# Patient Record
Sex: FEMALE | Race: BLACK OR AFRICAN AMERICAN | NOT HISPANIC OR LATINO | Employment: FULL TIME | URBAN - METROPOLITAN AREA
[De-identification: names, ages, dates, MRNs, and addresses within clinical notes are randomized per-mention and may not be internally consistent; named-entity substitution may affect disease eponyms.]

---

## 2023-12-22 ENCOUNTER — OFFICE VISIT (OUTPATIENT)
Dept: URGENT CARE | Facility: CLINIC | Age: 58
End: 2023-12-22
Payer: COMMERCIAL

## 2023-12-22 VITALS
DIASTOLIC BLOOD PRESSURE: 77 MMHG | SYSTOLIC BLOOD PRESSURE: 123 MMHG | RESPIRATION RATE: 17 BRPM | WEIGHT: 240.5 LBS | OXYGEN SATURATION: 96 % | TEMPERATURE: 99 F | BODY MASS INDEX: 40.07 KG/M2 | HEIGHT: 65 IN | HEART RATE: 96 BPM

## 2023-12-22 DIAGNOSIS — J10.1 INFLUENZA A: ICD-10-CM

## 2023-12-22 DIAGNOSIS — R09.81 NASAL CONGESTION: Primary | ICD-10-CM

## 2023-12-22 DIAGNOSIS — R05.1 ACUTE COUGH: ICD-10-CM

## 2023-12-22 LAB
CTP QC/QA: YES
CTP QC/QA: YES
POC MOLECULAR INFLUENZA A AGN: POSITIVE
POC MOLECULAR INFLUENZA B AGN: NEGATIVE
SARS-COV-2 AG RESP QL IA.RAPID: NEGATIVE

## 2023-12-22 PROCEDURE — 87811 SARS CORONAVIRUS 2 ANTIGEN POCT, MANUAL READ: ICD-10-PCS | Mod: QW,S$GLB,, | Performed by: NURSE PRACTITIONER

## 2023-12-22 PROCEDURE — 99204 PR OFFICE/OUTPT VISIT, NEW, LEVL IV, 45-59 MIN: ICD-10-PCS | Mod: S$GLB,,, | Performed by: NURSE PRACTITIONER

## 2023-12-22 PROCEDURE — 99204 OFFICE O/P NEW MOD 45 MIN: CPT | Mod: S$GLB,,, | Performed by: NURSE PRACTITIONER

## 2023-12-22 PROCEDURE — 87811 SARS-COV-2 COVID19 W/OPTIC: CPT | Mod: QW,S$GLB,, | Performed by: NURSE PRACTITIONER

## 2023-12-22 PROCEDURE — 87502 POCT INFLUENZA A/B MOLECULAR: ICD-10-PCS | Mod: QW,S$GLB,, | Performed by: NURSE PRACTITIONER

## 2023-12-22 PROCEDURE — 87502 INFLUENZA DNA AMP PROBE: CPT | Mod: QW,S$GLB,, | Performed by: NURSE PRACTITIONER

## 2023-12-22 RX ORDER — PROMETHAZINE HYDROCHLORIDE AND DEXTROMETHORPHAN HYDROBROMIDE 6.25; 15 MG/5ML; MG/5ML
5 SYRUP ORAL EVERY 8 HOURS PRN
Qty: 100 ML | Refills: 0 | Status: SHIPPED | OUTPATIENT
Start: 2023-12-22 | End: 2023-12-29

## 2023-12-22 RX ORDER — OSELTAMIVIR PHOSPHATE 75 MG/1
75 CAPSULE ORAL 2 TIMES DAILY
Qty: 10 CAPSULE | Refills: 0 | Status: SHIPPED | OUTPATIENT
Start: 2023-12-22 | End: 2023-12-27

## 2023-12-22 RX ORDER — AZELASTINE 1 MG/ML
1 SPRAY, METERED NASAL 2 TIMES DAILY
Qty: 30 ML | Refills: 0 | Status: SHIPPED | OUTPATIENT
Start: 2023-12-22 | End: 2024-12-21

## 2023-12-22 NOTE — PATIENT INSTRUCTIONS
FLU  Your Rapid FLU test was POSITIVE FOR INFLUENZA A  If your condition worsens or fails to improve we recommend that you receive another evaluation at the ER immediately or contact your PCP to discuss your concerns or return here. You must understand that you've received an urgent care treatment only and that you may be released before all your medical problems are known or treated. You the patient will arrange for follouwp care as instructed.   -  Take full course of Tamilfu as prescribed  -  Flonase (fluticasone) is a nasal spray which is available over the counter and may help with your symptoms.   -  Zyrtec D, Claritin D or Allegra D can also help with symptoms of congestion and drainage.   -  If you just have drainage you can take plain Zyrtec, Claritin or Allegra   -  Rest and fluids are also important.   -  Tylenol or ibuprofen can also be used as directed for pain unless you have an allergy to them or medical condition such as stomach ulcers, kidney or liver disease or blood thinners etc for which you should not be taking these type of medications.   - Do not share any utensils or share drinks   - Wash hands frequently.       You must understand that you've received an Urgent Care treatment only and that you may be released before all your medical problems are known or treated. You, the patient, will arrange for follow up care as instructed.  Follow up with your PCP or specialty clinic as directed in the next 1-2 weeks if not improved or as needed.  You can call [phone] to schedule an appointment with the appropriate provider.  If your condition worsens we recommend that you receive another evaluation at the emergency room immediately or contact your primary medical clinics after hours call service to discuss your concerns.  Please return here or go to the Emergency Department for any concerns or worsening of condition.    If you were prescribed a narcotic or controlled medication, do not drive  or operate heavy equipment or machinery while taking these medications.    Thank you for choosing [facility]!    Our goal in the Urgent Care is to always provide outstanding medical care. You may receive a survey by mail or e-mail in the next week regarding your experience today. We would greatly appreciate you completing and returning the survey. Your feedback provides us with a way to recognize our staff who provide very good care, and it helps us learn how to improve when your experience was below our aspiration of excellence.      We appreciate you trusting us with your medical care. We hope you feel better soon. We will be happy to take care of you for all of your future medical needs.   This note was prepared using voice-recognition software.  Although efforts are made to proofread the note, some errors may persist in the final document.     Sincerely,    Edmund Blanco DNP, FNP-C

## 2023-12-22 NOTE — LETTER
December 22, 2023      [facility] [place]  [address]  [facility] [address]  Phone: [phone]  Fax: [phone]       Patient: Madelaine Barros   YOB: 1965  Date of Visit: 12/22/2023    To Whom It May Concern:    ROBSON Barros  was at [facility] on 12/22/2023. The patient may return to work on 12/26/2023 with no restrictions. If you have any questions or concerns, or if I can be of further assistance, please do not hesitate to contact me.    Sincerely,    Edmund Blanco DNP FNP-C

## 2023-12-22 NOTE — PROGRESS NOTES
"Subjective:      Patient ID: Madelaine Barros is a 58 y.o. female.    Vitals:  height is 5' 5" (1.651 m) and weight is 109.1 kg (240 lb 8.4 oz). Her oral temperature is 98.9 °F (37.2 °C). Her blood pressure is 123/77 and her pulse is 96. Her respiration is 17 and oxygen saturation is 96%.     Chief Complaint: Sinus Problem    58 y.o femlae who presents today with chief complaint of cough, nasal congestion, and sinus pressure x12 days.  Patient has been using saline nasal cleansing and Muccinex and reports no improvement.     Sinus Problem  This is a new problem. The current episode started 1 to 4 weeks ago. There has been no fever. Associated symptoms include congestion, coughing and sinus pressure. Pertinent negatives include no chills, diaphoresis, ear pain, headaches, hoarse voice, neck pain, shortness of breath, sneezing, sore throat or swollen glands. Past treatments include oral decongestants. The treatment provided no relief.     Constitution: Negative for chills, sweating, fatigue, fever, generalized weakness and international travel in last 60 days.   HENT:  Positive for congestion, postnasal drip and sinus pressure. Negative for ear pain, nosebleeds, foreign body in nose, sinus pain and sore throat.    Neck: Negative for neck pain.   Cardiovascular:  Negative for chest pain and palpitations.   Respiratory:  Positive for cough. Negative for chest tightness, sputum production, bloody sputum, COPD, shortness of breath, stridor, wheezing and asthma.    Skin:  Negative for rash.   Allergic/Immunologic: Negative for asthma and sneezing.   Neurological:  Negative for headaches.      Objective:     Physical Exam   Constitutional: She is oriented to person, place, and time. She appears well-developed. She is cooperative.  Non-toxic appearance. She does not appear ill. No distress.   HENT:   Head: Normocephalic and atraumatic.   Ears:   Right Ear: Hearing, tympanic membrane, external ear and ear canal normal. " impacted cerumen  Left Ear: Hearing, tympanic membrane, external ear and ear canal normal. impacted cerumen  Nose: Rhinorrhea and congestion present. No mucosal edema or nasal deformity. No epistaxis. Right sinus exhibits no maxillary sinus tenderness and no frontal sinus tenderness. Left sinus exhibits no maxillary sinus tenderness and no frontal sinus tenderness.   Mouth/Throat: Uvula is midline, oropharynx is clear and moist and mucous membranes are normal. No trismus in the jaw. Normal dentition. No uvula swelling. No oropharyngeal exudate, posterior oropharyngeal edema or posterior oropharyngeal erythema.   Eyes: Conjunctivae and lids are normal. No scleral icterus.   Neck: Trachea normal and phonation normal. Neck supple. No edema present. No erythema present. No neck rigidity present.   Cardiovascular: Normal rate, regular rhythm, normal heart sounds and normal pulses.   Pulmonary/Chest: Effort normal and breath sounds normal. No stridor. No respiratory distress. She has no decreased breath sounds. She has no wheezes. She has no rhonchi. She has no rales. She exhibits no tenderness.   Abdominal: Normal appearance.   Musculoskeletal: Normal range of motion.         General: No deformity. Normal range of motion.   Neurological: She is alert and oriented to person, place, and time. She exhibits normal muscle tone. Coordination normal.   Skin: Skin is warm, dry, intact, not diaphoretic and not pale.   Psychiatric: Her speech is normal and behavior is normal. Judgment and thought content normal.   Nursing note and vitals reviewed.      Assessment:     1. Nasal congestion    2. Influenza A    3. Acute cough      Results for orders placed or performed in visit on 12/22/23   POCT Influenza A/B MOLECULAR   Result Value Ref Range    POC Molecular Influenza A Ag Positive (A) Negative, Not Reported    POC Molecular Influenza B Ag Negative Negative, Not Reported     Acceptable Yes    SARS Coronavirus 2  Antigen, POCT Manual Read   Result Value Ref Range    SARS Coronavirus 2 Antigen Negative Negative     Acceptable Yes         Plan:   Nasal congestion  -     POCT Influenza A/B MOLECULAR  -     SARS Coronavirus 2 Antigen, POCT Manual Read  -     azelastine (ASTELIN) 137 mcg (0.1 %) nasal spray; 1 spray (137 mcg total) by Nasal route 2 (two) times daily.  Dispense: 30 mL; Refill: 0    Influenza A  -     oseltamivir (TAMIFLU) 75 MG capsule; Take 1 capsule (75 mg total) by mouth 2 (two) times daily. for 5 days  Dispense: 10 capsule; Refill: 0    Acute cough  -     promethazine-dextromethorphan (PROMETHAZINE-DM) 6.25-15 mg/5 mL Syrp; Take 5 mLs by mouth every 8 (eight) hours as needed (cough).  Dispense: 100 mL; Refill: 0          FLU  Your Rapid FLU test was POSITIVE FOR INFLUENZA A  If your condition worsens or fails to improve we recommend that you receive another evaluation at the ER immediately or contact your PCP to discuss your concerns or return here. You must understand that you've received an urgent care treatment only and that you may be released before all your medical problems are known or treated. [facility] the [facility].   -  Take full course of Tamilfu as prescribed  -  Flonase (fluticasone) is a nasal spray which is available over the counter and may help with your symptoms.   -  Zyrtec D, Claritin D or Allegra D can also help with symptoms of congestion and drainage.   -  If you just have drainage you can take plain Zyrtec, Claritin or Allegra   -  Rest and fluids are also important.   -  Tylenol or ibuprofen can also be used as directed for pain unless you have an allergy to them or medical condition such as stomach ulcers, kidney or liver disease or blood thinners etc for which you should not be taking these type of medications.   - Do not share any utensils or share drinks   - Wash hands frequently       You must understand that you've received an  Urgent Care treatment only and that you may be released before all your medical problems are known or treated. You, the patient, will arrange for follow up care as instructed.  Follow up with your PCP or specialty clinic as directed in the next 1-2 weeks if not improved or as needed.  You can call [phone] to schedule an appointment with the appropriate provider.  If your condition worsens we recommend that you receive another evaluation at the emergency room immediately or contact your primary medical clinics after hours call service to discuss your concerns.  Please return here or go to the Emergency Department for any concerns or worsening of condition.    If you were prescribed a narcotic or controlled medication, do not drive or operate heavy equipment or machinery while taking these medications.    Thank you for choosing [facility]!    Our goal in the Urgent Care is to always provide outstanding medical care. You may receive a survey by mail or e-mail in the next week regarding your experience today. We would greatly appreciate you completing and returning the survey. Your feedback provides us with a way to recognize our staff who provide very good care, and it helps us learn how to improve when your experience was below our aspiration of excellence.      We appreciate you trusting us with your medical care. We hope you feel better soon. We will be happy to take care of you for all of your future medical needs.   This note was prepared using voice-recognition software.  Although efforts are made to proofread the note, some errors may persist in the final document.     Sincerely,    Edmund Blanco DNP, FNP-C      Additional MDM:     Heart Failure Score:   COPD = No

## 2025-03-02 ENCOUNTER — HOSPITAL ENCOUNTER (INPATIENT)
Facility: HOSPITAL | Age: 60
LOS: 22 days | Discharge: HOME HEALTH | DRG: 377 | End: 2025-03-25
Attending: EMERGENCY MEDICINE | Admitting: HOSPITALIST
Payer: COMMERCIAL

## 2025-03-02 DIAGNOSIS — I10 HYPERTENSION, UNSPECIFIED TYPE: ICD-10-CM

## 2025-03-02 DIAGNOSIS — Z13.6 SCREENING FOR CARDIOVASCULAR CONDITION: ICD-10-CM

## 2025-03-02 DIAGNOSIS — Z93.1 PEG (PERCUTANEOUS ENDOSCOPIC GASTROSTOMY) STATUS: ICD-10-CM

## 2025-03-02 DIAGNOSIS — R00.0 TACHYCARDIA: ICD-10-CM

## 2025-03-02 DIAGNOSIS — R13.10 DYSPHAGIA, UNSPECIFIED TYPE: ICD-10-CM

## 2025-03-02 DIAGNOSIS — R07.9 CHEST PAIN: ICD-10-CM

## 2025-03-02 DIAGNOSIS — K62.5 RECTAL BLEEDING: Primary | ICD-10-CM

## 2025-03-02 PROBLEM — J30.9 ALLERGIC RHINITIS: Status: RESOLVED | Noted: 2019-06-01 | Resolved: 2025-03-02

## 2025-03-02 PROBLEM — E78.5 HYPERLIPIDEMIA: Status: ACTIVE | Noted: 2019-06-01

## 2025-03-02 PROBLEM — N18.6 ESRD (END STAGE RENAL DISEASE): Status: ACTIVE | Noted: 2025-01-05

## 2025-03-02 PROBLEM — D59.39 ATYPICAL HUS (HEMOLYTIC UREMIC SYNDROME) WITH MUTATION IN THROMBOMODULIN GENE: Status: ACTIVE | Noted: 2025-01-05

## 2025-03-02 PROBLEM — K63.5 POLYP OF COLON: Status: RESOLVED | Noted: 2024-03-05 | Resolved: 2025-03-02

## 2025-03-02 PROBLEM — D59.4 MAHA (MICROANGIOPATHIC HEMOLYTIC ANEMIA): Status: ACTIVE | Noted: 2024-11-03

## 2025-03-02 PROBLEM — E43 UNSPECIFIED SEVERE PROTEIN-CALORIE MALNUTRITION: Status: ACTIVE | Noted: 2025-01-02

## 2025-03-02 PROBLEM — M17.11 ARTHRITIS OF RIGHT KNEE: Status: RESOLVED | Noted: 2019-06-01 | Resolved: 2025-03-02

## 2025-03-02 PROBLEM — I82.C11 ACUTE THROMBOSIS OF RIGHT INTERNAL JUGULAR VEIN: Status: ACTIVE | Noted: 2025-03-02

## 2025-03-02 PROBLEM — K21.9 GERD WITHOUT ESOPHAGITIS: Status: ACTIVE | Noted: 2024-08-15

## 2025-03-02 PROBLEM — N18.5 STAGE 5 CHRONIC KIDNEY DISEASE NOT ON CHRONIC DIALYSIS: Status: ACTIVE | Noted: 2024-11-22

## 2025-03-02 PROBLEM — I16.1 HYPERTENSIVE EMERGENCY: Status: RESOLVED | Noted: 2024-10-23 | Resolved: 2025-03-02

## 2025-03-02 PROBLEM — Z86.19 HISTORY OF HELICOBACTER PYLORI INFECTION: Status: ACTIVE | Noted: 2025-03-02

## 2025-03-02 PROBLEM — R74.01 ELEVATED TRANSAMINASE LEVEL: Status: ACTIVE | Noted: 2025-03-02

## 2025-03-02 PROBLEM — K44.9 HIATAL HERNIA: Status: RESOLVED | Noted: 2024-12-11 | Resolved: 2025-03-02

## 2025-03-02 PROBLEM — E51.2 WERNICKE ENCEPHALOPATHY: Status: ACTIVE | Noted: 2025-02-13

## 2025-03-02 PROBLEM — K26.9 DUODENAL ULCER: Status: ACTIVE | Noted: 2024-12-11

## 2025-03-02 PROBLEM — Z86.39 HISTORY OF WERNICKE'S ENCEPHALOPATHY: Status: ACTIVE | Noted: 2025-02-13

## 2025-03-02 PROBLEM — K22.2 SCHATZKI'S RING: Status: ACTIVE | Noted: 2024-12-11

## 2025-03-02 PROBLEM — A04.8 H. PYLORI INFECTION: Status: ACTIVE | Noted: 2025-03-02

## 2025-03-02 PROBLEM — K22.89: Status: ACTIVE | Noted: 2024-12-11

## 2025-03-02 LAB
ABO + RH BLD: NORMAL
ALBUMIN SERPL BCP-MCNC: 2.7 G/DL (ref 3.5–5.2)
ALLENS TEST: ABNORMAL
ALP SERPL-CCNC: 251 U/L (ref 40–150)
ALT SERPL W/O P-5'-P-CCNC: 478 U/L (ref 10–44)
ANION GAP SERPL CALC-SCNC: 18 MMOL/L (ref 8–16)
APTT PPP: 52.8 SEC (ref 21–32)
AST SERPL-CCNC: 733 U/L (ref 10–40)
BASOPHILS # BLD AUTO: 0.01 K/UL (ref 0–0.2)
BASOPHILS NFR BLD: 0.1 % (ref 0–1.9)
BILIRUB SERPL-MCNC: 2.2 MG/DL (ref 0.1–1)
BIPAP: 0
BLD GP AB SCN CELLS X3 SERPL QL: NORMAL
BUN SERPL-MCNC: 35 MG/DL (ref 6–20)
CALCIUM SERPL-MCNC: 8.3 MG/DL (ref 8.7–10.5)
CHLORIDE SERPL-SCNC: 99 MMOL/L (ref 95–110)
CO2 SERPL-SCNC: 18 MMOL/L (ref 23–29)
CREAT SERPL-MCNC: 6.1 MG/DL (ref 0.5–1.4)
D DIMER PPP IA.FEU-MCNC: 0.84 MG/L FEU
DIFFERENTIAL METHOD BLD: ABNORMAL
EOSINOPHIL # BLD AUTO: 0 K/UL (ref 0–0.5)
EOSINOPHIL NFR BLD: 0 % (ref 0–8)
ERYTHROCYTE [DISTWIDTH] IN BLOOD BY AUTOMATED COUNT: 19.9 % (ref 11.5–14.5)
ERYTHROCYTE [DISTWIDTH] IN BLOOD BY AUTOMATED COUNT: 21.2 % (ref 11.5–14.5)
EST. GFR  (NO RACE VARIABLE): 7.4 ML/MIN/1.73 M^2
FIO2: 21 %
GLUCOSE SERPL-MCNC: 81 MG/DL (ref 70–110)
HAV IGM SERPL QL IA: NORMAL
HBV CORE IGM SERPL QL IA: NORMAL
HBV SURFACE AG SERPL QL IA: NORMAL
HCT VFR BLD AUTO: 33.5 % (ref 37–48.5)
HCT VFR BLD AUTO: 45.9 % (ref 37–48.5)
HCV AB SERPL QL IA: NORMAL
HCV AB SERPL QL IA: NORMAL
HGB BLD-MCNC: 10.4 G/DL (ref 12–16)
HGB BLD-MCNC: 14.3 G/DL (ref 12–16)
HIV 1+2 AB+HIV1 P24 AG SERPL QL IA: NORMAL
IMM GRANULOCYTES # BLD AUTO: 0.03 K/UL (ref 0–0.04)
IMM GRANULOCYTES NFR BLD AUTO: 0.4 % (ref 0–0.5)
INFLUENZA A, MOLECULAR: NEGATIVE
INFLUENZA B, MOLECULAR: NEGATIVE
INR PPP: 2.1 (ref 0.8–1.2)
LDH SERPL L TO P-CCNC: 1.9 MMOL/L
LDH SERPL L TO P-CCNC: 3.13 MMOL/L (ref 0.5–2.2)
LYMPHOCYTES # BLD AUTO: 0.9 K/UL (ref 1–4.8)
LYMPHOCYTES NFR BLD: 12.1 % (ref 18–48)
MAGNESIUM SERPL-MCNC: 1.8 MG/DL (ref 1.6–2.6)
MCH RBC QN AUTO: 26.7 PG (ref 27–31)
MCH RBC QN AUTO: 27.1 PG (ref 27–31)
MCHC RBC AUTO-ENTMCNC: 31 G/DL (ref 32–36)
MCHC RBC AUTO-ENTMCNC: 31.2 G/DL (ref 32–36)
MCV RBC AUTO: 86 FL (ref 82–98)
MCV RBC AUTO: 87 FL (ref 82–98)
MONOCYTES # BLD AUTO: 0.5 K/UL (ref 0.3–1)
MONOCYTES NFR BLD: 6.3 % (ref 4–15)
NEUTROPHILS # BLD AUTO: 6 K/UL (ref 1.8–7.7)
NEUTROPHILS NFR BLD: 81.1 % (ref 38–73)
NRBC BLD-RTO: 0 /100 WBC
PLATELET # BLD AUTO: 104 K/UL (ref 150–450)
PLATELET # BLD AUTO: 116 K/UL (ref 150–450)
PMV BLD AUTO: ABNORMAL FL (ref 9.2–12.9)
PMV BLD AUTO: ABNORMAL FL (ref 9.2–12.9)
POC PERFORMED BY: NORMAL
POC TEMPERATURE: 37 C
POTASSIUM SERPL-SCNC: 3.8 MMOL/L (ref 3.5–5.1)
PROT SERPL-MCNC: 5.8 G/DL (ref 6–8.4)
PROTHROMBIN TIME: 21.9 SEC (ref 9–12.5)
RBC # BLD AUTO: 3.89 M/UL (ref 4–5.4)
RBC # BLD AUTO: 5.27 M/UL (ref 4–5.4)
RETICS/RBC NFR AUTO: 1.7 % (ref 0.5–2.5)
SAMPLE: ABNORMAL
SITE: ABNORMAL
SODIUM SERPL-SCNC: 135 MMOL/L (ref 136–145)
SPECIMEN OUTDATE: NORMAL
SPECIMEN SOURCE: NORMAL
SPECIMEN SOURCE: NORMAL
WBC # BLD AUTO: 7.42 K/UL (ref 3.9–12.7)
WBC # BLD AUTO: 9.22 K/UL (ref 3.9–12.7)

## 2025-03-02 PROCEDURE — 80053 COMPREHEN METABOLIC PANEL: CPT | Performed by: EMERGENCY MEDICINE

## 2025-03-02 PROCEDURE — 96360 HYDRATION IV INFUSION INIT: CPT | Mod: 59

## 2025-03-02 PROCEDURE — 96366 THER/PROPH/DIAG IV INF ADDON: CPT

## 2025-03-02 PROCEDURE — 83735 ASSAY OF MAGNESIUM: CPT | Performed by: EMERGENCY MEDICINE

## 2025-03-02 PROCEDURE — 63600175 PHARM REV CODE 636 W HCPCS

## 2025-03-02 PROCEDURE — 93005 ELECTROCARDIOGRAM TRACING: CPT

## 2025-03-02 PROCEDURE — 87389 HIV-1 AG W/HIV-1&-2 AB AG IA: CPT | Performed by: EMERGENCY MEDICINE

## 2025-03-02 PROCEDURE — 85045 AUTOMATED RETICULOCYTE COUNT: CPT | Performed by: HOSPITALIST

## 2025-03-02 PROCEDURE — 86803 HEPATITIS C AB TEST: CPT | Performed by: EMERGENCY MEDICINE

## 2025-03-02 PROCEDURE — 80074 ACUTE HEPATITIS PANEL: CPT

## 2025-03-02 PROCEDURE — G0378 HOSPITAL OBSERVATION PER HR: HCPCS

## 2025-03-02 PROCEDURE — 25000003 PHARM REV CODE 250

## 2025-03-02 PROCEDURE — 96361 HYDRATE IV INFUSION ADD-ON: CPT

## 2025-03-02 PROCEDURE — 96365 THER/PROPH/DIAG IV INF INIT: CPT

## 2025-03-02 PROCEDURE — 93010 ELECTROCARDIOGRAM REPORT: CPT | Mod: ,,, | Performed by: INTERNAL MEDICINE

## 2025-03-02 PROCEDURE — 83605 ASSAY OF LACTIC ACID: CPT

## 2025-03-02 PROCEDURE — 85379 FIBRIN DEGRADATION QUANT: CPT | Performed by: HOSPITALIST

## 2025-03-02 PROCEDURE — 85610 PROTHROMBIN TIME: CPT

## 2025-03-02 PROCEDURE — 86880 COOMBS TEST DIRECT: CPT | Performed by: HOSPITALIST

## 2025-03-02 PROCEDURE — 25000003 PHARM REV CODE 250: Performed by: EMERGENCY MEDICINE

## 2025-03-02 PROCEDURE — 87502 INFLUENZA DNA AMP PROBE: CPT

## 2025-03-02 PROCEDURE — 82803 BLOOD GASES ANY COMBINATION: CPT

## 2025-03-02 PROCEDURE — 96367 TX/PROPH/DG ADDL SEQ IV INF: CPT

## 2025-03-02 PROCEDURE — 85730 THROMBOPLASTIN TIME PARTIAL: CPT

## 2025-03-02 PROCEDURE — 25500020 PHARM REV CODE 255: Performed by: EMERGENCY MEDICINE

## 2025-03-02 PROCEDURE — 99900035 HC TECH TIME PER 15 MIN (STAT)

## 2025-03-02 PROCEDURE — 87040 BLOOD CULTURE FOR BACTERIA: CPT | Mod: 59

## 2025-03-02 PROCEDURE — 83010 ASSAY OF HAPTOGLOBIN QUANT: CPT | Performed by: HOSPITALIST

## 2025-03-02 PROCEDURE — 83615 LACTATE (LD) (LDH) ENZYME: CPT | Performed by: HOSPITALIST

## 2025-03-02 PROCEDURE — 96375 TX/PRO/DX INJ NEW DRUG ADDON: CPT

## 2025-03-02 PROCEDURE — 85027 COMPLETE CBC AUTOMATED: CPT | Performed by: HOSPITALIST

## 2025-03-02 PROCEDURE — 85025 COMPLETE CBC W/AUTO DIFF WBC: CPT

## 2025-03-02 PROCEDURE — 86901 BLOOD TYPING SEROLOGIC RH(D): CPT | Performed by: HOSPITALIST

## 2025-03-02 RX ORDER — CHOLECALCIFEROL (VITAMIN D3) 25 MCG
1 TABLET ORAL DAILY
COMMUNITY
Start: 2025-02-18 | End: 2025-03-20

## 2025-03-02 RX ORDER — ATORVASTATIN CALCIUM 80 MG/1
1 TABLET, FILM COATED ORAL DAILY
Status: ON HOLD | COMMUNITY
Start: 2025-02-17 | End: 2025-03-20

## 2025-03-02 RX ORDER — NAPROXEN SODIUM 220 MG/1
1 TABLET, FILM COATED ORAL DAILY
Status: ON HOLD | COMMUNITY
Start: 2025-02-18 | End: 2025-03-20

## 2025-03-02 RX ORDER — VANCOMYCIN 1.75 GRAM/500 ML IN 0.9 % SODIUM CHLORIDE INTRAVENOUS
25 ONCE
Status: COMPLETED | OUTPATIENT
Start: 2025-03-02 | End: 2025-03-02

## 2025-03-02 RX ORDER — FOLIC ACID 1 MG/1
1000 TABLET ORAL DAILY
COMMUNITY
Start: 2024-11-07

## 2025-03-02 RX ORDER — APIXABAN 5 MG/1
5 TABLET, FILM COATED ORAL 2 TIMES DAILY
COMMUNITY
Start: 2025-02-17

## 2025-03-02 RX ORDER — SCOPOLAMINE 1 MG/3D
1 PATCH, EXTENDED RELEASE TRANSDERMAL
COMMUNITY
Start: 2024-12-19 | End: 2025-03-03

## 2025-03-02 RX ORDER — HYDRALAZINE HYDROCHLORIDE 50 MG/1
50 TABLET, FILM COATED ORAL EVERY 8 HOURS
COMMUNITY
Start: 2024-11-07 | End: 2025-03-03

## 2025-03-02 RX ORDER — PANTOPRAZOLE SODIUM 40 MG/1
40 TABLET, DELAYED RELEASE ORAL DAILY
COMMUNITY
Start: 2025-02-17

## 2025-03-02 RX ORDER — LIDOCAINE HYDROCHLORIDE 20 MG/ML
JELLY TOPICAL
Status: COMPLETED | OUTPATIENT
Start: 2025-03-02 | End: 2025-03-02

## 2025-03-02 RX ORDER — MORPHINE SULFATE 4 MG/ML
4 INJECTION, SOLUTION INTRAMUSCULAR; INTRAVENOUS
Refills: 0 | Status: COMPLETED | OUTPATIENT
Start: 2025-03-02 | End: 2025-03-02

## 2025-03-02 RX ORDER — PANTOPRAZOLE SODIUM 40 MG/10ML
80 INJECTION, POWDER, LYOPHILIZED, FOR SOLUTION INTRAVENOUS
Status: COMPLETED | OUTPATIENT
Start: 2025-03-02 | End: 2025-03-02

## 2025-03-02 RX ORDER — ESCITALOPRAM OXALATE 10 MG/1
1 TABLET ORAL DAILY
COMMUNITY
Start: 2025-02-18 | End: 2025-03-20

## 2025-03-02 RX ORDER — CARVEDILOL 12.5 MG/1
12.5 TABLET ORAL 2 TIMES DAILY
COMMUNITY
Start: 2024-11-18 | End: 2025-03-03

## 2025-03-02 RX ORDER — FUROSEMIDE 20 MG/1
TABLET ORAL
COMMUNITY
End: 2025-03-03 | Stop reason: CLARIF

## 2025-03-02 RX ORDER — POLYETHYLENE GLYCOL 3350 17 G/17G
17 POWDER, FOR SOLUTION ORAL DAILY
COMMUNITY
Start: 2024-12-19 | End: 2025-03-03

## 2025-03-02 RX ORDER — THIAMINE HCL 100 MG
100 TABLET ORAL DAILY
COMMUNITY
Start: 2025-02-18

## 2025-03-02 RX ORDER — FAMOTIDINE 20 MG/1
20 TABLET, FILM COATED ORAL DAILY
COMMUNITY
Start: 2024-11-07 | End: 2025-03-03 | Stop reason: CLARIF

## 2025-03-02 RX ORDER — NIFEDIPINE 60 MG/1
60 TABLET, EXTENDED RELEASE ORAL 2 TIMES DAILY
COMMUNITY
Start: 2024-11-07 | End: 2025-03-03

## 2025-03-02 RX ORDER — MIRTAZAPINE 15 MG/1
15 TABLET, FILM COATED ORAL NIGHTLY
COMMUNITY
Start: 2025-02-17

## 2025-03-02 RX ADMIN — SODIUM CHLORIDE 2163 ML: 9 INJECTION, SOLUTION INTRAVENOUS at 04:03

## 2025-03-02 RX ADMIN — VANCOMYCIN HYDROCHLORIDE 1750 MG: 5 INJECTION, POWDER, LYOPHILIZED, FOR SOLUTION INTRAVENOUS at 06:03

## 2025-03-02 RX ADMIN — LIDOCAINE HYDROCHLORIDE 10 ML: 20 JELLY TOPICAL at 04:03

## 2025-03-02 RX ADMIN — IOHEXOL 75 ML: 350 INJECTION, SOLUTION INTRAVENOUS at 07:03

## 2025-03-02 RX ADMIN — MORPHINE SULFATE 4 MG: 4 INJECTION INTRAVENOUS at 07:03

## 2025-03-02 RX ADMIN — PIPERACILLIN SODIUM AND TAZOBACTAM SODIUM 4.5 G: 4; .5 INJECTION, POWDER, FOR SOLUTION INTRAVENOUS at 05:03

## 2025-03-02 RX ADMIN — PANTOPRAZOLE SODIUM 80 MG: 40 INJECTION, POWDER, LYOPHILIZED, FOR SOLUTION INTRAVENOUS at 06:03

## 2025-03-02 NOTE — ED PROVIDER NOTES
"Encounter Date: 3/2/2025       History     Chief Complaint   Patient presents with    Melena     From home. Reports +melena, bleeding gums, and "sore to rectum" that's burning. Given Fentanyl 65 mcg intranasal by Lisette. -blood thinners. AOx4     Madelaine LINDSAY Jonel this is a 59-year-old female with a history of ESRD on HD MWF, HTN, HLD, atypical hemolytic anemia/HUS presents to the ED with reports of melena and rectal pain at home.  Patient brought in by patient's sister who notes patient has had a very complicated medical history in the last few months.  Patient has had a roughly 100 lb weight loss since September, and has been in and out of the hospitals.  Most recently patient had a stay at [facility] for anemia and malnutrition, found to have concerning hemolysis and started on Eliquis with possible outpatient bone marrow biopsy considered.  In the past few days, patient has had increasing vomiting and p.o. intolerance, with some bleeding about the anus and gums noted by family.  Here in the ED, patient is endorsing significant rectal pain and pain with defecation.  Patient denies fevers, shortness of breath, chest pain, abdominal pain, new rashes.  Patient's most recent dialysis was on Friday        Review of patient's allergies indicates:  No Known Allergies  Past Medical History:   Diagnosis Date    Allergic rhinitis 06/01/2019    Diabetes mellitus     Hiatal hernia 12/11/2024    Hypertension     Polyp of colon 03/05/2024    Renal disorder      Past Surgical History:   Procedure Laterality Date    HYSTERECTOMY      TUBAL LIGATION       No family history on file.  Social History[1]  Review of Systems  10-point Review of Systems was performed and is negative/non-contributory unless otherwise stated in above HPI.    Physical Exam     Initial Vitals   BP Pulse Resp Temp SpO2   03/02/25 1433 03/02/25 1433 03/02/25 1802 03/02/25 1433 03/02/25 1433   (!) 150/109 (!) 120 14 97.7 °F (36.5 °C) 98 %      MAP       --        "         Physical Exam    Constitutional: She is not diaphoretic. She appears cachectic. She appears distressed.   HENT:   Head: Normocephalic and atraumatic. Mouth/Throat: Oropharynx is clear and moist.   Eyes: Conjunctivae and EOM are normal. Pupils are equal, round, and reactive to light. No scleral icterus.   Neck: No tracheal deviation present. No JVD present.   Normal range of motion.  Cardiovascular:  Normal rate, regular rhythm, normal heart sounds and intact distal pulses.     Exam reveals no gallop and no friction rub.       No murmur heard.  Pulmonary/Chest: Breath sounds normal. No stridor. No respiratory distress. She has no wheezes. She has no rhonchi. She has no rales.   Abdominal: Abdomen is soft. She exhibits no distension. There is no abdominal tenderness. There is no rebound.   Genitourinary:       Musculoskeletal:         General: No edema. Normal range of motion.      Cervical back: Normal range of motion.     Neurological: She is alert and oriented to person, place, and time.   Skin: Skin is warm and dry.               ED Course   ED US Guided Misc Procedure    Date/Time: 3/3/2025 5:51 PM    Performed by: Nasra Nava MD  Authorized by: Zoya Cadena MD    Procedure:  Ultrasound-guided peripheral venous cannulation  Indication:  Failed or difficult IV access   Left   Antecubital  Procedure:  Candidate vein examined with linear probe - confirmed collapsibility, lack of pulsatility, and proper anatomic location.  Catheter gauge:  20   Flushes easily and without pain. Patient tolerated procedure well.  Complications:  None    Labs Reviewed   CBC W/ AUTO DIFFERENTIAL - Abnormal       Result Value    WBC 7.42      RBC 5.27      Hemoglobin 14.3      Hematocrit 45.9      MCV 87      MCH 27.1      MCHC 31.2 (*)     RDW 21.2 (*)     Platelets 116 (*)     MPV SEE COMMENT      Immature Granulocytes 0.4      Gran # (ANC) 6.0      Immature Grans (Abs) 0.03      Lymph # 0.9 (*)     Mono # 0.5      Eos #  0.0      Baso # 0.01      nRBC 0      Gran % 81.1 (*)     Lymph % 12.1 (*)     Mono % 6.3      Eosinophil % 0.0      Basophil % 0.1      Differential Method Automated     APTT - Abnormal    aPTT 52.8 (*)    PROTIME-INR - Abnormal    Prothrombin Time 21.9 (*)     INR 2.1 (*)    COMPREHENSIVE METABOLIC PANEL - Abnormal    Sodium 135 (*)     Potassium 3.8      Chloride 99      CO2 18 (*)     Glucose 81      BUN 35 (*)     Creatinine 6.1 (*)     Calcium 8.3 (*)     Total Protein 5.8 (*)     Albumin 2.7 (*)     Total Bilirubin 2.2 (*)     Alkaline Phosphatase 251 (*)      (*)      (*)     eGFR 7.4 (*)     Anion Gap 18 (*)    CBC WITHOUT DIFFERENTIAL - Abnormal    WBC 9.22      RBC 3.89 (*)     Hemoglobin 10.4 (*)     Hematocrit 33.5 (*)     MCV 86      MCH 26.7 (*)     MCHC 31.0 (*)     RDW 19.9 (*)     Platelets 104 (*)     MPV SEE COMMENT     D DIMER, QUANTITATIVE - Abnormal    D-Dimer 0.84 (*)    ISTAT LACTATE - Abnormal    POC Lactate 3.13 (*)     Sample VENOUS      Site Other      Allens Test N/A     INFLUENZA A & B BY MOLECULAR    Influenza A, Molecular Negative      Influenza B, Molecular Negative      Flu A & B Source Nasal swab     CULTURE, BLOOD   CULTURE, BLOOD   CLOSTRIDIUM DIFFICILE   HEPATITIS C ANTIBODY    Hepatitis C Ab Non-reactive      Narrative:     Release to patient->Immediate   HIV 1 / 2 ANTIBODY    HIV 1/2 Ag/Ab Non-reactive      Narrative:     Release to patient->Immediate   MAGNESIUM    Magnesium 1.8     HEPATITIS PANEL, ACUTE    Hepatitis B Surface Ag Non-reactive      Hep B C IgM Non-reactive      Hep A IgM Non-reactive      Hepatitis C Ab Non-reactive     RETICULOCYTES    Retic 1.7     URINALYSIS, REFLEX TO URINE CULTURE   LACTATE DEHYDROGENASE   HAPTOGLOBIN   TYPE & SCREEN   DIRECT ANTIGLOBULIN TEST   ISTAT CHEM8          Imaging Results               CT Abdomen Pelvis With IV Contrast NO Oral Contrast (Final result)  Result time 03/02/25 20:23:37      Final result by Alba  Madhu SUNSHINE MD (03/02/25 20:23:37)                   Impression:      1. Gastroduodenitis involving the gastric pylorus and duodenal bulb.  Minimal small bowel wall thickening in the left hemiabdomen and mild diffuse colonic wall thickening which could be exaggerated by decompressed state.  Suggest correlation for any clinical signs of enteritis or colitis.  2. Small volume pericholecystic fluid is likely reactive in the setting of adjacent gastroduodenitis or liver dysfunction.  No cholelithiasis or other evidence of cholecystitis.  3. Circumferential bladder wall thickening which may be seen in setting of cystitis.  Recommend correlation with urinalysis.  4. Small volume subcapsular fluid along the posterior lower pole left kidney of uncertain etiology.  5. Anasarca.  6. Probable hepatic steatosis and nonspecific subtle heterogeneous enhancement of the liver.  Suggest correlation with laboratory values and risk factors for hepatitis.  7. Additional findings as above.  This report was flagged in Epic as abnormal.    Electronically signed by resident: Jac Hawkins  Date:    03/02/2025  Time:    19:42    Electronically signed by: Madhu Willis MD  Date:    03/02/2025  Time:    20:23               Narrative:    EXAMINATION:  CT ABDOMEN PELVIS WITH IV CONTRAST    CLINICAL HISTORY:  Sepsis;Sepsis with elevated LFTs and coagulopathy, c/f hepatitis;    TECHNIQUE:  Low dose axial images, sagittal and coronal reformations were obtained from the lung bases to the pubic symphysis following the IV administration of 75 mL of Omnipaque 350 .  Oral contrast was not given.    COMPARISON:  Report of CT abdomen pelvis 12/29/2024 and abdominal ultrasound 12/27/2024 (images unavailable)    FINDINGS:  SOFT TISSUES: Diffuse body wall edema.    LUNG BASES/VISUALIZED MEDIASTINUM: Unremarkable.    HEPATOBILIARY: Liver is normal size with diffuse parenchymal hypoattenuation suggestive of steatosis.  Heterogeneous enhancement of the liver  which could be related to hepatitis/inflammation in this patient with elevated LFTs.  No focal hepatic lesions.  No biliary duct dilatation.  Small volume pericholecystic fluid interposed between the gallbladder in the liver.  No calcified gallstones, wall thickening, or pericholecystic stranding.    PANCREAS: No focal masses or ductal dilatation.    SPLEEN: Normal size.    ADRENALS: No adrenal nodules.    KIDNEYS/URETERS: Kidneys enhance symmetrically. Crescentic hypoattenuating collection along the medial inferior pole of left kidney, possibly related to remote subcapsular hemorrhage.  Multifocal left renal cortical scarring.  No obvious enhancing renal lesion though there is possible cortical scarring.  No nephrolithiasis or hydronephrosis. No solid mass lesions. Ureters are unremarkable.    BLADDER/PELVIC ORGANS: Mild circumferential bladder wall thickening.  Hysterectomy.  No adnexal masses.    PERITONEUM / RETROPERITONEUM: No free air or fluid.    LYMPH NODES: No lymphadenopathy.    VESSELS: No aortic aneurysm.  Major aortic branch vessels are patent.  Portal venous system is patent noting narrowing of the main portal vein as it passes under the duodenum.  Decompressed IVC which may be seen the setting of volume depletion.    GI TRACT: Mucosal hyperenhancement, submucosal edema, and wall thickening of the gastric pylorus and duodenal bulb.  No evidence of bowel obstruction.  Minimal small bowel wall thickening in the left hemiabdomen.  Few colonic diverticula.  Appendix is normal.  Mild diffuse colonic wall thickening which could be exaggerated by decompressed state.    BONES: Transitional lumbosacral anatomy.  Advanced degenerative change of the hips.  Degenerative changes spine.  No acute fracture or aggressive appearing osseous lesion.  No acute fractures or suspicious osseous lesions.  Transitional lumbosacral vertebral body at L5 with pseudoarticulation of the left transverse process with the sacrum.                                        X-Ray Chest AP Portable (Final result)  Result time 03/02/25 18:22:49      Final result by Howard Mendez MD (03/02/25 18:22:49)                   Impression:      1. Interstitial findings are accentuated by positioning, superimposed edema is a consideration although correlation is needed.      Electronically signed by: Howard Mendez MD  Date:    03/02/2025  Time:    18:22               Narrative:    EXAMINATION:  XR CHEST AP PORTABLE    CLINICAL HISTORY:  Sepsis;    TECHNIQUE:  Single frontal view of the chest was performed.    COMPARISON:  None    FINDINGS:  Right central venous catheter tip projects over the distal SVC.  The cardiomediastinal silhouette is not enlarged noting patient is rotated..  There is no pleural effusion.  The trachea is midline.  The lungs are symmetrically expanded bilaterally with mildly coarse interstitial attenuation, accentuated by overlying habitus given patient rotation..  No large focal consolidation seen.  There is no pneumothorax.  The osseous structures are remarkable for degenerative change..                                       Medications   piperacillin-tazobactam (ZOSYN) 4.5 g in D5W 100 mL IVPB (MB+) (has no administration in time range)   sodium chloride 0.9% bolus 2,163 mL 2,163 mL (0 mLs Intravenous Stopped 3/2/25 1843)   vancomycin 1750 mg in 0.9% sodium chloride 500 mL IVPB (0 mg Intravenous Stopped 3/2/25 2051)   LIDOcaine HCl 2% urojet (10 mLs Mucous Membrane Given 3/2/25 1604)   pantoprazole injection 80 mg (80 mg Intravenous Given 3/2/25 1844)   morphine injection 4 mg (4 mg Intravenous Given 3/2/25 1942)   iohexoL (OMNIPAQUE 350) injection 75 mL (75 mLs Intravenous Given 3/2/25 1922)     Medical Decision Making  Madelaine Barros is a 59 y.o. female with a past medical history of ESRD on HD MWF, HTN, HLD, atypical hemolytic anemia/HUS presenting to the ED with vomiting, bleeding, and malnutrition. History and physical  exam as above. Initial vital signs concerning for persistent tachycardia. Vital signs addressed with fluid bolus. Initial work-up to include sepsis bundle: Labs (CBC, CMP, PT-INR, Magnesium, Lactate, UA, Blood Cultures, type and screen), Imaging (CXR,), EKG, IV Fluids (NS 30 mL/kg bolus), broad-spectrum antibiotics, pantoprazole    Differential diagnosis for this patient includes, but is not limited to:  Sepsis (persistent tachycardia and tachypnea, open wound around anus with fecal contamination, easy bleeding may represent DIC, however is afebrile), GI bleed (dark stools with positive Hemoccult), liver dysfunction (given bleeding from anus and gums with minimal trauma), malnutrition (bleeding gums and cachexia).      Results of workup include significant transaminase elevation despite normal transaminases charted in January.  This raises suspicion for liver dysfunction, hepatitis versus acute liver injury.  Ordered CT abdomen pelvis with IV contrast to assess, which resulted with significant gastroduodenitis with increased uptake in the liver, concerning for steatosis versus hepatitis.  Ordered acute hepatitis panel    Discussed with Hospital Medicine regarding admission for further workup and management of suspected hepatitis versus acute liver injury.  Patient admitted to Hospital Medicine    This patient does have evidence of infective focus  My overall impression is sepsis.  Source: Skin and Soft Tissue (location anus)  Antibiotics given- Antibiotics (72h ago, onward)    Start     Stop Route Frequency Ordered    03/02/25 1515  piperacillin-tazobactam (ZOSYN) 4.5 g in D5W 100 mL IVPB   (MB+)  (Sepsis Workup)         03/03/25 1514 IV Every 8 hours (non-standard times) 03/02/25 1502    03/02/25 1515  vancomycin 1750 mg in 0.9% sodium chloride 500 mL IVPB    (Sepsis Workup)         -- IV Once 03/02/25 1502      Latest lactate reviewed-  Lab             03/02/25                       1620          POCLAC        3.13*         Organ dysfunction indicated by Acute liver injury and Thrombocytopenia     Fluid challenge Actual Body weight- Patient will receive 30ml/kg actual body weight to calculate fluid bolus for treatment of septic shock.     Post- resuscitation assessment Yes - I attest a sepsis perfusion exam was performed within 6 hours of sepsis, severe sepsis, or septic shock presentation, following fluid resuscitation.      Will Not start Pressors- Levophed for MAP of 65  Source control achieved by:  IV antibiotics     Amount and/or Complexity of Data Reviewed  Labs: ordered. Decision-making details documented in ED Course.  Radiology: ordered.    Risk  Prescription drug management.  Decision regarding hospitalization.              Attending Attestation:         Attending Critical Care:   Critical Care Times:   ==============================================================  Total Critical Care Time - exclusive of procedural time: 40 minutes.  ==============================================================  Critical care was necessary to treat or prevent imminent or life-threatening deterioration of the following conditions: sepsis (gi bleed).   Critical care was time spent personally by me on the following activities: development of treatment plan with patient or relative, ordering and performing treatments and interventions, evaluation of patient's response to treatment, discussion with consultants and re-evaluation of patient's conition.   Critical Care Condition: potentially life-threatening       Attending ED Notes:   Attending Note:  I have seen the patient, have repeated the key portions of the history and physical, reviewed and agree with the medical documentation, and supervised and managed the medical care of the patient. Additionally, I was present for the critical portion of any procedure(s) performed.    59 F hx complicated medical hx here for rectal pain, melena, vomiting with weakness  Labs show new  transaminitis, denies alcohol use  CT concerning for acute gastroduodenitis, treated with protonix  Abx given, lactate improved with IVF  Admit to     ARI Nava MD  Staff ED Physician  03/03/2025 5:50 PM            ED Course as of 03/02/25 2304   Sun Mar 02, 2025   1658 Hemoglobin: 14.3 [GM]   1658 WBC: 7.42 [GM]   1658 INR(!): 2.1 [GM]   1658 POC Lactate(!): 3.13 [GM]   1732 IV antibiotics and fluids were delayed due to difficult IV access.    I presented to bedside and placed a left 18 gauge AC ultrasound-guided IV line. [GM]      ED Course User Index  [GM] Nasra Nava MD          Signed,    Luke Vogel MD  Emergency Medicine, PGY-1  [facility]       Medical Decision Making:   Clinical Tests:   Sepsis Perfusion Assessment: "I attest a sepsis perfusion exam was performed within 6 hours of sepsis, severe sepsis, or septic shock presentation, following fluid resuscitation."             Clinical Impression:  Final diagnoses:  [R00.0] Tachycardia  [Z13.6] Screening for cardiovascular condition          ED Disposition Condition    Observation                   Luke Vogel MD  Resident  03/02/25 2304       Nasra Nava MD  03/03/25 1753         [1]   Social History  Tobacco Use    Smoking status: Never   Substance Use Topics    Alcohol use: Yes     Comment: occasionally    Drug use: No        Nasra Nava MD  03/21/25 7260

## 2025-03-02 NOTE — Clinical Note
Diagnosis: Tachycardia [591049]   Future Attending Provider: WOO GERBER [[id number]]   Reason for IP Medical Treatment  (Clinical interventions that can only be accomplished in the IP setting? ) :: rectal bleeding; frequent CBC monitoring, daily labs, IV PPI BID, Hematology, Nephrology consults, hemodialysis, VS monitoring   Plans for Post-Acute care--if anticipated (pick the single best option):: A. No post acute care anticipated at this time   Special Needs:: No Special Needs [1]

## 2025-03-03 ENCOUNTER — OFFICE VISIT (OUTPATIENT)
Dept: DIALYSIS | Facility: HOSPITAL | Age: 60
End: 2025-03-03
Attending: EMERGENCY MEDICINE
Payer: COMMERCIAL

## 2025-03-03 PROBLEM — R19.7 DIARRHEA: Status: ACTIVE | Noted: 2025-03-03

## 2025-03-03 PROBLEM — D59.39 ATYPICAL HUS (HEMOLYTIC UREMIC SYNDROME) WITH MUTATION IN THROMBOMODULIN GENE: Chronic | Status: ACTIVE | Noted: 2025-01-05

## 2025-03-03 PROBLEM — S37.012S: Status: ACTIVE | Noted: 2025-03-03

## 2025-03-03 PROBLEM — N18.5 ANEMIA OF CHRONIC RENAL FAILURE, STAGE 5: Chronic | Status: ACTIVE | Noted: 2025-03-03

## 2025-03-03 PROBLEM — D63.1 ANEMIA OF CHRONIC RENAL FAILURE, STAGE 5: Chronic | Status: ACTIVE | Noted: 2025-03-03

## 2025-03-03 PROBLEM — E51.2 WERNICKE ENCEPHALOPATHY: Chronic | Status: ACTIVE | Noted: 2025-02-13

## 2025-03-03 PROBLEM — N18.6 ESRD (END STAGE RENAL DISEASE): Chronic | Status: ACTIVE | Noted: 2025-01-05

## 2025-03-03 PROBLEM — N18.5 ANEMIA OF CHRONIC RENAL FAILURE, STAGE 5: Status: ACTIVE | Noted: 2025-03-03

## 2025-03-03 PROBLEM — K21.9 GERD WITHOUT ESOPHAGITIS: Chronic | Status: ACTIVE | Noted: 2024-08-15

## 2025-03-03 PROBLEM — G93.41 METABOLIC ENCEPHALOPATHY: Status: ACTIVE | Noted: 2025-03-03

## 2025-03-03 PROBLEM — R13.10 DYSPHAGIA: Chronic | Status: ACTIVE | Noted: 2024-12-09

## 2025-03-03 PROBLEM — D63.1 ANEMIA OF CHRONIC RENAL FAILURE, STAGE 5: Status: ACTIVE | Noted: 2025-03-03

## 2025-03-03 LAB
ALBUMIN SERPL BCP-MCNC: 2.6 G/DL (ref 3.5–5.2)
ALBUMIN SERPL BCP-MCNC: 2.8 G/DL (ref 3.5–5.2)
ALP SERPL-CCNC: 225 U/L (ref 40–150)
ALP SERPL-CCNC: 269 U/L (ref 40–150)
ALT SERPL W/O P-5'-P-CCNC: 420 U/L (ref 10–44)
ALT SERPL W/O P-5'-P-CCNC: 502 U/L (ref 10–44)
ANION GAP SERPL CALC-SCNC: 17 MMOL/L (ref 8–16)
ANION GAP SERPL CALC-SCNC: 20 MMOL/L (ref 8–16)
APTT PPP: 49.8 SEC (ref 21–32)
AST SERPL-CCNC: 694 U/L (ref 10–40)
AST SERPL-CCNC: 799 U/L (ref 10–40)
BACTERIA #/AREA URNS AUTO: NORMAL /HPF
BASOPHILS # BLD AUTO: 0 K/UL (ref 0–0.2)
BASOPHILS # BLD AUTO: 0.01 K/UL (ref 0–0.2)
BASOPHILS NFR BLD: 0 % (ref 0–1.9)
BASOPHILS NFR BLD: 0.1 % (ref 0–1.9)
BILIRUB SERPL-MCNC: 2.1 MG/DL (ref 0.1–1)
BILIRUB SERPL-MCNC: 2.3 MG/DL (ref 0.1–1)
BILIRUB UR QL STRIP: ABNORMAL
BLD PROD TYP BPU: NORMAL
BLD PROD TYP BPU: NORMAL
BLOOD UNIT EXPIRATION DATE: NORMAL
BLOOD UNIT EXPIRATION DATE: NORMAL
BLOOD UNIT TYPE CODE: 600
BLOOD UNIT TYPE CODE: 6200
BLOOD UNIT TYPE: NORMAL
BLOOD UNIT TYPE: NORMAL
BUN SERPL-MCNC: 41 MG/DL (ref 6–20)
BUN SERPL-MCNC: 43 MG/DL (ref 6–20)
C3 SERPL-MCNC: 57 MG/DL (ref 50–180)
C4 SERPL-MCNC: 27 MG/DL (ref 11–44)
CALCIUM SERPL-MCNC: 8.4 MG/DL (ref 8.7–10.5)
CALCIUM SERPL-MCNC: 8.6 MG/DL (ref 8.7–10.5)
CHLORIDE SERPL-SCNC: 100 MMOL/L (ref 95–110)
CHLORIDE SERPL-SCNC: 99 MMOL/L (ref 95–110)
CLARITY UR REFRACT.AUTO: ABNORMAL
CO2 SERPL-SCNC: 17 MMOL/L (ref 23–29)
CO2 SERPL-SCNC: 17 MMOL/L (ref 23–29)
CODING SYSTEM: NORMAL
CODING SYSTEM: NORMAL
COLOR UR AUTO: ABNORMAL
CREAT SERPL-MCNC: 6.2 MG/DL (ref 0.5–1.4)
CREAT SERPL-MCNC: 6.4 MG/DL (ref 0.5–1.4)
CROSSMATCH INTERPRETATION: NORMAL
CROSSMATCH INTERPRETATION: NORMAL
DAT IGG-SP REAG RBC-IMP: NORMAL
DIFFERENTIAL METHOD BLD: ABNORMAL
DISPENSE STATUS: NORMAL
DISPENSE STATUS: NORMAL
EOSINOPHIL # BLD AUTO: 0 K/UL (ref 0–0.5)
EOSINOPHIL NFR BLD: 0 % (ref 0–8)
EOSINOPHIL NFR BLD: 0.1 % (ref 0–8)
ERYTHROCYTE [DISTWIDTH] IN BLOOD BY AUTOMATED COUNT: 19.8 % (ref 11.5–14.5)
ERYTHROCYTE [DISTWIDTH] IN BLOOD BY AUTOMATED COUNT: 19.8 % (ref 11.5–14.5)
ERYTHROCYTE [DISTWIDTH] IN BLOOD BY AUTOMATED COUNT: 19.9 % (ref 11.5–14.5)
ERYTHROCYTE [DISTWIDTH] IN BLOOD BY AUTOMATED COUNT: 20 % (ref 11.5–14.5)
ERYTHROCYTE [DISTWIDTH] IN BLOOD BY AUTOMATED COUNT: 20.8 % (ref 11.5–14.5)
EST. GFR  (NO RACE VARIABLE): 7 ML/MIN/1.73 M^2
EST. GFR  (NO RACE VARIABLE): 7.3 ML/MIN/1.73 M^2
FIBRINOGEN PPP-MCNC: 207 MG/DL (ref 182–400)
GGT SERPL-CCNC: 447 U/L (ref 8–55)
GLUCOSE SERPL-MCNC: 83 MG/DL (ref 70–110)
GLUCOSE SERPL-MCNC: 94 MG/DL (ref 70–110)
GLUCOSE UR QL STRIP: ABNORMAL
HAPTOGLOB SERPL-MCNC: <10 MG/DL (ref 30–250)
HCT VFR BLD AUTO: 27.7 % (ref 37–48.5)
HCT VFR BLD AUTO: 33.7 % (ref 37–48.5)
HCT VFR BLD AUTO: 34.7 % (ref 37–48.5)
HCT VFR BLD AUTO: 35.2 % (ref 37–48.5)
HCT VFR BLD AUTO: 35.5 % (ref 37–48.5)
HGB BLD-MCNC: 10.9 G/DL (ref 12–16)
HGB BLD-MCNC: 11.1 G/DL (ref 12–16)
HGB BLD-MCNC: 8.8 G/DL (ref 12–16)
HGB UR QL STRIP: ABNORMAL
HYALINE CASTS UR QL AUTO: 1 /LPF
IMM GRANULOCYTES # BLD AUTO: 0.03 K/UL (ref 0–0.04)
IMM GRANULOCYTES # BLD AUTO: 0.03 K/UL (ref 0–0.04)
IMM GRANULOCYTES # BLD AUTO: 0.04 K/UL (ref 0–0.04)
IMM GRANULOCYTES # BLD AUTO: 0.05 K/UL (ref 0–0.04)
IMM GRANULOCYTES # BLD AUTO: 0.06 K/UL (ref 0–0.04)
IMM GRANULOCYTES NFR BLD AUTO: 0.3 % (ref 0–0.5)
IMM GRANULOCYTES NFR BLD AUTO: 0.3 % (ref 0–0.5)
IMM GRANULOCYTES NFR BLD AUTO: 0.4 % (ref 0–0.5)
IMM GRANULOCYTES NFR BLD AUTO: 0.5 % (ref 0–0.5)
IMM GRANULOCYTES NFR BLD AUTO: 0.6 % (ref 0–0.5)
INR PPP: 2.1 (ref 0.8–1.2)
KETONES UR QL STRIP: NEGATIVE
LDH SERPL L TO P-CCNC: 804 U/L (ref 110–260)
LEUKOCYTE ESTERASE UR QL STRIP: ABNORMAL
LYMPHOCYTES # BLD AUTO: 0.9 K/UL (ref 1–4.8)
LYMPHOCYTES # BLD AUTO: 1.1 K/UL (ref 1–4.8)
LYMPHOCYTES # BLD AUTO: 1.5 K/UL (ref 1–4.8)
LYMPHOCYTES NFR BLD: 10 % (ref 18–48)
LYMPHOCYTES NFR BLD: 10.5 % (ref 18–48)
LYMPHOCYTES NFR BLD: 10.7 % (ref 18–48)
LYMPHOCYTES NFR BLD: 14.6 % (ref 18–48)
LYMPHOCYTES NFR BLD: 8.6 % (ref 18–48)
MAGNESIUM SERPL-MCNC: 1.9 MG/DL (ref 1.6–2.6)
MCH RBC QN AUTO: 26.9 PG (ref 27–31)
MCH RBC QN AUTO: 27 PG (ref 27–31)
MCH RBC QN AUTO: 27.2 PG (ref 27–31)
MCH RBC QN AUTO: 27.4 PG (ref 27–31)
MCH RBC QN AUTO: 27.5 PG (ref 27–31)
MCHC RBC AUTO-ENTMCNC: 31.3 G/DL (ref 32–36)
MCHC RBC AUTO-ENTMCNC: 31.4 G/DL (ref 32–36)
MCHC RBC AUTO-ENTMCNC: 31.5 G/DL (ref 32–36)
MCHC RBC AUTO-ENTMCNC: 31.8 G/DL (ref 32–36)
MCHC RBC AUTO-ENTMCNC: 32.9 G/DL (ref 32–36)
MCV RBC AUTO: 83 FL (ref 82–98)
MCV RBC AUTO: 86 FL (ref 82–98)
MICROSCOPIC COMMENT: NORMAL
MONOCYTES # BLD AUTO: 1 K/UL (ref 0.3–1)
MONOCYTES # BLD AUTO: 1 K/UL (ref 0.3–1)
MONOCYTES # BLD AUTO: 1.1 K/UL (ref 0.3–1)
MONOCYTES # BLD AUTO: 1.2 K/UL (ref 0.3–1)
MONOCYTES # BLD AUTO: 1.5 K/UL (ref 0.3–1)
MONOCYTES NFR BLD: 11.1 % (ref 4–15)
MONOCYTES NFR BLD: 11.2 % (ref 4–15)
MONOCYTES NFR BLD: 14.4 % (ref 4–15)
MONOCYTES NFR BLD: 9.8 % (ref 4–15)
MONOCYTES NFR BLD: 9.9 % (ref 4–15)
NEUTROPHILS # BLD AUTO: 7.1 K/UL (ref 1.8–7.7)
NEUTROPHILS # BLD AUTO: 8.1 K/UL (ref 1.8–7.7)
NEUTROPHILS # BLD AUTO: 8.1 K/UL (ref 1.8–7.7)
NEUTROPHILS # BLD AUTO: 8.2 K/UL (ref 1.8–7.7)
NEUTROPHILS # BLD AUTO: 8.4 K/UL (ref 1.8–7.7)
NEUTROPHILS NFR BLD: 70.5 % (ref 38–73)
NEUTROPHILS NFR BLD: 78.3 % (ref 38–73)
NEUTROPHILS NFR BLD: 78.7 % (ref 38–73)
NEUTROPHILS NFR BLD: 79.4 % (ref 38–73)
NEUTROPHILS NFR BLD: 79.7 % (ref 38–73)
NITRITE UR QL STRIP: NEGATIVE
NRBC BLD-RTO: 0 /100 WBC
NUM UNITS TRANS FFP: NORMAL
NUM UNITS TRANS FFP: NORMAL
OHS QRS DURATION: 76 MS
OHS QRS DURATION: 82 MS
OHS QTC CALCULATION: 486 MS
OHS QTC CALCULATION: 492 MS
PH UR STRIP: 6 [PH] (ref 5–8)
PHOSPHATE SERPL-MCNC: 4.2 MG/DL (ref 2.7–4.5)
PLATELET # BLD AUTO: 107 K/UL (ref 150–450)
PLATELET # BLD AUTO: 111 K/UL (ref 150–450)
PLATELET # BLD AUTO: 111 K/UL (ref 150–450)
PLATELET # BLD AUTO: 122 K/UL (ref 150–450)
PLATELET # BLD AUTO: 88 K/UL (ref 150–450)
PMV BLD AUTO: ABNORMAL FL (ref 9.2–12.9)
POCT GLUCOSE: 111 MG/DL (ref 70–110)
POCT GLUCOSE: 93 MG/DL (ref 70–110)
POCT GLUCOSE: 95 MG/DL (ref 70–110)
POCT GLUCOSE: 98 MG/DL (ref 70–110)
POCT GLUCOSE: 98 MG/DL (ref 70–110)
POTASSIUM SERPL-SCNC: 4 MMOL/L (ref 3.5–5.1)
POTASSIUM SERPL-SCNC: 4.2 MMOL/L (ref 3.5–5.1)
PROT SERPL-MCNC: 6 G/DL (ref 6–8.4)
PROT SERPL-MCNC: 6.1 G/DL (ref 6–8.4)
PROT UR QL STRIP: ABNORMAL
PROTHROMBIN TIME: 21.9 SEC (ref 9–12.5)
RBC # BLD AUTO: 3.21 M/UL (ref 4–5.4)
RBC # BLD AUTO: 4.04 M/UL (ref 4–5.4)
RBC # BLD AUTO: 4.04 M/UL (ref 4–5.4)
RBC # BLD AUTO: 4.08 M/UL (ref 4–5.4)
RBC # BLD AUTO: 4.12 M/UL (ref 4–5.4)
RBC #/AREA URNS AUTO: 3 /HPF (ref 0–4)
SODIUM SERPL-SCNC: 133 MMOL/L (ref 136–145)
SODIUM SERPL-SCNC: 137 MMOL/L (ref 136–145)
SP GR UR STRIP: 1.03 (ref 1–1.03)
SQUAMOUS #/AREA URNS AUTO: 0 /HPF
TROPONIN I SERPL DL<=0.01 NG/ML-MCNC: 1102 NG/L (ref 0–14)
TROPONIN I SERPL DL<=0.01 NG/ML-MCNC: 740 NG/L (ref 0–14)
URN SPEC COLLECT METH UR: ABNORMAL
WBC # BLD AUTO: 10.06 K/UL (ref 3.9–12.7)
WBC # BLD AUTO: 10.13 K/UL (ref 3.9–12.7)
WBC # BLD AUTO: 10.19 K/UL (ref 3.9–12.7)
WBC # BLD AUTO: 10.4 K/UL (ref 3.9–12.7)
WBC # BLD AUTO: 10.7 K/UL (ref 3.9–12.7)
WBC #/AREA STL HPF: NORMAL /[HPF]
WBC #/AREA URNS AUTO: 2 /HPF (ref 0–5)

## 2025-03-03 PROCEDURE — 83735 ASSAY OF MAGNESIUM: CPT | Performed by: HOSPITALIST

## 2025-03-03 PROCEDURE — 80053 COMPREHEN METABOLIC PANEL: CPT | Mod: 91 | Performed by: HOSPITALIST

## 2025-03-03 PROCEDURE — 5A1D70Z PERFORMANCE OF URINARY FILTRATION, INTERMITTENT, LESS THAN 6 HOURS PER DAY: ICD-10-PCS | Performed by: HOSPITALIST

## 2025-03-03 PROCEDURE — 86160 COMPLEMENT ANTIGEN: CPT | Performed by: HOSPITALIST

## 2025-03-03 PROCEDURE — G0257 UNSCHED DIALYSIS ESRD PT HOS: HCPCS

## 2025-03-03 PROCEDURE — 96366 THER/PROPH/DIAG IV INF ADDON: CPT

## 2025-03-03 PROCEDURE — 87046 STOOL CULTR AEROBIC BACT EA: CPT | Performed by: HOSPITALIST

## 2025-03-03 PROCEDURE — 81001 URINALYSIS AUTO W/SCOPE: CPT | Performed by: HOSPITALIST

## 2025-03-03 PROCEDURE — 85025 COMPLETE CBC W/AUTO DIFF WBC: CPT | Performed by: HOSPITALIST

## 2025-03-03 PROCEDURE — 51798 US URINE CAPACITY MEASURE: CPT

## 2025-03-03 PROCEDURE — 85025 COMPLETE CBC W/AUTO DIFF WBC: CPT | Mod: 91 | Performed by: HOSPITALIST

## 2025-03-03 PROCEDURE — 89055 LEUKOCYTE ASSESSMENT FECAL: CPT | Performed by: HOSPITALIST

## 2025-03-03 PROCEDURE — 94761 N-INVAS EAR/PLS OXIMETRY MLT: CPT

## 2025-03-03 PROCEDURE — 93010 ELECTROCARDIOGRAM REPORT: CPT | Mod: ,,, | Performed by: INTERNAL MEDICINE

## 2025-03-03 PROCEDURE — 84484 ASSAY OF TROPONIN QUANT: CPT | Performed by: HOSPITALIST

## 2025-03-03 PROCEDURE — 85730 THROMBOPLASTIN TIME PARTIAL: CPT | Performed by: HOSPITALIST

## 2025-03-03 PROCEDURE — 87045 FECES CULTURE AEROBIC BACT: CPT | Performed by: HOSPITALIST

## 2025-03-03 PROCEDURE — 63600175 PHARM REV CODE 636 W HCPCS: Performed by: HOSPITALIST

## 2025-03-03 PROCEDURE — 82977 ASSAY OF GGT: CPT | Performed by: HOSPITALIST

## 2025-03-03 PROCEDURE — 85384 FIBRINOGEN ACTIVITY: CPT | Performed by: HOSPITALIST

## 2025-03-03 PROCEDURE — 36430 TRANSFUSION BLD/BLD COMPNT: CPT

## 2025-03-03 PROCEDURE — 99233 SBSQ HOSP IP/OBS HIGH 50: CPT | Mod: ,,, | Performed by: INTERNAL MEDICINE

## 2025-03-03 PROCEDURE — 87427 SHIGA-LIKE TOXIN AG IA: CPT | Performed by: HOSPITALIST

## 2025-03-03 PROCEDURE — 85610 PROTHROMBIN TIME: CPT | Performed by: HOSPITALIST

## 2025-03-03 PROCEDURE — 20600001 HC STEP DOWN PRIVATE ROOM

## 2025-03-03 PROCEDURE — 96367 TX/PROPH/DG ADDL SEQ IV INF: CPT

## 2025-03-03 PROCEDURE — 84100 ASSAY OF PHOSPHORUS: CPT | Performed by: HOSPITALIST

## 2025-03-03 PROCEDURE — 27000207 HC ISOLATION

## 2025-03-03 PROCEDURE — P9017 PLASMA 1 DONOR FRZ W/IN 8 HR: HCPCS | Performed by: HOSPITALIST

## 2025-03-03 PROCEDURE — 36415 COLL VENOUS BLD VENIPUNCTURE: CPT | Performed by: HOSPITALIST

## 2025-03-03 PROCEDURE — 87449 NOS EACH ORGANISM AG IA: CPT | Performed by: HOSPITALIST

## 2025-03-03 PROCEDURE — 86160 COMPLEMENT ANTIGEN: CPT | Mod: 59 | Performed by: HOSPITALIST

## 2025-03-03 PROCEDURE — P9612 CATHETERIZE FOR URINE SPEC: HCPCS

## 2025-03-03 PROCEDURE — 25000003 PHARM REV CODE 250: Performed by: HOSPITALIST

## 2025-03-03 PROCEDURE — 93005 ELECTROCARDIOGRAM TRACING: CPT

## 2025-03-03 PROCEDURE — 63600175 PHARM REV CODE 636 W HCPCS: Performed by: STUDENT IN AN ORGANIZED HEALTH CARE EDUCATION/TRAINING PROGRAM

## 2025-03-03 PROCEDURE — 30233M1 TRANSFUSION OF NONAUTOLOGOUS PLASMA CRYOPRECIPITATE INTO PERIPHERAL VEIN, PERCUTANEOUS APPROACH: ICD-10-PCS | Performed by: HOSPITALIST

## 2025-03-03 PROCEDURE — 99285 EMERGENCY DEPT VISIT HI MDM: CPT | Mod: 25

## 2025-03-03 RX ORDER — GLUCAGON 1 MG
1 KIT INJECTION
Status: DISCONTINUED | OUTPATIENT
Start: 2025-03-03 | End: 2025-03-25 | Stop reason: HOSPADM

## 2025-03-03 RX ORDER — ALUMINUM HYDROXIDE, MAGNESIUM HYDROXIDE, AND SIMETHICONE 1200; 120; 1200 MG/30ML; MG/30ML; MG/30ML
30 SUSPENSION ORAL 4 TIMES DAILY PRN
Status: DISCONTINUED | OUTPATIENT
Start: 2025-03-03 | End: 2025-03-25 | Stop reason: HOSPADM

## 2025-03-03 RX ORDER — THIAMINE HCL 100 MG
100 TABLET ORAL DAILY
Status: DISCONTINUED | OUTPATIENT
Start: 2025-03-03 | End: 2025-03-25 | Stop reason: HOSPADM

## 2025-03-03 RX ORDER — HEPARIN SODIUM,PORCINE/D5W 25000/250
0-40 INTRAVENOUS SOLUTION INTRAVENOUS CONTINUOUS
Status: DISCONTINUED | OUTPATIENT
Start: 2025-03-03 | End: 2025-03-03

## 2025-03-03 RX ORDER — NALOXONE HCL 0.4 MG/ML
0.02 VIAL (ML) INJECTION
Status: DISCONTINUED | OUTPATIENT
Start: 2025-03-03 | End: 2025-03-25 | Stop reason: HOSPADM

## 2025-03-03 RX ORDER — MUPIROCIN 20 MG/G
OINTMENT TOPICAL 2 TIMES DAILY
Status: CANCELLED | OUTPATIENT
Start: 2025-03-03 | End: 2025-03-08

## 2025-03-03 RX ORDER — AMOXICILLIN 250 MG
1 CAPSULE ORAL 2 TIMES DAILY PRN
Status: DISCONTINUED | OUTPATIENT
Start: 2025-03-03 | End: 2025-03-25 | Stop reason: HOSPADM

## 2025-03-03 RX ORDER — FOLIC ACID 1 MG/1
1000 TABLET ORAL DAILY
Status: DISCONTINUED | OUTPATIENT
Start: 2025-03-03 | End: 2025-03-25 | Stop reason: HOSPADM

## 2025-03-03 RX ORDER — IBUPROFEN 200 MG
24 TABLET ORAL
Status: DISCONTINUED | OUTPATIENT
Start: 2025-03-03 | End: 2025-03-25 | Stop reason: HOSPADM

## 2025-03-03 RX ORDER — SODIUM CHLORIDE 9 MG/ML
INJECTION, SOLUTION INTRAVENOUS
Status: CANCELLED | OUTPATIENT
Start: 2025-03-03

## 2025-03-03 RX ORDER — HEPARIN SODIUM 1000 [USP'U]/ML
1000 INJECTION, SOLUTION INTRAVENOUS; SUBCUTANEOUS
Status: DISCONTINUED | OUTPATIENT
Start: 2025-03-03 | End: 2025-03-25 | Stop reason: HOSPADM

## 2025-03-03 RX ORDER — SODIUM CHLORIDE 9 MG/ML
INJECTION, SOLUTION INTRAVENOUS ONCE
Status: CANCELLED | OUTPATIENT
Start: 2025-03-03 | End: 2025-03-03

## 2025-03-03 RX ORDER — CHOLECALCIFEROL (VITAMIN D3) 25 MCG
1000 TABLET ORAL DAILY
Status: DISCONTINUED | OUTPATIENT
Start: 2025-03-03 | End: 2025-03-25 | Stop reason: HOSPADM

## 2025-03-03 RX ORDER — ESCITALOPRAM OXALATE 10 MG/1
10 TABLET ORAL DAILY
Status: DISCONTINUED | OUTPATIENT
Start: 2025-03-03 | End: 2025-03-25 | Stop reason: HOSPADM

## 2025-03-03 RX ORDER — POLYETHYLENE GLYCOL 3350 17 G/17G
17 POWDER, FOR SOLUTION ORAL DAILY PRN
Status: DISCONTINUED | OUTPATIENT
Start: 2025-03-03 | End: 2025-03-25 | Stop reason: HOSPADM

## 2025-03-03 RX ORDER — ONDANSETRON HYDROCHLORIDE 2 MG/ML
4 INJECTION, SOLUTION INTRAVENOUS EVERY 8 HOURS PRN
Status: DISCONTINUED | OUTPATIENT
Start: 2025-03-03 | End: 2025-03-25 | Stop reason: HOSPADM

## 2025-03-03 RX ORDER — PROCHLORPERAZINE EDISYLATE 5 MG/ML
5 INJECTION INTRAMUSCULAR; INTRAVENOUS EVERY 6 HOURS PRN
Status: DISCONTINUED | OUTPATIENT
Start: 2025-03-03 | End: 2025-03-25 | Stop reason: HOSPADM

## 2025-03-03 RX ORDER — TALC
6 POWDER (GRAM) TOPICAL NIGHTLY PRN
Status: DISCONTINUED | OUTPATIENT
Start: 2025-03-03 | End: 2025-03-25 | Stop reason: HOSPADM

## 2025-03-03 RX ORDER — IBUPROFEN 200 MG
16 TABLET ORAL
Status: DISCONTINUED | OUTPATIENT
Start: 2025-03-03 | End: 2025-03-25 | Stop reason: HOSPADM

## 2025-03-03 RX ORDER — MIRTAZAPINE 15 MG/1
15 TABLET, FILM COATED ORAL NIGHTLY
Status: DISCONTINUED | OUTPATIENT
Start: 2025-03-03 | End: 2025-03-11

## 2025-03-03 RX ORDER — HEPARIN SODIUM 1000 [USP'U]/ML
1000 INJECTION, SOLUTION INTRAVENOUS; SUBCUTANEOUS
Status: DISCONTINUED | OUTPATIENT
Start: 2025-03-03 | End: 2025-03-03

## 2025-03-03 RX ORDER — ACETAMINOPHEN 325 MG/1
650 TABLET ORAL EVERY 4 HOURS PRN
Status: DISCONTINUED | OUTPATIENT
Start: 2025-03-03 | End: 2025-03-03

## 2025-03-03 RX ORDER — PANTOPRAZOLE SODIUM 40 MG/10ML
40 INJECTION, POWDER, LYOPHILIZED, FOR SOLUTION INTRAVENOUS 2 TIMES DAILY
Status: DISCONTINUED | OUTPATIENT
Start: 2025-03-03 | End: 2025-03-05

## 2025-03-03 RX ORDER — HYDROCODONE BITARTRATE AND ACETAMINOPHEN 500; 5 MG/1; MG/1
TABLET ORAL
Status: DISCONTINUED | OUTPATIENT
Start: 2025-03-03 | End: 2025-03-05

## 2025-03-03 RX ORDER — SODIUM CHLORIDE 0.9 % (FLUSH) 0.9 %
10 SYRINGE (ML) INJECTION EVERY 6 HOURS PRN
Status: DISCONTINUED | OUTPATIENT
Start: 2025-03-03 | End: 2025-03-25 | Stop reason: HOSPADM

## 2025-03-03 RX ADMIN — PANTOPRAZOLE SODIUM 40 MG: 40 INJECTION, POWDER, LYOPHILIZED, FOR SOLUTION INTRAVENOUS at 08:03

## 2025-03-03 RX ADMIN — PANTOPRAZOLE SODIUM 40 MG: 40 INJECTION, POWDER, LYOPHILIZED, FOR SOLUTION INTRAVENOUS at 09:03

## 2025-03-03 RX ADMIN — FOLIC ACID 1000 MCG: 1 TABLET ORAL at 08:03

## 2025-03-03 RX ADMIN — Medication 1000 UNITS: at 08:03

## 2025-03-03 RX ADMIN — PIPERACILLIN SODIUM AND TAZOBACTAM SODIUM 4.5 G: 4; .5 INJECTION, POWDER, FOR SOLUTION INTRAVENOUS at 01:03

## 2025-03-03 RX ADMIN — HEPARIN SODIUM 1000 UNITS: 1000 INJECTION, SOLUTION INTRAVENOUS; SUBCUTANEOUS at 03:03

## 2025-03-03 RX ADMIN — PHYTONADIONE 5 MG: 10 INJECTION, EMULSION INTRAMUSCULAR; INTRAVENOUS; SUBCUTANEOUS at 05:03

## 2025-03-03 RX ADMIN — Medication 100 MG: at 08:03

## 2025-03-03 RX ADMIN — ESCITALOPRAM OXALATE 10 MG: 5 TABLET, FILM COATED ORAL at 08:03

## 2025-03-03 RX ADMIN — NEPHROCAP 1 CAPSULE: 1 CAP ORAL at 09:03

## 2025-03-03 RX ADMIN — MIRTAZAPINE 15 MG: 15 TABLET, FILM COATED ORAL at 09:03

## 2025-03-03 NOTE — PROGRESS NOTES
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     She receives all care in [facility], history provided by daughter Sue Barros at bedside.  She was diagnosed with HUS and developed severe KERRY now ESRD in November 2024, she is followed by hematology @ [facility] and has been on immunomodulator infusions every 8 weeks (Soliris, now Ultomiris last dose 2/24/25).    She has severe debility, poor appetite dysphagia to solids, mostly consumes liquid, but daughter is concerned her nutritional intake is inadequate, inquires about feeding tube placement for chronic management.  She had watery stool today,  pts sister noted at home earlier that gingival bleeding was present.  Pt has had c/o of rectal pain, on ED rectal exam, reported anal lesion w/ tenderness and slow bleeding noted, (pictures in media tab)   Patient has not required frequent transfusions, hx of a duodenal ulcer but no reported past history of lower GI bleeding.  Daughter today notes all home anti-hypertensive have been discontinued since leaving IP rehab.     At bedside patient appears ill lethargic, cannot fully participate in interview, wakes up periodically w/ acute pain complaints.     In ED tonight started on treatment for sepsis - given 30cc/kg Bolus 2.1Liters, initial lactic acid 1.9, blood cultuers obtained and vancomycin/zosyn given.  Recent [facility] hgb values in 8.0-9.0 range.   Initial hgb here was 14 and repeat values  are pending.   She has receiving Morphine 4mg IV for pain,  Protonix 80mg IV x 1, topical lidocaine for rectal pain.     Recent admission history   1/31-2/18 - [facility] IP rehab    1/5-1/31/25 - [facility] Admit  - Encephalopathy diagnosed with Wernicke Encephalopathy s/p high dose IV thiamine on maintenance tharpy now.  She had intractable nausea vomiting.  Was on empiric high dose steroids for possible CNS vasculitis s/p cerebral angiogram on 1/13 w/o signs of vasculitis.  REquired ICU level of care during admit for shock, suspected secondary to anti-hypertensive use. Patient tapered to one medication during admit, She was diagnosed with Right IJ DVT and started on oral apixaban for anticoagulation.      12/25/24-01/05/25 - [facility] Admit - HTN emergency & intractable N/V, started on Ultomiris inpt, Diagnosis & treatment for Wernicke Encephalopathy, w/u for Scleroderma, transferred to [facility] for rheumatology consult.     12/09-12/19/24 - [facility] admit - Nausea/vomiting weakness, seen by GI s/p EGD w/ duodenal ulcer, hiatal hernia, Schatzki ring    11/15-11/18/24 - [facility] admit - Admit w/ N/V/ HA for hypertensive emergency causing NSTEMI, no acute changes on ECHO, PRBC transfusion given. High sensitive troponin peak at 677.      10/23-11/06/24 - [facility] admit - HTN emergency w/ KERRY diagnosed with MAHA/HUS, s/p renal biopsy & genetic testing.   Diagnosed & treated for latent syphilis w/ PCN per ID.  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 LFTs up, may be acttively hemalyzing.  hgb 14.3-> 10.4 -> 11. and lethargy/debility .  cr 6.4, INR 2.1.  CRS, hematology, Nephrology and SW/ CM consulted.  LDH and GGT is high.     Called Sue Barros.   Pt lacks capacity. Sue  did sign a blood consent last night.   Wants pt to go to [facility]. Wants regular MD's for ongoing care and treatment for HUS. Pt has been declining mental status since October 2024 since illness began. N&V causes malnourishment and delirium. Some recovery of MS and declined again. Now still with memory loss, loss of orientation, does not remember illness. Not eating properly. Pt's mother had dementia. Will continue ongoing care as needed, including HD, transfusion and what is needed and work to arrange transfer.     Interval History: see above    Review of Systems   Constitutional:  Positive for activity change. Negative for appetite change, chills and fever.   HENT:  Negative for trouble swallowing.    Respiratory:  Negative for cough and shortness of breath.    Cardiovascular:  Negative for chest pain and leg swelling.   Gastrointestinal:  Positive for rectal pain. Negative for abdominal pain, anal bleeding, constipation, diarrhea and nausea.   Genitourinary:  Negative for difficulty urinating.   Musculoskeletal:  Negative for arthralgias, back pain and gait problem.   Neurological:  Negative for light-headedness, numbness and headaches.   Psychiatric/Behavioral:  Positive for confusion. Negative for behavioral problems.      Objective:     Vital Signs (Most Recent):  Temp: 97.8 °F (36.6 °C) (03/03/25 0624)  Pulse: (!) 111 (03/03/25 0624)  Resp: 12 (03/03/25 0624)  BP: 113/81 (03/03/25 0624)  SpO2: 97 % (03/03/25 0624) Vital Signs (24h Range):  Temp:  [97.6 °F (36.4 °C)-98 °F (36.7 °C)] 97.8 °F (36.6 °C)  Pulse:  [] 111  Resp:  [11-20] 12  SpO2:  [95 %-98 %] 97 %  BP: (113-184)/() 113/81     Weight: 72.1 kg (158 lb 15.2 oz)  Body mass index is 26.45 kg/m².  No intake or output data in the 24 hours ending 03/03/25 0747      Physical Exam  Constitutional:       General: She is not in acute distress.     Appearance: Normal appearance. She is ill-appearing.   HENT:      Head: Normocephalic  and atraumatic.      Nose: Nose normal.      Mouth/Throat:      Mouth: Mucous membranes are moist.   Eyes:      General: No scleral icterus.     Extraocular Movements: Extraocular movements intact.      Pupils: Pupils are equal, round, and reactive to light.   Cardiovascular:      Rate and Rhythm: Normal rate and regular rhythm.      Pulses: Normal pulses.      Heart sounds: Normal heart sounds.   Pulmonary:      Effort: Pulmonary effort is normal.      Breath sounds: Normal breath sounds. No wheezing or rhonchi.   Chest:      Chest wall: No tenderness.   Abdominal:      General: Abdomen is flat. Bowel sounds are normal. There is no distension.      Palpations: Abdomen is soft.      Tenderness: There is no abdominal tenderness. There is no right CVA tenderness, left CVA tenderness, guarding or rebound.   Musculoskeletal:         General: No swelling, tenderness or deformity. Normal range of motion.      Cervical back: Normal range of motion and neck supple. No rigidity or tenderness.   Skin:     General: Skin is warm and dry.      Coloration: Skin is not jaundiced or pale.      Findings: No erythema or rash.   Neurological:      General: No focal deficit present.      Mental Status: She is alert. Mental status is at baseline.      Cranial Nerves: No cranial nerve deficit.      Motor: No weakness.   Psychiatric:         Attention and Perception: She is inattentive.         Mood and Affect: Affect is blunt.         Speech: Speech is delayed.         Behavior: Behavior is slowed and withdrawn.         Cognition and Memory: Cognition is impaired. Memory is impaired.             Significant Labs: All pertinent labs within the past 24 hours have been reviewed.  CBC:   Recent Labs   Lab 03/02/25  1609 03/02/25  2218 03/03/25  0510   WBC 7.42 9.22 10.06   HGB 14.3 10.4* 11.1*   HCT 45.9 33.5* 35.2*   * 104* 111*     CMP:   Recent Labs   Lab 03/02/25  1732 03/03/25  0510   * 133*   K 3.8 4.0   CL 99 99   CO2 18*  17*   GLU 81 83   BUN 35* 41*   CREATININE 6.1* 6.4*   CALCIUM 8.3* 8.4*   PROT 5.8* 6.0   ALBUMIN 2.7* 2.6*   BILITOT 2.2* 2.3*   ALKPHOS 251* 269*   * 799*   * 502*   ANIONGAP 18* 17*       Significant Imaging: I have reviewed all pertinent imaging results/findings within the past 24 hours.    Assessment and Plan     * Rectal bleeding  Patient with undifferentiated rectal pain, ?lesion and rectal bleeding.  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 Rising PT/INR and PTT compared to last [facility] 1/31 values.       Metabolic encephalopathy  Pt with waxing and waning changes in MS since October, 2024. Related to HUS.   3/3- she lacks capacity, unable to communicate about her illness.     I spoke to her DTR, Sue Barros, who wants pt to go to [facility]. Wants regular MD's for ongoing care and treatment for HUS.        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   -last Ultomiris dose 2/24/25 - outpt hematologist is Roma Cantu MD  @ [facility]  -Consult Hematology given concern for now active hemolysis again based on lab studies and report of both rectal bleeding and gingival bleeding.   -type & screen and blood consent obtained  -check fibrinogen and complement levels, prior notes indicate complement mediated HUS.     -Discuss with hematology in AM re: need for bridging anticoagulation in setting of active  hemolysis and potential GI bleeding with recent diagnosis of Right IJ Clot    -Would discuss with transfer center regarding transfer to [facility]- daughter requests [facility] if transfer occurs for continuity of care.     ESRD (end stage renal disease)  Consult nephrology     Pt given sepsis bolus 2.1L in ED earlier tonight - CXR with possible edema, no acute effusions on CT abdomen      Elevated transaminase level  Hepatic steatosis on CT tonight   -she has severe malnutrition - her elevated bilirubin I suspect is from hemolysis, but not sure hemolysis explains acutely elevated transaminase levels.   -Worsening hepatic function may contribute to her worsening coagulopathy elevated PT/PTT      Acute thrombosis of right internal jugular vein  Diagnosed during [facility] January hospital admission on apixaban as outpatient.     With concern for active bleeding - holding anticoagulation - would f/u with hematology regarding bridging anticoagulation with IV heparin infusion      Renal hematoma, left, sequela  Bud imaging with concern for left renal lesion that is believed to be secondary to hematoma from her renal biopsy in late 2024.  Current imaging shows resolving appearance of remote hematoma.     Diarrhea  ED team has ordered C.diff studies.  Daughter notes patient having about 3 watery BM per day.     If having watery stool will leave orders for C.diff studies. Add additional pending stool studies.   If patient on primarily a liquid diet and low oral intake this may be cause but CT shows some small bowel wall thickening and colonic thickening  so  an infectious enterocolitis remains on differential.     Started on sepsis protocol in ED -  I will hold further empiric IV antibiotics at this time.       Wernicke encephalopathy  Continue on home thiamine supplementation.       Dysphagia  NPO for now pending CRS evaluation     Recent [facility] notes indicate pt possibly afraid to eat, she has had severe weight loss in recent months with  acute decline in health with HUS syndrome.   Continue IV PPI -     Unspecified severe protein-calorie malnutrition  Severe malnutrition patient with recent hx of worsening dysphagia, has had w/u for scleroderma, has hiatal hernia and ?esophageal dysmotility  - dysphagia to solids.   Developed wernicke encephalopathy from nutritional deficiency     Daughter reports concern of inadequate intake has had severe weight loss in recent months with multiple complex health conditions.   Nutrition consult for tube feed recs.     If patient remains admit here and GI bleed w/u completed/resolved - she inquires about tube feed placement to management severe malnutrition.     GERD without esophagitis  On protonix as outaptient, hx of duodenal ulcer diagnosed in recent months     Continue IV PPI BId for now       History of Helicobacter pylori infection  Diagnosed via EGD and reported treated in [facility] system.         VTE Risk Mitigation (From admission, onward)           Ordered     IP VTE HIGH RISK PATIENT  Once         03/03/25 0416     Place sequential compression device  Until discontinued         03/03/25 0416     Reason for No Pharmacological VTE Prophylaxis  Once        Question:  Reasons:  Answer:  Active Bleeding    03/03/25 0416                    Discharge Planning   PATRICIA:      Code Status: Full Code   Medical Readiness for Discharge Date:                Zoya Cadena MD  Department of Hospital Medicine   [facility] - Emergency Dept

## 2025-03-03 NOTE — ASSESSMENT & PLAN NOTE
Diagnosed via EGD and reported treated in [facility] system.

## 2025-03-03 NOTE — ASSESSMENT & PLAN NOTE
Consult nephrology     Pt given sepsis bolus 2.1L in ED earlier tonight - CXR with possible edema, no acute effusions on CT abdomen

## 2025-03-03 NOTE — ASSESSMENT & PLAN NOTE
Severe malnutrition patient with recent hx of worsening dysphagia, has had w/u for scleroderma, has hiatal hernia and ?esophageal dysmotility  - dysphagia to solids.   Developed wernicke encephalopathy from nutritional deficiency     Daughter reports concern of inadequate intake has had severe weight loss in recent months with multiple complex health conditions.   Nutrition consult for tube feed recs.     If patient remains admit here and GI bleed w/u completed/resolved - she inquires about tube feed placement to management severe malnutrition.

## 2025-03-03 NOTE — HPI
Madelaine Barros is a 59 year old Black woman with hypertension, 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 She lives in [place] [facility] [place]. She works for [employer]. Her primary care physician is Dr. Delilah Kelley. Her hematologist is Dr. Roma Cantu.              She was hospitalized at [facility] from 10/23/2024 to 11/6/2024.              She was hospitalized at [facility] from 11/15/2024 to 1/18/2024.              She was hospitalized at [facility] from 12/9/2024 to 12/19/2024.              She was hospitalized at [facility] from 12/25/2024 to 1/5/2025.              She was hospitalized at [facility] from 1/5/2025 to 1/31/2025. She was discharged to [facility] inpatient rehab until 2/18/2025.               She presented to [facility] Emergency Department on 3/2/2025 with rectal pain and bleeding. She receives her care in the [facility] system, not at [facility]. History was provided by her daughter Sue Barros. She is followed by Hematology at [facility] and has been on immunomodulator infusions every 8 weeks (Soliris, now Ultomiris, last dose 2/24/2025). She has severe debility, poor appetite, dysphagia to solids, mostly consumes liquid, but her daughter was concerned her nutritional intake is inadequate and inquired about feeding tube placement for chronic nutrition. She had watery stool on the day  of presentation. Her sister noted that she had gingival bleeding. She reported an anal lesion with tenderness and slow bleeding. She has not required frequent transfusions. Her daughter noted that all home antihypertensive medications were discontinued since she was discharged from inpatient rehab.              In the emergency department, she appeared ill, lethargic, unable to fully participate in interview, periodically awakening with pain. Labs showed hemoglobin 14.3 g/dL (from 13.2 on 2/24/2025). Repeat hemoglobin was 10.4. Alkaline phosphatase was 251 U/L, total bilirubin was 2.2 mg/dL, AST was 733 U/L, ALT was 478 U/L. She was given 2.1 liters of IV fluids, vancomycin, piperacillin-tazobactam, 4 mg of IV morphine, 80 mg of IV pantoprazole, topical lidocaine for rectal pain. She was admitted to Hospital Medicine Team S.

## 2025-03-03 NOTE — HOSPITAL COURSE
GGT was elevated at 447 U/L. LDH was elevated. Colorectal Surgery, Hematology, Nephrology, and Case Management were consulted. Colorectal Surgery noted a posterior midline anal fissure. She expressed desire to go to [facility] since she gets her care at [facility]. Her mental status has been declining since October 2024. She has memory loss, disorientation, and does not remember her illness. Her mother had dementia. She had chest pain on 3/3/2025 while getting dialysis. EKG showed sinus tachycardia. Troponin was 1102 ng/L. Repeat was 704. Hematology recommended giving 2 units of fresh frozen plasma (FFP) and starting low rate heparin infusion then transitioning to apixaban 2.5 mg twice daily if she had no further bleeding. She was given another 2 units of FFP. Her daughter requested gastrostomy tube placement for malnutrition. On 3/4/2025, heparin was held due to recurrent bleeding. She had intermittent somnolence. She was kept NPO. Speech Language Pathology was consulted. She was hypoglycemic so was put on 10% dextrose drip. Speech Language Pathology recommended full liquid diet. LFTs trended down. Heparin infusion was restarted on 3/5/2025. On 3/6/2025, her daughter decided that she did not want transfer to a [facility] hospital, instead she wanted to transfer her medical care to the [facility] system. Hemoglobin and hematocrit remained stable with no evidence of recurrent bleeding on 3/7/2025. Heparin drip was continued. She had epistaxis on 3/7/2025. Hemoglobin decreased to 6.7 g/dL on 3/8/2025. She developed right arm swelling. Repeat hemoglobin was 5.7. Heparin drip was held. She was transfused 1 unit of packed red blood cells (PRBCs). Hemoglobin improved to 8.2. Right upper extremity venous ultrasound showed known right internal jugular thrombus as well as subclavian thrombus, although the subclavian vein was not visualized when the internal jugular thrombus was diagnosed, so it was unknown if it was new.  Hemoglobin was stable on 3/10/2025. Heparin drip was resumed. Sodium was found to be 118 mmol/L. She had been on dextrose drip for days to treat hypoglycemia due to poor oral intake. She was given normal saline instead. Gastroenterology placed a gastrostomy tube on 3/11/2025. Around midnight that night, she vomited twice. Nausea resolved. Abdomen X-ray showed nothing concerning. She tolerated tube feeds and heparin was transitioned to apixaban. Hyponatremia progressively improved. She had hypophosphatemia that was treated. Case Management worked on skilled nursing facility placement. On 3/20/2025, family opted to take her home with home health.  On 3/21/2025, she had recurrent rectal bleeding with demond red blood, with rectal pain when passing gas and defecating. Apixaban was held. Ultrasound showed persistent occlusive thrombus in the right internal jugular vein. Bleeding subsided. Her blood pressures were too well controlled on the new carvedilol, nifedipine, and hydralazine, so they were stopped. Apixaban was resumed on 3/23/2025. She was monitored for 48 hours on apixaban and had no recurrence of rectal bleeding or rectal pain. She was discharged home with home health on 3/25/2025.

## 2025-03-03 NOTE — HPI
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 She has severe debility, poor appetite dysphagia to solids, mostly consumes liquid. Pt has had c/o of rectal pain, on ED rectal exam, reported anal lesion w/ tenderness and slow bleeding noted. GI saw the patient and noted a non-bleeding fissure in the posterior midline. Nephrology was consulted to help manage her hemodialysis. Patient states that she receives HD MWF and has not missed any of her HD sessions. She is uncertain what her dry hernandez is, denies any dyspnea or chest pain.

## 2025-03-03 NOTE — ASSESSMENT & PLAN NOTE
ED team has ordered C.diff studies.  Daughter notes patient having about 3 watery BM per day.     If having watery stool will leave orders for C.diff studies. Add additional pending stool studies.   If patient on primarily a liquid diet and low oral intake this may be cause but CT shows some small bowel wall thickening and colonic thickening  so  an infectious enterocolitis remains on differential.     Started on sepsis protocol in ED -  I will hold further empiric IV antibiotics at this time.

## 2025-03-03 NOTE — CONSULTS
Hematology Oncology Consult Note    Inpatient consult to Hematology  Consult performed by: Elan Recinos DO  Consult ordered by: Atif Melo MD        SUBJECTIVE:     History of Present Illness:  59F w/ Atypical Hemolytic Uremic syndrome (diagnosed by renal biopsy and genetic testing October 2025) now ESRD on HD m/w/f, Wernicke encephalopathy, Severe Malnutrition, GERD/Duodenal ulcer & schatzki ring, Dysphagia, latent syphilis (treated) who presented to the ED for concerns of rectal pain and bleeding.      She receives all care in [facility] system. She was diagnosed with HUS and developed severe KERRY now ESRD in November 2024, she is followed by hematology at [facility] and has been on immunomodulator infusions every 8 weeks (eculizumab initially, now ravulizumab last 2/24/25). Gingival bleeding was present earlier per sister. On ED rectal exam, reported anal lesion w/ tenderness and slow bleeding noted. Patient has not required frequent transfusions, hx of a duodenal ulcer but no reported past history of lower GI bleeding.       In ED tonight started on treatment for sepsis - 2L IVF, initial lactic acid 1.9, blood cultuers obtained and vancomycin/zosyn given. Recent [facility] hgb values in 8.0-10.0 range.  Initial hgb here was 14, now 11.1  Complements and fibrinogen normal. PTT 49.8, PT 21.9. JOSE negative, retic 1.7, hapto <10, , D-dimer 0.84. , Tbili 2.3, , , . UA with proteinuria Stool studies in process. CT A/P showing gastroduodenitis involving the gastric pylorus and duodenal bulb.    Since October has been admitted multiple times for NSTEMI, Wernicke encephalopathy, and right IJ DVT stated on apixaban (January 2025).     Hematology consulted regarding need for anticoagulation in setting of active hemolysis, bleeding, and recent right IJ thrombosis.       Review of patient's allergies indicates:  No Known Allergies  Past Medical History:   Diagnosis Date    Allergic rhinitis  06/01/2019    Diabetes mellitus     Duodenal ulcer 12/11/2024    Bulb; 4mm; NO SRH on exam of 11 Dec 24      GERD without esophagitis 08/15/2024    Hiatal hernia 12/11/2024    History of Helicobacter pylori infection 03/02/2025    Hypertension     Hypertensive emergency 10/2024    Latent syphilis 11/2024    [place] [facility] [place]    MAHA (microangiopathic hemolytic anemia) 11/03/2024    Polyp of colon 03/05/2024    Schatzki's ring 12/11/2024    TIA (transient ischemic attack) 10/2024    Type 2 MI (myocardial infarction) 11/2024    secondary to hypertensive emergency, normal ECHO - [facility] [place]    Wernicke encephalopathy 02/13/2025     Past Surgical History:   Procedure Laterality Date    HYSTERECTOMY      TUBAL LIGATION       Family History   Problem Relation Name Age of Onset    Scleroderma Other       Social History[1]  ROS  10 point ROS negative except as noted above.     OBJECTIVE:     Vital Signs:  Temp:  [97.6 °F (36.4 °C)-97.9 °F (36.6 °C)]   Pulse:  [102-111]   Resp:  [12-14]   BP: (113-149)/()   SpO2:  [95 %-97 %]     Physical Exam  Constitutional:       General: She is not in acute distress.     Appearance: She is ill-appearing.   HENT:      Head: Normocephalic and atraumatic.      Mouth/Throat:      Comments: Small amount of blood visualized  Eyes:      Extraocular Movements: Extraocular movements intact.      Pupils: Pupils are equal, round, and reactive to light.   Cardiovascular:      Rate and Rhythm: Regular rhythm. Tachycardia present.   Pulmonary:      Effort: Pulmonary effort is normal.      Breath sounds: Normal breath sounds.   Abdominal:      General: Abdomen is flat.      Palpations: Abdomen is soft.   Musculoskeletal:         General: No swelling. Normal range of motion.   Skin:     General: Skin is warm and dry.   Neurological:      Motor: Weakness present.      Comments: Says she sees her sister, no one in the room       Laboratory:  CBC:   Recent Labs   Lab 03/03/25  0510   WBC  10.06   RBC 4.08   HGB 11.1*   HCT 35.2*   *   MCV 86   MCH 27.2   MCHC 31.5*     CMP:   Recent Labs   Lab 03/03/25  0510   GLU 83   CALCIUM 8.4*   ALBUMIN 2.6*   PROT 6.0   *   K 4.0   CO2 17*   CL 99   BUN 41*   CREATININE 6.4*   ALKPHOS 269*   *   *   BILITOT 2.3*         Diagnostic Results:  X-Ray: Reviewed  CT: Reviewed    Peripheral smear reviewed, occasional schistocytes and acanthocytes.     ASSESSMENT/PLAN:     #aHUS  #Coagulopathy in setting of acute liver injury  #Rectal bleeding  #Mucosal bleeding  This patient with recent diagnosis of aHUS c/b ESRD on HD on ravulizumab infusions is admitted with rectal and mucosal bleeding. Has coagulopathy in setting of acute liver injury. Decreased haptoglobin and occasional schistocytes on smear suggest low level hemolysis, however hemoglobin and platelet count have overall improved. Fibrinogen normal, making DIC unlikely. Holding anticoagulation. Hematology consulted regarding need for anticoagulation in setting of active hemolysis, bleeding, and recent right IJ thrombosis. CRS saw patient and will not perform anoscopy or flex sig as anal fissure found. Trialing more aggressive bowel regimen.       Recommendations:   - Recommend 2u FFP due to coagulopathy, recheck coags after  - Hold anticoagulation during active bleeding  - Can give additional FFP for continued bleeding  - Once no signs of bleeding, can start low rate infusional heparin and uptitrate to therapeutic dose if no evidence of bleeding  - Transition from heparin to apixaban 2.5 mg BID (HD dosing) if no bleeding      Discussed with Dr. Narayanan.  We will continue to follow. Let us know if any questions.      Elan Recinos DO  Hematology/Oncology Fellow, PGY-IV   [facility]         [1]   Social History  Tobacco Use    Smoking status: Never   Substance Use Topics    Alcohol use: Not Currently     Comment: occasionally    Drug use: No

## 2025-03-03 NOTE — CARE UPDATE
15 minutes into her HD session, patient complained of chest pain and her session was stopped at that time. 12 lead ECG showed sinus tachycardia and a troponin was ordered.

## 2025-03-03 NOTE — ASSESSMENT & PLAN NOTE
-hemolysis studies sent tonight and have returned with haptoglobin <10, has elevated direct bili, elevated LDH and D-dimer.   -last Ultomiris dose 2/24/25 - outpt hematologist is Roma Cantu MD  @ [facility]  -Consult Hematology given concern for now active hemolysis again based on lab studies and report of both rectal bleeding and gingival bleeding.   -type & screen and blood consent obtained  -check fibrinogen and complement levels, prior notes indicate complement mediated HUS.     -Discuss with hematology in AM re: need for bridging anticoagulation in setting of active hemolysis and potential GI bleeding with recent diagnosis of Right IJ Clot    -Would discuss with transfer center regarding transfer to [facility]- daughter requests [facility] if transfer occurs for continuity of care.

## 2025-03-03 NOTE — SUBJECTIVE & OBJECTIVE
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     Current Outpatient Medications on File Prior to Encounter   Medication Sig    aspirin 81 MG Chew Take 1 tablet by mouth once daily.    atorvastatin (LIPITOR) 80 MG tablet Take 1 tablet by mouth once daily.    carvediloL (COREG) 12.5 MG tablet Take 12.5 mg by mouth 2 (two) times daily.    ELIQUIS 5 mg Tab Take 5 mg by mouth 2 (two) times daily.    EScitalopram oxalate (LEXAPRO) 10 MG tablet Take 1 tablet by mouth once daily.    hydrALAZINE (APRESOLINE) 50 MG tablet Take 50 mg by mouth every 8 (eight) hours.    mirtazapine (REMERON) 15 MG tablet Take 15 mg by mouth every evening.    NIFEdipine (PROCARDIA-XL) 60 MG (OSM) 24 hr tablet Take 60 mg by mouth 2 (two) times daily.    vitamin D (VITAMIN D3) 1000 units Tab Take 1 tablet by mouth once daily.    vitamin renal formula, B-complex-vitamin c-folic acid, (NEPHROCAP) 1 mg Cap Take 1 capsule by mouth once daily.    azelastine (ASTELIN) 137 mcg (0.1 %) nasal spray 1 spray (137 mcg total) by Nasal route 2 (two) times daily.    epoetin  pily-epbx (RETACRIT) 10,000 unit/mL imjection Inject 10,000 Units into the skin.    famotidine (PEPCID) 20 MG tablet Take 20 mg by mouth once daily.    folic acid (FOLVITE) 1 MG tablet Take 1,000 mcg by mouth once daily.    furosemide (LASIX) 20 MG tablet 90    ibuprofen (ADVIL,MOTRIN) 600 MG tablet Take 1 tablet (600 mg total) by mouth every 6 (six) hours as needed for Pain.    pantoprazole (PROTONIX) 40 MG tablet Take 40 mg by mouth once daily.    polyethylene glycol (GLYCOLAX) 17 gram/dose powder Take 17 g by mouth once daily.    scopolamine (TRANSDERM-SCOP) 1.3-1.5 mg (1 mg over 3 days) 1 patch Every 3 (three) days.    VITAMIN B-1 100 MG tablet Take 100 mg by mouth once daily.     Family History    None       Tobacco Use    Smoking status: Never    Smokeless tobacco: Not on file   Substance and Sexual Activity    Alcohol use: Yes     Comment: occasionally    Drug use: No    Sexual activity: Not Currently     Review of Systems   Unable to perform ROS: Acuity of condition   Genitourinary:  Positive for decreased urine volume (Very limited Urine output, urinates few times per week since ESRD diagnosis).     Objective:     Vital Signs (Most Recent):  Temp: 97.6 °F (36.4 °C) (03/03/25 0124)  Pulse: 108 (03/03/25 0232)  Resp: 12 (03/03/25 0232)  BP: (!) 130/92 (03/03/25 0232)  SpO2: 97 % (03/03/25 0232) Vital Signs (24h Range):  Temp:  [97.6 °F (36.4 °C)-98 °F (36.7 °C)] 97.6 °F (36.4 °C)  Pulse:  [] 108  Resp:  [11-20] 12  SpO2:  [95 %-98 %] 97 %  BP: (130-184)/() 130/92     Weight: 72.1 kg (158 lb 15.2 oz)  Body mass index is 26.45 kg/m².     Physical Exam  Constitutional:       Appearance: She is cachectic. She is ill-appearing.   HENT:      Mouth/Throat:      Mouth: Mucous membranes are dry.   Eyes:      Pupils: Pupils are equal, round, and reactive to light.   Cardiovascular:      Rate and Rhythm: Regular rhythm. Tachycardia present.      Comments: Right chest tunneled hd catheter  Pulmonary:       "Effort: No respiratory distress.      Comments: Limited anterior exam  Abdominal:      General: There is no distension.      Palpations: Abdomen is soft.      Tenderness: There is no abdominal tenderness. There is no guarding.      Comments: See ED note re: rectal exam & media tab pictures   Musculoskeletal:      Right lower leg: No edema.      Left lower leg: No edema.      Comments: Diffuse muscle wasting in extremities   Skin:     General: Skin is warm and dry.      Findings: No bruising.   Neurological:      Mental Status: She is lethargic.      GCS: GCS eye subscore is 3. GCS verbal subscore is 4. GCS motor subscore is 5.      Motor: Weakness present.              CRANIAL NERVES     CN III, IV, VI   Pupils are equal, round, and reactive to light.       Significant Labs: All pertinent labs within the past 24 hours have been reviewed.  ABGs: No results for input(s): "PH", "PCO2", "HCO3", "POCSATURATED", "BE", "TOTALHB", "COHB", "METHB", "O2HB", "POCFIO2", "PO2" in the last 48 hours.  CBC:   Recent Labs   Lab 03/02/25  1609 03/02/25  2218   WBC 7.42 9.22   HGB 14.3 10.4*   HCT 45.9 33.5*   * 104*     CMP:   Recent Labs   Lab 03/02/25  1732   *   K 3.8   CL 99   CO2 18*   GLU 81   BUN 35*   CREATININE 6.1*   CALCIUM 8.3*   PROT 5.8*   ALBUMIN 2.7*   BILITOT 2.2*   ALKPHOS 251*   *   *   ANIONGAP 18*     Cardiac Markers: No results for input(s): "CKMB", "MYOGLOBIN", "BNP", "TROPISTAT" in the last 48 hours.  Coagulation:   Recent Labs   Lab 03/02/25  1609   INR 2.1*   APTT 52.8*     Lactic Acid: No results for input(s): "LACTATE" in the last 48 hours.  Magnesium:   Recent Labs   Lab 03/02/25  1732   MG 1.8     Troponin: No results for input(s): "TROPONINI", "TROPONINIHS" in the last 48 hours.  Urine Studies: No results for input(s): "COLORU", "APPEARANCEUA", "PHUR", "SPECGRAV", "PROTEINUA", "GLUCUA", "KETONESU", "BILIRUBINUA", "OCCULTUA", "NITRITE", "UROBILINOGEN", "LEUKOCYTESUR", "RBCUA", " ""WBCUA", "BACTERIA", "SQUAMEPITHEL", "HYALINECASTS" in the last 48 hours.    Invalid input(s): "WRIGHTSUR"    Significant Imaging: I have reviewed all pertinent imaging results/findings within the past 24 hours.    XR CHEST AP PORTABLE     CLINICAL HISTORY:  Sepsis;     TECHNIQUE:  Single frontal view of the chest was performed.     COMPARISON:  None     FINDINGS:  Right central venous catheter tip projects over the distal SVC.  The cardiomediastinal silhouette is not enlarged noting patient is rotated..  There is no pleural effusion.  The trachea is midline.  The lungs are symmetrically expanded bilaterally with mildly coarse interstitial attenuation, accentuated by overlying habitus given patient rotation..  No large focal consolidation seen.  There is no pneumothorax.  The osseous structures are remarkable for degenerative change..     Impression:     1. Interstitial findings are accentuated by positioning, superimposed edema is a consideration although correlation is needed.        Electronically signed by: Howard Mendez MD  Date:                                            03/02/2025  Time:                                           18:22      CT ABDOMEN PELVIS WITH IV CONTRAST     CLINICAL HISTORY:  Sepsis;Sepsis with elevated LFTs and coagulopathy, c/f hepatitis;     TECHNIQUE:  Low dose axial images, sagittal and coronal reformations were obtained from the lung bases to the pubic symphysis following the IV administration of 75 mL of Omnipaque 350 .  Oral contrast was not given.     COMPARISON:  Report of CT abdomen pelvis 12/29/2024 and abdominal ultrasound 12/27/2024 (images unavailable)     FINDINGS:  SOFT TISSUES: Diffuse body wall edema.     LUNG BASES/VISUALIZED MEDIASTINUM: Unremarkable.     HEPATOBILIARY: Liver is normal size with diffuse parenchymal hypoattenuation suggestive of steatosis.  Heterogeneous enhancement of the liver which could be related to hepatitis/inflammation in this patient with " elevated LFTs.  No focal hepatic lesions.  No biliary duct dilatation.  Small volume pericholecystic fluid interposed between the gallbladder in the liver.  No calcified gallstones, wall thickening, or pericholecystic stranding.     PANCREAS: No focal masses or ductal dilatation.     SPLEEN: Normal size.     ADRENALS: No adrenal nodules.     KIDNEYS/URETERS: Kidneys enhance symmetrically. Crescentic hypoattenuating collection along the medial inferior pole of left kidney, possibly related to remote subcapsular hemorrhage.  Multifocal left renal cortical scarring.  No obvious enhancing renal lesion though there is possible cortical scarring.  No nephrolithiasis or hydronephrosis. No solid mass lesions. Ureters are unremarkable.     BLADDER/PELVIC ORGANS: Mild circumferential bladder wall thickening.  Hysterectomy.  No adnexal masses.     PERITONEUM / RETROPERITONEUM: No free air or fluid.     LYMPH NODES: No lymphadenopathy.     VESSELS: No aortic aneurysm.  Major aortic branch vessels are patent.  Portal venous system is patent noting narrowing of the main portal vein as it passes under the duodenum.  Decompressed IVC which may be seen the setting of volume depletion.     GI TRACT: Mucosal hyperenhancement, submucosal edema, and wall thickening of the gastric pylorus and duodenal bulb.  No evidence of bowel obstruction.  Minimal small bowel wall thickening in the left hemiabdomen.  Few colonic diverticula.  Appendix is normal.  Mild diffuse colonic wall thickening which could be exaggerated by decompressed state.     BONES: Transitional lumbosacral anatomy.  Advanced degenerative change of the hips.  Degenerative changes spine.  No acute fracture or aggressive appearing osseous lesion.  No acute fractures or suspicious osseous lesions.  Transitional lumbosacral vertebral body at L5 with pseudoarticulation of the left transverse process with the sacrum.     Impression:     1. Gastroduodenitis involving the gastric  pylorus and duodenal bulb.  Minimal small bowel wall thickening in the left hemiabdomen and mild diffuse colonic wall thickening which could be exaggerated by decompressed state.  Suggest correlation for any clinical signs of enteritis or colitis.  2. Small volume pericholecystic fluid is likely reactive in the setting of adjacent gastroduodenitis or liver dysfunction.  No cholelithiasis or other evidence of cholecystitis.  3. Circumferential bladder wall thickening which may be seen in setting of cystitis.  Recommend correlation with urinalysis.  4. Small volume subcapsular fluid along the posterior lower pole left kidney of uncertain etiology.  5. Anasarca.  6. Probable hepatic steatosis and nonspecific subtle heterogeneous enhancement of the liver.  Suggest correlation with laboratory values and risk factors for hepatitis.  7. Additional findings as above.  This report was flagged in Epic as abnormal.     Electronically signed by resident: Jac Hawkins  Date:                                            03/02/2025  Time:                                           19:42     Electronically signed by: Madhu Willis MD  Date:                                            03/02/2025  Time:                                           20:23

## 2025-03-03 NOTE — ASSESSMENT & PLAN NOTE
Diagnosed during [facility] January hospital admission on apixaban as outpatient.     With concern for active bleeding - holding anticoagulation - would f/u with hematology regarding bridging anticoagulation with IV heparin infusion

## 2025-03-03 NOTE — ASSESSMENT & PLAN NOTE
-hemolysis studies sent tonight and have returned with haptoglobin <10, has elevated direct bili, elevated LDH and D-dimer.   -last Ultomiris dose 2/24/25 - outpt hematologist is Roma Cantu MD  @ [facility]  -Consult Hematology given concern for now active hemolysis again based on lab studies and report of both rectal bleeding and gingival bleeding.   -type & screen and blood consent obtained  -check fibrinogen and complement levels, prior notes indicate complement mediated HUS.     -Discuss with hematology in AM re: need for bridging anticoagulation in setting of active hemolysis and potential GI bleeding with recent diagnosis of Right IJ Clot    -Would discuss with transfer center regarding transfer to [facility]- daughter requests [facility] if transfer occurs for continuity of care.

## 2025-03-03 NOTE — ASSESSMENT & PLAN NOTE
Patient with undifferentiated rectal pain, ?lesion and rectal bleeding.  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 Rising PT/INR and PTT compared to last [facility] 1/31 values.

## 2025-03-03 NOTE — ASSESSMENT & PLAN NOTE
Diagnosed during [facility] January hospital admission on apixaban as outpatient.     With concern for active bleeding - holding anticoagulation - would f/u with hematology regarding bridging anticoagulation with IV heparin infusion

## 2025-03-03 NOTE — ASSESSMENT & PLAN NOTE
On protonix as outaptient, hx of duodenal ulcer diagnosed in recent months     Continue IV PPI BId for now

## 2025-03-03 NOTE — HPI
59 year old female with complex PMH including multiple recent hospitalizations for encephalopathy, malnutrition with significant weight loss and HUS amongst many others. Most recently she was admitted at outside hospital for encephalopathy and vasculitis. She was found to have a RIJ DVT where she was started on anticoagulation and eventually discharge to a rehab facility in late January. She spent a couple of weeks in inpatient rehab and has been at home with caregivers since that time. She presented to the ED overnight for complaints of acute onset perirectal pain and bleeding. Upon evaluation in the ED she was found to be hemodynamically stable. Labwork revealed appropriate H/H but she has evidence of coagulopathy (skewed due to her recent anticoagulation), and moderate transaminitis concerning for hepatitis. CT abd/pelvis reveals gastroduodenitis and findings suggestive of hepatitis/steatosis. Perirectal exam revealed an indistinct bleeding area around the anus. She was admitted to the hospital medicine service overnight and Colorectal Surgery was consulted for evaluation.

## 2025-03-03 NOTE — ASSESSMENT & PLAN NOTE
Hepatic steatosis on CT tonight   -she has severe malnutrition - her elevated bilirubin I suspect is from hemolysis, but not sure hemolysis explains acutely elevated transaminase levels.   -Worsening hepatic function may contribute to her worsening coagulopathy elevated PT/PTT

## 2025-03-03 NOTE — ASSESSMENT & PLAN NOTE
Outpatient HD Information:  -Dialysis modality: Hemodialysis  -HD TX days: Monday/Wednesday/Friday  -Last HD TX prior to hospital admission: ?  -Dialysis access: dialysis catheter  -Residual urine: Anuric   -EDW: ?    Recommendations  -HD today for clearance of metabolic toxins.   -1.5L fluid restriction  -Renal diet when not NPO

## 2025-03-03 NOTE — NURSING
Patient  is in contact  isolation .Patient arrived  in stretcher . Ao*2.Both lumens of HD catheter flushes good . Dialysis treatment started .

## 2025-03-03 NOTE — ASSESSMENT & PLAN NOTE
NPO for now pending CRS evaluation     Recent [facility] notes indicate pt possibly afraid to eat, she has had severe weight loss in recent months with acute decline in health with HUS syndrome.   Continue IV PPI -

## 2025-03-03 NOTE — SUBJECTIVE & OBJECTIVE
(Not in a hospital admission)      Review of patient's allergies indicates:  No Known Allergies    Past Medical History:   Diagnosis Date    Allergic rhinitis 06/01/2019    Diabetes mellitus     Duodenal ulcer 12/11/2024    Bulb; 4mm; NO SRH on exam of 11 Dec 24      GERD without esophagitis 08/15/2024    Hiatal hernia 12/11/2024    History of Helicobacter pylori infection 03/02/2025    Hypertension     Hypertensive emergency 10/2024    Latent syphilis 11/2024    [place] with [place] [facility] [place]    MAHA (microangiopathic hemolytic anemia) 11/03/2024    Polyp of colon 03/05/2024    Schatzki's ring 12/11/2024    TIA (transient ischemic attack) 10/2024    Type 2 MI (myocardial infarction) 11/2024    secondary to hypertensive emergency, normal ECHO - [facility] [place]    Wernicke encephalopathy 02/13/2025     Past Surgical History:   Procedure Laterality Date    HYSTERECTOMY      TUBAL LIGATION       Family History       Problem Relation (Age of Onset)    Scleroderma Other          Tobacco Use    Smoking status: Never    Smokeless tobacco: Not on file   Substance and Sexual Activity    Alcohol use: Not Currently     Comment: occasionally    Drug use: No    Sexual activity: Not Currently     Review of Systems   Unable to perform ROS: Mental status change     Objective:     Vital Signs (Most Recent):  Temp: 97.8 °F (36.6 °C) (03/03/25 0624)  Pulse: (!) 111 (03/03/25 0624)  Resp: 12 (03/03/25 0624)  BP: 113/81 (03/03/25 0624)  SpO2: 97 % (03/03/25 0624) Vital Signs (24h Range):  Temp:  [97.6 °F (36.4 °C)-98 °F (36.7 °C)] 97.8 °F (36.6 °C)  Pulse:  [] 111  Resp:  [11-20] 12  SpO2:  [95 %-98 %] 97 %  BP: (113-184)/() 113/81     Weight: 72.1 kg (158 lb 15.2 oz)  Body mass index is 26.45 kg/m².     Physical Exam  Vitals and nursing note reviewed.   Constitutional:       Appearance: She is ill-appearing.      Comments: Unable to give thorough history or answer direct questions   HENT:      Head: Normocephalic.   Eyes:       General: No scleral icterus.     Extraocular Movements: Extraocular movements intact.      Conjunctiva/sclera: Conjunctivae normal.   Cardiovascular:      Rate and Rhythm: Tachycardia present.      Pulses: Normal pulses.   Pulmonary:      Effort: Pulmonary effort is normal. No respiratory distress.      Breath sounds: No wheezing.   Abdominal:      General: Abdomen is flat. Bowel sounds are normal. There is no distension.      Palpations: Abdomen is soft.   Genitourinary:     Comments: Mild perianal skin excoriation. Old hemorrhoid in the right anterior column with no evidence of thrombosis. Non-bleeding anal fissue in the posterior midline.   Musculoskeletal:         General: No swelling or tenderness. Normal range of motion.   Skin:     General: Skin is warm and dry.      Coloration: Skin is not jaundiced or pale.   Neurological:      Mental Status: She is disoriented.            Anorectal Exam:  Anal Skin: External hemorrhoids in the right anterior column. Non-thrombosed. Mild circumferential skin excoriation. Non-bleeding fissure in the posterior midline.     Digital Rectal Exam:  Resting Tone normal  Squeeze normal  Relaxation with bear down present  Mass none  Rectocele  absent  Tenderness  present    Anoscopy: Deferred    Significant Labs:  All pertinent lab results within the last 24 hours have been reviewed.     Significant Diagnostics:  I have reviewed all pertinent imaging results/findings within the past 24 hours.

## 2025-03-03 NOTE — ED NOTES
Pt identifiers Madelaine Barros were checked and are correct  LOC: The patient is awake, alert, aware of environment with an appropriate affect. Oriented x4, speaking appropriately  APPEARANCE: Pt resting comfortably, in no acute distress, pt is clean and well groomed, clothing properly fastened  SKIN: Skin warm, dry and intact, normal skin turgor, moist mucus membranes  RESPIRATORY: Airway is open and patent, respirations are spontaneous, even and unlabored, normal effort and rate  CARDIAC: Normal rate and rhythm, no peripheral edema noted, capillary refill < 3 seconds, bilateral radial pulses 2+  pt is on a cardiac monitor and pulse oximetry  Dialysis access site noted to right upper chest   ABDOMEN: Soft, nontender, nondistended. Bowel sounds present   NEUROLOGIC: PERRL, facial expression is symmetrical, patient moving all extremities spontaneously, normal sensation in all extremities when touched with a finger.  Follows all commands appropriately  MUSCULOSKELETAL: No obvious deformities.

## 2025-03-03 NOTE — CARE UPDATE
RAPID RESPONSE NURSE NOTE        Admit Date: 3/2/2025  LOS: 0  Code Status: Full Code   Date of Consult: 2025  : 1965  Age: 59 y.o.  Weight:   Wt Readings from Last 1 Encounters:   25 72.1 kg (158 lb 15.2 oz)     Sex: female  Race: Black or    Bed: ED :   MRN: [id number]  Time Rapid Response Team page Received: 1452  Time Rapid Response Team at Bedside: 1453  Time Rapid Response Team left Bedside: 1310  Was the patient discharged from an ICU this admission? No   Was the patient discharged from a PACU within last 24 hours? No   Did the patient receive conscious sedation/general anesthesia in last 24 hours? No  Was the patient in the ED within the past 24 hours? No  Was the patient on NIPPV within the past 24 hours? No   Did this progress into an ARC or CPA: No  Attending Physician: Zoya Cadena MD  Primary Service: [facility]       SITUATION    Notified by Pager.  Reason for alert: Chest Pain  Called to evaluate the patient for Circulatory.    BACKGROUND     Why is the patient in the hospital?: Rectal bleeding    Patient has a past medical history of Allergic rhinitis, Diabetes mellitus, Duodenal ulcer, GERD without esophagitis, Hiatal hernia, History of Helicobacter pylori infection, Hypertension, Hypertensive emergency, Latent syphilis, MAHA (microangiopathic hemolytic anemia), Polyp of colon, Schatzki's ring, TIA (transient ischemic attack), Type 2 MI (myocardial infarction), and Wernicke encephalopathy.    Last Vitals:  Temp: 98.1 °F (36.7 °C) ( 1402)  Pulse: 113 ( 1500)  Resp: 16 ( 1455)  BP: 108/83 ( 1500)  SpO2: 100 % ( 1500)    24 Hours Vitals Range:  Temp:  [97.5 °F (36.4 °C)-98.1 °F (36.7 °C)]   Pulse:  []   Resp:  [11-20]   BP: (101-184)/()   SpO2:  [95 %-100 %]     Labs:  Recent Labs     25  2218 25  0510 25  1217   WBC 9.22 10.06 10.70   HGB 10.4* 11.1* 11.1*   HCT 33.5* 35.2* 35.5*   * 111* 111*        Recent Labs     03/02/25  1732 03/03/25  0510   * 133*   K 3.8 4.0   CL 99 99   CO2 18* 17*   BUN 35* 41*   CREATININE 6.1* 6.4*   GLU 81 83   PHOS  --  4.2   MG 1.8 1.9      ASSESSMENT     Physical Exam  Constitutional:       General: She is not in acute distress.  Eyes:      Pupils: Pupils are equal, round, and reactive to light.   Cardiovascular:      Rate and Rhythm: Regular rhythm. Tachycardia present.   Pulmonary:      Effort: No respiratory distress.      Breath sounds: No wheezing or rhonchi.   Abdominal:      Tenderness: There is no abdominal tenderness. There is no guarding.   Neurological:      Mental Status: She is alert. Mental status is at baseline. She is disoriented.     Called to see pt who was began HD and 15 minutes into HD began c/o Chest Pain.  Pt was rinsed back and symptoms resolved.  Pt confused but at baseline.  EKG obtained and Troponin sent.   Nephrology at bedside.  Pt remained hemodynamically stable while protecting her airway throughout the event.  No immediate critical care needs were identified.    INTERVENTIONS    The patient was seen for Cardiac problem. Staff concerns included chest pain. The following interventions were performed: Troponin, EKG, continuous cardiac monitoring continued, and continuous pulse ox monitoring .    RECOMMENDATIONS    We recommend: Continuous telemetry monitoring, Continuous SpO2 monitoring, Maintain IV access, Monitor for signs of hemodynamic instability, Titrate oxygen to maintain SpO2 >90, and Notify Rapid Response of decline in patient status    PROVIDER ESCALATION    Orders received and case discussed with Dr. Davis .    Primary team arrival time: N/A    Disposition: Tx to ER bed 06.    FOLLOW UP    Charge RNJanes  updated on plan of care. Instructed to call the Rapid Response Nurse, Yasmany Rodgers RN at [phone] for additional questions or concerns.

## 2025-03-03 NOTE — SUBJECTIVE & OBJECTIVE
Interval History: see above    Review of Systems   Constitutional:  Positive for activity change. Negative for appetite change, chills and fever.   HENT:  Negative for trouble swallowing.    Respiratory:  Negative for cough and shortness of breath.    Cardiovascular:  Negative for chest pain and leg swelling.   Gastrointestinal:  Positive for rectal pain. Negative for abdominal pain, anal bleeding, constipation, diarrhea and nausea.   Genitourinary:  Negative for difficulty urinating.   Musculoskeletal:  Negative for arthralgias, back pain and gait problem.   Neurological:  Negative for light-headedness, numbness and headaches.   Psychiatric/Behavioral:  Positive for confusion. Negative for behavioral problems.      Objective:     Vital Signs (Most Recent):  Temp: 97.8 °F (36.6 °C) (03/03/25 0624)  Pulse: (!) 111 (03/03/25 0624)  Resp: 12 (03/03/25 0624)  BP: 113/81 (03/03/25 0624)  SpO2: 97 % (03/03/25 0624) Vital Signs (24h Range):  Temp:  [97.6 °F (36.4 °C)-98 °F (36.7 °C)] 97.8 °F (36.6 °C)  Pulse:  [] 111  Resp:  [11-20] 12  SpO2:  [95 %-98 %] 97 %  BP: (113-184)/() 113/81     Weight: 72.1 kg (158 lb 15.2 oz)  Body mass index is 26.45 kg/m².  No intake or output data in the 24 hours ending 03/03/25 0747      Physical Exam  Constitutional:       General: She is not in acute distress.     Appearance: Normal appearance. She is ill-appearing.   HENT:      Head: Normocephalic and atraumatic.      Nose: Nose normal.      Mouth/Throat:      Mouth: Mucous membranes are moist.   Eyes:      General: No scleral icterus.     Extraocular Movements: Extraocular movements intact.      Pupils: Pupils are equal, round, and reactive to light.   Cardiovascular:      Rate and Rhythm: Normal rate and regular rhythm.      Pulses: Normal pulses.      Heart sounds: Normal heart sounds.   Pulmonary:      Effort: Pulmonary effort is normal.      Breath sounds: Normal breath sounds. No wheezing or rhonchi.   Chest:      Chest  wall: No tenderness.   Abdominal:      General: Abdomen is flat. Bowel sounds are normal. There is no distension.      Palpations: Abdomen is soft.      Tenderness: There is no abdominal tenderness. There is no right CVA tenderness, left CVA tenderness, guarding or rebound.   Musculoskeletal:         General: No swelling, tenderness or deformity. Normal range of motion.      Cervical back: Normal range of motion and neck supple. No rigidity or tenderness.   Skin:     General: Skin is warm and dry.      Coloration: Skin is not jaundiced or pale.      Findings: No erythema or rash.   Neurological:      General: No focal deficit present.      Mental Status: She is alert. Mental status is at baseline.      Cranial Nerves: No cranial nerve deficit.      Motor: No weakness.   Psychiatric:         Attention and Perception: She is inattentive.         Mood and Affect: Affect is blunt.         Speech: Speech is delayed.         Behavior: Behavior is slowed and withdrawn.         Cognition and Memory: Cognition is impaired. Memory is impaired.             Significant Labs: All pertinent labs within the past 24 hours have been reviewed.  CBC:   Recent Labs   Lab 03/02/25  1609 03/02/25  2218 03/03/25  0510   WBC 7.42 9.22 10.06   HGB 14.3 10.4* 11.1*   HCT 45.9 33.5* 35.2*   * 104* 111*     CMP:   Recent Labs   Lab 03/02/25  1732 03/03/25  0510   * 133*   K 3.8 4.0   CL 99 99   CO2 18* 17*   GLU 81 83   BUN 35* 41*   CREATININE 6.1* 6.4*   CALCIUM 8.3* 8.4*   PROT 5.8* 6.0   ALBUMIN 2.7* 2.6*   BILITOT 2.2* 2.3*   ALKPHOS 251* 269*   * 799*   * 502*   ANIONGAP 18* 17*       Significant Imaging: I have reviewed all pertinent imaging results/findings within the past 24 hours.

## 2025-03-03 NOTE — H&P
"  [facility] - Emergency Dept  Hospital Medicine  History & Physical    Patient Name: Madelaine Barros  MRN: [id number]  Patient Class: OP- Observation  Admission Date: 3/2/2025  Attending Physician: Zoya Cadena MD [facility] HOSP MED S  Admitting Physician: Atif Melo MD  Primary Care Provider: Delilah Kelley MD    Patient information was obtained from relative(s), past medical records, and ER records.     Subjective:     Principal Problem:Rectal bleeding    Chief Complaint:   Chief Complaint   Patient presents with    Melena     From home. Reports +melena, bleeding gums, and "sore to rectum" that's burning. Given Fentanyl 65 mcg intranasal by [facility]. -blood thinners. 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     She receives all care in [facility] [place], history provided by daughter Sue Barros at bedside.  She was diagnosed with HUS and developed severe KERRY now ESRD in November 2024, she is followed by hematology @ [facility] and has been on immunomodulator infusions every 8 weeks (Soliris, now Ultomiris last dose 2/24/25).    She has severe debility, poor appetite dysphagia to solids, mostly consumes liquid, but daughter is concerned her nutritional intake is inadequate, inquires about feeding tube placement for chronic management.  She had watery stool today,  pts sister noted at home earlier that gingival bleeding was present.  Pt has had c/o of rectal pain, on ED rectal exam, reported anal lesion w/ tenderness and slow bleeding noted, (pictures in media tab)   Patient has not required frequent transfusions, hx of a duodenal ulcer but no reported past history of lower GI bleeding.  Daughter today notes all home anti-hypertensive have been discontinued since leaving IP rehab.     At bedside patient " appears ill lethargic, cannot fully participate in interview, wakes up periodically w/ acute pain complaints.     In ED tonight started on treatment for sepsis - given 30cc/kg Bolus 2.1Liters, initial lactic acid 1.9, blood cultuers obtained and vancomycin/zosyn given.  [place] [facility] [place] values in 8.0-9.0 range.   Initial hgb here was 14 and repeat values are pending.   She has receiving Morphine 4mg IV for pain,  Protonix 80mg IV x 1, topical lidocaine for rectal pain.     Recent admission history   [place] [facility] [place]    1/5-1/31/25 - [facility] Admit  - Encephalopathy diagnosed with Wernicke Encephalopathy s/p high dose IV thiamine on maintenance tharpy now.  She had intractable nausea vomiting.  Was on empiric high dose steroids for possible CNS vasculitis s/p cerebral angiogram on 1/13 w/o signs of vasculitis.  REquired ICU level of care during admit for shock, suspected secondary to anti-hypertensive use. Patient tapered to one medication during admit, She was diagnosed with Right IJ DVT and started on oral apixaban for anticoagulation.      12/25/24-01/05/25 - [facility] Admit - HTN emergency & intractable N/V, started on Ultomiris inpt, Diagnosis & treatment for Wernicke Encephalopathy, w/u for Scleroderma, transferred to [facility] for rheumatology consult.     12/09-12/19/24 - [facility] admit - Nausea/vomiting weakness, seen by GI s/p EGD w/ duodenal ulcer, hiatal hernia, Schatzki ring    11/15-11/18/24 - [facility] admit - Admit w/ N/V/ HA for hypertensive emergency causing NSTEMI, no acute changes on ECHO, PRBC transfusion given. High sensitive troponin peak at 677.      10/23-11/06/24 - [facility] admit - HTN emergency w/ KERRY diagnosed with MAHA/HUS, s/p renal biopsy & genetic testing.   Diagnosed & treated for latent syphilis w/ PCN per ID.  TIA concern - started on ASA & statin    Past Medical History:   Diagnosis Date    Allergic rhinitis 06/01/2019    Diabetes mellitus     Duodenal ulcer 12/11/2024     Bulb; 4mm; NO SRH on exam of 11 Dec 24      GERD without esophagitis 08/15/2024    Hiatal hernia 12/11/2024    History of Helicobacter pylori infection 03/02/2025    Hypertension     Hypertensive emergency 10/2024    Latent syphilis 11/2024    [place] with [place] [facility] [place]    MAHA (microangiopathic hemolytic anemia) 11/03/2024    Polyp of colon 03/05/2024    Schatzki's ring 12/11/2024    TIA (transient ischemic attack) 10/2024    Type 2 MI (myocardial infarction) 11/2024    secondary to hypertensive emergency, normal ECHO [place] [facility] [place]    Wernicke encephalopathy 02/13/2025       Past Surgical History:   Procedure Laterality Date    HYSTERECTOMY      TUBAL LIGATION         Review of patient's allergies indicates:  No Known Allergies    No current facility-administered medications on file prior to encounter.     Current Outpatient Medications on File Prior to Encounter   Medication Sig    aspirin 81 MG Chew Take 1 tablet by mouth once daily.    atorvastatin (LIPITOR) 80 MG tablet Take 1 tablet by mouth once daily.    carvediloL (COREG) 12.5 MG tablet Take 12.5 mg by mouth 2 (two) times daily.    ELIQUIS 5 mg Tab Take 5 mg by mouth 2 (two) times daily.    EScitalopram oxalate (LEXAPRO) 10 MG tablet Take 1 tablet by mouth once daily.    hydrALAZINE (APRESOLINE) 50 MG tablet Take 50 mg by mouth every 8 (eight) hours.    mirtazapine (REMERON) 15 MG tablet Take 15 mg by mouth every evening.    NIFEdipine (PROCARDIA-XL) 60 MG (OSM) 24 hr tablet Take 60 mg by mouth 2 (two) times daily.    vitamin D (VITAMIN D3) 1000 units Tab Take 1 tablet by mouth once daily.    vitamin renal formula, B-complex-vitamin c-folic acid, (NEPHROCAP) 1 mg Cap Take 1 capsule by mouth once daily.    azelastine (ASTELIN) 137 mcg (0.1 %) nasal spray 1 spray (137 mcg total) by Nasal route 2 (two) times daily.    epoetin pily-epbx (RETACRIT) 10,000 unit/mL imjection Inject 10,000 Units into the skin.    famotidine (PEPCID) 20 MG tablet Take 20 mg by  mouth once daily.    folic acid (FOLVITE) 1 MG tablet Take 1,000 mcg by mouth once daily.    furosemide (LASIX) 20 MG tablet 90    ibuprofen (ADVIL,MOTRIN) 600 MG tablet Take 1 tablet (600 mg total) by mouth every 6 (six) hours as needed for Pain.    pantoprazole (PROTONIX) 40 MG tablet Take 40 mg by mouth once daily.    polyethylene glycol (GLYCOLAX) 17 gram/dose powder Take 17 g by mouth once daily.    scopolamine (TRANSDERM-SCOP) 1.3-1.5 mg (1 mg over 3 days) 1 patch Every 3 (three) days.    VITAMIN B-1 100 MG tablet Take 100 mg by mouth once daily.     Family History    None       Tobacco Use    Smoking status: Never    Smokeless tobacco: Not on file   Substance and Sexual Activity    Alcohol use: Yes     Comment: occasionally    Drug use: No    Sexual activity: Not Currently     Review of Systems   Unable to perform ROS: Acuity of condition   Genitourinary:  Positive for decreased urine volume (Very limited Urine output, urinates few times per week since ESRD diagnosis).     Objective:     Vital Signs (Most Recent):  Temp: 97.6 °F (36.4 °C) (03/03/25 0124)  Pulse: 108 (03/03/25 0232)  Resp: 12 (03/03/25 0232)  BP: (!) 130/92 (03/03/25 0232)  SpO2: 97 % (03/03/25 0232) Vital Signs (24h Range):  Temp:  [97.6 °F (36.4 °C)-98 °F (36.7 °C)] 97.6 °F (36.4 °C)  Pulse:  [] 108  Resp:  [11-20] 12  SpO2:  [95 %-98 %] 97 %  BP: (130-184)/() 130/92     Weight: 72.1 kg (158 lb 15.2 oz)  Body mass index is 26.45 kg/m².     Physical Exam  Constitutional:       Appearance: She is cachectic. She is ill-appearing.   HENT:      Mouth/Throat:      Mouth: Mucous membranes are dry.   Eyes:      Pupils: Pupils are equal, round, and reactive to light.   Cardiovascular:      Rate and Rhythm: Regular rhythm. Tachycardia present.      Comments: Right chest tunneled hd catheter  Pulmonary:      Effort: No respiratory distress.      Comments: Limited anterior exam  Abdominal:      General: There is no distension.       "Palpations: Abdomen is soft.      Tenderness: There is no abdominal tenderness. There is no guarding.      Comments: See ED note re: rectal exam & media tab pictures   Musculoskeletal:      Right lower leg: No edema.      Left lower leg: No edema.      Comments: Diffuse muscle wasting in extremities   Skin:     General: Skin is warm and dry.      Findings: No bruising.   Neurological:      Mental Status: She is lethargic.      GCS: GCS eye subscore is 3. GCS verbal subscore is 4. GCS motor subscore is 5.      Motor: Weakness present.              CRANIAL NERVES     CN III, IV, VI   Pupils are equal, round, and reactive to light.       Significant Labs: All pertinent labs within the past 24 hours have been reviewed.  ABGs: No results for input(s): "PH", "PCO2", "HCO3", "POCSATURATED", "BE", "TOTALHB", "COHB", "METHB", "O2HB", "POCFIO2", "PO2" in the last 48 hours.  CBC:   Recent Labs   Lab 03/02/25  1609 03/02/25  2218   WBC 7.42 9.22   HGB 14.3 10.4*   HCT 45.9 33.5*   * 104*     CMP:   Recent Labs   Lab 03/02/25  1732   *   K 3.8   CL 99   CO2 18*   GLU 81   BUN 35*   CREATININE 6.1*   CALCIUM 8.3*   PROT 5.8*   ALBUMIN 2.7*   BILITOT 2.2*   ALKPHOS 251*   *   *   ANIONGAP 18*     Cardiac Markers: No results for input(s): "CKMB", "MYOGLOBIN", "BNP", "TROPISTAT" in the last 48 hours.  Coagulation:   Recent Labs   Lab 03/02/25  1609   INR 2.1*   APTT 52.8*     Lactic Acid: No results for input(s): "LACTATE" in the last 48 hours.  Magnesium:   Recent Labs   Lab 03/02/25  1732   MG 1.8     Troponin: No results for input(s): "TROPONINI", "TROPONINIHS" in the last 48 hours.  Urine Studies: No results for input(s): "COLORU", "APPEARANCEUA", "PHUR", "SPECGRAV", "PROTEINUA", "GLUCUA", "KETONESU", "BILIRUBINUA", "OCCULTUA", "NITRITE", "UROBILINOGEN", "LEUKOCYTESUR", "RBCUA", "WBCUA", "BACTERIA", "SQUAMEPITHEL", "HYALINECASTS" in the last 48 hours.    Invalid input(s): "WRIGHTSUR"    Significant " Imaging: I have reviewed all pertinent imaging results/findings within the past 24 hours.    XR CHEST AP PORTABLE     CLINICAL HISTORY:  Sepsis;     TECHNIQUE:  Single frontal view of the chest was performed.     COMPARISON:  None     FINDINGS:  Right central venous catheter tip projects over the distal SVC.  The cardiomediastinal silhouette is not enlarged noting patient is rotated..  There is no pleural effusion.  The trachea is midline.  The lungs are symmetrically expanded bilaterally with mildly coarse interstitial attenuation, accentuated by overlying habitus given patient rotation..  No large focal consolidation seen.  There is no pneumothorax.  The osseous structures are remarkable for degenerative change..     Impression:     1. Interstitial findings are accentuated by positioning, superimposed edema is a consideration although correlation is needed.        Electronically signed by: Howard Mendez MD  Date:                                            03/02/2025  Time:                                           18:22      CT ABDOMEN PELVIS WITH IV CONTRAST     CLINICAL HISTORY:  Sepsis;Sepsis with elevated LFTs and coagulopathy, c/f hepatitis;     TECHNIQUE:  Low dose axial images, sagittal and coronal reformations were obtained from the lung bases to the pubic symphysis following the IV administration of 75 mL of Omnipaque 350 .  Oral contrast was not given.     COMPARISON:  Report of CT abdomen pelvis 12/29/2024 and abdominal ultrasound 12/27/2024 (images unavailable)     FINDINGS:  SOFT TISSUES: Diffuse body wall edema.     LUNG BASES/VISUALIZED MEDIASTINUM: Unremarkable.     HEPATOBILIARY: Liver is normal size with diffuse parenchymal hypoattenuation suggestive of steatosis.  Heterogeneous enhancement of the liver which could be related to hepatitis/inflammation in this patient with elevated LFTs.  No focal hepatic lesions.  No biliary duct dilatation.  Small volume pericholecystic fluid interposed  between the gallbladder in the liver.  No calcified gallstones, wall thickening, or pericholecystic stranding.     PANCREAS: No focal masses or ductal dilatation.     SPLEEN: Normal size.     ADRENALS: No adrenal nodules.     KIDNEYS/URETERS: Kidneys enhance symmetrically. Crescentic hypoattenuating collection along the medial inferior pole of left kidney, possibly related to remote subcapsular hemorrhage.  Multifocal left renal cortical scarring.  No obvious enhancing renal lesion though there is possible cortical scarring.  No nephrolithiasis or hydronephrosis. No solid mass lesions. Ureters are unremarkable.     BLADDER/PELVIC ORGANS: Mild circumferential bladder wall thickening.  Hysterectomy.  No adnexal masses.     PERITONEUM / RETROPERITONEUM: No free air or fluid.     LYMPH NODES: No lymphadenopathy.     VESSELS: No aortic aneurysm.  Major aortic branch vessels are patent.  Portal venous system is patent noting narrowing of the main portal vein as it passes under the duodenum.  Decompressed IVC which may be seen the setting of volume depletion.     GI TRACT: Mucosal hyperenhancement, submucosal edema, and wall thickening of the gastric pylorus and duodenal bulb.  No evidence of bowel obstruction.  Minimal small bowel wall thickening in the left hemiabdomen.  Few colonic diverticula.  Appendix is normal.  Mild diffuse colonic wall thickening which could be exaggerated by decompressed state.     BONES: Transitional lumbosacral anatomy.  Advanced degenerative change of the hips.  Degenerative changes spine.  No acute fracture or aggressive appearing osseous lesion.  No acute fractures or suspicious osseous lesions.  Transitional lumbosacral vertebral body at L5 with pseudoarticulation of the left transverse process with the sacrum.     Impression:     1. Gastroduodenitis involving the gastric pylorus and duodenal bulb.  Minimal small bowel wall thickening in the left hemiabdomen and mild diffuse colonic wall  thickening which could be exaggerated by decompressed state.  Suggest correlation for any clinical signs of enteritis or colitis.  2. Small volume pericholecystic fluid is likely reactive in the setting of adjacent gastroduodenitis or liver dysfunction.  No cholelithiasis or other evidence of cholecystitis.  3. Circumferential bladder wall thickening which may be seen in setting of cystitis.  Recommend correlation with urinalysis.  4. Small volume subcapsular fluid along the posterior lower pole left kidney of uncertain etiology.  5. Anasarca.  6. Probable hepatic steatosis and nonspecific subtle heterogeneous enhancement of the liver.  Suggest correlation with laboratory values and risk factors for hepatitis.  7. Additional findings as above.  This report was flagged in Epic as abnormal.     Electronically signed by resident: Jac Hawkins  Date:                                            03/02/2025  Time:                                           19:42     Electronically signed by: Madhu Willis MD  Date:                                            03/02/2025  Time:                                           20:23  Assessment/Plan:     * Rectal bleeding  Patient with undifferentiated rectal pain, ?lesion and rectal bleeding.  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 Rising PT/INR and PTT compared to last [facility] 1/31 values.       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   -last Ultomiris dose 2/24/25 - outpt hematologist is Roma Cantu MD  @ [facility]  -Consult Hematology given concern for now active hemolysis again based on lab studies and report of both rectal bleeding and gingival bleeding.   -type & screen and blood consent obtained  -check fibrinogen and complement levels, prior notes indicate complement mediated HUS.     -Discuss with hematology in AM re: need for bridging anticoagulation in setting of active hemolysis and potential GI bleeding with recent diagnosis of Right IJ Clot    -Would discuss with transfer center regarding transfer to [facility]- daughter requests [facility] if transfer occurs for continuity of care.     Elevated transaminase level  Hepatic steatosis on CT tonight   -she has severe malnutrition - her elevated bilirubin I suspect is from hemolysis, but not sure hemolysis explains acutely elevated transaminase levels.   _Worsening hepatic function may contribute to her worsening coagulopathy elevated PT/PTT      Dysphagia  NPO for now pending CRS evaluation     Recent [facility] notes indicate pt possibly afraid to eat, she has had severe weight loss in recent months with acute decline in health with HUS syndrome.   Continue IV PPI -     ESRD (end stage renal disease)  Consult nephrology     Pt given sepsis bolus 2.1L in ED earlier tonight - CXR with possible edema, no acute effusions on CT abdomen      Acute thrombosis of right internal jugular vein  Diagnosed during [facility] [place] hospital admission on apixaban as outpatient.     With concern for active bleeding - holding anticoagulation - would f/u with hematology regarding bridging anticoagulation with IV heparin infusion      Wernicke encephalopathy  Continue on home thiamine supplementation.       Unspecified severe protein-calorie malnutrition  Severe malnutrition patient with recent hx of worsening dysphagia, has had w/u for scleroderma, has hiatal hernia and ?esophageal  dysmotility  - dysphagia to solids.   Developed wernicke encephalopathy from nutritional deficiency     Daughter reports concern of inadequate intake has had severe weight loss in recent months with multiple complex health conditions.   Nutrition consult for tube feed recs.     If patient remains admit here and GI bleed w/u completed/resolved - she inquires about tube feed placement to management severe malnutrition.     GERD without esophagitis  On protonix as outaptient, hx of duodenal ulcer diagnosed in recent months     Continue IV PPI BId for now       Diarrhea  ED team has ordered C.diff studies.  Daughter notes patient having about 3 watery BM per day.     If having watery stool will leave orders for C.diff studies. Add additional pending stool studies.   If patient on primarily a liquid diet and low oral intake this may be cause but CT shows some small bowel wall thickening and colonic thickening  so  an infectious enterocolitis remains on differential.     Started on sepsis protocol in ED -  I will hold further empiric IV antibiotics at this time.       Renal hematoma, left, sequela  Bud imaging with concern for left renal lesion that is believed to be secondary to hematoma from her renal biopsy in late 2024.  Current imaging shows resolving appearance of remote hematoma.     History of Helicobacter pylori infection  Diagnosed via EGD and reported treated in [facility].         VTE Risk Mitigation (From admission, onward)           Ordered     IP VTE HIGH RISK PATIENT  Once         03/03/25 0416     Place sequential compression device  Until discontinued         03/03/25 0416     Reason for No Pharmacological VTE Prophylaxis  Once        Question:  Reasons:  Answer:  Active Bleeding    03/03/25 0416                       On 03/03/2025, patient should be placed in hospital observation services under my care.             Atif Melo MD  Department of Hospital Medicine  [facility] - Emergency  Dept

## 2025-03-03 NOTE — PROVIDER PROGRESS NOTES - EMERGENCY DEPT.
Encounter Date: 3/2/2025    ED Physician Progress Notes        Physician Note:   Given patient's newly elevated transaminase level and coagulopathy on labs, concern for potential liver involvement/injury and ordered CT abdomen and pelvis with IV contrast.  Given patient's decreased kidney function, discussed risks and benefits of contrast administration with patient's daughter.  Specifically the risk of further came to any damage, which was deemed largely negligible given patient's end-stage renal disease on dialysis.  Patient's daughter agrees that the benefits of potentially diagnosing a hepatitis quickly outweigh the risks of potential further kidney injury, and states that she would agree with IV contrast administration at this time.       Signed,    Luke Vogel MD  Emergency Medicine, PGY-1  [facility]

## 2025-03-03 NOTE — CONSULTS
[facility] Emergency Dept  Nephrology  Consult Note    Patient Name: Madelaine Barros  MRN: [id number]  Admission Date: 3/2/2025  Hospital Length of Stay: 0 days  Attending Provider: Zoya Cadena MD   Primary Care Physician: Delilah Kelley MD  Principal Problem:Rectal bleeding    Inpatient consult to Nephrology  Consult performed by: Milo Davis MD  Consult ordered by: Atif Melo MD  Reason for consult: ESRD        Subjective:     HPI: 60 y/o AAF w/ Atypical Hemolytic Uremic syndrome now ESRD on HD m/w/f, Severe Malnutrition, GERD/Duodenal ulcer & schatzki ring, Dysphagia, latent syphilis (treated) presents to the ED for concerns of rectal pain and bleeding. She has severe debility, poor appetite dysphagia to solids, mostly consumes liquid. Pt has had c/o of rectal pain, on ED rectal exam, reported anal lesion w/ tenderness and slow bleeding noted. GI saw the patient and noted a non-bleeding fissure in the posterior midline. Nephrology was consulted to help manage her hemodialysis. Patient states that she receives HD MWF and has not missed any of her HD sessions. She is uncertain what her dry hernandez is, denies any dyspnea or chest pain.     Past Medical History:   Diagnosis Date    Allergic rhinitis 06/01/2019    Diabetes mellitus     Duodenal ulcer 12/11/2024    Bulb; 4mm; NO SRH on exam of 11 Dec 24      GERD without esophagitis 08/15/2024    Hiatal hernia 12/11/2024    History of Helicobacter pylori infection 03/02/2025    Hypertension     Hypertensive emergency 10/2024    Latent syphilis 11/2024    [place] [facility] [place]    MAHA (microangiopathic hemolytic anemia) 11/03/2024    Polyp of colon 03/05/2024    Schatzki's ring 12/11/2024    TIA (transient ischemic attack) 10/2024    Type 2 MI (myocardial infarction) 11/2024    secondary to hypertensive emergency, normal [place]CHO [place] [facility] [place]    Wernicke encephalopathy 02/13/2025       Past Surgical History:   Procedure Laterality Date     HYSTERECTOMY      TUBAL LIGATION         Review of patient's allergies indicates:  No Known Allergies  Current Facility-Administered Medications   Medication Frequency    aluminum-magnesium hydroxide-simethicone 200-200-20 mg/5 mL suspension 30 mL QID PRN    dextrose 50% injection 12.5 g PRN    dextrose 50% injection 25 g PRN    EScitalopram oxalate tablet 10 mg Daily    folic acid tablet 1,000 mcg Daily    glucagon (human recombinant) injection 1 mg PRN    glucose chewable tablet 16 g PRN    glucose chewable tablet 24 g PRN    melatonin tablet 6 mg Nightly PRN    mirtazapine tablet 15 mg QHS    naloxone 0.4 mg/mL injection 0.02 mg PRN    ondansetron injection 4 mg Q8H PRN    pantoprazole injection 40 mg BID    polyethylene glycol packet 17 g Daily PRN    prochlorperazine injection Soln 5 mg Q6H PRN    senna-docusate 8.6-50 mg per tablet 1 tablet BID PRN    sodium chloride 0.9% flush 10 mL Q6H PRN    thiamine tablet 100 mg Daily    vitamin D 1000 units tablet 1,000 Units Daily    vitamin renal formula (B-complex-vitamin c-folic acid) 1 mg per capsule 1 capsule Daily     Current Outpatient Medications   Medication    aspirin 81 MG Chew    atorvastatin (LIPITOR) 80 MG tablet    ELIQUIS 5 mg Tab    epoetin pily-epbx (RETACRIT) 10,000 unit/mL imjection    EScitalopram oxalate (LEXAPRO) 10 MG tablet    folic acid (FOLVITE) 1 MG tablet    mirtazapine (REMERON) 15 MG tablet    pantoprazole (PROTONIX) 40 MG tablet    VITAMIN B-1 100 MG tablet    vitamin D (VITAMIN D3) 1000 units Tab    vitamin renal formula, B-complex-vitamin c-folic acid, (NEPHROCAP) 1 mg Cap    azelastine (ASTELIN) 137 mcg (0.1 %) nasal spray     Family History       Problem Relation (Age of Onset)    Scleroderma Other          Tobacco Use    Smoking status: Never    Smokeless tobacco: Not on file   Substance and Sexual Activity    Alcohol use: Not Currently     Comment: occasionally    Drug use: No    Sexual activity: Not Currently     Review of  Systems   Constitutional:  Negative for chills, diaphoresis and fever.   Respiratory:  Negative for chest tightness, shortness of breath and wheezing.    Cardiovascular:  Negative for chest pain.     Objective:     Vital Signs (Most Recent):  Temp: 97.5 °F (36.4 °C) (03/03/25 0930)  Pulse: (!) 116 (03/03/25 0930)  Resp: 13 (03/03/25 0930)  BP: 119/83 (03/03/25 0930)  SpO2: 100 % (03/03/25 0930) Vital Signs (24h Range):  Temp:  [97.5 °F (36.4 °C)-98 °F (36.7 °C)] 97.5 °F (36.4 °C)  Pulse:  [] 116  Resp:  [11-20] 13  SpO2:  [95 %-100 %] 100 %  BP: (113-184)/() 119/83     Weight: 72.1 kg (158 lb 15.2 oz) (03/02/25 1433)  Body mass index is 26.45 kg/m².  Body surface area is 1.82 meters squared.    No intake/output data recorded.     Physical Exam  Vitals reviewed.   Constitutional:       General: She is not in acute distress.     Appearance: She is not ill-appearing or toxic-appearing.   HENT:      Head: Normocephalic and atraumatic.      Right Ear: External ear normal.      Left Ear: External ear normal.      Nose: Nose normal.      Mouth/Throat:      Mouth: Mucous membranes are moist.   Eyes:      Extraocular Movements: Extraocular movements intact.   Cardiovascular:      Rate and Rhythm: Normal rate and regular rhythm.      Pulses: Normal pulses.      Heart sounds: Normal heart sounds. No murmur heard.  Pulmonary:      Effort: Pulmonary effort is normal.      Breath sounds: Normal breath sounds.   Abdominal:      General: Abdomen is flat.      Palpations: Abdomen is soft.   Musculoskeletal:         General: Normal range of motion.      Right lower leg: No edema.      Left lower leg: No edema.   Skin:     General: Skin is warm.      Capillary Refill: Capillary refill takes less than 2 seconds.   Neurological:      Mental Status: She is alert.          Significant Labs:  CBC:   Recent Labs   Lab 03/03/25  0510   WBC 10.06   RBC 4.08   HGB 11.1*   HCT 35.2*   *   MCV 86   MCH 27.2   MCHC 31.5*      CMP:   Recent Labs   Lab 03/03/25  0510   GLU 83   CALCIUM 8.4*   ALBUMIN 2.6*   PROT 6.0   *   K 4.0   CO2 17*   CL 99   BUN 41*   CREATININE 6.4*   ALKPHOS 269*   *   *   BILITOT 2.3*     All labs within the past 24 hours have been reviewed.  Assessment/Plan:     Renal/  ESRD (end stage renal disease)  Outpatient HD Information:  -Dialysis modality: Hemodialysis  -HD TX days: Monday/Wednesday/Friday  -Last HD TX prior to hospital admission: ?  -Dialysis access: dialysis catheter  -Residual urine: Anuric   -EDW: ?    Recommendations  -HD today for clearance of metabolic toxins.   -1.5L fluid restriction  -Renal diet when not NPO      Oncology  Anemia of chronic renal failure, stage 5  At goal Hgb of 10-11         Thank you for your consult. I will follow-up with patient. Please contact us if you have any additional questions.    Milo Davis MD  Nephrology  [facility] - [facility] Dept

## 2025-03-03 NOTE — NURSING
Patient is in contact  Isolation .Dialysis treatment ran for  15 minutes, Bp  dropped  and patient  complains of  chest  pain .  Patient was restless  to answer  so dialysis treatment stopped  and rapid  called .  Troponin and  CBC sent . EKG done .   Attempt  made to call to give  report  to Primary Nurse .  Report given Charge Nurse.

## 2025-03-03 NOTE — ASSESSMENT & PLAN NOTE
59 year old female with complex PMH including multiple recent hospitalizations for encephalopathy, malnutrition with significant weight loss and HUS amongst many others. Most recently she was admitted at outside hospital for encephalopathy and vasculitis. Started on anticoagulation for RIJ DVT. Presented to [facility] [place] with perianal pain and bleeding. Found to be diffusely coagulopathic with evidence of acute liver dysfunction and gastroduodenitis.    - Perianal exam revealed a non-bleeding fissure in the posterior midline. Patient admits to some constipation/straining with bowel movements but is not able to provide a thorough history.  - Digital exam performed with no evidence of mass. Normal tone. No blood on extraction. Anoscopy deferred given her symptoms.   - recommend correction of coagulopathy  - Needs PO bowel regimen consisting of stool softener +/- osmotic agent.   - Will need outpatient follow up with CRS  - Can continue to follow while inpatient

## 2025-03-03 NOTE — ASSESSMENT & PLAN NOTE
NPO for now pending CRS evaluation     Recent [facility] [place] [place] notes indicate pt possibly afraid to eat, she has had severe weight loss in recent months with acute decline in health with HUS syndrome.   Continue IV PPI -

## 2025-03-03 NOTE — CONSULTS
[facility] - Emergency Dept  Colorectal Surgery  Consult Note    Patient Name: Madelaine Barros  MRN: [id number]  Admission Date: 3/2/2025  Hospital Length of Stay: 0 days  Attending Physician: Zoya Cadena MD  Primary Care Provider: Delilah Kelley MD    Inpatient consult to Colorectal Surgery  Consult performed by: Sridhar Mathis MD  Consult ordered by: Atif Melo MD  Reason for consult: Perianal pain        Subjective:     Chief Complaint/Reason for Admission: Perianal pain    History of Present Illness:  59 year old female with complex PMH including multiple recent hospitalizations for encephalopathy, malnutrition with significant weight loss and HUS amongst many others. Most recently she was admitted at outside hospital for encephalopathy and vasculitis. She was found to have a RIJ DVT where she was started on anticoagulation and eventually discharge to a rehab facility in late January. She spent a couple of weeks in inpatient rehab and has been at home with caregivers since that time. She presented to the ED overnight for complaints of acute onset perirectal pain and bleeding. Upon evaluation in the ED she was found to be hemodynamically stable. Labwork revealed appropriate H/H but she has evidence of coagulopathy (skewed due to her recent anticoagulation), and moderate transaminitis concerning for hepatitis. CT abd/pelvis reveals gastroduodenitis and findings suggestive of hepatitis/steatosis. Perirectal exam revealed an indistinct bleeding area around the anus. She was admitted to the hospital medicine service overnight and Colorectal Surgery was consulted for evaluation.     (Not in a hospital admission)      Review of patient's allergies indicates:  No Known Allergies    Past Medical History:   Diagnosis Date    Allergic rhinitis 06/01/2019    Diabetes mellitus     Duodenal ulcer 12/11/2024    Bulb; 4mm; NO SRH on exam of 11 Dec 24      GERD without esophagitis 08/15/2024    Hiatal  hernia 12/11/2024    History of Helicobacter pylori infection 03/02/2025    Hypertension     Hypertensive emergency 10/2024    Latent syphilis 11/2024    [place] [facility] [place]    MAHA (microangiopathic hemolytic anemia) 11/03/2024    Polyp of colon 03/05/2024    Schatzki's ring 12/11/2024    TIA (transient ischemic attack) 10/2024    Type 2 MI (myocardial infarction) 11/2024    secondary to hypertensive emergency, [place] [facility] [place]    Wernicke encephalopathy 02/13/2025     Past Surgical History:   Procedure Laterality Date    HYSTERECTOMY      TUBAL LIGATION       Family History       Problem Relation (Age of Onset)    Scleroderma Other          Tobacco Use    Smoking status: Never    Smokeless tobacco: Not on file   Substance and Sexual Activity    Alcohol use: Not Currently     Comment: occasionally    Drug use: No    Sexual activity: Not Currently     Review of Systems   Unable to perform ROS: Mental status change     Objective:     Vital Signs (Most Recent):  Temp: 97.8 °F (36.6 °C) (03/03/25 0624)  Pulse: (!) 111 (03/03/25 0624)  Resp: 12 (03/03/25 0624)  BP: 113/81 (03/03/25 0624)  SpO2: 97 % (03/03/25 0624) Vital Signs (24h Range):  Temp:  [97.6 °F (36.4 °C)-98 °F (36.7 °C)] 97.8 °F (36.6 °C)  Pulse:  [] 111  Resp:  [11-20] 12  SpO2:  [95 %-98 %] 97 %  BP: (113-184)/() 113/81     Weight: 72.1 kg (158 lb 15.2 oz)  Body mass index is 26.45 kg/m².     Physical Exam  Vitals and nursing note reviewed.   Constitutional:       Appearance: She is ill-appearing.      Comments: Unable to give thorough history or answer direct questions   HENT:      Head: Normocephalic.   Eyes:      General: No scleral icterus.     Extraocular Movements: Extraocular movements intact.      Conjunctiva/sclera: Conjunctivae normal.   Cardiovascular:      Rate and Rhythm: Tachycardia present.      Pulses: Normal pulses.   Pulmonary:      Effort: Pulmonary effort is normal. No respiratory distress.      Breath  sounds: No wheezing.   Abdominal:      General: Abdomen is flat. Bowel sounds are normal. There is no distension.      Palpations: Abdomen is soft.   Genitourinary:     Comments: Mild perianal skin excoriation. Old hemorrhoid in the right anterior column with no evidence of thrombosis. Non-bleeding anal fissue in the posterior midline.   Musculoskeletal:         General: No swelling or tenderness. Normal range of motion.   Skin:     General: Skin is warm and dry.      Coloration: Skin is not jaundiced or pale.   Neurological:      Mental Status: She is disoriented.            Anorectal Exam:  Anal Skin: External hemorrhoids in the right anterior column. Non-thrombosed. Mild circumferential skin excoriation. Non-bleeding fissure in the posterior midline.     Digital Rectal Exam:  Resting Tone normal  Squeeze normal  Relaxation with bear down present  Mass none  Rectocele  absent  Tenderness  present    Anoscopy: Deferred    Significant Labs:  All pertinent lab results within the last 24 hours have been reviewed.     Significant Diagnostics:  I have reviewed all pertinent imaging results/findings within the past 24 hours.  Assessment/Plan:     GI  * Rectal bleeding  59 year old female with complex PMH including multiple recent hospitalizations for encephalopathy, malnutrition with significant weight loss and HUS amongst many others. Most recently she was admitted at outside hospital for encephalopathy and vasculitis. Started on anticoagulation for RIJ DVT. Presented to [facility] overnight with perianal pain and bleeding. Found to be diffusely coagulopathic with evidence of acute liver dysfunction and gastroduodenitis.    - Perianal exam revealed a non-bleeding fissure in the posterior midline. Patient admits to some constipation/straining with bowel movements but is not able to provide a thorough history.  - Digital exam performed with no evidence of mass. Normal tone. No blood on extraction. Anoscopy deferred given her  symptoms.   - recommend correction of coagulopathy  - Needs PO bowel regimen consisting of stool softener +/- osmotic agent.   - Will need outpatient follow up with CRS  - Can continue to follow while inpatient        Thank you for your consult. I will follow-up with patient. Please contact us if you have any additional questions.    Sridhar Mathis MD  Colorectal Surgery  [facility]

## 2025-03-03 NOTE — ED TRIAGE NOTES
"    Triage note:  Chief Complaint   Patient presents with    Melena     From home. Reports +melena, bleeding gums, and "sore to rectum" that's burning. Given Fentanyl 65 mcg intranasal by [facility]. -blood thinners. AOx4     Review of patient's allergies indicates:  No Known Allergies  History reviewed. No pertinent past medical history.    "

## 2025-03-03 NOTE — ASSESSMENT & PLAN NOTE
Pt with waxing and waning changes in MS since October, 2024. Related to HUS.   3/3- she lacks capacity, unable to communicate about her illness.     I spoke to her DTR, Sue Barros, who wants pt to [facility] to [facility]. Wants regular MD's for ongoing care and treatment for HUS.

## 2025-03-03 NOTE — ASSESSMENT & PLAN NOTE
Bud imaging with concern for left renal lesion that is believed to be secondary to hematoma from her renal biopsy in late 2024.  Current imaging shows resolving appearance of remote hematoma.

## 2025-03-03 NOTE — ED NOTES
Per Veronika RN, pt. Began having chest pain while being dialyzed at 1500. 12 lead EKG was obtained by EKG technician ID .  Khurram with rapid response confirmed that the rapid team did go to dialysis and evaluate the pt. The EKG was reviewed by a nephrologist at bedside per dialysis and rapid response nurse.

## 2025-03-03 NOTE — RESPIRATORY THERAPY
"RAPID RESPONSE RESPIRATORY THERAPY NOTE             Code Status: Full Code   : 1965  Bed: ED :   MRN: [id number]  Time page Received: 1455  Time Rapid Response RT at Bedside: 1458  Time Rapid Response RT left Bedside: 1514    SITUATION    Evaluated patient for: Chest pain    BACKGROUND    Why is the patient in the hospital?: Rectal bleeding    Patient has a past medical history of Allergic rhinitis, Diabetes mellitus, Duodenal ulcer, GERD without esophagitis, Hiatal hernia, History of Helicobacter pylori infection, Hypertension, Hypertensive emergency, Latent syphilis, MAHA (microangiopathic hemolytic anemia), Polyp of colon, Schatzki's ring, TIA (transient ischemic attack), Type 2 MI (myocardial infarction), and Wernicke encephalopathy.    24 Hours Vitals Range:  Temp:  [97.5 °F (36.4 °C)-98.1 °F (36.7 °C)]   Pulse:  []   Resp:  [11-20]   BP: (101-184)/()   SpO2:  [95 %-100 %]     Labs:    Recent Labs     25  1732 25  0510   * 133*   K 3.8 4.0   CL 99 99   CO2 18* 17*   BUN 35* 41*   CREATININE 6.1* 6.4*   GLU 81 83   PHOS  --  4.2   MG 1.8 1.9        No results for input(s): "PH", "PCO2", "PO2", "HCO3", "POCSATURATED", "BE" in the last 72 hours.    ASSESSMENT/INTERVENTIONS  Patient complains of chest pain, but showing no signs or symptoms of respiratory distress. She is wearing a nasal cannula and has an SPO2 of 100% on 3L. An EKG was obtained. No further respiratory interventions are needed at this time.     Last Vitals: Temp: 98.1 °F (36.7 °C) ( 1402)  Pulse: 113 ( 1500)  Resp: 16 ( 1455)  BP: 108/83 ( 1500)  SpO2: 100 % ( 1500)  Level of Consciousness: Level of Consciousness (AVPU): alert  Respiratory Effort:    Expansion/Accessory Muscle Usage:    All Lung Field Breath Sounds:    O2 Device/Concentration: 3L NC  NIPPV: No Surgical airway: No Vent: No  ETCO2 monitored:    Ambu at bedside: Yes    Active Orders   Respiratory Care    Oxygen PRN     " Frequency: PRN     Number of Occurrences: Until Specified     Order Questions:      Device type: Low flow      Device: Nasal Cannula (1- 5 Liters)      LPM: 2      Titrate O2 per Oxygen Titration Protocol: Yes      To maintain SpO2 goal of: >= 92%      Notify MD of: Inability to achieve desired SpO2; Sudden change in patient status and requires 20% increase in FiO2; Patient requires >60% FiO2    Pulse Oximetry Continuous     Frequency: Continuous     Number of Occurrences: Until Specified       RECOMMENDATIONS  ?  We recommend: RRT Recs: Continue POC per primary team.      FOLLOW-UP    Disposition: Tx to ER bed 06.    Please call back the Rapid Response RT, Ever Carmona, RRT at x [phone] for any questions or concerns.

## 2025-03-03 NOTE — ASSESSMENT & PLAN NOTE
Patient with undifferentiated rectal pain, ?lesion and rectal bleeding.  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 Rising PT/INR and PTT compared to last [facility] 1/31 values.

## 2025-03-03 NOTE — ASSESSMENT & PLAN NOTE
Hepatic steatosis on CT tonight   -she has severe malnutrition - her elevated bilirubin I suspect is from hemolysis, but not sure hemolysis explains acutely elevated transaminase levels.   _Worsening hepatic function may contribute to her worsening coagulopathy elevated PT/PTT

## 2025-03-04 LAB
ALBUMIN SERPL BCP-MCNC: 2.8 G/DL (ref 3.5–5.2)
ALP SERPL-CCNC: 238 U/L (ref 40–150)
ALT SERPL W/O P-5'-P-CCNC: 399 U/L (ref 10–44)
ANION GAP SERPL CALC-SCNC: 17 MMOL/L (ref 8–16)
APTT PPP: 40 SEC (ref 21–32)
AST SERPL-CCNC: 676 U/L (ref 10–40)
BASOPHILS # BLD AUTO: 0 K/UL (ref 0–0.2)
BASOPHILS # BLD AUTO: 0.01 K/UL (ref 0–0.2)
BASOPHILS NFR BLD: 0 % (ref 0–1.9)
BASOPHILS NFR BLD: 0.1 % (ref 0–1.9)
BILIRUB SERPL-MCNC: 2.3 MG/DL (ref 0.1–1)
BLD PROD TYP BPU: NORMAL
BLD PROD TYP BPU: NORMAL
BLOOD UNIT EXPIRATION DATE: NORMAL
BLOOD UNIT EXPIRATION DATE: NORMAL
BLOOD UNIT TYPE CODE: 6200
BLOOD UNIT TYPE CODE: 6200
BLOOD UNIT TYPE: NORMAL
BLOOD UNIT TYPE: NORMAL
BUN SERPL-MCNC: 49 MG/DL (ref 6–20)
CALCIUM SERPL-MCNC: 8.8 MG/DL (ref 8.7–10.5)
CHLORIDE SERPL-SCNC: 97 MMOL/L (ref 95–110)
CO2 SERPL-SCNC: 21 MMOL/L (ref 23–29)
CODING SYSTEM: NORMAL
CODING SYSTEM: NORMAL
CREAT SERPL-MCNC: 6.8 MG/DL (ref 0.5–1.4)
CROSSMATCH INTERPRETATION: NORMAL
CROSSMATCH INTERPRETATION: NORMAL
DIFFERENTIAL METHOD BLD: ABNORMAL
DIFFERENTIAL METHOD BLD: ABNORMAL
DISPENSE STATUS: NORMAL
DISPENSE STATUS: NORMAL
EOSINOPHIL # BLD AUTO: 0 K/UL (ref 0–0.5)
EOSINOPHIL # BLD AUTO: 0.1 K/UL (ref 0–0.5)
EOSINOPHIL NFR BLD: 0 % (ref 0–8)
EOSINOPHIL NFR BLD: 0.6 % (ref 0–8)
ERYTHROCYTE [DISTWIDTH] IN BLOOD BY AUTOMATED COUNT: 20.4 % (ref 11.5–14.5)
ERYTHROCYTE [DISTWIDTH] IN BLOOD BY AUTOMATED COUNT: 20.6 % (ref 11.5–14.5)
EST. GFR  (NO RACE VARIABLE): 6.5 ML/MIN/1.73 M^2
GLUCOSE SERPL-MCNC: 129 MG/DL (ref 70–110)
HCT VFR BLD AUTO: 26.6 % (ref 37–48.5)
HCT VFR BLD AUTO: 39.3 % (ref 37–48.5)
HGB BLD-MCNC: 12.2 G/DL (ref 12–16)
HGB BLD-MCNC: 8.6 G/DL (ref 12–16)
IMM GRANULOCYTES # BLD AUTO: 0.02 K/UL (ref 0–0.04)
IMM GRANULOCYTES # BLD AUTO: 0.03 K/UL (ref 0–0.04)
IMM GRANULOCYTES NFR BLD AUTO: 0.3 % (ref 0–0.5)
IMM GRANULOCYTES NFR BLD AUTO: 0.3 % (ref 0–0.5)
INR PPP: 1.3 (ref 0.8–1.2)
LYMPHOCYTES # BLD AUTO: 1.2 K/UL (ref 1–4.8)
LYMPHOCYTES # BLD AUTO: 1.3 K/UL (ref 1–4.8)
LYMPHOCYTES NFR BLD: 10.7 % (ref 18–48)
LYMPHOCYTES NFR BLD: 21.3 % (ref 18–48)
MAGNESIUM SERPL-MCNC: 1.8 MG/DL (ref 1.6–2.6)
MCH RBC QN AUTO: 26.9 PG (ref 27–31)
MCH RBC QN AUTO: 27.7 PG (ref 27–31)
MCHC RBC AUTO-ENTMCNC: 31 G/DL (ref 32–36)
MCHC RBC AUTO-ENTMCNC: 32.3 G/DL (ref 32–36)
MCV RBC AUTO: 86 FL (ref 82–98)
MCV RBC AUTO: 87 FL (ref 82–98)
MONOCYTES # BLD AUTO: 0.5 K/UL (ref 0.3–1)
MONOCYTES # BLD AUTO: 1.5 K/UL (ref 0.3–1)
MONOCYTES NFR BLD: 12.8 % (ref 4–15)
MONOCYTES NFR BLD: 8.7 % (ref 4–15)
NEUTROPHILS # BLD AUTO: 4.1 K/UL (ref 1.8–7.7)
NEUTROPHILS # BLD AUTO: 8.6 K/UL (ref 1.8–7.7)
NEUTROPHILS NFR BLD: 69.7 % (ref 38–73)
NEUTROPHILS NFR BLD: 75.5 % (ref 38–73)
NRBC BLD-RTO: 0 /100 WBC
NRBC BLD-RTO: 0 /100 WBC
NUM UNITS TRANS FFP: NORMAL
NUM UNITS TRANS FFP: NORMAL
OHS QRS DURATION: 70 MS
OHS QRS DURATION: 76 MS
OHS QTC CALCULATION: 478 MS
OHS QTC CALCULATION: 487 MS
PHOSPHATE SERPL-MCNC: 4.2 MG/DL (ref 2.7–4.5)
PLATELET # BLD AUTO: 87 K/UL (ref 150–450)
PLATELET # BLD AUTO: 97 K/UL (ref 150–450)
PMV BLD AUTO: ABNORMAL FL (ref 9.2–12.9)
PMV BLD AUTO: ABNORMAL FL (ref 9.2–12.9)
POCT GLUCOSE: 114 MG/DL (ref 70–110)
POCT GLUCOSE: 131 MG/DL (ref 70–110)
POCT GLUCOSE: 153 MG/DL (ref 70–110)
POCT GLUCOSE: 23 MG/DL (ref 70–110)
POCT GLUCOSE: 23 MG/DL (ref 70–110)
POCT GLUCOSE: 32 MG/DL (ref 70–110)
POCT GLUCOSE: 44 MG/DL (ref 70–110)
POCT GLUCOSE: 47 MG/DL (ref 70–110)
POCT GLUCOSE: 57 MG/DL (ref 70–110)
POCT GLUCOSE: 65 MG/DL (ref 70–110)
POCT GLUCOSE: 82 MG/DL (ref 70–110)
POCT GLUCOSE: 96 MG/DL (ref 70–110)
POTASSIUM SERPL-SCNC: 3.5 MMOL/L (ref 3.5–5.1)
PROT SERPL-MCNC: 6.2 G/DL (ref 6–8.4)
PROTHROMBIN TIME: 14 SEC (ref 9–12.5)
RBC # BLD AUTO: 3.1 M/UL (ref 4–5.4)
RBC # BLD AUTO: 4.54 M/UL (ref 4–5.4)
SODIUM SERPL-SCNC: 135 MMOL/L (ref 136–145)
TROPONIN I SERPL DL<=0.01 NG/ML-MCNC: 818 NG/L (ref 0–14)
WBC # BLD AUTO: 11.37 K/UL (ref 3.9–12.7)
WBC # BLD AUTO: 5.95 K/UL (ref 3.9–12.7)

## 2025-03-04 PROCEDURE — 99232 SBSQ HOSP IP/OBS MODERATE 35: CPT | Mod: ,,, | Performed by: NURSE PRACTITIONER

## 2025-03-04 PROCEDURE — 92610 EVALUATE SWALLOWING FUNCTION: CPT

## 2025-03-04 PROCEDURE — P9017 PLASMA 1 DONOR FRZ W/IN 8 HR: HCPCS | Performed by: HOSPITALIST

## 2025-03-04 PROCEDURE — 27000221 HC OXYGEN, UP TO 24 HOURS

## 2025-03-04 PROCEDURE — 84100 ASSAY OF PHOSPHORUS: CPT | Performed by: HOSPITALIST

## 2025-03-04 PROCEDURE — 63600175 PHARM REV CODE 636 W HCPCS: Performed by: HOSPITALIST

## 2025-03-04 PROCEDURE — 63600175 PHARM REV CODE 636 W HCPCS: Performed by: STUDENT IN AN ORGANIZED HEALTH CARE EDUCATION/TRAINING PROGRAM

## 2025-03-04 PROCEDURE — 97535 SELF CARE MNGMENT TRAINING: CPT

## 2025-03-04 PROCEDURE — 84484 ASSAY OF TROPONIN QUANT: CPT | Performed by: HOSPITALIST

## 2025-03-04 PROCEDURE — 85610 PROTHROMBIN TIME: CPT | Performed by: HOSPITALIST

## 2025-03-04 PROCEDURE — 25000003 PHARM REV CODE 250: Performed by: HOSPITALIST

## 2025-03-04 PROCEDURE — 36600 WITHDRAWAL OF ARTERIAL BLOOD: CPT

## 2025-03-04 PROCEDURE — 82803 BLOOD GASES ANY COMBINATION: CPT

## 2025-03-04 PROCEDURE — 93005 ELECTROCARDIOGRAM TRACING: CPT

## 2025-03-04 PROCEDURE — 36415 COLL VENOUS BLD VENIPUNCTURE: CPT | Performed by: HOSPITALIST

## 2025-03-04 PROCEDURE — 80053 COMPREHEN METABOLIC PANEL: CPT | Performed by: HOSPITALIST

## 2025-03-04 PROCEDURE — 90935 HEMODIALYSIS ONE EVALUATION: CPT

## 2025-03-04 PROCEDURE — 27000207 HC ISOLATION

## 2025-03-04 PROCEDURE — 85730 THROMBOPLASTIN TIME PARTIAL: CPT | Performed by: HOSPITALIST

## 2025-03-04 PROCEDURE — 83735 ASSAY OF MAGNESIUM: CPT | Performed by: HOSPITALIST

## 2025-03-04 PROCEDURE — 20600001 HC STEP DOWN PRIVATE ROOM

## 2025-03-04 PROCEDURE — 85025 COMPLETE CBC W/AUTO DIFF WBC: CPT | Performed by: HOSPITALIST

## 2025-03-04 PROCEDURE — 93010 ELECTROCARDIOGRAM REPORT: CPT | Mod: ,,, | Performed by: INTERNAL MEDICINE

## 2025-03-04 PROCEDURE — 99900035 HC TECH TIME PER 15 MIN (STAT)

## 2025-03-04 RX ORDER — DEXTROSE MONOHYDRATE 100 MG/ML
INJECTION, SOLUTION INTRAVENOUS CONTINUOUS
Status: DISCONTINUED | OUTPATIENT
Start: 2025-03-04 | End: 2025-03-07

## 2025-03-04 RX ORDER — SODIUM CHLORIDE 9 MG/ML
INJECTION, SOLUTION INTRAVENOUS ONCE
Status: CANCELLED | OUTPATIENT
Start: 2025-03-04 | End: 2025-03-04

## 2025-03-04 RX ADMIN — MIRTAZAPINE 15 MG: 15 TABLET, FILM COATED ORAL at 09:03

## 2025-03-04 RX ADMIN — DEXTROSE MONOHYDRATE 25 G: 25 INJECTION, SOLUTION INTRAVENOUS at 06:03

## 2025-03-04 RX ADMIN — DEXTROSE MONOHYDRATE: 100 INJECTION, SOLUTION INTRAVENOUS at 09:03

## 2025-03-04 RX ADMIN — PANTOPRAZOLE SODIUM 40 MG: 40 INJECTION, POWDER, LYOPHILIZED, FOR SOLUTION INTRAVENOUS at 09:03

## 2025-03-04 RX ADMIN — HEPARIN SODIUM 1000 UNITS: 1000 INJECTION, SOLUTION INTRAVENOUS; SUBCUTANEOUS at 05:03

## 2025-03-04 RX ADMIN — DEXTROSE MONOHYDRATE 25 G: 25 INJECTION, SOLUTION INTRAVENOUS at 08:03

## 2025-03-04 NOTE — ASSESSMENT & PLAN NOTE
-hemolysis studies sent tonight and have returned with haptoglobin <10, has elevated direct bili, elevated LDH and D-dimer.   -last Ultomiris dose 2/24/25 - outpt hematologist is Roma Cantu MD  @ [facility]  -Consult Hematology given concern for now active hemolysis again based on lab studies and report of both rectal bleeding and gingival bleeding.   -type & screen and blood consent obtained  -check fibrinogen and complement levels, prior notes indicate complement mediated HUS.     -Discuss with hematology in AM re: need for bridging anticoagulation in setting of active hemolysis and potential GI bleeding with recent diagnosis of Right IJ Clot    -Would discuss with transfer center regarding [place] [facility] [place]- daughter requests [facility] if transfer occurs for continuity of care.  Needs insurance approval.

## 2025-03-04 NOTE — SUBJECTIVE & OBJECTIVE
Interval History: see above    Review of Systems   Constitutional:  Positive for activity change. Negative for appetite change, chills and fever.   HENT:  Negative for trouble swallowing.    Respiratory:  Negative for cough and shortness of breath.    Cardiovascular:  Negative for chest pain and leg swelling.   Gastrointestinal:  Positive for rectal pain. Negative for abdominal pain, anal bleeding, constipation, diarrhea and nausea.   Genitourinary:  Negative for difficulty urinating.   Musculoskeletal:  Negative for arthralgias, back pain and gait problem.   Neurological:  Negative for light-headedness, numbness and headaches.   Psychiatric/Behavioral:  Positive for confusion. Negative for behavioral problems.      Objective:     Vital Signs (Most Recent):  Temp: 97.9 °F (36.6 °C) (03/04/25 0255)  Pulse: 94 (03/04/25 0707)  Resp: 11 (03/04/25 0255)  BP: (P) 137/89 (03/04/25 0345)  SpO2: 100 % (03/04/25 0707) Vital Signs (24h Range):  Temp:  [97.5 °F (36.4 °C)-98.3 °F (36.8 °C)] 97.9 °F (36.6 °C)  Pulse:  [] 94  Resp:  [10-23] 11  SpO2:  [92 %-100 %] 100 %  BP: ()/(66-99) (P) 137/89     Weight: 72.1 kg (158 lb 15.2 oz)  Body mass index is 26.45 kg/m².    Intake/Output Summary (Last 24 hours) at 3/4/2025 0809  Last data filed at 3/4/2025 0345  Gross per 24 hour   Intake 1501.37 ml   Output 85 ml   Net 1416.37 ml         Physical Exam  Constitutional:       General: She is not in acute distress.     Appearance: Normal appearance. She is ill-appearing.   HENT:      Head: Normocephalic and atraumatic.      Nose: Nose normal.      Mouth/Throat:      Mouth: Mucous membranes are moist.   Eyes:      General: No scleral icterus.     Extraocular Movements: Extraocular movements intact.      Pupils: Pupils are equal, round, and reactive to light.   Cardiovascular:      Rate and Rhythm: Normal rate and regular rhythm.      Pulses: Normal pulses.      Heart sounds: Normal heart sounds.   Pulmonary:      Effort:  Pulmonary effort is normal.      Breath sounds: Normal breath sounds. No wheezing or rhonchi.   Chest:      Chest wall: No tenderness.   Abdominal:      General: Abdomen is flat. Bowel sounds are normal. There is no distension.      Palpations: Abdomen is soft.      Tenderness: There is no abdominal tenderness. There is no right CVA tenderness, left CVA tenderness, guarding or rebound.   Musculoskeletal:         General: No swelling, tenderness or deformity. Normal range of motion.      Cervical back: Normal range of motion and neck supple. No rigidity or tenderness.   Skin:     General: Skin is warm and dry.      Coloration: Skin is not jaundiced or pale.      Findings: No erythema or rash.   Neurological:      General: No focal deficit present.      Mental Status: She is alert. Mental status is at baseline.      Cranial Nerves: No cranial nerve deficit.      Motor: No weakness.   Psychiatric:         Attention and Perception: She is inattentive.         Mood and Affect: Affect is blunt.         Speech: Speech is delayed.         Behavior: Behavior is slowed and withdrawn.         Cognition and Memory: Cognition is impaired. Memory is impaired.             Significant Labs: All pertinent labs within the past 24 hours have been reviewed.  CBC:   Recent Labs   Lab 03/03/25  1814 03/03/25 2024 03/03/25  2331   WBC 10.13 10.19 5.95   HGB 10.9* 8.8* 12.2   HCT 34.7* 27.7* 39.3   * 88* 97*     CMP:   Recent Labs   Lab 03/02/25  1732 03/03/25  0510 03/03/25 2024   * 133* 137   K 3.8 4.0 4.2   CL 99 99 100   CO2 18* 17* 17*   GLU 81 83 94   BUN 35* 41* 43*   CREATININE 6.1* 6.4* 6.2*   CALCIUM 8.3* 8.4* 8.6*   PROT 5.8* 6.0 6.1   ALBUMIN 2.7* 2.6* 2.8*   BILITOT 2.2* 2.3* 2.1*   ALKPHOS 251* 269* 225*   * 799* 694*   * 502* 420*   ANIONGAP 18* 17* 20*       Significant Imaging: I have reviewed all pertinent imaging results/findings within the past 24 hours.

## 2025-03-04 NOTE — CONSULTS
[facility] - Telemetry Stepdown  Adult Nutrition  Consult Note    SUMMARY     Recommendations  1. Upon G tube placement, initiate TF of Isosource 1.5 @10ml/hr, advancing as tolerated to a goal rate of 50ml/hr to provide 1800 kcals, 81.5g of protein, and 912 ml of fluid. Free water flushes of 155ml Q4H to provide an additional 930ml. Total free water=1842ml   --Avoid holding TF for residuals less than 500mL unless associated with other s/s of intolerance such as N/V/D, abd pain/distention.     2. Monitor weight/labs/residuals    Goals: Pt will meet 85% of EEN/EPN by RD follow up    Nutrition Goal Status: new  Communication of RD Recs:  (POC)    Nutrition Discharge Planning   Nutrition Discharge Planning: Enteral nutrition (comments)  Enteral nutrition (comments): Isosource 1.5    Assessment and Plan  Nutrition Problem  Inadequate oral intake     Related to (etiology):   Decreased ability to consume sufficient needs    Signs and Symptoms (as evidenced by):   No diet order in place   Severe debility, poor appetite, dysphagia to solids, mostly consuming liquids   TF recs in place     Interventions:  Collaboration by nutrition professional with other providers     Nutrition Diagnosis Status:   New    Malnutrition Assessment  Pending NFPE     Reason for Assessment  Reason For Assessment: consult (TF recs)  Diagnosis:  (Rectal bleeding)  General Information Comments: Pt currently has no diet order in place. May require a G tube placement. PMH- duodenal ulcer, GERD, wernicke encephalopathy. ESRD on HD. Refusing some meals at previous facility. Anil-12/medial perineum. No meal intake noted. Melena, bleeding gums, and sores to rectum. ESRD on HD. Severe malnutrition noted in MD note. Severe debility, poor appetite, dysphagia to solids, mostly consumes liquids. Lethargic. No recent weight history.    Nutrition/Diet History  Nutrition Intake History: NELSON  Food Preferences: NELSON  Factors Affecting Nutritional Intake: decreased  "appetite, chewing difficulties/inability to chew food, difficulty/impaired swallowing    Food Insecurity: Patient Unable To Answer (3/4/2025)    Hunger Vital Sign     Worried About Running Out of Food in the Last Year: Patient unable to answer     Ran Out of Food in the Last Year: Patient unable to answer   Recent Concern: Food Insecurity - Food Insecurity Present (2024)    Received from [facility]    Hunger Vital Sign     Worried About Running Out of Food in the Last Year: Often true     Ran Out of Food in the Last Year: Often true     Anthropometrics  Height: 5' 5" (165.1 cm)  Height (inches): 65 in  Weight: 72.1 kg (158 lb 15.2 oz)  Weight (lb): 158.95 lb  Ideal Body Weight (IBW), Female: 125 lb  % Ideal Body Weight, Female (lb): 127.16 %  BMI (Calculated): 26.5  BMI Grade: 25 - 29.9 - overweight  Usual Body Weight (UBW), k.1 kg (1/3/25)  % Usual Body Weight: 100.21  % Weight Change From Usual Weight: 0 %     Lab/Procedures/Meds  Pertinent Labs Reviewed: reviewed  Pertinent Labs Comments: CO2 17, BUN 43, Creatinine 6.2, Ca 8.6, GFR 10, Cholesterol 237  Pertinent Medications Reviewed: reviewed  Pertinent Medications Comments: folic acid, pantoprazole, thiamine, vit D 1000 units, renal vitamin    Estimated/Assessed Needs  Weight Used For Calorie Calculations: 72.1 kg (158 lb 15.2 oz)  Energy Calorie Requirements (kcal): 8371-8273 kcals (25-30 kcals/kg)  Energy Need Method: Kcal/kg  Protein Requirements: 72-86g (1-1.2g/kg)  Weight Used For Protein Calculations: 72.1 kg (158 lb 15.2 oz)     Estimated Fluid Requirement Method: RDA Method  RDA Method (mL): 1802  CHO Requirement: 225g    Nutrition Prescription Ordered  Current Diet Order: None    Evaluation of Received Nutrient/Fluid Intake  I/O: 1501/85  Energy Calories Required: not meeting needs  Protein Required: not meeting needs  Fluid Required: not meeting needs  Comments: LBM-3/3/25  Tolerance: not tolerating  % Intake of Estimated Energy Needs: " Other: No diet order  % Meal Intake: Other: No diet order    Nutrition Risk  Level of Risk/Frequency of Follow-up: high (2x/week)     Monitor and Evaluation  Monitor and Evaluation: Enteral and parenteral nutrition administration, Weight     Nutrition Follow-Up  RD Follow-up?: Yes

## 2025-03-04 NOTE — ED NOTES
Telemetry Verification   Patient placed on Telemetry Box  Verified with War Room  Box # [id number]   Monitor Tech    Rate    Rhythm

## 2025-03-04 NOTE — ASSESSMENT & PLAN NOTE
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  . Most recent hemoglobin and hematocrit are listed below.  Recent Labs     03/03/25  1814 03/03/25 2024 03/03/25  2331   HGB 10.9* 8.8* 12.2   HCT 34.7* 27.7* 39.3     Plan  - Monitor serial CBC: Every 12 hours  - Transfuse PRBC if patient becomes hemodynamically unstable, symptomatic or H/H drops below 7/21.  - Patient has not received any PRBC transfusions to date  - Patient's anemia is currently improving  - correcting coagulopathy

## 2025-03-04 NOTE — ED NOTES
MD Cadena made aware of drop in pt's Hemoglobin 8.8  and Hematocrit of 27.7. MD Cadena also made aware of pt's bloody BM. Pt cleaned and changed into new gown and sheets. Will continue to monitor.

## 2025-03-04 NOTE — ASSESSMENT & PLAN NOTE
Diagnosed during [facility] January hospital admission on apixaban as outpatient.     With concern for active bleeding - holding anticoagulation - would f/u with hematology regarding bridging anticoagulation with IV heparin infusion    3/4- if lab stable this am, consider trial of low intensity heparin.

## 2025-03-04 NOTE — PROGRESS NOTES
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     She receives all care in [facility] system, history provided by daughter Sue Barros at bedside.  She was diagnosed with HUS and developed severe KERRY now ESRD in November 2024, she is followed by hematology @ [facility] and has been on immunomodulator infusions every 8 weeks (Soliris, now Ultomiris last dose 2/24/25).    She has severe debility, poor appetite dysphagia to solids, mostly consumes liquid, but daughter is concerned her nutritional intake is inadequate, inquires about feeding tube placement for chronic management.  She had watery stool today,  pts sister noted at home earlier that gingival bleeding was present.  Pt has had c/o of rectal pain, on ED rectal exam, reported anal lesion w/ tenderness and slow bleeding noted, (pictures in media tab)   Patient has not required frequent transfusions, hx of a duodenal ulcer but no reported past history of lower GI bleeding.  Daughter today notes all home anti-hypertensive have been discontinued since leaving IP rehab.     At bedside patient appears ill lethargic, cannot fully participate in interview, wakes up periodically w/ acute pain complaints.     In ED tonight started on treatment for sepsis - given 30cc/kg Bolus 2.1Liters, initial lactic acid 1.9, blood cultuers obtained and vancomycin/zosyn given.  Recent [facility] hgb values in 8.0-9.0 range.   Initial hgb here was 14 and repeat  values are pending.   She has receiving Morphine 4mg IV for pain,  Protonix 80mg IV x 1, topical lidocaine for rectal pain.     Recent admission history   1/31-2/18 - [facility] IP rehab    1/5-1/31/25 - [facility] Admit  - Encephalopathy diagnosed with Wernicke Encephalopathy s/p high dose IV thiamine on maintenance tharpy now.  She had intractable nausea vomiting.  Was on empiric high dose steroids for possible CNS vasculitis s/p cerebral angiogram on 1/13 w/o signs of vasculitis.  REquired ICU level of care during admit for shock, suspected secondary to anti-hypertensive use. Patient tapered to one medication during admit, She was diagnosed with Right IJ DVT and started on oral apixaban for anticoagulation.      12/25/24-01/05/25 - [facility] Admit - HTN emergency & intractable N/V, started on Ultomiris inpt, Diagnosis & treatment for Wernicke Encephalopathy, w/u for Scleroderma, transferred to [facility] for rheumatology consult.     12/09-12/19/24 - [facility] admit - Nausea/vomiting weakness, seen by GI s/p EGD w/ duodenal ulcer, hiatal hernia, Schatzki ring    11/15-11/18/24 - [facility] admit - Admit w/ N/V/ HA for hypertensive emergency causing NSTEMI, no acute changes on ECHO, PRBC transfusion given. High sensitive troponin peak at 677.      10/23-11/06/24 - [facility] admit - HTN emergency w/ KERRY diagnosed with MAHA/HUS, s/p renal biopsy & genetic testing.   Diagnosed & treated for latent syphilis w/ PCN per ID.  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 LFTs up, may be acttively hemalyzing.  hgb 14.3-> 10.4 -> 11. and lethargy/debility .  cr 6.4, INR 2.1.  CRS, hematology, Nephrology and SW/ CM consulted.  LDH and GGT is high.     Called Sue Barros.   Pt lacks capacity.  Sue did sign a blood consent last night.   Wants pt to go to [facility]. Wants regular MD's for ongoing care and treatment for HUS. Pt has been declining mental status since October 2024 since illness began. N&V causes malnourishment and delirium. Some recovery of MS and declined again. Now still with memory loss, loss of orientation, does not remember illness. Not eating properly. Pt's mother had dementia. Will continue ongoing care as needed, including HD, transfusion and what is needed and work to arrange transfer.   Per CRS: CT abd/pelvis reveals gastroduodenitis and findings suggestive of hepatitis/steatosis. Perirectal exam revealed an indistinct bleeding area around the anus. Perrectal fissure.   Per Nephrology HD scheduled for today.     In HD: At 15 minutes into HD she complained of chest pain and had to be rinsed back.  RR called.  ECG shows sinus tachycardia, Troponin was  1102 -> 704    Appreciate Hematology recs:   - Received 2 units FFP.  no signs of bleeding all day but Hb droped to 8.8  Continue monitoring for bleeding. Will start low rate infusional heparin in am. (and uptitrate to therapeutic dose) if no evidence of bleeding. Coags in am.   - Transition from heparin to apixaban 2.5 mg BID (HD dosing) if no bleeding  - Hold anticoagulation during active bleeding  - had a bloody BM 9:30 pm additional 2 units  FFP ordered  for continued bleeding    3/4- received FFP x 4 for active rectal bleeding last night.  Hb 12.2. Did not tolerate HD due to chest pain, which resolved. EKG- ST.  Odrered a repeat EKG this am- It is NSR.  Trop felt high due to renal disease,  CR 6.2. heparin held due to active bleeding. Coagulopathy being corrected, and NG placement on hold.  Encephalopathy- Pt more somnolent this am, but alert with DTR in room. Interacting.   Plan to retry HD. Did not receive blood. Anuric. NPO, needs  speech eval.  Repeat lab pending: cmp, cbc  and trop.  Hard stick and could  not draw this am. Ok to get from HD catheter.  BS 23=> 89. added D10 @ 50 cc/ h.  FULL liquid diet per SLP.         Interval History: see above    Review of Systems   Constitutional:  Positive for activity change. Negative for appetite change, chills and fever.   HENT:  Negative for trouble swallowing.    Respiratory:  Negative for cough and shortness of breath.    Cardiovascular:  Negative for chest pain and leg swelling.   Gastrointestinal:  Positive for rectal pain. Negative for abdominal pain, anal bleeding, constipation, diarrhea and nausea.   Genitourinary:  Negative for difficulty urinating.   Musculoskeletal:  Negative for arthralgias, back pain and gait problem.   Neurological:  Negative for light-headedness, numbness and headaches.   Psychiatric/Behavioral:  Positive for confusion. Negative for behavioral problems.      Objective:     Vital Signs (Most Recent):  Temp: 97.9 °F (36.6 °C) (03/04/25 0255)  Pulse: 94 (03/04/25 0707)  Resp: 11 (03/04/25 0255)  BP: (P) 137/89 (03/04/25 0345)  SpO2: 100 % (03/04/25 0707) Vital Signs (24h Range):  Temp:  [97.5 °F (36.4 °C)-98.3 °F (36.8 °C)] 97.9 °F (36.6 °C)  Pulse:  [] 94  Resp:  [10-23] 11  SpO2:  [92 %-100 %] 100 %  BP: ()/(66-99) (P) 137/89     Weight: 72.1 kg (158 lb 15.2 oz)  Body mass index is 26.45 kg/m².    Intake/Output Summary (Last 24 hours) at 3/4/2025 0809  Last data filed at 3/4/2025 0345  Gross per 24 hour   Intake 1501.37 ml   Output 85 ml   Net 1416.37 ml         Physical Exam  Constitutional:       General: She is not in acute distress.     Appearance: Normal appearance. She is ill-appearing.   HENT:      Head: Normocephalic and atraumatic.      Nose: Nose normal.      Mouth/Throat:      Mouth: Mucous membranes are moist.   Eyes:      General: No scleral icterus.     Extraocular Movements: Extraocular movements intact.      Pupils: Pupils are equal, round, and reactive to light.   Cardiovascular:      Rate and Rhythm: Normal rate and  regular rhythm.      Pulses: Normal pulses.      Heart sounds: Normal heart sounds.   Pulmonary:      Effort: Pulmonary effort is normal.      Breath sounds: Normal breath sounds. No wheezing or rhonchi.   Chest:      Chest wall: No tenderness.   Abdominal:      General: Abdomen is flat. Bowel sounds are normal. There is no distension.      Palpations: Abdomen is soft.      Tenderness: There is no abdominal tenderness. There is no right CVA tenderness, left CVA tenderness, guarding or rebound.   Musculoskeletal:         General: No swelling, tenderness or deformity. Normal range of motion.      Cervical back: Normal range of motion and neck supple. No rigidity or tenderness.   Skin:     General: Skin is warm and dry.      Coloration: Skin is not jaundiced or pale.      Findings: No erythema or rash.   Neurological:      General: No focal deficit present.      Mental Status: She is alert. Mental status is at baseline.      Cranial Nerves: No cranial nerve deficit.      Motor: No weakness.   Psychiatric:         Attention and Perception: She is inattentive.         Mood and Affect: Affect is blunt.         Speech: Speech is delayed.         Behavior: Behavior is slowed and withdrawn.         Cognition and Memory: Cognition is impaired. Memory is impaired.             Significant Labs: All pertinent labs within the past 24 hours have been reviewed.  CBC:   Recent Labs   Lab 03/03/25  1814 03/03/25 2024 03/03/25  2331   WBC 10.13 10.19 5.95   HGB 10.9* 8.8* 12.2   HCT 34.7* 27.7* 39.3   * 88* 97*     CMP:   Recent Labs   Lab 03/02/25  1732 03/03/25  0510 03/03/25 2024   * 133* 137   K 3.8 4.0 4.2   CL 99 99 100   CO2 18* 17* 17*   GLU 81 83 94   BUN 35* 41* 43*   CREATININE 6.1* 6.4* 6.2*   CALCIUM 8.3* 8.4* 8.6*   PROT 5.8* 6.0 6.1   ALBUMIN 2.7* 2.6* 2.8*   BILITOT 2.2* 2.3* 2.1*   ALKPHOS 251* 269* 225*   * 799* 694*   * 502* 420*   ANIONGAP 18* 17* 20*       Significant Imaging: I have  reviewed all pertinent imaging results/findings within the past 24 hours.    Assessment and Plan     * Rectal bleeding  Patient with undifferentiated rectal pain, ?lesion and rectal bleeding.  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 Rising PT/INR and PTT compared to last [facility] 1/31 values.     3/4-has non-bleeding fissure in the posterior midline.constipation/straining with bowel movements but is not able to provide a thorough history.  correction of coagulopathy with FFP.  PO bowel regimen started.   Received 4 units FFP.  Am lab pending      Metabolic encephalopathy  Pt with waxing and waning changes in MS since October, 2024. Related to HUS.   3/3- she lacks capacity, unable to communicate about her illness.     I spoke to her DTR, Sue Barros, who wants pt to go to [facility]. Wants regular MD's for ongoing care and treatment for HUS.    3/4- somnolence. 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   -last Ultomiris dose 2/24/25 - outpt hematologist is Roma Cantu MD  @ [facility]  -Consult Hematology given concern for now active hemolysis again based on lab studies and report of both rectal bleeding and gingival bleeding.   -type & screen and blood consent obtained  -check fibrinogen and complement levels, prior notes indicate complement mediated HUS.     -Discuss with hematology  in AM re: need for bridging anticoagulation in setting of active hemolysis and potential GI bleeding with recent diagnosis of Right IJ Clot    -Would discuss with transfer center regarding transfer to [facility]- daughter requests [facility] if transfer occurs for continuity of care.  Needs insurance approval.     ESRD (end stage renal disease)  Consult nephrology     Pt given sepsis bolus 2.1L in ED earlier tonight - CXR with possible edema, no acute effusions on CT abdomen      Elevated transaminase level  Hepatic steatosis on CT tonight   -she has severe malnutrition - her elevated bilirubin I suspect is from hemolysis, but not sure hemolysis explains acutely elevated transaminase levels.   -Worsening hepatic function may contribute to her worsening coagulopathy elevated PT/PTT      Acute thrombosis of right internal jugular vein  Diagnosed during [facility] January hospital admission on apixaban as outpatient.     With concern for active bleeding - holding anticoagulation - would f/u with hematology regarding bridging anticoagulation with IV heparin infusion    3/4- if lab stable this am, consider trial of low intensity heparin.       Renal hematoma, left, sequela  Bud imaging with concern for left renal lesion that is believed to be secondary to hematoma from her renal biopsy in late 2024.  Current imaging shows resolving appearance of remote hematoma.     Diarrhea  ED team has ordered C.diff studies.  Daughter notes patient having about 3 watery BM per day.     If having watery stool will leave orders for C.diff studies. Add additional pending stool studies.   If patient on primarily a liquid diet and low oral intake this may be cause but CT shows some small bowel wall thickening and colonic thickening  so  an infectious enterocolitis remains on differential.     Started on sepsis protocol in ED -  I will hold further empiric IV antibiotics at this time.       Wernicke encephalopathy  Continue on home thiamine  supplementation.       Dysphagia  NPO for now pending CRS evaluation     Recent [facility] notes indicate pt possibly afraid to eat, she has had severe weight loss in recent months with acute decline in health with HUS syndrome.   Continue IV PPI -     3/4- SLP joce, nurses holding meds    Unspecified severe protein-calorie malnutrition  Severe malnutrition patient with recent hx of worsening dysphagia, has had w/u for scleroderma, has hiatal hernia and ?esophageal dysmotility  - dysphagia to solids.   Developed wernicke encephalopathy from nutritional deficiency     Daughter reports concern of inadequate intake has had severe weight loss in recent months with multiple complex health conditions.   Nutrition consult for tube feed recs.     If patient remains admit here and GI bleed w/u completed/resolved - she inquires about tube feed placement to management severe malnutrition.     3/4- Holding TF as has coagulopathy and high risk of trauma and further bleeding. GI bleed not resolved. Lab pending    GERD without esophagitis  On protonix as outaptient, hx of duodenal ulcer diagnosed in recent months     Continue IV PPI BId for now       History of Helicobacter pylori infection  Diagnosed via EGD and reported treated in [facility] system.       Anemia of chronic renal failure, stage 5  Anemia is likely due to acute blood loss which was from GI bleed and active hemolysis due to HUS.  . Most recent hemoglobin and hematocrit are listed below.  Recent Labs     03/03/25  1814 03/03/25  2024 03/03/25  2331   HGB 10.9* 8.8* 12.2   HCT 34.7* 27.7* 39.3     Plan  - Monitor serial CBC: Every 12 hours  - Transfuse PRBC if patient becomes hemodynamically unstable, symptomatic or H/H drops below 7/21.  - Patient has not received any PRBC transfusions to date  - Patient's anemia is currently improving  - correcting coagulopathy      VTE Risk Mitigation (From admission, onward)           Ordered     heparin (porcine) injection 1,000 Units  As  needed (PRN)         03/03/25 1524     IP VTE HIGH RISK PATIENT  Once         03/03/25 0416     Place sequential compression device  Until discontinued         03/03/25 0416     Reason for No Pharmacological VTE Prophylaxis  Once        Question:  Reasons:  Answer:  Active Bleeding    03/03/25 0416                    Discharge Planning   PATRICIA:      Code Status: Full Code   Medical Readiness for Discharge Date:            Zoya Cadena MD  Department of Hospital Medicine   [facility] - Telemetry Stepdown

## 2025-03-04 NOTE — ASSESSMENT & PLAN NOTE
Pt with waxing and waning changes in MS since October, 2024. Related to HUS.   3/3- she lacks capacity, unable to communicate about her illness.     I spoke to her DTR, Sue Barros, who wants pt to go to [facility]. Wants regular MD's for ongoing care and treatment for HUS.    3/4- somnolence. Attempt to re-try dialysis

## 2025-03-04 NOTE — PLAN OF CARE
Recommendations  1. Upon G tube placement, initiate TF of Isosource 1.5 @10ml/hr, advancing as tolerated to a goal rate of 50ml/hr to provide 1800 kcals, 81.5g of protein, and 912 ml of fluid. Free water flushes of 155ml Q4H to provide an additional 930ml. Total free water=1842ml   --Avoid holding TF for residuals less than 500mL unless associated with other s/s of intolerance such as N/V/D, abd pain/distention.     2. Monitor weight/labs/residuals    Goals: Pt will meet 85% of EEN/EPN by RD follow up

## 2025-03-04 NOTE — PROGRESS NOTES
03/04/25 1745   Post-Hemodialysis Assessment   Blood Volume Processed (Liters) 53 L   Dialyzer Clearance   (yellow)   Duration of Treatment 180 minutes   Net Fluid Removal 500     HD tx completed without issue, blood returned and PC heparin locked. Pt in NAD/VSS, back to unit via stretcher. Report called to primary RN.

## 2025-03-04 NOTE — ASSESSMENT & PLAN NOTE
NPO for now pending CRS evaluation     Recent [facility] notes indicate pt possibly afraid to eat, she has had severe weight loss in recent months with acute decline in health with HUS syndrome.   Continue IV PPI -     3/4- SLP joce, nurses holding meds

## 2025-03-04 NOTE — CARE UPDATE
RAPID RESPONSE NURSE PROACTIVE ROUNDING NOTE       Time of Visit: 45    Patient Information:  Admit Date: 3/2/2025  LOS: 1  Code Status: Full Code   Date of Visit: 2025  : 1965  Age: 59 y.o.  Sex: female  Race: Black or   Bed: 8087/8087 A:   MRN: [id number]    Recent Clinical History:  ICU discharge this admission? No   PACU discharge (last 24 hours)? No   Received conscious sedation/general anesthesia (last 24 hours)? No  ED Visit (Last 24 hours)? Yes  On NIPPV (Last 24 hours)? No     Primary Team:  Attending Physician: Zoya Cadena MD  Primary Service: [facility] MED S     SITUATION    Notification source: Bedside RN via phone call.  Reason for alert: increased oxygen demand, abnormal lab values  Alert category: Circulatory     BACKGROUND     Primary Reason for Admission: Rectal bleeding    Patient has a past medical history of Allergic rhinitis, Diabetes mellitus, Duodenal ulcer, GERD without esophagitis, Hiatal hernia, History of Helicobacter pylori infection, Hypertension, Hypertensive emergency, Latent syphilis, MAHA (microangiopathic hemolytic anemia), Polyp of colon, Schatzki's ring, TIA (transient ischemic attack), Type 2 MI (myocardial infarction), and Wernicke encephalopathy.    Last Vitals:  Temp: 97.9 °F (36.6 °C) (34)  Pulse: 100 (34)  Resp: 11 (34)  BP: 139/94 (43)  SpO2: 100 % (34)    24 Hours Vitals Range:  Temp:  [97.5 °F (36.4 °C)-98.3 °F (36.8 °C)]   Pulse:  []   Resp:  [10-18]   BP: ()/(66-99)   SpO2:  [92 %-100 %]     Labs:  Recent Labs     25  1814 25  2331   WBC 10.13 10.19 5.95   HGB 10.9* 8.8* 12.2   HCT 34.7* 27.7* 39.3   * 88* 97*       Recent Labs     25  1732 25  0510 03/03/25  2024   * 133* 137   K 3.8 4.0 4.2   CL 99 99 100   CO2 18* 17* 17*   BUN 35* 41* 43*   CREATININE 6.1* 6.4* 6.2*   GLU 81 83 94   PHOS  --  4.2  --    MG 1.8 1.9  --         No  "results for input(s): "PH", "PCO2", "PO2", "HCO3", "POCSATURATED", "BE" in the last 72 hours.     ASSESSMENT      Physical Exam  Constitutional:       Appearance: She is ill-appearing.   Cardiovascular:      Rate and Rhythm: Normal rate.   Pulmonary:      Breath sounds: Decreased breath sounds present.   Abdominal:      General: There is distension.   Skin:     General: Skin is warm and dry.   Neurological:      Mental Status: She is alert. Mental status is at baseline.       /94 (99)  RR 14, SpO2 100% on 4L NC    Patient awake and alert, sitting up in bed. FFP currently infusing as per order. No additional bloody bowel movements since ~2130 while still in the ER  Patient denies any current chest pain or shortness of breath.    INTERVENTIONS    Patient evaluated for Respiratory problem. Staff concerns included increased oxygen requirements. The following interventions were performed: portable chest x-ray, continuous pulse ox monitoring continued, continuous cardiac monitoring continued, and CBC, troponin.    Repeat trop ordered earlier down to 740 from 1102. Will order repeat with AM labs    Repeat CBC drawn by phlebotomy at 2331, CXR completed at 0007 with no detrimental change    Time spent at the bedside: < 15 min    RECOMMENDATIONS    We Recommend: Continuous telemetry monitoring, Continuous SpO2 monitoring, Maintain IV access, Strict I/O, Monitor for signs of respiratory distress, hemodynamic instability, and bleeding, Titrate oxygen to maintain SpO2 >92, and Notify Rapid Response of decline in patient status    PROVIDER ESCALATION    Escalation Required:  No    Orders received and case discussed with NA.    Patient Disposition: Remain in room 8011.    FOLLOW-UP    Bedside Miladys GUERRIER  updated on plan of care. Instructed to contact Rapid Response NurseCHESTER RN at [phone] for further questions or concerns.            "

## 2025-03-04 NOTE — PROGRESS NOTES
[facility] - Telemetry Stepdown  Nephrology  Progress Note    Patient Name: Madelaine Barros  MRN: [id number]  Admission Date: 3/2/2025  Hospital Length of Stay: 1 days  Attending Provider: Zoya Cadena MD   Primary Care Physician: Delilah Kelley MD  Principal Problem:Rectal bleeding    Subjective:       Interval History: Pt rinsed back after ~ 15 min of HD yesterday due to chest pain. EKG - NSR today. Troponin trending down. Somnolent earlier this morning. BG 23. D10 gtt started. Received FFP x 4 yesterday due to rectal bleeding. Repeat hgb 12.2 this morning. Labs pending. Difficult stick. HDS. SBP 130s-140s. Alert on exam, following commands.        Review of patient's allergies indicates:  No Known Allergies  Current Facility-Administered Medications   Medication Frequency    0.9%  NaCl infusion (for blood administration) Q24H PRN    0.9%  NaCl infusion (for blood administration) Q24H PRN    aluminum-magnesium hydroxide-simethicone 200-200-20 mg/5 mL suspension 30 mL QID PRN    dextrose 10 % infusion Continuous    dextrose 50% injection 12.5 g PRN    dextrose 50% injection 25 g PRN    EScitalopram oxalate tablet 10 mg Daily    folic acid tablet 1,000 mcg Daily    glucagon (human recombinant) injection 1 mg PRN    glucose chewable tablet 16 g PRN    glucose chewable tablet 24 g PRN    heparin (porcine) injection 1,000 Units PRN    melatonin tablet 6 mg Nightly PRN    mirtazapine tablet 15 mg QHS    naloxone 0.4 mg/mL injection 0.02 mg PRN    ondansetron injection 4 mg Q8H PRN    pantoprazole injection 40 mg BID    polyethylene glycol packet 17 g Daily PRN    prochlorperazine injection Soln 5 mg Q6H PRN    senna-docusate 8.6-50 mg per tablet 1 tablet BID PRN    sodium chloride 0.9% flush 10 mL Q6H PRN    thiamine tablet 100 mg Daily    vitamin D 1000 units tablet 1,000 Units Daily    vitamin renal formula (B-complex-vitamin c-folic acid) 1 mg per capsule 1 capsule Daily       Objective:     Vital Signs  (Most Recent):  Temp: 99.2 °F (37.3 °C) (03/04/25 1131)  Pulse: 93 (03/04/25 1131)  Resp: 14 (03/04/25 1131)  BP: (!) 138/90 (03/04/25 1131)  SpO2: 100 % (03/04/25 1131) Vital Signs (24h Range):  Temp:  [97.3 °F (36.3 °C)-99.2 °F (37.3 °C)] 99.2 °F (37.3 °C)  Pulse:  [] 93  Resp:  [10-23] 14  SpO2:  [92 %-100 %] 100 %  BP: ()/(66-99) 138/90     Weight: 72.1 kg (158 lb 15.2 oz) (03/04/25 0958)  Body mass index is 26.45 kg/m².  Body surface area is 1.82 meters squared.    I/O last 3 completed shifts:  In: 1501.4 [Blood:1155.8; Other:300; IV Piggyback:45.6]  Out: 85 [Other:85]     Physical Exam     Significant Labs:  CBC:   Recent Labs   Lab 03/03/25  2331   WBC 5.95   RBC 4.54   HGB 12.2   HCT 39.3   PLT 97*   MCV 87   MCH 26.9*   MCHC 31.0*     CMP:   Recent Labs   Lab 03/03/25 2024   GLU 94   CALCIUM 8.6*   ALBUMIN 2.8*   PROT 6.1      K 4.2   CO2 17*      BUN 43*   CREATININE 6.2*   ALKPHOS 225*   *   *   BILITOT 2.1*     All labs within the past 24 hours have been reviewed.       Assessment/Plan:     Renal/  ESRD (end stage renal disease)  Outpatient HD Information:  -Dialysis modality: Hemodialysis  -HD TX days: Monday/Wednesday/Friday  -Last HD TX prior to hospital admission: ?  -Dialysis access: dialysis catheter  -Residual urine: Anuric   -EDW: ?    Recommendations    -HD today for clearance of metabolic toxins.   -1.5L fluid restriction  -Renal diet when not NPO      GI  * Rectal bleeding  - per primary         Thank you for your consult. I will follow-up with patient. Please contact us if you have any additional questions.    Gladys Feliciano, ARUN  Nephrology  [facility] - Telemetry Stepdown

## 2025-03-04 NOTE — PLAN OF CARE
Problem: SLP  Goal: SLP Goal  Description: Speech Language Pathology Goals  Goals expected to be met by 3/18:   1) Pt will tolerate least restrictive diet in order to maintain adequate nutrition/hydration.     Recommending full liquid diet. SLP to follow.       Outcome: Progressing

## 2025-03-04 NOTE — PLAN OF CARE
Problem: Adult Inpatient Plan of Care  Goal: Plan of Care Review  Outcome: Progressing  Goal: Patient-Specific Goal (Individualized)  Outcome: Progressing  Goal: Absence of Hospital-Acquired Illness or Injury  Outcome: Progressing  Goal: Optimal Comfort and Wellbeing  Outcome: Progressing  Goal: Readiness for Transition of Care  Outcome: Progressing     Problem: Infection  Goal: Absence of Infection Signs and Symptoms  Outcome: Progressing     Problem: Hemodialysis  Goal: Safe, Effective Therapy Delivery  Outcome: Progressing  Goal: Effective Tissue Perfusion  Outcome: Progressing  Goal: Absence of Infection Signs and Symptoms  Outcome: Progressing     Problem: Wound  Goal: Optimal Coping  Outcome: Progressing  Goal: Optimal Functional Ability  Outcome: Progressing  Goal: Absence of Infection Signs and Symptoms  Outcome: Progressing  Goal: Improved Oral Intake  Outcome: Progressing  Goal: Optimal Pain Control and Function  Outcome: Progressing  Goal: Skin Health and Integrity  Outcome: Progressing  Goal: Optimal Wound Healing  Outcome: Progressing     Problem: Skin Injury Risk Increased  Goal: Skin Health and Integrity  Outcome: Progressing     Problem: Fall Injury Risk  Goal: Absence of Fall and Fall-Related Injury  Outcome: Progressing

## 2025-03-04 NOTE — ASSESSMENT & PLAN NOTE
Patient with undifferentiated rectal pain, ?lesion and rectal bleeding.  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 Rising PT/INR and PTT compared to last [facility] 1/31 values.     3/4-has non-bleeding fissure in the posterior midline.constipation/straining with bowel movements but is not able to provide a thorough history.  correction of coagulopathy with FFP.  PO bowel regimen started.   Received 4 units FFP.  Am lab pending

## 2025-03-04 NOTE — SUBJECTIVE & OBJECTIVE
Interval History: Pt rinsed back after ~ 15 min of HD yesterday due to chest pain. EKG - NSR today. Troponin trending down. Somnolent earlier this morning. BG 23. D10 gtt started. Received FFP x 4 yesterday due to rectal bleeding. Repeat hgb 12.2 this morning. Labs pending. Difficult stick. HDS. SBP 130s-140s. Alert on exam, following commands.        Review of patient's allergies indicates:  No Known Allergies  Current Facility-Administered Medications   Medication Frequency    0.9%  NaCl infusion (for blood administration) Q24H PRN    0.9%  NaCl infusion (for blood administration) Q24H PRN    aluminum-magnesium hydroxide-simethicone 200-200-20 mg/5 mL suspension 30 mL QID PRN    dextrose 10 % infusion Continuous    dextrose 50% injection 12.5 g PRN    dextrose 50% injection 25 g PRN    EScitalopram oxalate tablet 10 mg Daily    folic acid tablet 1,000 mcg Daily    glucagon (human recombinant) injection 1 mg PRN    glucose chewable tablet 16 g PRN    glucose chewable tablet 24 g PRN    heparin (porcine) injection 1,000 Units PRN    melatonin tablet 6 mg Nightly PRN    mirtazapine tablet 15 mg QHS    naloxone 0.4 mg/mL injection 0.02 mg PRN    ondansetron injection 4 mg Q8H PRN    pantoprazole injection 40 mg BID    polyethylene glycol packet 17 g Daily PRN    prochlorperazine injection Soln 5 mg Q6H PRN    senna-docusate 8.6-50 mg per tablet 1 tablet BID PRN    sodium chloride 0.9% flush 10 mL Q6H PRN    thiamine tablet 100 mg Daily    vitamin D 1000 units tablet 1,000 Units Daily    vitamin renal formula (B-complex-vitamin c-folic acid) 1 mg per capsule 1 capsule Daily       Objective:     Vital Signs (Most Recent):  Temp: 99.2 °F (37.3 °C) (03/04/25 1131)  Pulse: 93 (03/04/25 1131)  Resp: 14 (03/04/25 1131)  BP: (!) 138/90 (03/04/25 1131)  SpO2: 100 % (03/04/25 1131) Vital Signs (24h Range):  Temp:  [97.3 °F (36.3 °C)-99.2 °F (37.3 °C)] 99.2 °F (37.3 °C)  Pulse:  [] 93  Resp:  [10-23] 14  SpO2:  [92 %-100  %] 100 %  BP: ()/(66-99) 138/90     Weight: 72.1 kg (158 lb 15.2 oz) (03/04/25 0958)  Body mass index is 26.45 kg/m².  Body surface area is 1.82 meters squared.    I/O last 3 completed shifts:  In: 1501.4 [Blood:1155.8; Other:300; IV Piggyback:45.6]  Out: 85 [Other:85]     Physical Exam     Significant Labs:  CBC:   Recent Labs   Lab 03/03/25  2331   WBC 5.95   RBC 4.54   HGB 12.2   HCT 39.3   PLT 97*   MCV 87   MCH 26.9*   MCHC 31.0*     CMP:   Recent Labs   Lab 03/03/25 2024   GLU 94   CALCIUM 8.6*   ALBUMIN 2.8*   PROT 6.1      K 4.2   CO2 17*      BUN 43*   CREATININE 6.2*   ALKPHOS 225*   *   *   BILITOT 2.1*     All labs within the past 24 hours have been reviewed.

## 2025-03-04 NOTE — ASSESSMENT & PLAN NOTE
Severe malnutrition patient with recent hx of worsening dysphagia, has had w/u for scleroderma, has hiatal hernia and ?esophageal dysmotility  - dysphagia to solids.   Developed wernicke encephalopathy from nutritional deficiency     Daughter reports concern of inadequate intake has had severe weight loss in recent months with multiple complex health conditions.   Nutrition consult for tube feed recs.     If patient remains admit here and GI bleed w/u completed/resolved - she inquires about tube feed placement to management severe malnutrition.     3/4- Holding TF as has coagulopathy and high risk of trauma and further bleeding. GI bleed not resolved. Lab pending

## 2025-03-04 NOTE — ED NOTES
Assumed care of the patient. Report received from kody GUERRIER. Pt on continuous cardiac monitoring, continuous pulse oximetry, and automatic BP cuff cycling Q15min. Pt in hospital gown, side rails up X2, bed low and locked, and call light is placed within reach. No  family/visitors at bedside at this time. Pt denies any complaints or needs.

## 2025-03-04 NOTE — PT/OT/SLP EVAL
"Speech Language Pathology Evaluation  Bedside Swallow    Patient Name:  Madelaine Barros   MRN:  [id number]  Admitting Diagnosis: Rectal bleeding    Recommendations:                 General Recommendations:   ongoing swallow  Diet recommendations:   , Full liquids ; Pt would benefit from oral nutritional supplement given minimal PO intake  Aspiration Precautions: 1 bite/sip at a time, Feed only when awake/alert, Frequent oral care, HOB to 90 degrees, Meds crushed in puree, Small bites/sips, and Standard aspiration precautions   General Precautions: Standard, aspiration  Communication strategies:  go to room if call light pushed    Assessment:     Madelaine Barros is a 59 y.o. female with an SLP diagnosis of Dysphagia.  She presents with decreased PO acceptance.    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    Polyp of colon 03/05/2024    Schatzki's ring 12/11/2024    TIA (transient ischemic attack) 10/2024    Type 2 MI (myocardial infarction) 11/2024    secondary to hypertensive emergency, normal ECHO - [facility] ADMIT    Wernicke encephalopathy 02/13/2025       Past Surgical History:   Procedure Laterality Date    HYSTERECTOMY      TUBAL LIGATION     HPI: "60 y/o AAF w/ Atypical Hemolytic Uremic syndrome now ESRD on HD m/w/f, Severe Malnutrition, GERD/Duodenal ulcer & schatzki ring, Dysphagia, latent syphilis (treated) presents to the ED for concerns of rectal pain and bleeding.   She receives all care in [facility] system, history provided by daughter Sue Barros at bedside.  She was diagnosed " "with HUS and developed severe KERRY now ESRD in November 2024, she is followed by hematology @ [facility] and has been on immunomodulator infusions every 8 weeks (Soliris, now Ultomiris last dose 2/24/25).    She has severe debility, poor appetite dysphagia to solids, mostly consumes liquid, but daughter is concerned her nutritional intake is inadequate, inquires about feeding tube placement for chronic management.  She had watery stool today,  pts sister noted at home earlier that gingival bleeding was present.  Pt has had c/o of rectal pain, on ED rectal exam, reported anal lesion w/ tenderness and slow bleeding noted, (pictures in media tab)   Patient has not required frequent transfusions, hx of a duodenal ulcer but no reported past history of lower GI bleeding.  Daughter today notes all home anti-hypertensive have been discontinued since leaving IP rehab.   At bedside patient appears ill lethargic, cannot fully participate in interview, wakes up periodically w/ acute pain complaints.     In ED tonight started on treatment for sepsis - given 30cc/kg Bolus 2.1Liters, initial lactic acid 1.9, blood cultuers obtained and vancomycin/zosyn given.  Recent [facility] hgb values in 8.0-9.0 range.   Initial hgb here was 14 and repeat values are pending.   She has receiving Morphine 4mg IV for pain,  Protonix 80mg IV x 1, topical lidocaine for rectal pain. "    Prior Intubation HX:  none this admit    Modified Barium Swallow: none on file    Chest X-Rays: 3/4: "FINDINGS:  Cardiac wires overlie the chest.  Cardiomediastinal silhouette is not enlarged.  Coarse interstitial lung markings in the left hemithorax, potentially exaggerated by mild rotation.  No confluent area of consolidation.  No large pleural effusion.  No distinct pneumothorax.  Similar positioning of right-sided central venous catheter.  Impression:  No detrimental change when compared with 03/02/2025."    Prior diet: per sister, pt refusing solids; diet mostly consists " of purees.    Occupation/hobbies/homemaking: [employer].    Subjective     Pt resting upon arrival with sister present.     Pain/Comfort:  Pain Rating 1: 0/10    Respiratory Status: Nasal cannula, flow 2 L/min    Objective:     Oral Musculature Evaluation  Oral Musculature: WFL  Dentition: present and adequate  Secretion Management: adequate  Mucosal Quality: adequate  Mandibular Strength and Mobility: WFL  Oral Labial Strength and Mobility: WFL  Lingual Strength and Mobility: WFL  Velar Elevation: WFL  Buccal Strength and Mobility: WFL  Volitional Cough: strong  Volitional Swallow: elicited  Voice Prior to PO Intake: dry and clear; low intensity    Bedside Swallow Eval:   Consistencies Assessed:  Thin liquids via cup edge and straw sips  Puree   Minced and moist  Pt declined advanced solids    Oral Phase:   Minced and moist: increased mastication time and mild Oral residue  Thins and puree: WFL    Pharyngeal Phase:   no overt clinical signs/symptoms of aspiration    Compensatory Strategies  None    Treatment: RN reported fluctuating mental status and levels of alertness. Pt easily roused given min verbal stimulation and was able to maintained adequate alertness throughout session. Pt's sister reported pt with minimal PO intake prior to admit. PT not amenable to advanced solids past minced and moist to which she demonstrated prolonged mastication with mild oral residue.  SLP educated pt and sister re: SLP services, diet recs, aspiration precautions, oral nutritional supplement, and poc to which they both verbalized understanding. Recommend full liquid diet. MD in room for half of session and in agreement with SLP recs. SLP to follow.     Goals:   Multidisciplinary Problems       SLP Goals          Problem: SLP    Goal Priority Disciplines Outcome   SLP Goal     SLP Progressing   Description: Speech Language Pathology Goals  Goals expected to be met by 3/18:   1) Pt will tolerate least restrictive diet in order to maintain  adequate nutrition/hydration.     Recommending full liquid diet. SLP to follow.                            Plan:     Patient to be seen:  4 x/week   Plan of Care expires:  04/04/25  Plan of Care reviewed with:  patient, sibling   SLP Follow-Up:  Yes       Discharge recommendations:      Barriers to Discharge:  Level of Skilled Assistance Needed      Time Tracking:     SLP Treatment Date:      Speech Start Time:  1054  Speech Stop Time:  1115     Speech Total Time (min):  21 min    Billable Minutes: Eval Swallow and Oral Function `13 and Self Care/Home Management Training 8    03/04/2025

## 2025-03-04 NOTE — CARE UPDATE
RAPID RESPONSE NURSE CHART REVIEW        Chart Reviewed: 03/03/2025, 2005    MRN: [id number]  Bed: ED 06    Dx: Rectal bleeding    Madelaine Barros has a past medical history of Allergic rhinitis, Diabetes mellitus, Duodenal ulcer, GERD without esophagitis, Hiatal hernia, History of Helicobacter pylori infection, Hypertension, Hypertensive emergency, Latent syphilis, MAHA (microangiopathic hemolytic anemia), Polyp of colon, Schatzki's ring, TIA (transient ischemic attack), Type 2 MI (myocardial infarction), and Wernicke encephalopathy.    Last VS: BP (!) 139/94   Pulse 101   Temp 97.9 °F (36.6 °C)   Resp 14   Wt 72.1 kg (158 lb 15.2 oz)   SpO2 100%   Breastfeeding No   BMI 26.45 kg/m²     24H Vital Sign Range:  Temp:  [97.5 °F (36.4 °C)-98.3 °F (36.8 °C)]   Pulse:  []   Resp:  [10-18]   BP: ()/(66-99)   SpO2:  [92 %-100 %]     Level of Consciousness (AVPU): responds to voice    Recent Labs     03/03/25  1814 03/03/25 2024 03/03/25  2331   WBC 10.13 10.19 5.95   HGB 10.9* 8.8* 12.2   HCT 34.7* 27.7* 39.3   * 88* 97*       Recent Labs     03/02/25  1732 03/03/25  0510 03/03/25 2024   * 133* 137   K 3.8 4.0 4.2   CL 99 99 100   CO2 18* 17* 17*   BUN 35* 41* 43*   CREATININE 6.1* 6.4* 6.2*   GLU 81 83 94   PHOS  --  4.2  --    MG 1.8 1.9  --           OXYGEN:  Flow (L/min) (Oxygen Therapy): 4          MEWS score: 0    ED Charge Marshall GUERRIER notified of patient acuity. Bed request changed to step-down from med/surg. Bed coordinator Juan made aware, will assign patient bed on MTS[facility].  No additional concerns verbalized at this time. Patient ok to go to step-down floor once bed is ready. Instructed to call [phone] for further concerns or assistance.    CHESTER Monroy RN

## 2025-03-05 LAB
ALBUMIN SERPL BCP-MCNC: 2.5 G/DL (ref 3.5–5.2)
ALP SERPL-CCNC: 267 U/L (ref 40–150)
ALT SERPL W/O P-5'-P-CCNC: 430 U/L (ref 10–44)
ANION GAP SERPL CALC-SCNC: 13 MMOL/L (ref 8–16)
APTT PPP: 39.6 SEC (ref 21–32)
APTT PPP: 51.1 SEC (ref 21–32)
APTT PPP: >150 SEC (ref 21–32)
APTT PPP: >150 SEC (ref 21–32)
AST SERPL-CCNC: 697 U/L (ref 10–40)
BACTERIA STL CULT: NORMAL
BASOPHILS # BLD AUTO: 0.01 K/UL (ref 0–0.2)
BASOPHILS # BLD AUTO: ABNORMAL K/UL (ref 0–0.2)
BASOPHILS NFR BLD: 0 % (ref 0–1.9)
BASOPHILS NFR BLD: 0.1 % (ref 0–1.9)
BILIRUB SERPL-MCNC: 2 MG/DL (ref 0.1–1)
BUN SERPL-MCNC: 23 MG/DL (ref 6–20)
BURR CELLS BLD QL SMEAR: ABNORMAL
CALCIUM SERPL-MCNC: 8.6 MG/DL (ref 8.7–10.5)
CHLORIDE SERPL-SCNC: 96 MMOL/L (ref 95–110)
CO2 SERPL-SCNC: 23 MMOL/L (ref 23–29)
CREAT SERPL-MCNC: 4 MG/DL (ref 0.5–1.4)
DIFFERENTIAL METHOD BLD: ABNORMAL
DIFFERENTIAL METHOD BLD: ABNORMAL
E COLI SXT1 STL QL IA: NEGATIVE
E COLI SXT2 STL QL IA: NEGATIVE
EOSINOPHIL # BLD AUTO: 0.1 K/UL (ref 0–0.5)
EOSINOPHIL # BLD AUTO: ABNORMAL K/UL (ref 0–0.5)
EOSINOPHIL NFR BLD: 1.3 % (ref 0–8)
EOSINOPHIL NFR BLD: 1.6 % (ref 0–8)
ERYTHROCYTE [DISTWIDTH] IN BLOOD BY AUTOMATED COUNT: 19.9 % (ref 11.5–14.5)
ERYTHROCYTE [DISTWIDTH] IN BLOOD BY AUTOMATED COUNT: 20.3 % (ref 11.5–14.5)
EST. GFR  (NO RACE VARIABLE): 12.3 ML/MIN/1.73 M^2
GLUCOSE SERPL-MCNC: 109 MG/DL (ref 70–110)
HCT VFR BLD AUTO: 24.7 % (ref 37–48.5)
HCT VFR BLD AUTO: 25.9 % (ref 37–48.5)
HGB BLD-MCNC: 8.1 G/DL (ref 12–16)
HGB BLD-MCNC: 8.3 G/DL (ref 12–16)
IMM GRANULOCYTES # BLD AUTO: 0.04 K/UL (ref 0–0.04)
IMM GRANULOCYTES # BLD AUTO: ABNORMAL K/UL (ref 0–0.04)
IMM GRANULOCYTES NFR BLD AUTO: 0.5 % (ref 0–0.5)
IMM GRANULOCYTES NFR BLD AUTO: ABNORMAL %
INR PPP: 1.1 (ref 0.8–1.2)
INR PPP: 1.2 (ref 0.8–1.2)
LYMPHOCYTES # BLD AUTO: 1.5 K/UL (ref 1–4.8)
LYMPHOCYTES # BLD AUTO: ABNORMAL K/UL (ref 1–4.8)
LYMPHOCYTES NFR BLD: 10.6 % (ref 18–48)
LYMPHOCYTES NFR BLD: 17.1 % (ref 18–48)
MAGNESIUM SERPL-MCNC: 1.6 MG/DL (ref 1.6–2.6)
MCH RBC QN AUTO: 27 PG (ref 27–31)
MCH RBC QN AUTO: 27.2 PG (ref 27–31)
MCHC RBC AUTO-ENTMCNC: 32 G/DL (ref 32–36)
MCHC RBC AUTO-ENTMCNC: 32.8 G/DL (ref 32–36)
MCV RBC AUTO: 82 FL (ref 82–98)
MCV RBC AUTO: 85 FL (ref 82–98)
MONOCYTES # BLD AUTO: 1.2 K/UL (ref 0.3–1)
MONOCYTES # BLD AUTO: ABNORMAL K/UL (ref 0.3–1)
MONOCYTES NFR BLD: 13.7 % (ref 4–15)
MONOCYTES NFR BLD: 25.2 % (ref 4–15)
NEUTROPHILS # BLD AUTO: 5.8 K/UL (ref 1.8–7.7)
NEUTROPHILS # BLD AUTO: ABNORMAL K/UL
NEUTROPHILS NFR BLD: 61.8 % (ref 38–73)
NEUTROPHILS NFR BLD: 67.3 % (ref 38–73)
NRBC BLD-RTO: 0 /100 WBC
NRBC BLD-RTO: 0 /100 WBC
PHOSPHATE SERPL-MCNC: 1.9 MG/DL (ref 2.7–4.5)
PLATELET # BLD AUTO: 54 K/UL (ref 150–450)
PLATELET # BLD AUTO: 70 K/UL (ref 150–450)
PLATELET BLD QL SMEAR: ABNORMAL
PMV BLD AUTO: ABNORMAL FL (ref 9.2–12.9)
PMV BLD AUTO: ABNORMAL FL (ref 9.2–12.9)
POCT GLUCOSE: 104 MG/DL (ref 70–110)
POCT GLUCOSE: 106 MG/DL (ref 70–110)
POCT GLUCOSE: 108 MG/DL (ref 70–110)
POCT GLUCOSE: 109 MG/DL (ref 70–110)
POCT GLUCOSE: 121 MG/DL (ref 70–110)
POCT GLUCOSE: 42 MG/DL (ref 70–110)
POCT GLUCOSE: 46 MG/DL (ref 70–110)
POCT GLUCOSE: 97 MG/DL (ref 70–110)
POIKILOCYTOSIS BLD QL SMEAR: ABNORMAL
POTASSIUM SERPL-SCNC: 3.5 MMOL/L (ref 3.5–5.1)
PROT SERPL-MCNC: 5.9 G/DL (ref 6–8.4)
PROTHROMBIN TIME: 12.4 SEC (ref 9–12.5)
PROTHROMBIN TIME: 13 SEC (ref 9–12.5)
RBC # BLD AUTO: 3 M/UL (ref 4–5.4)
RBC # BLD AUTO: 3.05 M/UL (ref 4–5.4)
SCHISTOCYTES BLD QL SMEAR: ABNORMAL
SCHISTOCYTES BLD QL SMEAR: PRESENT
SODIUM SERPL-SCNC: 132 MMOL/L (ref 136–145)
WBC # BLD AUTO: 11.74 K/UL (ref 3.9–12.7)
WBC # BLD AUTO: 8.67 K/UL (ref 3.9–12.7)
WBC NRBC COR # BLD: ABNORMAL K/UL

## 2025-03-05 PROCEDURE — 85610 PROTHROMBIN TIME: CPT | Performed by: HOSPITALIST

## 2025-03-05 PROCEDURE — 85025 COMPLETE CBC W/AUTO DIFF WBC: CPT | Performed by: HOSPITALIST

## 2025-03-05 PROCEDURE — 99900035 HC TECH TIME PER 15 MIN (STAT)

## 2025-03-05 PROCEDURE — 80053 COMPREHEN METABOLIC PANEL: CPT | Performed by: HOSPITALIST

## 2025-03-05 PROCEDURE — 36415 COLL VENOUS BLD VENIPUNCTURE: CPT | Mod: XB | Performed by: HOSPITALIST

## 2025-03-05 PROCEDURE — 25000003 PHARM REV CODE 250: Performed by: HOSPITALIST

## 2025-03-05 PROCEDURE — 85730 THROMBOPLASTIN TIME PARTIAL: CPT | Mod: 91 | Performed by: HOSPITALIST

## 2025-03-05 PROCEDURE — 63600175 PHARM REV CODE 636 W HCPCS: Performed by: HOSPITALIST

## 2025-03-05 PROCEDURE — 94761 N-INVAS EAR/PLS OXIMETRY MLT: CPT

## 2025-03-05 PROCEDURE — 27000221 HC OXYGEN, UP TO 24 HOURS

## 2025-03-05 PROCEDURE — 84100 ASSAY OF PHOSPHORUS: CPT | Performed by: HOSPITALIST

## 2025-03-05 PROCEDURE — 83735 ASSAY OF MAGNESIUM: CPT | Performed by: HOSPITALIST

## 2025-03-05 PROCEDURE — 20600001 HC STEP DOWN PRIVATE ROOM

## 2025-03-05 PROCEDURE — 85610 PROTHROMBIN TIME: CPT | Mod: 91 | Performed by: HOSPITALIST

## 2025-03-05 PROCEDURE — 27000207 HC ISOLATION

## 2025-03-05 PROCEDURE — 36415 COLL VENOUS BLD VENIPUNCTURE: CPT | Performed by: HOSPITALIST

## 2025-03-05 PROCEDURE — 92526 ORAL FUNCTION THERAPY: CPT

## 2025-03-05 RX ORDER — HEPARIN SODIUM,PORCINE/D5W 25000/250
0-40 INTRAVENOUS SOLUTION INTRAVENOUS CONTINUOUS
Status: DISCONTINUED | OUTPATIENT
Start: 2025-03-05 | End: 2025-03-07

## 2025-03-05 RX ORDER — PANTOPRAZOLE SODIUM 40 MG/1
40 TABLET, DELAYED RELEASE ORAL
Status: DISCONTINUED | OUTPATIENT
Start: 2025-03-05 | End: 2025-03-25 | Stop reason: HOSPADM

## 2025-03-05 RX ADMIN — PANTOPRAZOLE SODIUM 40 MG: 40 TABLET, DELAYED RELEASE ORAL at 04:03

## 2025-03-05 RX ADMIN — HEPARIN SODIUM AND DEXTROSE 12 UNITS/KG/HR: 10000; 5 INJECTION INTRAVENOUS at 11:03

## 2025-03-05 RX ADMIN — Medication 1000 UNITS: at 09:03

## 2025-03-05 RX ADMIN — DEXTROSE MONOHYDRATE 25 G: 25 INJECTION, SOLUTION INTRAVENOUS at 12:03

## 2025-03-05 RX ADMIN — NEPHROCAP 1 CAPSULE: 1 CAP ORAL at 09:03

## 2025-03-05 RX ADMIN — DEXTROSE MONOHYDRATE: 100 INJECTION, SOLUTION INTRAVENOUS at 04:03

## 2025-03-05 RX ADMIN — Medication 100 MG: at 09:03

## 2025-03-05 RX ADMIN — PANTOPRAZOLE SODIUM 40 MG: 40 INJECTION, POWDER, LYOPHILIZED, FOR SOLUTION INTRAVENOUS at 09:03

## 2025-03-05 RX ADMIN — ESCITALOPRAM OXALATE 10 MG: 5 TABLET, FILM COATED ORAL at 09:03

## 2025-03-05 RX ADMIN — FOLIC ACID 1000 MCG: 1 TABLET ORAL at 09:03

## 2025-03-05 NOTE — CONSULTS
Received consult for PIV placement.  Attemted x 1 without success.  No other suitable veins located.  NIAS recommends alternate access placed by a physician.

## 2025-03-05 NOTE — PT/OT/SLP PROGRESS
Speech Language Pathology Treatment    Patient Name:  Madelaine Barros   MRN:  [id number]  Admitting Diagnosis: Rectal bleeding    Recommendations:                 General Recommendations:   Ongoing swallow   Diet recommendations: Liquid Diet Level: Full liquids; Pt would benefit from an oral nutritional supplement 2/2 poor PO intake  Aspiration Precautions: 1 bite/sip at a time, Frequent oral care, Meds crushed in puree, and Standard aspiration precautions   General Precautions: Standard, aspiration  Communication strategies:  go to room if call light pushed    Assessment:     Madelaine Barros is a 59 y.o. female with an SLP diagnosis of Dysphagia.  She presents with ongoing poor oral intake. SLP to follow for ongoing assessment.    Subjective     Pt resting unaccompanied upon SLP arrival. Sister arrived at the end of session. RN agreeable to SLP session.      Pain/Comfort:  Pain Rating 1: 0/10    Respiratory Status: Nasal cannula, flow 2 L/min    Objective:     Has the patient been evaluated by SLP for swallowing?   Yes  Keep patient NPO? No     Spoke with RN prior to session who endorsed pt declined breakfast this morning. Pt resting in bed upon SLP arrival. Pt able to rouse easily to min verbal stimulation. Pt able to maintain adequate alertness for PO intake though remained with eyes closed for majority of session. Pt required moderate encouragement to accept minimal PO of puree x3 and thins via straw sips x3. No overt signs of aspiration appreciated. Pt declined advanced PO. SLP provided education to pt and sister re: diet recs, ongoing assessment, aspiration precautions, small meals throughout the day vs 3 big meals, encouraging PO, and poc to which pt will benefit from ongoing reinforcement. Recommend continued full liquid diet with additional oral nutritional supplement given minimal intake.    Goals:   Multidisciplinary Problems       SLP Goals          Problem: SLP    Goal Priority Disciplines  Outcome   SLP Goal     SLP Progressing   Description: Speech Language Pathology Goals  Goals expected to be met by 3/18:   1) Pt will tolerate least restrictive diet in order to maintain adequate nutrition/hydration.     Recommending full liquid diet. SLP to follow.                            Plan:     Patient to be seen:  4 x/week   Plan of Care expires:  04/04/25  Plan of Care reviewed with:  patient, sibling   SLP Follow-Up:  Yes       Discharge recommendations:      Barriers to Discharge:  None    Time Tracking:     SLP Treatment Date:   03/05/25  Speech Start Time:  1104  Speech Stop Time:  1117     Speech Total Time (min):  13 min    Billable Minutes: Treatment Swallowing Dysfunction 13    03/05/2025

## 2025-03-05 NOTE — ASSESSMENT & PLAN NOTE
Pt with waxing and waning changes in MS since October, 2024. Related to HUS.   3/3- she lacks capacity, unable to communicate about her illness.     I spoke to her DTR, Sue Barros, who wants pt to go to [facility]. Wants regular MD's for ongoing care and treatment for HUS.    3/4- somnolence, improved when pt's DTR arrived.  Attempt to re-try dialysis

## 2025-03-05 NOTE — PLAN OF CARE
[facility] - Telemetry Stepdown  Initial Discharge Assessment       Primary Care Provider: Delilah Kelley MD    Admission Diagnosis: Rectal bleeding [K62.5]  Screening for cardiovascular condition [Z13.6]  Tachycardia [R00.0]  Chest pain [R07.9]    Admission Date: 3/2/2025  Expected Discharge Date: 3/7/2025    Transition of Care Barriers: None    Payor: BLUE CROSS BLUE [facility] / Plan: [place] OF [place] PPO / Product Type: PPO /     Extended Emergency Contact Information  Primary Emergency Contact: Sue Barros  Address: [address] [place] [address] [facility]  Home Phone: [phone]  Relation: Daughter  Secondary Emergency Contact: Gladys Burris   [facility]  Home Phone: [phone]  Relation: Sister    Discharge Plan A: Home with family, Home Health  Discharge Plan B: Home with family      [id number] [address] AT [facility] [address]  [address]  [facility] [address]  Phone: [phone] Fax: [phone]                CM met with patient in room for Discharge Planning Assessment.  Patient able to answer some questions. CM also called tesha Rogers [phone] Per patient, she lives with daughter Sue and a son in a 2-story home with 1 step(s) to enter.   Per Sue, patient was dependent with ADLS and used wheelchair for ambulation. Per Sue, patient is  on dialysis at [facility] on F, and does not take Coumadin. PCP and pharmacy verified. Patient does not have home health, and has not been hospitalized in past 30 days.  Patient will have help from Sue Barros (daughter) [phone] upon discharge.   Discharge Planning discussed.  All questions addressed.  CM will follow for needs.    Discharge Plan A and Plan B have been determined by review of patient's clinical status, future medical and therapeutic needs, and coverage/benefits for post-acute care in coordination with  multidisciplinary team members.      Initial Assessment (most recent)       Adult Discharge Assessment - 03/05/25 1601          Discharge Assessment    Assessment Type Discharge Planning Assessment     Confirmed/corrected address, phone number and insurance Yes     Confirmed Demographics Correct on Facesheet     Source of Information patient     When was your last doctors appointment? 02/24/25     Does patient/caregiver understand observation status Yes     Reason For Admission rectal bleeding     People in Home child(reji), adult     Facility Arrived From: Home     Do you expect to return to your current living situation? Yes     Do you have help at home or someone to help you manage your care at home? Yes     Who are your caregiver(s) and their phone number(s)? Sue Barros (daughter) [phone], Gladys Burris ( sister) [phone]     Prior to hospitilization cognitive status: Alert/Oriented     Current cognitive status: Alert/Oriented;Not Oriented to Time     Walking or Climbing Stairs Difficulty yes     Walking or Climbing Stairs ambulation difficulty, requires equipment     Mobility Management wheelchair, (bed bound at this time)     Dressing/Bathing Difficulty yes     Dressing/Bathing bathing difficulty, dependent;dressing difficulty, dependent     Dressing/Bathing Management daughter, sister assists in these tasks     Home Layout Bedroom on 2nd floor     Equipment Currently Used at Home wheelchair;raised toilet     Readmission within 30 days? No     Patient currently being followed by outpatient case management? No     Do you currently have service(s) that help you manage your care at home? No     Do you take prescription medications? Yes     Do you have prescription coverage? Yes     Coverage BLUE CROSS BLUE [employer] - BCBS OF [facility]     Do you have any problems affording any of your prescribed medications? TBD     Is the patient taking medications as prescribed? yes     Who is going to help you get  home at discharge? Sue Barros (daughter) [phone], Gladys Burris ( sister) [phone]     How do you get to doctors appointments? family or friend will provide     Are you on dialysis? Yes     Dialysis Name and Scheduled days [facility] MWF     Do you take coumadin? No     Discharge Plan A Home with family;Home Health     Discharge Plan B Home with family     DME Needed Upon Discharge  other (see comments)   TBD    Discharge Plan discussed with: Patient;Adult children     Transition of Care Barriers None        Physical Activity    On average, how many days per week do you engage in moderate to strenuous exercise (like a brisk walk)? 0 days     On average, how many minutes do you engage in exercise at this level? 0 min        Financial Resource Strain    How hard is it for you to pay for the very basics like food, housing, medical care, and heating? Somewhat hard        Housing Stability    In the last 12 months, was there a time when you were not able to pay the mortgage or rent on time? No     At any time in the past 12 months, were you homeless or living in a shelter (including now)? No        Transportation Needs    Has the lack of transportation kept you from medical appointments, meetings, work or from getting things needed for daily living? No        Food Insecurity    Within the past 12 months, you worried that your food would run out before you got the money to buy more. Sometimes true     Within the past 12 months, the food you bought just didn't last and you didn't have money to get more. Sometimes true        Stress    Do you feel stress - tense, restless, nervous, or anxious, or unable to sleep at night because your mind is troubled all the time - these days? Patient declined        Social Isolation    How often do you feel lonely or isolated from those around you?  Patient declined        Alcohol Use    Q1: How often do you have a drink containing alcohol? Never     Q2: How many drinks  containing alcohol do you have on a typical day when you are drinking? Patient does not drink     Q3: How often do you have six or more drinks on one occasion? Never        Utilities    In the past 12 months has the electric, gas, oil, or water company threatened to shut off services in your home? No        Health Literacy    How often do you need to have someone help you when you read instructions, pamphlets, or other written material from your doctor or pharmacy? Always        OTHER    Name(s) of People in Home Sue Barros (daughter) [phone],                      Tete Goetz RN     [phone]

## 2025-03-05 NOTE — PROGRESS NOTES
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     She receives all care in [facility] system, history provided by daughter Sue Barros at bedside.  She was diagnosed with HUS and developed severe KERRY now ESRD in November 2024, she is followed by hematology @ [facility] and has been on immunomodulator infusions every 8 weeks (Soliris, now Ultomiris last dose 2/24/25).    She has severe debility, poor appetite dysphagia to solids, mostly consumes liquid, but daughter is concerned her nutritional intake is inadequate, inquires about feeding tube placement for chronic management.  She had watery stool today,  pts sister noted at home earlier that gingival bleeding was present.  Pt has had c/o of rectal pain, on ED rectal exam, reported anal lesion w/ tenderness and slow bleeding noted, (pictures in media tab)   Patient has not required frequent transfusions, hx of a duodenal ulcer but no reported past history of lower GI bleeding.  Daughter today notes all home anti-hypertensive have been discontinued since leaving IP rehab.     At bedside patient appears ill lethargic, cannot fully participate in interview, wakes up periodically w/ acute pain complaints.     In ED tonight started on treatment for sepsis - given 30cc/kg Bolus 2.1Liters, initial lactic acid 1.9, blood cultuers obtained and vancomycin/zosyn given.  Recent [facility] hgb values in 8.0-9.0 range.   Initial hgb here was 14 and repeat values are  pending.   She has receiving Morphine 4mg IV for pain,  Protonix 80mg IV x 1, topical lidocaine for rectal pain.     Recent admission history   [place] [facility] [place]    1/5-1/31/25 - [facility] Admit  - Encephalopathy diagnosed with Wernicke Encephalopathy s/p high dose IV thiamine on maintenance tharpy now.  She had intractable nausea vomiting.  Was on empiric high dose steroids for possible CNS vasculitis s/p cerebral angiogram on 1/13 w/o signs of vasculitis.  REquired ICU level of care during admit for shock, suspected secondary to anti-hypertensive use. Patient tapered to one medication during admit, She was diagnosed with Right IJ DVT and started on oral apixaban for anticoagulation.      12/25/24-01/05/25 - [facility] Admit - HTN emergency & intractable N/V, started on Ultomiris inpt, Diagnosis & treatment for Wernicke Encephalopathy, w/u for Scleroderma, transferred to [facility] for rheumatology consult.     12/09-12/19/24 - [facility] admit - Nausea/vomiting weakness, seen by GI s/p EGD w/ duodenal ulcer, hiatal hernia, Schatzki ring    11/15-11/18/24 - [facility] admit - Admit w/ N/V/ HA for hypertensive emergency causing NSTEMI, no acute changes on ECHO, PRBC transfusion given. High sensitive troponin peak at 677.      10/23-11/06/24 - [facility] admit - HTN emergency w/ KERRY diagnosed with MAHA/HUS, s/p renal biopsy & genetic testing.   Diagnosed & treated for latent syphilis w/ PCN per ID.  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 LFTs up, may be acttively hemalyzing.  hgb 14.3-> 10.4 -> 11. and lethargy/debility .  cr 6.4, INR 2.1.  CRS, hematology, Nephrology and SW/ CM consulted.  LDH and GGT is high.     Called Sue Barros.   Pt lacks capacity. Sue did  sign a blood consent last night.   Wants pt to go to [facility]. Wants regular MD's for ongoing care and treatment for HUS. Pt has been declining mental status since October 2024 since illness began. N&V causes malnourishment and delirium. Some recovery of MS and declined again. Now still with memory loss, loss of orientation, does not remember illness. Not eating properly. Pt's mother had dementia. Will continue ongoing care as needed, including HD, transfusion and what is needed and work to arrange transfer.   Per CRS: CT abd/pelvis reveals gastroduodenitis and findings suggestive of hepatitis/steatosis. Perirectal exam revealed an indistinct bleeding area around the anus. Perrectal fissure.   Per Nephrology HD scheduled for today.     In HD: At 15 minutes into HD she complained of chest pain and had to be rinsed back.  RR called.  ECG shows sinus tachycardia, Troponin was  1102 -> 704    Appreciate Hematology recs:   - Received 2 units FFP.  no signs of bleeding all day but Hb droped to 8.8  Continue monitoring for bleeding. Will start low rate infusional heparin in am. (and uptitrate to therapeutic dose) if no evidence of bleeding. Coags in am.   - Transition from heparin to apixaban 2.5 mg BID (HD dosing) if no bleeding  - Hold anticoagulation during active bleeding  - had a bloody BM 9:30 pm additional 2 units  FFP ordered  for continued bleeding    3/4- received FFP x 4 for active rectal bleeding last night.  Hb 12.2. Did not tolerate HD due to chest pain, which resolved. EKG- ST.  Odrered a repeat EKG this am- It is NSR.  Trop felt high due to renal disease,  CR 6.2. heparin held due to active bleeding. Coagulopathy being corrected, and NG placement on hold.  Encephalopathy- Pt more somnolent this am, but alert with DTR in room. Interacting.   Plan to retry HD. Did not receive blood. Anuric. NPO, needs  speech eval.  Repeat lab pending: cmp, cbc  and trop.  Hard stick and could not draw this  am. Ok to get from HD catheter.  BS 23-> 89. added D10 @ 50 cc/ h.  FULL liquid diet per SLP.   - INR 1.3, PTT 40. Hb 8.8 -> 12-> 8.6.  Has Right IJ thrombosis. Bleeding seems stable so far today. Considering IV heparin.     3/5- had HD last night, 500 cc removed. No further rectal bleeding. Coagulopathy improved but not completely  resolved.  on D10 at 50 cc / h. Rate increased 75 today. She is not eating despite encouragement. Pt has novasource and full liquid diet.  INR 1.1, PTT 38, cbc- h/h stable.  will start low intensity heparin with plan to convert to eliquis if no bleeding in 1-2 days. Consider GI consult if rebleeds. Cr 4, phos 1.9. encourage oral intake. LFT trending down (since admit).  Would benefit from ongoing PC discussions.       Interval History: see above    Review of Systems   Constitutional:  Positive for activity change. Negative for appetite change, chills and fever.   HENT:  Negative for trouble swallowing.    Respiratory:  Negative for cough and shortness of breath.    Cardiovascular:  Negative for chest pain and leg swelling.   Gastrointestinal:  Positive for rectal pain. Negative for abdominal pain, anal bleeding, constipation, diarrhea and nausea.   Genitourinary:  Negative for difficulty urinating.   Musculoskeletal:  Negative for arthralgias, back pain and gait problem.   Neurological:  Negative for light-headedness, numbness and headaches.   Psychiatric/Behavioral:  Positive for confusion. Negative for behavioral problems.      Objective:     Vital Signs (Most Recent):  Temp: 97.3 °F (36.3 °C) (03/05/25 0452)  Pulse: 103 (03/05/25 0452)  Resp: 16 (03/05/25 0452)  BP: (!) 128/92 (03/05/25 0452)  SpO2: 96 % (03/04/25 2245) Vital Signs (24h Range):  Temp:  [97.3 °F (36.3 °C)-99.2 °F (37.3 °C)] 97.3 °F (36.3 °C)  Pulse:  [] 103  Resp:  [12-16] 16  SpO2:  [96 %-100 %] 96 %  BP: (102-144)/(75-94) 128/92     Weight: 72.1 kg (158 lb 15.2 oz)  Body mass index is 26.45  kg/m².    Intake/Output Summary (Last 24 hours) at 3/5/2025 0658  Last data filed at 3/4/2025 1745  Gross per 24 hour   Intake --   Output 1000 ml   Net -1000 ml         Physical Exam  Constitutional:       General: She is not in acute distress.     Appearance: Normal appearance. She is ill-appearing.   HENT:      Head: Normocephalic and atraumatic.      Nose: Nose normal.      Mouth/Throat:      Mouth: Mucous membranes are moist.   Eyes:      General: No scleral icterus.     Extraocular Movements: Extraocular movements intact.      Pupils: Pupils are equal, round, and reactive to light.   Cardiovascular:      Rate and Rhythm: Normal rate and regular rhythm.      Pulses: Normal pulses.      Heart sounds: Normal heart sounds.   Pulmonary:      Effort: Pulmonary effort is normal.      Breath sounds: Normal breath sounds. No wheezing or rhonchi.   Chest:      Chest wall: No tenderness.   Abdominal:      General: Abdomen is flat. Bowel sounds are normal. There is no distension.      Palpations: Abdomen is soft.      Tenderness: There is no abdominal tenderness. There is no right CVA tenderness, left CVA tenderness, guarding or rebound.   Musculoskeletal:         General: No swelling, tenderness or deformity. Normal range of motion.      Cervical back: Normal range of motion and neck supple. No rigidity or tenderness.   Skin:     General: Skin is warm and dry.      Coloration: Skin is not jaundiced or pale.      Findings: No erythema or rash.   Neurological:      General: No focal deficit present.      Mental Status: She is alert. Mental status is at baseline.      Cranial Nerves: No cranial nerve deficit.      Motor: No weakness.   Psychiatric:         Attention and Perception: She is inattentive.         Mood and Affect: Affect is blunt.         Speech: Speech is delayed.         Behavior: Behavior is slowed and withdrawn.         Cognition and Memory: Cognition is impaired. Memory is impaired.             Significant  Labs: All pertinent labs within the past 24 hours have been reviewed.  CBC:   Recent Labs   Lab 03/03/25 2024 03/03/25  2331 03/04/25  1245   WBC 10.19 5.95 11.37   HGB 8.8* 12.2 8.6*   HCT 27.7* 39.3 26.6*   PLT 88* 97* 87*     CMP:   Recent Labs   Lab 03/03/25 2024 03/04/25  1245    135*   K 4.2 3.5    97   CO2 17* 21*   GLU 94 129*   BUN 43* 49*   CREATININE 6.2* 6.8*   CALCIUM 8.6* 8.8   PROT 6.1 6.2   ALBUMIN 2.8* 2.8*   BILITOT 2.1* 2.3*   ALKPHOS 225* 238*   * 676*   * 399*   ANIONGAP 20* 17*       Significant Imaging: I have reviewed all pertinent imaging results/findings within the past 24 hours.    Assessment and Plan     * Rectal bleeding  Patient with undifferentiated rectal pain, ?lesion and rectal bleeding.  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 Rising PT/INR and PTT compared to last [facility] 1/31 values.     3/4-has non-bleeding fissure in the posterior midline.constipation/straining with bowel movements but is not able to provide a thorough history.  correction of coagulopathy with FFP.  PO bowel regimen started.   Received 4 units FFP.  Am lab pending    3/5- seems to have stopped. Consider GI eval if bleeds again.    Metabolic encephalopathy  Pt with waxing and waning changes in MS since October, 2024. Related to HUS.   3/3- she lacks capacity, unable to communicate about her illness.     I spoke to her DTR, Sue Barros, who wants pt to go to [facility]. Wants regular MD's for ongoing care and treatment for HUS.    3/4-  somnolence, improved when pt's DTR arrived.  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   -last Ultomiris dose 2/24/25 - out[place] hematologist is [place] Cantu [place]  [place]  -Consult Hematology given concern for now active hemolysis again based on lab studies and report of both rectal bleeding and gingival bleeding.   -type & screen and blood consent obtained  -check fibrinogen and complement levels, prior notes indicate complement mediated HUS.     -Discuss with hematology in AM re: need for bridging anticoagulation in setting of active hemolysis and potential GI bleeding with recent diagnosis of Right IJ Clot    -Would discuss with transfer center regarding transfer to [facility] [place] [facility]- daughter requests [facility] if transfer occurs for continuity of care.  Needs insurance approval.     3/5- nephrology and hematology following. Mild low grade hemolysis. Hb 8.1. coags better.     ESRD (end stage renal disease)  Consult nephrology     Pt given sepsis bolus 2.1L in ED earlier tonight - CXR with possible edema, no acute effusions on CT abdomen    HD on 3/4 removed 500 cc.       Elevated transaminase level  Hepatic steatosis on CT tonight   -she has severe malnutrition - her elevated bilirubin I suspect is from hemolysis, but not sure hemolysis explains acutely elevated transaminase levels.   -Worsening hepatic function may contribute to her worsening coagulopathy elevated PT/PTT      Acute thrombosis of right internal jugular vein  Diagnosed during [facility] [place] [facility] January hospital admission on apixaban as outpatient.     With concern for active bleeding - holding anticoagulation - would f/u with hematology regarding bridging anticoagulation with IV heparin infusion    3/5- lab stable this am, Coags improved. Will do trial of low intensity heparin.  Monitor for Recurrent GI bleeding.       Renal hematoma, left, sequela  Bud imaging with concern for left renal lesion that is believed to be secondary to hematoma from her renal biopsy in late 2024.  Current imaging shows resolving appearance of remote hematoma.     Diarrhea  ED team has ordered C.diff studies.  Daughter notes patient having about 3 watery BM per day.     If having watery stool will leave orders for C.diff studies. Add additional pending stool studies.   If patient on primarily a liquid diet and low oral intake this may be cause but CT shows some small bowel wall thickening and colonic thickening  so  an infectious enterocolitis remains on differential.     Started on sepsis protocol in ED -  I will hold further empiric IV antibiotics at this time.       Wernicke encephalopathy  Continue on home thiamine supplementation.       Dysphagia  NPO for now pending CRS evaluation     Recent [facility] notes indicate pt possibly afraid to eat, she has had severe weight loss in recent months with acute decline in health with HUS syndrome.   Continue IV PPI -     3/4- SLP eval- Full liquid diet, add Novasource. nurses holding meds    Unspecified severe protein-calorie malnutrition  Severe malnutrition patient with recent hx of worsening dysphagia, has had w/u for scleroderma, has hiatal hernia and ?esophageal dysmotility  - dysphagia to solids.   Developed wernicke encephalopathy from nutritional deficiency     Daughter reports concern of inadequate intake has had severe weight loss in recent months with multiple complex health conditions.   Nutrition consult for tube feed recs.     If patient remains admit here and GI bleed w/u completed/resolved - she inquires about tube feed placement to management severe malnutrition.     3/5- Holding TF as has coagulopathy and high risk of trauma and further bleeding. GI bleed not resolved. Lab pending    GERD without esophagitis  On protonix as outaptient, hx of duodenal  ulcer diagnosed in recent months   Continue IV PPI BId for now       History of Helicobacter pylori infection  Diagnosed via EGD and reported treated in [facility] system.       Anemia of chronic renal failure, stage 5  Anemia is likely due to acute blood loss which was from GI bleed and active hemolysis due to HUS.  . Most recent hemoglobin and hematocrit are listed below.  Recent Labs     03/03/25 2024 03/03/25  2331 03/04/25  1245   HGB 8.8* 12.2 8.6*   HCT 27.7* 39.3 26.6*       Plan  - Monitor serial CBC: Every 12 hours  - Transfuse PRBC if patient becomes hemodynamically unstable, symptomatic or H/H drops below 7/21.  - Patient has not received any PRBC transfusions to date  - Patient's anemia is currently improving  - correcting coagulopathy      VTE Risk Mitigation (From admission, onward)           Ordered     heparin (porcine) injection 1,000 Units  As needed (PRN)         03/03/25 1524     IP VTE HIGH RISK PATIENT  Once         03/03/25 0416     Place sequential compression device  Until discontinued         03/03/25 0416     Reason for No Pharmacological VTE Prophylaxis  Once        Question:  Reasons:  Answer:  Active Bleeding    03/03/25 0416                    Discharge Planning   PATRICIA: 3/7/2025     Code Status: Full Code   Medical Readiness for Discharge Date:              Zoya Cadena MD  Department of Hospital Medicine   [facility] - Telemetry Stepdown

## 2025-03-05 NOTE — ASSESSMENT & PLAN NOTE
-hemolysis studies sent tonight and have returned with haptoglobin <10, has elevated direct bili, elevated LDH and D-dimer.   -last Ultomiris dose 2/24/25 - outpt hematologist is Roma Cantu MD  @ [facility]  -Consult Hematology given concern for now active hemolysis again based on lab studies and report of both rectal bleeding and gingival bleeding.   -type & screen and blood consent obtained  -check fibrinogen and complement levels, prior notes indicate complement mediated HUS.     -Discuss with hematology in AM re: need for bridging anticoagulation in setting of active hemolysis and potential GI bleeding with recent diagnosis of Right IJ Clot    -Would discuss with transfer center regarding transfer to [facility] [place]- daughter requests [facility] if transfer occurs for continuity of care.  Needs insurance approval.     3/5- nephrology and hematology following. Mild low grade hemolysis. Hb 8.1. coags better.

## 2025-03-05 NOTE — ASSESSMENT & PLAN NOTE
Severe malnutrition patient with recent hx of worsening dysphagia, has had w/u for scleroderma, has hiatal hernia and ?esophageal dysmotility  - dysphagia to solids.   Developed wernicke encephalopathy from nutritional deficiency     Daughter reports concern of inadequate intake has had severe weight loss in recent months with multiple complex health conditions.   Nutrition consult for tube feed recs.     If patient remains admit here and GI bleed w/u completed/resolved - she inquires about tube feed placement to management severe malnutrition.     3/5- Holding TF as has coagulopathy and high risk of trauma and further bleeding. GI bleed not resolved. Lab pending

## 2025-03-05 NOTE — ASSESSMENT & PLAN NOTE
Diagnosed during [facility] January hospital admission on apixaban as outpatient.     With concern for active bleeding - holding anticoagulation - would f/u with hematology regarding bridging anticoagulation with IV heparin infusion    3/5- lab stable this am, Coags improved. Will do trial of low intensity heparin. Monitor for Recurrent GI bleeding.

## 2025-03-05 NOTE — AI DETERIORATION ALERT
"RAPID RESPONSE NURSE AI ALERT       AI alert received.    Chart Reviewed: 03/04/2025, 8:49 PM    MRN: [id number]  Bed: 8087/8087 A    Dx: Rectal bleeding    Madelaine Barros has a past medical history of Allergic rhinitis, Diabetes mellitus, Duodenal ulcer, GERD without esophagitis, Hiatal hernia, History of Helicobacter pylori infection, Hypertension, Hypertensive emergency, Latent syphilis, MAHA (microangiopathic hemolytic anemia), Polyp of colon, Schatzki's ring, TIA (transient ischemic attack), Type 2 MI (myocardial infarction), and Wernicke encephalopathy.    Last VS: /83 (BP Location: Right arm, Patient Position: Lying)   Pulse 97   Temp 99.2 °F (37.3 °C) (Oral)   Resp 14   Ht 5' 5" (1.651 m)   Wt 72.1 kg (158 lb 15.2 oz)   SpO2 100%   Breastfeeding No   BMI 26.45 kg/m²     24H Vital Sign Range:  Temp:  [97.3 °F (36.3 °C)-99.2 °F (37.3 °C)]   Pulse:  []   Resp:  [10-23]   BP: (102-144)/(76-99)   SpO2:  [92 %-100 %]     Level of Consciousness (AVPU): responds to voice    Recent Labs     03/03/25 2024 03/03/25  2331 03/04/25  1245   WBC 10.19 5.95 11.37   HGB 8.8* 12.2 8.6*   HCT 27.7* 39.3 26.6*   PLT 88* 97* 87*       Recent Labs     03/02/25  1732 03/03/25  0510 03/03/25 2024 03/04/25  1245   * 133* 137 135*   K 3.8 4.0 4.2 3.5   CL 99 99 100 97   CO2 18* 17* 17* 21*   BUN 35* 41* 43* 49*   CREATININE 6.1* 6.4* 6.2* 6.8*   GLU 81 83 94 129*   PHOS  --  4.2  --  4.2   MG 1.8 1.9  --  1.8        OXYGEN:  Flow (L/min) (Oxygen Therapy): 2      MEWS score: 1    Charge RNBridget contacted for AI alert reports monitoring wires replaced,  via telemetry. Pulse oximetry with intermittently labile waveform despite probe replacement s/t cool extremities, STAT ABG ordered to confirm oxygenation status. No additional concerns verbalized at this time. Instructed to call [phone] for further concerns or assistance.    Loren Goodwin RN        "

## 2025-03-05 NOTE — ASSESSMENT & PLAN NOTE
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  . Most recent hemoglobin and hematocrit are listed below.  Recent Labs     03/03/25 2024 03/03/25  2331 03/04/25  1245   HGB 8.8* 12.2 8.6*   HCT 27.7* 39.3 26.6*       Plan  - Monitor serial CBC: Every 12 hours  - Transfuse PRBC if patient becomes hemodynamically unstable, symptomatic or H/H drops below 7/21.  - Patient has not received any PRBC transfusions to date  - Patient's anemia is currently improving  - correcting coagulopathy

## 2025-03-05 NOTE — PLAN OF CARE
Nephrology Chart Review      Intake/Output Summary (Last 24 hours) at 3/5/2025 0905  Last data filed at 3/4/2025 1745  Gross per 24 hour   Intake --   Output 1000 ml   Net -1000 ml       Vitals:    03/04/25 2245 03/04/25 2319 03/05/25 0452 03/05/25 0830   BP: 112/75 122/85 (!) 128/92 113/82   BP Location:    Left arm   Patient Position:    Lying   Pulse: 100  103 97   Resp: 16  16 18   Temp: 99 °F (37.2 °C) 97.8 °F (36.6 °C) 97.3 °F (36.3 °C) 97.5 °F (36.4 °C)   TempSrc:  Axillary  Oral   SpO2: 96%   100%   Weight:       Height:           Recent Labs   Lab 03/03/25  0510 03/03/25 2024 03/04/25  1245 03/05/25  0730   * 137 135* 132*   K 4.0 4.2 3.5 3.5   CL 99 100 97 96   CO2 17* 17* 21* 23   BUN 41* 43* 49* 23*   CREATININE 6.4* 6.2* 6.8* 4.0*   CALCIUM 8.4* 8.6* 8.8 8.6*   PHOS 4.2  --  4.2 1.9*         Labs stable and completed HD yesterday evening. No new recommendations at this time, will plan for HD tomorrow.     No other related issues identified. Please call Nephrology as needed; We will continue to follow.

## 2025-03-05 NOTE — ASSESSMENT & PLAN NOTE
Patient with undifferentiated rectal pain, ?lesion and rectal bleeding.  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 Rising PT/INR and PTT compared to last [facility].     3/4-has non-bleeding fissure in the posterior midline.constipation/straining with bowel movements but is not able to provide a thorough history.  correction of coagulopathy with FFP.  PO bowel regimen started.   Received 4 units FFP.  Am lab pending    3/5- seems to have stopped. Consider GI eval if bleeds again.

## 2025-03-05 NOTE — ASSESSMENT & PLAN NOTE
Consult nephrology     Pt given sepsis bolus 2.1L in ED earlier tonight - CXR with possible edema, no acute effusions on CT abdomen    HD on 3/4 removed 500 cc.

## 2025-03-05 NOTE — SUBJECTIVE & OBJECTIVE
Interval History: see above    Review of Systems   Constitutional:  Positive for activity change. Negative for appetite change, chills and fever.   HENT:  Negative for trouble swallowing.    Respiratory:  Negative for cough and shortness of breath.    Cardiovascular:  Negative for chest pain and leg swelling.   Gastrointestinal:  Positive for rectal pain. Negative for abdominal pain, anal bleeding, constipation, diarrhea and nausea.   Genitourinary:  Negative for difficulty urinating.   Musculoskeletal:  Negative for arthralgias, back pain and gait problem.   Neurological:  Negative for light-headedness, numbness and headaches.   Psychiatric/Behavioral:  Positive for confusion. Negative for behavioral problems.      Objective:     Vital Signs (Most Recent):  Temp: 97.3 °F (36.3 °C) (03/05/25 0452)  Pulse: 103 (03/05/25 0452)  Resp: 16 (03/05/25 0452)  BP: (!) 128/92 (03/05/25 0452)  SpO2: 96 % (03/04/25 2245) Vital Signs (24h Range):  Temp:  [97.3 °F (36.3 °C)-99.2 °F (37.3 °C)] 97.3 °F (36.3 °C)  Pulse:  [] 103  Resp:  [12-16] 16  SpO2:  [96 %-100 %] 96 %  BP: (102-144)/(75-94) 128/92     Weight: 72.1 kg (158 lb 15.2 oz)  Body mass index is 26.45 kg/m².    Intake/Output Summary (Last 24 hours) at 3/5/2025 0658  Last data filed at 3/4/2025 1745  Gross per 24 hour   Intake --   Output 1000 ml   Net -1000 ml         Physical Exam  Constitutional:       General: She is not in acute distress.     Appearance: Normal appearance. She is ill-appearing.   HENT:      Head: Normocephalic and atraumatic.      Nose: Nose normal.      Mouth/Throat:      Mouth: Mucous membranes are moist.   Eyes:      General: No scleral icterus.     Extraocular Movements: Extraocular movements intact.      Pupils: Pupils are equal, round, and reactive to light.   Cardiovascular:      Rate and Rhythm: Normal rate and regular rhythm.      Pulses: Normal pulses.      Heart sounds: Normal heart sounds.   Pulmonary:      Effort: Pulmonary effort  is normal.      Breath sounds: Normal breath sounds. No wheezing or rhonchi.   Chest:      Chest wall: No tenderness.   Abdominal:      General: Abdomen is flat. Bowel sounds are normal. There is no distension.      Palpations: Abdomen is soft.      Tenderness: There is no abdominal tenderness. There is no right CVA tenderness, left CVA tenderness, guarding or rebound.   Musculoskeletal:         General: No swelling, tenderness or deformity. Normal range of motion.      Cervical back: Normal range of motion and neck supple. No rigidity or tenderness.   Skin:     General: Skin is warm and dry.      Coloration: Skin is not jaundiced or pale.      Findings: No erythema or rash.   Neurological:      General: No focal deficit present.      Mental Status: She is alert. Mental status is at baseline.      Cranial Nerves: No cranial nerve deficit.      Motor: No weakness.   Psychiatric:         Attention and Perception: She is inattentive.         Mood and Affect: Affect is blunt.         Speech: Speech is delayed.         Behavior: Behavior is slowed and withdrawn.         Cognition and Memory: Cognition is impaired. Memory is impaired.             Significant Labs: All pertinent labs within the past 24 hours have been reviewed.  CBC:   Recent Labs   Lab 03/03/25 2024 03/03/25  2331 03/04/25  1245   WBC 10.19 5.95 11.37   HGB 8.8* 12.2 8.6*   HCT 27.7* 39.3 26.6*   PLT 88* 97* 87*     CMP:   Recent Labs   Lab 03/03/25 2024 03/04/25  1245    135*   K 4.2 3.5    97   CO2 17* 21*   GLU 94 129*   BUN 43* 49*   CREATININE 6.2* 6.8*   CALCIUM 8.6* 8.8   PROT 6.1 6.2   ALBUMIN 2.8* 2.8*   BILITOT 2.1* 2.3*   ALKPHOS 225* 238*   * 676*   * 399*   ANIONGAP 20* 17*       Significant Imaging: I have reviewed all pertinent imaging results/findings within the past 24 hours.   normal

## 2025-03-05 NOTE — ASSESSMENT & PLAN NOTE
NPO for now pending CRS evaluation     [place] [facility] possibly afraid to eat, she has had severe weight loss in recent months with acute decline in health with HUS syndrome.   Continue IV PPI -     3/4- SLP eval- Full liquid diet, add Novasource. nurses holding meds

## 2025-03-06 LAB
APTT PPP: 36.3 SEC (ref 21–32)
APTT PPP: 69.4 SEC (ref 21–32)
APTT PPP: 76.3 SEC (ref 21–32)
BASOPHILS # BLD AUTO: 0.01 K/UL (ref 0–0.2)
BASOPHILS NFR BLD: 0.1 % (ref 0–1.9)
DIFFERENTIAL METHOD BLD: ABNORMAL
EOSINOPHIL # BLD AUTO: 0.1 K/UL (ref 0–0.5)
EOSINOPHIL NFR BLD: 1.3 % (ref 0–8)
ERYTHROCYTE [DISTWIDTH] IN BLOOD BY AUTOMATED COUNT: 19.9 % (ref 11.5–14.5)
HCT VFR BLD AUTO: 28.2 % (ref 37–48.5)
HGB BLD-MCNC: 8.9 G/DL (ref 12–16)
IMM GRANULOCYTES # BLD AUTO: 0.02 K/UL (ref 0–0.04)
IMM GRANULOCYTES NFR BLD AUTO: 0.2 % (ref 0–0.5)
LYMPHOCYTES # BLD AUTO: 1.8 K/UL (ref 1–4.8)
LYMPHOCYTES NFR BLD: 20.3 % (ref 18–48)
MCH RBC QN AUTO: 27 PG (ref 27–31)
MCHC RBC AUTO-ENTMCNC: 31.6 G/DL (ref 32–36)
MCV RBC AUTO: 86 FL (ref 82–98)
MONOCYTES # BLD AUTO: 1.4 K/UL (ref 0.3–1)
MONOCYTES NFR BLD: 16.3 % (ref 4–15)
NEUTROPHILS # BLD AUTO: 5.3 K/UL (ref 1.8–7.7)
NEUTROPHILS NFR BLD: 61.8 % (ref 38–73)
NRBC BLD-RTO: 0 /100 WBC
PLATELET # BLD AUTO: 67 K/UL (ref 150–450)
PMV BLD AUTO: ABNORMAL FL (ref 9.2–12.9)
POCT GLUCOSE: 111 MG/DL (ref 70–110)
POCT GLUCOSE: 124 MG/DL (ref 70–110)
POCT GLUCOSE: 126 MG/DL (ref 70–110)
POCT GLUCOSE: 161 MG/DL (ref 70–110)
RBC # BLD AUTO: 3.3 M/UL (ref 4–5.4)
WBC # BLD AUTO: 8.63 K/UL (ref 3.9–12.7)

## 2025-03-06 PROCEDURE — 63600175 PHARM REV CODE 636 W HCPCS: Performed by: HOSPITALIST

## 2025-03-06 PROCEDURE — 80100014 HC HEMODIALYSIS 1:1

## 2025-03-06 PROCEDURE — 25000003 PHARM REV CODE 250: Performed by: HOSPITALIST

## 2025-03-06 PROCEDURE — 36415 COLL VENOUS BLD VENIPUNCTURE: CPT | Performed by: HOSPITALIST

## 2025-03-06 PROCEDURE — 92526 ORAL FUNCTION THERAPY: CPT

## 2025-03-06 PROCEDURE — 85025 COMPLETE CBC W/AUTO DIFF WBC: CPT | Performed by: HOSPITALIST

## 2025-03-06 PROCEDURE — 85730 THROMBOPLASTIN TIME PARTIAL: CPT | Mod: 91 | Performed by: HOSPITALIST

## 2025-03-06 PROCEDURE — 85730 THROMBOPLASTIN TIME PARTIAL: CPT | Performed by: HOSPITALIST

## 2025-03-06 PROCEDURE — 63600175 PHARM REV CODE 636 W HCPCS: Performed by: STUDENT IN AN ORGANIZED HEALTH CARE EDUCATION/TRAINING PROGRAM

## 2025-03-06 PROCEDURE — 20600001 HC STEP DOWN PRIVATE ROOM

## 2025-03-06 RX ORDER — SODIUM CHLORIDE 9 MG/ML
INJECTION, SOLUTION INTRAVENOUS
Status: CANCELLED | OUTPATIENT
Start: 2025-03-06

## 2025-03-06 RX ORDER — MUPIROCIN 20 MG/G
OINTMENT TOPICAL 2 TIMES DAILY
Status: CANCELLED | OUTPATIENT
Start: 2025-03-06 | End: 2025-03-11

## 2025-03-06 RX ORDER — SODIUM CHLORIDE 9 MG/ML
INJECTION, SOLUTION INTRAVENOUS ONCE
Status: CANCELLED | OUTPATIENT
Start: 2025-03-06 | End: 2025-03-06

## 2025-03-06 RX ADMIN — DEXTROSE MONOHYDRATE: 100 INJECTION, SOLUTION INTRAVENOUS at 07:03

## 2025-03-06 RX ADMIN — DEXTROSE MONOHYDRATE: 100 INJECTION, SOLUTION INTRAVENOUS at 12:03

## 2025-03-06 RX ADMIN — HEPARIN SODIUM AND DEXTROSE 30 UNITS/KG/HR: 10000; 5 INJECTION INTRAVENOUS at 06:03

## 2025-03-06 RX ADMIN — NEPHROCAP 1 CAPSULE: 1 CAP ORAL at 09:03

## 2025-03-06 RX ADMIN — MIRTAZAPINE 15 MG: 15 TABLET, FILM COATED ORAL at 09:03

## 2025-03-06 RX ADMIN — Medication 1000 UNITS: at 09:03

## 2025-03-06 RX ADMIN — FOLIC ACID 1000 MCG: 1 TABLET ORAL at 09:03

## 2025-03-06 RX ADMIN — ESCITALOPRAM OXALATE 10 MG: 5 TABLET, FILM COATED ORAL at 09:03

## 2025-03-06 RX ADMIN — Medication 100 MG: at 09:03

## 2025-03-06 RX ADMIN — HEPARIN SODIUM AND DEXTROSE 12 UNITS/KG/HR: 10000; 5 INJECTION INTRAVENOUS at 07:03

## 2025-03-06 RX ADMIN — HEPARIN SODIUM 1000 UNITS: 1000 INJECTION, SOLUTION INTRAVENOUS; SUBCUTANEOUS at 06:03

## 2025-03-06 RX ADMIN — PANTOPRAZOLE SODIUM 40 MG: 40 TABLET, DELAYED RELEASE ORAL at 09:03

## 2025-03-06 NOTE — SUBJECTIVE & OBJECTIVE
Interval History: Patient seen this morning, no acute distress. Will plan for HD this afternoon.        Review of patient's allergies indicates:  No Known Allergies  Current Facility-Administered Medications   Medication Frequency    aluminum-magnesium hydroxide-simethicone 200-200-20 mg/5 mL suspension 30 mL QID PRN    dextrose 10 % infusion Continuous    dextrose 50% injection 12.5 g PRN    dextrose 50% injection 25 g PRN    EScitalopram oxalate tablet 10 mg Daily    folic acid tablet 1,000 mcg Daily    glucagon (human recombinant) injection 1 mg PRN    glucose chewable tablet 16 g PRN    glucose chewable tablet 24 g PRN    heparin (porcine) injection 1,000 Units PRN    heparin 25,000 units in dextrose 5% (100 units/ml) IV bolus from bag LOW INTENSITY nomogram - [facility] PRN    heparin 25,000 units in dextrose 5% (100 units/ml) IV bolus from bag LOW INTENSITY nomogram - [facility] PRN    heparin 25,000 units in dextrose 5% 250 mL (100 units/mL) infusion LOW INTENSITY nomogram - [facility] Continuous    melatonin tablet 6 mg Nightly PRN    mirtazapine tablet 15 mg QHS    naloxone 0.4 mg/mL injection 0.02 mg PRN    ondansetron injection 4 mg Q8H PRN    pantoprazole EC tablet 40 mg BID AC    polyethylene glycol packet 17 g Daily PRN    prochlorperazine injection Soln 5 mg Q6H PRN    senna-docusate 8.6-50 mg per tablet 1 tablet BID PRN    sodium chloride 0.9% flush 10 mL Q6H PRN    thiamine tablet 100 mg Daily    vitamin D 1000 units tablet 1,000 Units Daily    vitamin renal formula (B-complex-vitamin c-folic acid) 1 mg per capsule 1 capsule Daily       Objective:     Vital Signs (Most Recent):  Temp: 98.2 °F (36.8 °C) (03/06/25 0515)  Pulse: 99 (03/06/25 0515)  Resp: 15 (03/06/25 0515)  BP: 101/71 (03/06/25 0615)  SpO2: 100 % (03/06/25 0515) Vital Signs (24h Range):  Temp:  [97.5 °F (36.4 °C)-98.5 °F (36.9 °C)] 98.2 °F (36.8 °C)  Pulse:  [] 99  Resp:  [15-20] 15  SpO2:  [98 %-100 %] 100 %  BP: ()/(60-84) 101/71     Weight:  72.1 kg (158 lb 15.2 oz) (03/04/25 09)  Body mass index is 26.45 kg/m².  Body surface area is 1.82 meters squared.    No intake/output data recorded.     Physical Exam  Constitutional:       Appearance: Normal appearance.   HENT:      Head: Normocephalic and atraumatic.      Right Ear: External ear normal.      Left Ear: External ear normal.      Nose: Nose normal.      Mouth/Throat:      Mouth: Mucous membranes are moist.   Eyes:      Extraocular Movements: Extraocular movements intact.   Cardiovascular:      Rate and Rhythm: Normal rate and regular rhythm.      Pulses: Normal pulses.      Heart sounds: Normal heart sounds.   Pulmonary:      Effort: Pulmonary effort is normal.      Breath sounds: Normal breath sounds.   Abdominal:      General: Abdomen is flat.      Palpations: Abdomen is soft.   Musculoskeletal:         General: Normal range of motion.      Cervical back: Normal range of motion and neck supple.      Right lower leg: No edema.      Left lower leg: No edema.   Skin:     General: Skin is warm.      Capillary Refill: Capillary refill takes less than 2 seconds.   Neurological:      General: No focal deficit present.      Mental Status: She is alert and oriented to person, place, and time.          Significant Labs:  CBC:   Recent Labs   Lab 03/06/25  0407   WBC 8.63   RBC 3.30*   HGB 8.9*   HCT 28.2*   PLT 67*   MCV 86   MCH 27.0   MCHC 31.6*     CMP:   Recent Labs   Lab 03/05/25  0730      CALCIUM 8.6*   ALBUMIN 2.5*   PROT 5.9*   *   K 3.5   CO2 23   CL 96   BUN 23*   CREATININE 4.0*   ALKPHOS 267*   *   *   BILITOT 2.0*     All labs within the past 24 hours have been reviewed.

## 2025-03-06 NOTE — PROGRESS NOTES
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 She lives in [facility]. She works for "[employer]. Her primary care physician is Dr. Delilah Kelley.               She was hospitalized at [facility] from 10/23/2024 to 11/6/2024.              She was hospitalized at [facility] from 11/15/2024 to 1/18/2024.              She was hospitalized at [facility] from 12/9/2024 to 12/19/2024.              She was hospitalized at [facility] from 12/25/2024 to 1/5/2025.              She was hospitalized at [facility] from 1/5/2025 to 1/31/2025. She was discharged to [facility] inpatient rehab until 2/18/2025.               She presented to [facility] Emergency Department on 3/2/2025 with rectal pain and bleeding. She receives her care in the [facility] system, not at [facility]. History was provided by her daughter Sue Barros. She is followed by Hematology at [facility] and has been on immunomodulator  infusions every 8 weeks (Soliris, now Ultomiris, last dose 2/24/2025). She has severe debility, poor appetite, dysphagia to solids, mostly consumes liquid, but her daughter was concerned her nutritional intake is inadequate and inquired about feeding tube placement for chronic nutrition. She had watery stool on the day of presentation. Her sister noted that she had gingival bleeding. She reported an anal lesion with tenderness and slow bleeding. She has not required frequent transfusions. Her daughter noted that all home antihypertensive medications were discontinued since she was discharged from inpatient rehab.              In the emergency department, she appeared ill, lethargic, unable to fully participate in interview, periodically awakening with pain. Labs showed hemoglobin 14.3 g/dL (from 13.2 on 2/24/2025). Repeat hemoglobin was 10.4. Alkaline phosphatase was 251 U/L, total bilirubin was 2.2 mg/dL, AST was 733 U/L, ALT was 478 U/L. She was given 2.1 liters of IV fluids, vancomycin, piperacillin-tazobactam, 4 mg of IV morphine, 80 mg of IV pantoprazole, topical lidocaine for rectal pain. She was admitted to Hospital Medicine Team S.     Overview/Hospital Course:  GGT was elevated at 447 U/L. LDH was elevated. Colorectal Surgery, Hematology, Nephrology, and Case Management were consulted. She expressed desire to go to [facility] since she gets her care at [facility]. Her mental status has been declining since October 2024. She has memory loss, disorientation, and does not remember her illness. Her mother had dementia. She had chest pain on 3/3/2025 while getting dialysis. EKG showed sinus tachycardia. Troponin was 1102 ng/L. Repeat was 704. Hematology recommended giving 2 units of fresh frozen plasma (FFP) and starting low rate heparin infusion then transitioning to apixaban 2.5 mg twice daily if she had no further bleeding. She was given another 2 units of FFP. On 3/4/2025, heparin was held due to  recurrent bleeding. She had intermittent somnolence. She was kept NPO. Speech Language Pathology was consulted. She was hypoglycemic so was put on 10% dextrose drip. Speech Language Pathology recommended full liquid diet. LFTs trended down. Heparin infusion was restarted on 3/5/2025.     Interval History: On heparin drip and D10 drip.    Review of Systems   Unable to perform ROS: Mental status change     Objective:     Vital Signs (Most Recent):  Temp: 98.2 °F (36.8 °C) (03/06/25 0758)  Pulse: 104 (03/06/25 0758)  Resp: 14 (03/06/25 0758)  BP: 95/65 (03/06/25 0758)  SpO2: 100 % (03/06/25 0758) Vital Signs (24h Range):  Temp:  [97.7 °F (36.5 °C)-98.5 °F (36.9 °C)] 98.2 °F (36.8 °C)  Pulse:  [] 104  Resp:  [14-20] 14  SpO2:  [98 %-100 %] 100 %  BP: ()/(60-84) 95/65     Weight: 72.1 kg (158 lb 15.2 oz)  Body mass index is 26.45 kg/m².  No intake or output data in the 24 hours ending 03/06/25 0907      Physical Exam  Vitals and nursing note reviewed.   Constitutional:       General: She is not in acute distress.     Appearance: She is well-developed. She is ill-appearing. She is not diaphoretic.      Interventions: She is not intubated.  Pulmonary:      Effort: Pulmonary effort is normal. No accessory muscle usage or respiratory distress. She is not intubated.   Skin:     General: Skin is warm and dry.      Coloration: Skin is not jaundiced or pale.   Neurological:      Mental Status: She is confused.      Motor: No seizure activity.   Psychiatric:         Attention and Perception: She is inattentive.         Mood and Affect: Affect is blunt.         Behavior: Behavior is not aggressive or hyperactive.         Cognition and Memory: Cognition is impaired. Memory is impaired.               Significant Labs: All pertinent labs within the past 24 hours have been reviewed.  CBC:   Recent Labs   Lab 03/05/25  1245 03/05/25  1428 03/06/25  0407   WBC 11.74 8.67 8.63   HGB 8.1* 8.3* 8.9*   HCT 24.7* 25.9* 28.2*    PLT 54* 70* 67*       Significant Imaging: I have reviewed all pertinent imaging results/findings within the past 24 hours.  X-Ray Chest AP Portable 3/2/25: FINDINGS:   Right central venous catheter tip projects over the distal SVC.  The cardiomediastinal silhouette is not enlarged noting patient is rotated..  There is no pleural effusion.  The trachea is midline.  The lungs are symmetrically expanded bilaterally with mildly coarse interstitial attenuation, accentuated by overlying habitus given patient rotation..  No large focal consolidation seen.  There is no pneumothorax.  The osseous structures are remarkable for degenerative change..   Impression:  1. Interstitial findings are accentuated by positioning, superimposed edema is a consideration although correlation is needed.   CT Abdomen Pelvis With IV Contrast NO Oral Contrast 3/2/25: FINDINGS:   SOFT TISSUES: Diffuse body wall edema.   LUNG BASES/VISUALIZED  MEDIASTINUM: Unremarkable.   HEPATOBILIARY: Liver is normal size with diffuse parenchymal hypoattenuation suggestive of steatosis.  Heterogeneous enhancement of the liver which could be related to hepatitis/inflammation in this patient with elevated LFTs.  No focal hepatic lesions.  No biliary duct dilatation.  Small volume pericholecystic fluid interposed between the gallbladder in the liver.  No calcified gallstones, wall thickening, or pericholecystic stranding.   PANCREAS: No focal masses or ductal dilatation.   SPLEEN: Normal size.   ADRENALS: No adrenal nodules.   KIDNEYS/URETERS: Kidneys enhance symmetrically. Crescentic hypoattenuating collection along the medial inferior pole of left kidney, possibly related to remote subcapsular hemorrhage.  Multifocal left renal cortical scarring.  No obvious enhancing renal lesion though there is possible cortical scarring.  No nephrolithiasis or hydronephrosis. No solid mass lesions. Ureters are unremarkable.   BLADDER/PELVIC ORGANS: Mild circumferential  bladder wall thickening.  Hysterectomy.  No adnexal masses.   PERITONEUM / RETROPERITONEUM: No free air or fluid.   LYMPH NODES: No lymphadenopathy.   VESSELS: No aortic aneurysm.  Major aortic branch vessels are patent.  Portal venous system is patent noting narrowing of the main portal vein as it passes under the duodenum.  Decompressed IVC which may be seen the setting of volume depletion.   GI TRACT: Mucosal hyperenhancement, submucosal edema, and wall thickening of the gastric pylorus and duodenal bulb.  No evidence of bowel obstruction.  Minimal small bowel wall thickening in the left hemiabdomen.  Few colonic diverticula.  Appendix is normal.  Mild diffuse colonic wall thickening which could be exaggerated by decompressed state.   BONES: Transitional lumbosacral anatomy.  Advanced degenerative change of the hips.  Degenerative changes spine.  No acute fracture or aggressive appearing osseous lesion.  No acute fractures or suspicious osseous lesions.  Transitional lumbosacral vertebral body at L5 with pseudoarticulation of the left transverse process with the sacrum.   Impression:  1. Gastroduodenitis involving the gastric pylorus and duodenal bulb.  Minimal small bowel wall thickening in the left hemiabdomen and mild diffuse colonic wall thickening which could be exaggerated by decompressed state.  Suggest correlation for any clinical signs of enteritis or colitis.   2. Small volume pericholecystic fluid is likely reactive in the setting of adjacent gastroduodenitis or liver dysfunction.  No cholelithiasis or other evidence of cholecystitis.   3. Circumferential bladder wall thickening which may be seen in setting of cystitis.  Recommend correlation with urinalysis.   4. Small volume subcapsular fluid along the posterior lower pole left kidney of uncertain etiology.   5. Anasarca.   6. Probable hepatic steatosis and nonspecific subtle heterogeneous enhancement of the liver.  Suggest correlation with  laboratory values and risk factors for hepatitis.   7. Additional findings as above.     Assessment and Plan     Assessment & Plan  Rectal bleeding  Holding home aspirin and apixaban. Consulted Colorectal Surgery. Monitor for recurrence  Unspecified severe protein-calorie malnutrition  Severe malnutrition patient with recent hx of worsening dysphagia, has had w/u for scleroderma, has hiatal hernia and ?esophageal dysmotility  - dysphagia to solids.   Developed wernicke encephalopathy from nutritional deficiency     Daughter reports concern of inadequate intake has had severe weight loss in recent months with multiple complex health conditions.   Nutrition consult for tube feed recs.     If patient remains admit here and GI bleed w/u completed/resolved - she inquires about tube feed placement to management severe malnutrition.     3/5- Holding TF as has coagulopathy and high risk of trauma and further bleeding. GI bleed not resolved. Lab pending  Dysphagia  NPO for now pending CRS evaluation     Recent [facility] notes indicate pt possibly afraid to eat, she has had severe weight loss in recent months with acute decline in health with HUS syndrome.   Continue IV PPI -     3/4- SLP eval- Full liquid diet, add Novasource. nurses holding meds  Wernicke encephalopathy  Continue on home thiamine supplementation.     GERD without esophagitis  Continue home pantoprazole.    ESRD (end stage renal disease)  Nephrology managing dialysis.    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   -last Ultomiris dose 2/24/25 - outpt hematologist is Roma Cantu MD  @ [facility]  -Consult Hematology given concern for now active hemolysis again based on lab studies and report of both rectal bleeding and gingival bleeding.   -type & screen and blood consent obtained  -check fibrinogen and  complement levels, prior notes indicate complement mediated HUS.     -Discuss with hematology in AM re: need for bridging anticoagulation in setting of active hemolysis and potential GI bleeding with recent diagnosis of Right IJ Clot    -Would discuss with transfer center regarding [place] [facility] daughter requests [facility] if transfer occurs for continuity of care.  Needs insurance approval.     3/5- nephrology and hematology following. Mild low grade hemolysis. Hb 8.1. coags better.   Elevated transaminase level  Hepatic steatosis on CT tonight   -she has severe malnutrition - her elevated bilirubin I suspect is from hemolysis, but not sure hemolysis explains acutely elevated transaminase levels.   -Worsening hepatic function may contribute to her worsening coagulopathy elevated PT/PTT    Acute thrombosis of right internal jugular vein  Diagnosed 1/20/2025, on apixaban at home. Held due to GI bleed. Started heparin drip 3/5/2025. Transition to apixaban if no evidence of recurrent bleeding after 24 to 48 hours.  History of Helicobacter pylori infection  Diagnosed via EGD and reported [facility] in [facility].     Renal hematoma, left, sequela  In January, due to biopsy in 2024. Most recent imaging showed resolving appearance of remote hematoma.   Diarrhea  ED team has ordered C.diff studies.  Daughter notes patient having about 3 watery BM per day.     If having watery stool will leave orders for C.diff studies. Add additional pending stool studies.   If patient on primarily a liquid diet and low oral intake this may be cause but CT shows some small bowel wall thickening and colonic thickening  so  an infectious enterocolitis remains on differential.     Started on sepsis protocol in ED -  I will hold further empiric IV antibiotics at this time.     Anemia of chronic renal failure, stage 5  Anemia is likely due to acute blood loss which was from GI bleed and active hemolysis due to HUS.  . Most recent hemoglobin and  hematocrit are listed below.  Recent Labs     03/05/25  1245 03/05/25  1428 03/06/25  0407   HGB 8.1* 8.3* 8.9*   HCT 24.7* 25.9* 28.2*     Plan  - Monitor serial CBC: Every 12 hours  - Transfuse PRBC if patient becomes hemodynamically unstable, symptomatic or H/H drops below 7/21.  - Patient has not received any PRBC transfusions to date  - Patient's anemia is currently improving  - correcting coagulopathy  Metabolic encephalopathy  Waxing and waning since October 2024 due to HUS. Lacks capacity, unable to communicate about her illness. Daughter Sue Barros wants her to go to [facility] for continuity of care.  VTE Risk Mitigation (From admission, onward)           Ordered     heparin 25,000 units in dextrose 5% (100 units/ml) IV bolus from bag LOW INTENSITY nomogram - [facility]  As needed (PRN)        Question:  Heparin Infusion Adjustment (DO NOT MODIFY ANSWER)  Answer:  \\[email or web address]\epic\Images\Pharmacy\HeparinInfusions\heparin LOW INTENSITY nomogram for [facility] [id number]    03/05/25 1409     heparin 25,000 units in dextrose 5% (100 units/ml) IV bolus from bag LOW INTENSITY nomogram - [facility]  As needed (PRN)        Question:  Heparin Infusion Adjustment (DO NOT MODIFY ANSWER)  Answer:  \\[email or web address]\epic\Images\Pharmacy\HeparinInfusions\heparin LOW INTENSITY nomogram for [facility] [id number]    03/05/25 1409     heparin 25,000 units in dextrose 5% 250 mL (100 units/mL) infusion LOW INTENSITY nomogram - [facility]  Continuous        Question:  Begin at (units/kg/hr)  Answer:  12    03/05/25 1409     heparin (porcine) injection 1,000 Units  As needed (PRN)         03/03/25 1524     IP VTE HIGH RISK PATIENT  Once         03/03/25 0416     Place sequential compression device  Until discontinued         03/03/25 0416     Reason for No Pharmacological VTE Prophylaxis  Once        Question:  Reasons:  Answer:  Active Bleeding    03/03/25 0416                    Discharge Planning   PATRICIA: 3/7/2025     Code Status: Full Code    Medical Readiness for Discharge Date:   Discharge Plan A: Home with family, Home Health                        Edi Staton MD  Department of Hospital Medicine   [facility] - Telemetry Stepdown

## 2025-03-06 NOTE — ASSESSMENT & PLAN NOTE
Diagnosed 1/20/2025, on apixaban at home. Held due to GI bleed. Started heparin drip 3/5/2025. Transition to apixaban if no evidence of recurrent bleeding after 24 to 48 hours.

## 2025-03-06 NOTE — SUBJECTIVE & OBJECTIVE
Interval History: On heparin drip and D10 drip.    Review of Systems   Unable to perform ROS: Mental status change     Objective:     Vital Signs (Most Recent):  Temp: 98.2 °F (36.8 °C) (03/06/25 0758)  Pulse: 104 (03/06/25 0758)  Resp: 14 (03/06/25 0758)  BP: 95/65 (03/06/25 0758)  SpO2: 100 % (03/06/25 0758) Vital Signs (24h Range):  Temp:  [97.7 °F (36.5 °C)-98.5 °F (36.9 °C)] 98.2 °F (36.8 °C)  Pulse:  [] 104  Resp:  [14-20] 14  SpO2:  [98 %-100 %] 100 %  BP: ()/(60-84) 95/65     Weight: 72.1 kg (158 lb 15.2 oz)  Body mass index is 26.45 kg/m².  No intake or output data in the 24 hours ending 03/06/25 0907      Physical Exam  Vitals and nursing note reviewed.   Constitutional:       General: She is not in acute distress.     Appearance: She is well-developed. She is ill-appearing. She is not diaphoretic.      Interventions: She is not intubated.  Pulmonary:      Effort: Pulmonary effort is normal. No accessory muscle usage or respiratory distress. She is not intubated.   Skin:     General: Skin is warm and dry.      Coloration: Skin is not jaundiced or pale.   Neurological:      Mental Status: She is confused.      Motor: No seizure activity.   Psychiatric:         Attention and Perception: She is inattentive.         Mood and Affect: Affect is blunt.         Behavior: Behavior is not aggressive or hyperactive.         Cognition and Memory: Cognition is impaired. Memory is impaired.               Significant Labs: All pertinent labs within the past 24 hours have been reviewed.  CBC:   Recent Labs   Lab 03/05/25  1245 03/05/25  1428 03/06/25  0407   WBC 11.74 8.67 8.63   HGB 8.1* 8.3* 8.9*   HCT 24.7* 25.9* 28.2*   PLT 54* 70* 67*       Significant Imaging: I have reviewed all pertinent imaging results/findings within the past 24 hours.  X-Ray Chest AP Portable 3/2/25: FINDINGS:   Right central venous catheter tip projects over the distal SVC.  The cardiomediastinal silhouette is not enlarged noting  patient is rotated..  There is no pleural effusion.  The trachea is midline.  The lungs are symmetrically expanded bilaterally with mildly coarse interstitial attenuation, accentuated by overlying habitus given patient rotation..  No large focal consolidation seen.  There is no pneumothorax.  The osseous structures are remarkable for degenerative change..   Impression:  1. Interstitial findings are accentuated by positioning, superimposed edema is a consideration although correlation is needed.   CT Abdomen Pelvis With IV Contrast NO Oral Contrast 3/2/25: FINDINGS:   SOFT TISSUES: Diffuse body wall edema.   LUNG BASES/VISUALIZED  MEDIASTINUM: Unremarkable.   HEPATOBILIARY: Liver is normal size with diffuse parenchymal hypoattenuation suggestive of steatosis.  Heterogeneous enhancement of the liver which could be related to hepatitis/inflammation in this patient with elevated LFTs.  No focal hepatic lesions.  No biliary duct dilatation.  Small volume pericholecystic fluid interposed between the gallbladder in the liver.  No calcified gallstones, wall thickening, or pericholecystic stranding.   PANCREAS: No focal masses or ductal dilatation.   SPLEEN: Normal size.   ADRENALS: No adrenal nodules.   KIDNEYS/URETERS: Kidneys enhance symmetrically. Crescentic hypoattenuating collection along the medial inferior pole of left kidney, possibly related to remote subcapsular hemorrhage.  Multifocal left renal cortical scarring.  No obvious enhancing renal lesion though there is possible cortical scarring.  No nephrolithiasis or hydronephrosis. No solid mass lesions. Ureters are unremarkable.   BLADDER/PELVIC ORGANS: Mild circumferential bladder wall thickening.  Hysterectomy.  No adnexal masses.   PERITONEUM / RETROPERITONEUM: No free air or fluid.   LYMPH NODES: No lymphadenopathy.   VESSELS: No aortic aneurysm.  Major aortic branch vessels are patent.  Portal venous system is patent noting narrowing of the main portal vein  as it passes under the duodenum.  Decompressed IVC which may be seen the setting of volume depletion.   GI TRACT: Mucosal hyperenhancement, submucosal edema, and wall thickening of the gastric pylorus and duodenal bulb.  No evidence of bowel obstruction.  Minimal small bowel wall thickening in the left hemiabdomen.  Few colonic diverticula.  Appendix is normal.  Mild diffuse colonic wall thickening which could be exaggerated by decompressed state.   BONES: Transitional lumbosacral anatomy.  Advanced degenerative change of the hips.  Degenerative changes spine.  No acute fracture or aggressive appearing osseous lesion.  No acute fractures or suspicious osseous lesions.  Transitional lumbosacral vertebral body at L5 with pseudoarticulation of the left transverse process with the sacrum.   Impression:  1. Gastroduodenitis involving the gastric pylorus and duodenal bulb.  Minimal small bowel wall thickening in the left hemiabdomen and mild diffuse colonic wall thickening which could be exaggerated by decompressed state.  Suggest correlation for any clinical signs of enteritis or colitis.   2. Small volume pericholecystic fluid is likely reactive in the setting of adjacent gastroduodenitis or liver dysfunction.  No cholelithiasis or other evidence of cholecystitis.   3. Circumferential bladder wall thickening which may be seen in setting of cystitis.  Recommend correlation with urinalysis.   4. Small volume subcapsular fluid along the posterior lower pole left kidney of uncertain etiology.   5. Anasarca.   6. Probable hepatic steatosis and nonspecific subtle heterogeneous enhancement of the liver.  Suggest correlation with laboratory values and risk factors for hepatitis.   7. Additional findings as above.

## 2025-03-06 NOTE — CONSULTS
[facility] - Telemetry Stepdown  Wound Care    Patient Name:  Madealine Barros   MRN:  [id number]  Date: 3/6/2025  Diagnosis: Rectal bleeding    History:     Past Medical History:   Diagnosis Date    Allergic rhinitis 06/01/2019    Diabetes mellitus     Duodenal ulcer 12/11/2024    Bulb; 4mm; NO SRH on exam of 11 Dec 24      GERD without esophagitis 08/15/2024    Hiatal hernia 12/11/2024    History of Helicobacter pylori infection 03/02/2025    Hypertension     Hypertensive emergency 10/2024    Latent syphilis 11/2024    treated with PCN  - [facility] Admit    MAHA (microangiopathic hemolytic anemia) 11/03/2024    Polyp of colon 03/05/2024    Schatzki's ring 12/11/2024    TIA (transient ischemic attack) 10/2024    Type 2 MI (myocardial infarction) 11/2024    secondary to hypertensive emergency, normal ECHO - [facility] ADMIT    Wernicke encephalopathy 02/13/2025       Social History[1]    Precautions:     Allergies as of 03/02/2025    (No Known Allergies)       WO Assessment Details/Treatment     Wound care consult received for perineum.  Chart reviewed for this encounter.  See flowsheets for findings.    Pt found lying in bed, agreeable to care at this time. Pt turned in bed independently for assessment. Pt w/ scattered, red and moist partial thickness skin loss to perirectal region w/ extension to perineum, likely r/t moisture associated dermatitis as pt is incontinent. Triad applied for moisture barrier protection.    RECOMMENDATIONS:  BID/PRN - perineum - cleanse gently w/ no rinse bath wipes, pat dry and apply Triad to affected area.      03/06/25 1008   WOCN Assessment   WOCN Total Time (mins) 15   Visit Date 03/06/25   Visit Time 1008   Consult Type New   WOCN Speciality Wound   Intervention assessed;changed;applied;chart review;coordination of care;orders   Teaching on-going        Wound 03/02/25 1500 Moisture associated dermatitis medial Perineum   Date First Assessed/Time First Assessed: 03/02/25 1500   Present on  Original Admission: Yes  Primary Wound Type: (c) Moisture associated dermatitis  Orientation: medial  Location: Perineum   Wound Image    Dressing Appearance Open to air   Drainage Amount None   Drainage Characteristics/Odor No odor   Appearance Pink;Red;Moist   Tissue loss description Partial thickness   Periwound Area Moist   Wound Edges Irregular;Open   Care Cleansed with:;Soap and water;Applied:;Skin Barrier          [1]   Social History  Socioeconomic History    Marital status: Single   Tobacco Use    Smoking status: Never   Substance and Sexual Activity    Alcohol use: Not Currently     Comment: occasionally    Drug use: No    Sexual activity: Not Currently     Social Drivers of Health     Financial Resource Strain: Medium Risk (3/5/2025)    Overall Financial Resource Strain (CARDIA)     Difficulty of Paying Living Expenses: Somewhat hard   Food Insecurity: Food Insecurity Present (3/5/2025)    Hunger Vital Sign     Worried About Running Out of Food in the Last Year: Sometimes true     Ran Out of Food in the Last Year: Sometimes true   Transportation Needs: No Transportation Needs (1/31/2025)    Received from [facility]    PRAPARE - Transportation     Lack of Transportation (Medical): No     Lack of Transportation (Non-Medical): No   Physical Activity: Inactive (3/5/2025)    Exercise Vital Sign     Days of Exercise per Week: 0 days     Minutes of Exercise per Session: 0 min   Stress: Patient Declined (3/5/2025)    [employer] [facility] [employer] - Occupational Stress Questionnaire     Feeling of Stress : Patient declined   Recent Concern: Stress - Stress Concern Present (1/7/2025)    Received from [facility] - Occupational Stress Questionnaire     Feeling of Stress : To some extent   Housing Stability: Low Risk  (3/5/2025)    Housing Stability Vital Sign     Unable to Pay for Housing in the Last Year: No     Homeless in the Last Year: No   Recent Concern:  Housing Stability - High Risk (1/31/2025)    Received from [facility]    Housing Stability Vital Sign     Unable to Pay for Housing in the Last Year: Yes     Number of Times Moved in the Last Year: 1     Homeless in the Last Year: No

## 2025-03-06 NOTE — ASSESSMENT & PLAN NOTE
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 . Most recent hemoglobin and hematocrit are listed below.  Recent Labs     03/05/25  1245 03/05/25  1428 03/06/25  0407   HGB 8.1* 8.3* 8.9*   HCT 24.7* 25.9* 28.2*     Plan  - Monitor serial CBC: Every 12 hours  - Transfuse PRBC if patient becomes hemodynamically unstable, symptomatic or H/H drops below 7/21.  - Patient has not received any PRBC transfusions to date  - Patient's anemia is currently improving  - correcting coagulopathy

## 2025-03-06 NOTE — ASSESSMENT & PLAN NOTE
NPO for now pending CRS evaluation     Recent [facility] notes indicate pt possibly afraid to eat, she has had severe weight loss in recent months with acute decline in health with HUS syndrome.   Continue IV PPI -     3/4- SLP eval- Full liquid diet, add Novasource. nurses holding meds

## 2025-03-06 NOTE — PT/OT/SLP PROGRESS
"Speech Language Pathology Treatment    Patient Name:  Madelaine Barros   MRN:  [id number]  Admitting Diagnosis: Rectal bleeding    Recommendations:                 General Recommendations:   Ongoing swallow   Diet recommendations: Solid Diet Level: Minced & Moist - IDDSI Level 5, Liquid Diet Level: Thin liquids - IDDSI Level 0; Pt would benefit from an oral nutritional supplement 2/2 poor PO intake  Aspiration Precautions: 1 bite/sip at a time, Frequent oral care, Meds crushed in puree, and Standard aspiration precautions   General Precautions: Standard, aspiration  Communication strategies:  go to room if call light pushed    Assessment:     Madelaine Barros is a 59 y.o. female with an SLP diagnosis of Dysphagia.  She presents with ongoing poor oral intake, though was amenable to solid trials this date. Recommending diet upgrade to minced and moist consistency with thin liquids. SLP to follow for ongoing assessment.    Subjective     Pt resting upon arrival with her sister present. Pt pleasant and agreeable to session.    "I'm not hungry"    Pain/Comfort:  Pain Rating 1: 0/10    Respiratory Status: Nasal cannula, flow 2 L/min    Objective:     Has the patient been evaluated by SLP for swallowing?   Yes  Keep patient NPO? No      Pt resting in bed upon SLP arrival, endorsing right hip discomfort s/p completion of wound care. HOB raised prior to PO. Pt able to maintain adequate alertness for PO intake though required encouragement from SLP and her sister to accept. Pt accepted 1/2 sheet cracker and thins via straw sips x3. Pt demonstrated moderately prolonged mastication time of solid trials though no overt signs of aspiration appreciated. Pt endorsed fatigue following mastication of dry cracker. SLP provided education to pt and sister re: diet recs to upgrade, ongoing assessment, aspiration precautions, small meals throughout the day vs 3 big meals, encouraging PO, and poc to which pt will benefit from ongoing " reinforcement. Recommend minced and moist consistency diet with additional oral nutritional supplement given minimal intake. SLP to follow for ongoing assessment.     Goals:   Multidisciplinary Problems       SLP Goals          Problem: SLP    Goal Priority Disciplines Outcome   SLP Goal     SLP Progressing   Description: Speech Language Pathology Goals  Goals expected to be met by 3/18:   1) Pt will tolerate least restrictive diet in order to maintain adequate nutrition/hydration.     Recommending full liquid diet. SLP to follow.                            Plan:     Patient to be seen:  4 x/week   Plan of Care expires:  04/04/25  Plan of Care reviewed with:  patient, sibling   SLP Follow-Up:  Yes       Discharge recommendations:      Barriers to Discharge:  None    Time Tracking:     SLP Treatment Date:   03/06/25  Speech Start Time:  1012  Speech Stop Time:  1023     Speech Total Time (min):  11 min    Billable Minutes: Treatment Swallowing Dysfunction 11    03/06/2025

## 2025-03-06 NOTE — ASSESSMENT & PLAN NOTE
In January, due to biopsy in 2024. Most recent imaging showed resolving appearance of remote hematoma.

## 2025-03-06 NOTE — PLAN OF CARE
Recommendations  1. Upon G tube placement, initiate TF of Isosource 1.5 @10ml/hr, advancing as tolerated to a goal rate of 50ml/hr to provide 1800 kcals, 81.5g of protein, and 912 ml of fluid. Free water flushes of 155ml Q4H to provide an additional 930ml. Total free water=1842ml     --Avoid holding TF for residuals less than 500mL unless associated with other s/s of intolerance such as N/V/D, abd pain/distention.     2. Monitor weight/labs/residuals   3. Continue full liquid diet as clinically indicated   4. Continue Novasource Renal TID as tolerated    Goals: Pt will meet 85% of EEN/EPN by RD follow up

## 2025-03-06 NOTE — PLAN OF CARE
CM approached by bedside nurse regarding patient transferring to [facility]. CM was unaware of the transfer. CM reached out to daughter Sue. She advised that she does not want patient transferred to [facility]. She has been trying to get patient into the [facility] system and wants her to receive her care here  at [facility]. CM spoke with transfer center and cancelled transfer to [facility]. Medical team and bedside nurse notified.      Tete Goetz RN     [phone]

## 2025-03-06 NOTE — ASSESSMENT & PLAN NOTE
-hemolysis studies sent tonight and have returned with haptoglobin <10, has elevated direct bili, elevated LDH and D-dimer.   -last Ultomiris dose 2/24/25 - outpt hematologist is Roma Cantu MD  @ [facility]  -Consult Hematology given concern for now active hemolysis again based on lab studies and report of both rectal bleeding and gingival bleeding.   -type & screen and blood consent obtained  -check fibrinogen and complement levels, prior notes indicate complement mediated HUS.     -Discuss with hematology in AM re: need for bridging anticoagulation in setting of active hemolysis and potential GI bleeding with recent diagnosis of Right IJ Clot    -Would discuss with transfer center regarding transfer [place] [facility]- daughter requests [facility] if transfer occurs for continuity of care.  Needs insurance approval.     3/5- nephrology and hematology following. Mild low grade hemolysis. Hb 8.1. coags better.

## 2025-03-06 NOTE — PROGRESS NOTES
[facility] - Telemetry Stepdown  Nephrology  Progress Note    Patient Name: Madelaine Barros  MRN: [id number]  Admission Date: 3/2/2025  Hospital Length of Stay: 3 days  Attending Provider: Edi Staton MD   Primary Care Physician: Delilah Kelley MD  Principal Problem:Rectal bleeding    Subjective:     HPI: 58 y/o AAF w/ Atypical Hemolytic Uremic syndrome now ESRD on HD m/w/f, Severe Malnutrition, GERD/Duodenal ulcer & schatzki ring, Dysphagia, latent syphilis (treated) presents to the ED for concerns of rectal pain and bleeding. She has severe debility, poor appetite dysphagia to solids, mostly consumes liquid. Pt has had c/o of rectal pain, on ED rectal exam, reported anal lesion w/ tenderness and slow bleeding noted. GI saw the patient and noted a non-bleeding fissure in the posterior midline. Nephrology was consulted to help manage her hemodialysis. Patient states that she receives HD MWF and has not missed any of her HD sessions. She is uncertain what her dry hernandez is, denies any dyspnea or chest pain.     Interval History: Patient seen this morning, no acute distress. Will plan for HD this afternoon.        Review of patient's allergies indicates:  No Known Allergies  Current Facility-Administered Medications   Medication Frequency    aluminum-magnesium hydroxide-simethicone 200-200-20 mg/5 mL suspension 30 mL QID PRN    dextrose 10 % infusion Continuous    dextrose 50% injection 12.5 g PRN    dextrose 50% injection 25 g PRN    EScitalopram oxalate tablet 10 mg Daily    folic acid tablet 1,000 mcg Daily    glucagon (human recombinant) injection 1 mg PRN    glucose chewable tablet 16 g PRN    glucose chewable tablet 24 g PRN    heparin (porcine) injection 1,000 Units PRN    heparin 25,000 units in dextrose 5% (100 units/ml) IV bolus from bag LOW INTENSITY nomogram - [facility] PRN    heparin 25,000 units in dextrose 5% (100 units/ml) IV bolus from bag LOW INTENSITY nomogram - [facility] PRN    heparin 25,000 units  in dextrose 5% 250 mL (100 units/mL) infusion LOW INTENSITY nomogram - [facility] Continuous    melatonin tablet 6 mg Nightly PRN    mirtazapine tablet 15 mg QHS    naloxone 0.4 mg/mL injection 0.02 mg PRN    ondansetron injection 4 mg Q8H PRN    pantoprazole EC tablet 40 mg BID AC    polyethylene glycol packet 17 g Daily PRN    prochlorperazine injection Soln 5 mg Q6H PRN    senna-docusate 8.6-50 mg per tablet 1 tablet BID PRN    sodium chloride 0.9% flush 10 mL Q6H PRN    thiamine tablet 100 mg Daily    vitamin D 1000 units tablet 1,000 Units Daily    vitamin renal formula (B-complex-vitamin c-folic acid) 1 mg per capsule 1 capsule Daily       Objective:     Vital Signs (Most Recent):  Temp: 98.2 °F (36.8 °C) (03/06/25 0515)  Pulse: 99 (03/06/25 0515)  Resp: 15 (03/06/25 0515)  BP: 101/71 (03/06/25 0615)  SpO2: 100 % (03/06/25 0515) Vital Signs (24h Range):  Temp:  [97.5 °F (36.4 °C)-98.5 °F (36.9 °C)] 98.2 °F (36.8 °C)  Pulse:  [] 99  Resp:  [15-20] 15  SpO2:  [98 %-100 %] 100 %  BP: ()/(60-84) 101/71     Weight: 72.1 kg (158 lb 15.2 oz) (03/04/25 0958)  Body mass index is 26.45 kg/m².  Body surface area is 1.82 meters squared.    No intake/output data recorded.     Physical Exam  Constitutional:       Appearance: Normal appearance.   HENT:      Head: Normocephalic and atraumatic.      Right Ear: External ear normal.      Left Ear: External ear normal.      Nose: Nose normal.      Mouth/Throat:      Mouth: Mucous membranes are moist.   Eyes:      Extraocular Movements: Extraocular movements intact.   Cardiovascular:      Rate and Rhythm: Normal rate and regular rhythm.      Pulses: Normal pulses.      Heart sounds: Normal heart sounds.   Pulmonary:      Effort: Pulmonary effort is normal.      Breath sounds: Normal breath sounds.   Abdominal:      General: Abdomen is flat.      Palpations: Abdomen is soft.   Musculoskeletal:         General: Normal range of motion.      Cervical back: Normal range of motion  and neck supple.      Right lower leg: No edema.      Left lower leg: No edema.   Skin:     General: Skin is warm.      Capillary Refill: Capillary refill takes less than 2 seconds.   Neurological:      General: No focal deficit present.      Mental Status: She is alert and oriented to person, place, and time.          Significant Labs:  CBC:   Recent Labs   Lab 03/06/25  0407   WBC 8.63   RBC 3.30*   HGB 8.9*   HCT 28.2*   PLT 67*   MCV 86   MCH 27.0   MCHC 31.6*     CMP:   Recent Labs   Lab 03/05/25  0730      CALCIUM 8.6*   ALBUMIN 2.5*   PROT 5.9*   *   K 3.5   CO2 23   CL 96   BUN 23*   CREATININE 4.0*   ALKPHOS 267*   *   *   BILITOT 2.0*     All labs within the past 24 hours have been reviewed.       Assessment/Plan:     Renal/  ESRD (end stage renal disease)  Outpatient HD Information:  -Dialysis modality: Hemodialysis  -HD TX days: Monday/Wednesday/Friday  -Last HD TX prior to hospital admission: ?  -Dialysis access: dialysis catheter  -Residual urine: Anuric   -EDW: ?    Recommendations    -HD today for clearance of metabolic toxins.   -1.5L fluid restriction  -Renal diet when not NPO      GI  * Rectal bleeding  - per primary         Thank you for your consult. I will follow-up with patient. Please contact us if you have any additional questions.    Milo Davis MD  Nephrology  [facility] - Telemetry Stepdown

## 2025-03-06 NOTE — PROGRESS NOTES
[facility] - Telemetry Stepdown  Adult Nutrition  Progress Note    SUMMARY     Recommendations  1. Upon G tube placement, initiate TF of Isosource 1.5 @10ml/hr, advancing as tolerated to a goal rate of 50ml/hr to provide 1800 kcals, 81.5g of protein, and 912 ml of fluid. Free water flushes of 155ml Q4H to provide an additional 930ml. Total free water=1842ml     --Avoid holding TF for residuals less than 500mL unless associated with other s/s of intolerance such as N/V/D, abd pain/distention.     2. Monitor weight/labs/residuals   3. Continue full liquid diet as clinically indicated   4. Continue Novasource Renal TID as tolerated    Goals: Pt will meet 85% of EEN/EPN by RD follow up    Nutrition Goal Status: goal not met  Communication of RD Recs:  (POC)    Nutrition Discharge Planning  Nutrition Discharge Planning: Therapeutic diet (comments), Oral supplement regimen (comments)  Therapeutic diet (comments): Full Liquid  Oral supplement regimen (comments): Novasource Renal TID  Enteral nutrition (comments): 0    Assessment and Plan  No new Assessment & Plan notes have been filed under this hospital service since the last note was generated.  Service: Nutrition     Malnutrition Assessment  Unable to assess at this time, will attempt at follow up     Reason for Assessment  Reason For Assessment: RD follow-up  Diagnosis:  (Rectal bleeding)  General Information Comments: Pt is currently on a full liquid diet with Novsource TID. PMH- DM, GERD, wernicke encephalopathy. ESRD on HD, Lethargic. Anil-12/medial perineum. Noted meal intake. RD consulted for TF recs 3/4/25. HD scheduled for this afternoon. Mental status declining since October 2024. Weight stable for the last 2 months.    Nutrition/Diet History  Nutrition Intake History: NELSON  Food Preferences: NELSON  Factors Affecting Nutritional Intake: decreased appetite, chewing difficulties/inability to chew food, difficulty/impaired swallowing    Food Insecurity: Food  "Insecurity Present (3/5/2025)    Hunger Vital Sign     Worried About Running Out of Food in the Last Year: Sometimes true     Ran Out of Food in the Last Year: Sometimes true     Anthropometrics  Height: 5' 5" (165.1 cm)  Height (inches): 65 in  Weight: 72.1 kg (158 lb 15.2 oz)  Weight (lb): 158.95 lb  Ideal Body Weight (IBW), Female: 125 lb  % Ideal Body Weight, Female (lb): 127.16 %  BMI (Calculated): 26.5  BMI Grade: 25 - 29.9 - overweight  Usual Body Weight (UBW), k.1 kg (1/3/25)  % Usual Body Weight: 100.21  % Weight Change From Usual Weight: 0 %     Lab/Procedures/Meds  Pertinent Labs Reviewed: reviewed  Pertinent Labs Comments: Na 132, BUN 23, Creatinine 4, Ca 8.6, GFR 12.3, Phos 1.3, Cholesterol 237  Pertinent Medications Reviewed: reviewed  Pertinent Medications Comments: folic acid, pantoprazole, thiamine, vit D 1000 units, renal vitamin    Estimated/Assessed Needs  Weight Used For Calorie Calculations: 72.1 kg (158 lb 15.2 oz)  Energy Calorie Requirements (kcal): 0655-5452 kcals (25-30 kcals/kg)  Energy Need Method: Kcal/kg  Protein Requirements: 72-86g (1-1.2g/kg)  Weight Used For Protein Calculations: 72.1 kg (158 lb 15.2 oz)     Estimated Fluid Requirement Method: RDA Method  RDA Method (mL): 1802  CHO Requirement: 225g    Nutrition Prescription Ordered  Current Diet Order: Full Liquid  Oral Nutrition Supplement: Novasource TID    Evaluation of Received Nutrient/Fluid Intake  I/O:   Energy Calories Required: not meeting needs  Protein Required: not meeting needs  Fluid Required: not meeting needs  Comments: LBM-3/6/25  Tolerance: not tolerating  % Intake of Estimated Energy Needs: 0 - 25 %  % Meal Intake: 0 - 25 %    Nutrition Risk  Level of Risk/Frequency of Follow-up: high (2x/week)     Monitor and Evaluation  Monitor and Evaluation: Energy intake, Food and beverage intake, Protein intake, Diet order, Weight     Nutrition Follow-Up  RD Follow-up?: Yes      "

## 2025-03-07 PROBLEM — R19.7 DIARRHEA: Status: RESOLVED | Noted: 2025-03-03 | Resolved: 2025-03-07

## 2025-03-07 LAB
ALLENS TEST: ABNORMAL
APTT PPP: 79.4 SEC (ref 21–32)
APTT PPP: 85.9 SEC (ref 21–32)
BACTERIA BLD CULT: NORMAL
BACTERIA BLD CULT: NORMAL
BASOPHILS # BLD AUTO: 0.01 K/UL (ref 0–0.2)
BASOPHILS NFR BLD: 0.1 % (ref 0–1.9)
DIFFERENTIAL METHOD BLD: ABNORMAL
EOSINOPHIL # BLD AUTO: 0.2 K/UL (ref 0–0.5)
EOSINOPHIL NFR BLD: 2.1 % (ref 0–8)
ERYTHROCYTE [DISTWIDTH] IN BLOOD BY AUTOMATED COUNT: 20.4 % (ref 11.5–14.5)
HCO3 UR-SCNC: 23.6 MMOL/L (ref 24–28)
HCT VFR BLD AUTO: 28.2 % (ref 37–48.5)
HCT VFR BLD CALC: 31 %PCV (ref 36–54)
HGB BLD-MCNC: 8.7 G/DL (ref 12–16)
IMM GRANULOCYTES # BLD AUTO: 0.02 K/UL (ref 0–0.04)
IMM GRANULOCYTES NFR BLD AUTO: 0.2 % (ref 0–0.5)
LYMPHOCYTES # BLD AUTO: 2.2 K/UL (ref 1–4.8)
LYMPHOCYTES NFR BLD: 23.5 % (ref 18–48)
MCH RBC QN AUTO: 27.1 PG (ref 27–31)
MCHC RBC AUTO-ENTMCNC: 30.9 G/DL (ref 32–36)
MCV RBC AUTO: 88 FL (ref 82–98)
MONOCYTES # BLD AUTO: 1.3 K/UL (ref 0.3–1)
MONOCYTES NFR BLD: 14.1 % (ref 4–15)
NEUTROPHILS # BLD AUTO: 5.5 K/UL (ref 1.8–7.7)
NEUTROPHILS NFR BLD: 60 % (ref 38–73)
NRBC BLD-RTO: 0 /100 WBC
PCO2 BLDA: 32 MMHG (ref 35–45)
PH SMN: 7.47 [PH] (ref 7.35–7.45)
PLATELET # BLD AUTO: 69 K/UL (ref 150–450)
PMV BLD AUTO: ABNORMAL FL (ref 9.2–12.9)
PO2 BLDA: 152 MMHG (ref 80–100)
POC BE: 0 MMOL/L
POC IONIZED CALCIUM: 1.07 MMOL/L (ref 1.06–1.42)
POC SATURATED O2: 99 % (ref 95–100)
POC TCO2: 25 MMOL/L (ref 23–27)
POCT GLUCOSE: 113 MG/DL (ref 70–110)
POCT GLUCOSE: 114 MG/DL (ref 70–110)
POCT GLUCOSE: 128 MG/DL (ref 70–110)
POCT GLUCOSE: 130 MG/DL (ref 70–110)
POCT GLUCOSE: 85 MG/DL (ref 70–110)
POCT GLUCOSE: 88 MG/DL (ref 70–110)
POCT GLUCOSE: 95 MG/DL (ref 70–110)
POTASSIUM BLD-SCNC: 3.5 MMOL/L (ref 3.5–5.1)
RBC # BLD AUTO: 3.21 M/UL (ref 4–5.4)
SAMPLE: ABNORMAL
SITE: ABNORMAL
SODIUM BLD-SCNC: 132 MMOL/L (ref 136–145)
WBC # BLD AUTO: 9.13 K/UL (ref 3.9–12.7)

## 2025-03-07 PROCEDURE — 25000003 PHARM REV CODE 250: Performed by: HOSPITALIST

## 2025-03-07 PROCEDURE — 20600001 HC STEP DOWN PRIVATE ROOM

## 2025-03-07 PROCEDURE — 63600175 PHARM REV CODE 636 W HCPCS: Performed by: HOSPITALIST

## 2025-03-07 PROCEDURE — 36415 COLL VENOUS BLD VENIPUNCTURE: CPT | Performed by: HOSPITALIST

## 2025-03-07 PROCEDURE — 85730 THROMBOPLASTIN TIME PARTIAL: CPT | Performed by: HOSPITALIST

## 2025-03-07 PROCEDURE — 85025 COMPLETE CBC W/AUTO DIFF WBC: CPT | Performed by: HOSPITALIST

## 2025-03-07 PROCEDURE — 99223 1ST HOSP IP/OBS HIGH 75: CPT | Mod: ,,, | Performed by: INTERNAL MEDICINE

## 2025-03-07 RX ORDER — DEXTROSE MONOHYDRATE 100 MG/ML
INJECTION, SOLUTION INTRAVENOUS CONTINUOUS
Status: DISCONTINUED | OUTPATIENT
Start: 2025-03-07 | End: 2025-03-10

## 2025-03-07 RX ORDER — SODIUM CHLORIDE 9 MG/ML
INJECTION, SOLUTION INTRAVENOUS
Status: CANCELLED | OUTPATIENT
Start: 2025-03-07

## 2025-03-07 RX ORDER — HEPARIN SODIUM,PORCINE/D5W 25000/250
0-40 INTRAVENOUS SOLUTION INTRAVENOUS CONTINUOUS
Status: DISCONTINUED | OUTPATIENT
Start: 2025-03-07 | End: 2025-03-08

## 2025-03-07 RX ORDER — OXYMETAZOLINE HCL 0.05 %
2 SPRAY, NON-AEROSOL (ML) NASAL 2 TIMES DAILY
Status: COMPLETED | OUTPATIENT
Start: 2025-03-07 | End: 2025-03-08

## 2025-03-07 RX ORDER — MUPIROCIN 20 MG/G
OINTMENT TOPICAL 2 TIMES DAILY
Status: CANCELLED | OUTPATIENT
Start: 2025-03-07 | End: 2025-03-12

## 2025-03-07 RX ORDER — SODIUM CHLORIDE 9 MG/ML
INJECTION, SOLUTION INTRAVENOUS ONCE
Status: CANCELLED | OUTPATIENT
Start: 2025-03-07 | End: 2025-03-07

## 2025-03-07 RX ADMIN — MIRTAZAPINE 15 MG: 15 TABLET, FILM COATED ORAL at 09:03

## 2025-03-07 RX ADMIN — Medication 1000 UNITS: at 09:03

## 2025-03-07 RX ADMIN — FOLIC ACID 1000 MCG: 1 TABLET ORAL at 09:03

## 2025-03-07 RX ADMIN — Medication 2 SPRAY: at 10:03

## 2025-03-07 RX ADMIN — Medication 100 MG: at 09:03

## 2025-03-07 RX ADMIN — DEXTROSE MONOHYDRATE: 100 INJECTION, SOLUTION INTRAVENOUS at 03:03

## 2025-03-07 RX ADMIN — NEPHROCAP 1 CAPSULE: 1 CAP ORAL at 09:03

## 2025-03-07 RX ADMIN — ESCITALOPRAM OXALATE 10 MG: 5 TABLET, FILM COATED ORAL at 09:03

## 2025-03-07 RX ADMIN — HEPARIN SODIUM AND DEXTROSE 12 UNITS/KG/HR: 10000; 5 INJECTION INTRAVENOUS at 03:03

## 2025-03-07 RX ADMIN — PANTOPRAZOLE SODIUM 40 MG: 40 TABLET, DELAYED RELEASE ORAL at 03:03

## 2025-03-07 RX ADMIN — PANTOPRAZOLE SODIUM 40 MG: 40 TABLET, DELAYED RELEASE ORAL at 06:03

## 2025-03-07 NOTE — H&P (VIEW-ONLY)
[facility] - Telemetry Stepdown  Gastroenterology  Consult Note    Patient Name: Madelaine Barros  MRN: [id number]  Admission Date: 3/2/2025  Hospital Length of Stay: 4 days  Code Status: Full Code   Attending Provider: Edi Staton MD   Consulting Provider: Shayy Garcia MD  Primary Care Physician: Delilah Kelley MD  Principal Problem:Rectal bleeding    Inpatient consult to Gastroenterology  Consult performed by: Shayy Garcia MD  Consult ordered by: Edi Staton MD        Subjective:     HPI:  Madelaine Laws is a 58yo female with a hx of htn, atypical HUS, ESR on dialysis, anemia, GERD, wernicke encephalopathy, dysphagia, neuropathy, hx of latent syphilis (treated), TIA, duodenal ulcer and schatzki ring (12/11/2024), and hx of IJ DVT (on eliquis) admitted for rectal bleeding which was managed by CRS and has since subsided. She was recently admitted to OSH and started on eliquis for RIJ DVT. She is still being managed for malnutrition and dysphagia. GI has been consulted for PEG tube placement. Eliquis has been held since admission in favor for heparin drip. Heparin stopped early this morning. She is currently resting comfortable with complaints.     On 12/22/24 she was taken for a diagnostic E[place] at [facility] which showed a nonbleeding ulcer, hiatal hernia, Schatzki ring, visible concern for possible eosinophilic esophagitis though pathology negative, and subjective hypomotility. Pathology positive for chronic antral gastritis.   Esophagram 12/27 revealed no abnormality      Past Medical History:   Diagnosis Date    Allergic rhinitis 06/01/2019    Diabetes mellitus     Duodenal ulcer 12/11/2024    Bulb; 4mm; NO SRH on exam of 11 Dec 24      GERD without esophagitis 08/15/2024    Hiatal hernia 12/11/2024    History of Helicobacter pylori infection 03/02/2025    Hypertension     Hypertensive emergency 10/2024    Latent syphilis 11/2024    [place] [facility] [place]    MAHA (microangiopathic  hemolytic anemia) 11/03/2024    Polyp of colon 03/05/2024    Schatzki's ring 12/11/2024    TIA (transient ischemic attack) 10/2024    Type 2 MI (myocardial infarction) 11/2024    secondary to hypertensive emergency, normal [place] [facility] [place]    Wernicke encephalopathy 02/13/2025       Past Surgical History:   Procedure Laterality Date    HYSTERECTOMY      TUBAL LIGATION         Review of patient's allergies indicates:  No Known Allergies  Family History       Problem Relation (Age of Onset)    Scleroderma Other          Tobacco Use    Smoking status: Never    Smokeless tobacco: Not on file   Substance and Sexual Activity    Alcohol use: Not Currently     Comment: occasionally    Drug use: No    Sexual activity: Not Currently     Review of Systems   Gastrointestinal:  Negative for abdominal pain and blood in stool.    - limited by ams, but no current complaints.  Objective:     Vital Signs (Most Recent):  Temp: 98.1 °F (36.7 °C) (03/07/25 0810)  Pulse: 98 (03/07/25 0930)  Resp: 17 (03/07/25 0810)  BP: 108/65 (03/07/25 0810)  SpO2: (!) 92 % (03/07/25 0810) Vital Signs (24h Range):  Temp:  [97.8 °F (36.6 °C)-98.2 °F (36.8 °C)] 98.1 °F (36.7 °C)  Pulse:  [] 98  Resp:  [12-20] 17  SpO2:  [92 %-100 %] 92 %  BP: ()/(55-93) 108/65     Weight: 72.1 kg (158 lb 15.2 oz) (03/04/25 0958)  Body mass index is 26.45 kg/m².      Intake/Output Summary (Last 24 hours) at 3/7/2025 1116  Last data filed at 3/6/2025 1821  Gross per 24 hour   Intake 300 ml   Output 1600 ml   Net -1300 ml       Lines/Drains/Airways       Central Venous Catheter Line  Duration                  Hemodialysis Catheter right subclavian -- days              Peripheral Intravenous Line  Duration                  Peripheral IV - Single Lumen 03/02/25 1555 22 G Anterior;Distal;Right Wrist 4 days         Peripheral IV - Single Lumen 03/05/25 1833 22 G Anterior;Distal;Right Forearm 1 day                     Physical Exam  Constitutional:       General: She  is not in acute distress.     Appearance: Normal appearance.   Cardiovascular:      Rate and Rhythm: Normal rate and regular rhythm.   Pulmonary:      Effort: Pulmonary effort is normal. No respiratory distress.   Abdominal:      General: Abdomen is flat. There is no distension.      Palpations: Abdomen is soft.      Tenderness: There is no abdominal tenderness. There is no guarding.   Skin:     General: Skin is warm.   Neurological:      Mental Status: She is alert.      Comments: Confused  Calm, cooperative, alert          Significant Labs:  CBC:   Recent Labs   Lab 03/05/25  1428 03/06/25  0407 03/07/25  0612   WBC 8.67 8.63 9.13   HGB 8.3* 8.9* 8.7*   HCT 25.9* 28.2* 28.2*   PLT 70* 67* 69*       Significant Imaging:  Imaging results within the past 24 hours have been reviewed.  CT AP 3/2/25 -    1. Gastroduodenitis involving the gastric pylorus and duodenal bulb.  Minimal small bowel wall thickening in the left hemiabdomen and mild diffuse colonic wall thickening which could be exaggerated by decompressed state.  Suggest correlation for any clinical signs of enteritis or colitis.  2. Small volume pericholecystic fluid is likely reactive in the setting of adjacent gastroduodenitis or liver dysfunction.  No cholelithiasis or other evidence of cholecystitis.  3. Circumferential bladder wall thickening which may be seen in setting of cystitis.  Recommend correlation with urinalysis.  4. Small volume subcapsular fluid along the posterior lower pole left kidney of uncertain etiology.  5. Anasarca.  6. Probable hepatic steatosis and nonspecific subtle heterogeneous enhancement of the liver.  Suggest correlation with laboratory values and risk factors for hepatitis.  7. Additional findings as above.    Assessment/Plan:     GI  Dysphagia  59yF with progressive ams over multiple months and persistent issues with dysphagia leasing to malnutrition. GI has been asked to evaluate for placement of a PEG tube. While she does  have multiple considerations such as hiatal hernia, prior Schatzki ring dilation, subjective hypomotility and untreated H. Pylori, she does not have any direct contraindications. Rectal bleeding is now resolved and hgb has remained stable for multiple days.    - okay for tolerated diet for now  - NPO Sunday night  - plan for PEG placement Monday  - optimize hydration prior to procedure        Thank you for your consult. I will follow-up with patient. Please contact us if you have any additional questions.    Shayy Garcia MD  Gastroenterology  [facility] - Telemetry Stepdown

## 2025-03-07 NOTE — ASSESSMENT & PLAN NOTE
Severe malnutrition patient with recent hx of worsening dysphagia, has had w/u for scleroderma, has hiatal hernia and ?esophageal dysmotility  - dysphagia to solids.   Developed wernicke encephalopathy from nutritional deficiency     Daughter reports concern of inadequate intake has had severe weight loss in recent months with multiple complex health conditions. She requested PEG placement. Nutrition gave recommendations on tube feeds. Giving minced and moist diet. Add Novasource Renal supplements.

## 2025-03-07 NOTE — SUBJECTIVE & OBJECTIVE
Past Medical History:   Diagnosis Date    Allergic rhinitis 06/01/2019    Diabetes mellitus     Duodenal ulcer 12/11/2024    Bulb; 4mm; NO SRH on exam of 11 Dec 24      GERD without esophagitis 08/15/2024    Hiatal hernia 12/11/2024    History of Helicobacter pylori infection 03/02/2025    Hypertension     Hypertensive emergency 10/2024    Latent syphilis 11/2024    [place] [facility] [place]    MAHA (microangiopathic hemolytic anemia) 11/03/2024    Polyp of colon 03/05/2024    Schatzki's ring 12/11/2024    TIA (transient ischemic attack) 10/2024    Type 2 MI (myocardial infarction) 11/2024    secondary to hypertensive emergency, normal [place] [facility] [place]    Wernicke encephalopathy 02/13/2025       Past Surgical History:   Procedure Laterality Date    HYSTERECTOMY      TUBAL LIGATION         Review of patient's allergies indicates:  No Known Allergies  Family History       Problem Relation (Age of Onset)    Scleroderma Other          Tobacco Use    Smoking status: Never    Smokeless tobacco: Not on file   Substance and Sexual Activity    Alcohol use: Not Currently     Comment: occasionally    Drug use: No    Sexual activity: Not Currently     Review of Systems   Gastrointestinal:  Negative for abdominal pain and blood in stool.    - limited by ams, but no current complaints.  Objective:     Vital Signs (Most Recent):  Temp: 98.1 °F (36.7 °C) (03/07/25 0810)  Pulse: 98 (03/07/25 0930)  Resp: 17 (03/07/25 0810)  BP: 108/65 (03/07/25 0810)  SpO2: (!) 92 % (03/07/25 0810) Vital Signs (24h Range):  Temp:  [97.8 °F (36.6 °C)-98.2 °F (36.8 °C)] 98.1 °F (36.7 °C)  Pulse:  [] 98  Resp:  [12-20] 17  SpO2:  [92 %-100 %] 92 %  BP: ()/(55-93) 108/65     Weight: 72.1 kg (158 lb 15.2 oz) (03/04/25 0958)  Body mass index is 26.45 kg/m².      Intake/Output Summary (Last 24 hours) at 3/7/2025 1116  Last data filed at 3/6/2025 1821  Gross per 24 hour   Intake 300 ml   Output 1600 ml   Net -1300 ml        Lines/Drains/Airways       Central Venous Catheter Line  Duration                  Hemodialysis Catheter right subclavian -- days              Peripheral Intravenous Line  Duration                  Peripheral IV - Single Lumen 03/02/25 1555 22 G Anterior;Distal;Right Wrist 4 days         Peripheral IV - Single Lumen 03/05/25 1833 22 G Anterior;Distal;Right Forearm 1 day                     Physical Exam  Constitutional:       General: She is not in acute distress.     Appearance: Normal appearance.   Cardiovascular:      Rate and Rhythm: Normal rate and regular rhythm.   Pulmonary:      Effort: Pulmonary effort is normal. No respiratory distress.   Abdominal:      General: Abdomen is flat. There is no distension.      Palpations: Abdomen is soft.      Tenderness: There is no abdominal tenderness. There is no guarding.   Skin:     General: Skin is warm.   Neurological:      Mental Status: She is alert.      Comments: Confused  Calm, cooperative, alert          Significant Labs:  CBC:   Recent Labs   Lab 03/05/25  1428 03/06/25  0407 03/07/25  0612   WBC 8.67 8.63 9.13   HGB 8.3* 8.9* 8.7*   HCT 25.9* 28.2* 28.2*   PLT 70* 67* 69*       Significant Imaging:  Imaging results within the past 24 hours have been reviewed.  CT AP 3/2/25 -    1. Gastroduodenitis involving the gastric pylorus and duodenal bulb.  Minimal small bowel wall thickening in the left hemiabdomen and mild diffuse colonic wall thickening which could be exaggerated by decompressed state.  Suggest correlation for any clinical signs of enteritis or colitis.  2. Small volume pericholecystic fluid is likely reactive in the setting of adjacent gastroduodenitis or liver dysfunction.  No cholelithiasis or other evidence of cholecystitis.  3. Circumferential bladder wall thickening which may be seen in setting of cystitis.  Recommend correlation with urinalysis.  4. Small volume subcapsular fluid along the posterior lower pole left kidney of uncertain  etiology.  5. Anasarca.  6. Probable hepatic steatosis and nonspecific subtle heterogeneous enhancement of the liver.  Suggest correlation with laboratory values and risk factors for hepatitis.  7. Additional findings as above.

## 2025-03-07 NOTE — ASSESSMENT & PLAN NOTE
Chronic. Last Ultomiris dose 2/24/2025. Outpatient hematologist is Roma Cantu MD at [facility]. Consulted Hematology here. Appreciate assistance with management.

## 2025-03-07 NOTE — PLAN OF CARE
JORGE spoke with patient's sister Gladys this am. She had questions as to how to go about helping patient sign up for Medicare.  Gladys advised patient's daughter Sue has already filed for patient's disability. JORGE provided guidance and resources as how to file for Medicare.      Tete Goetz RN     [phone]  \

## 2025-03-07 NOTE — PROGRESS NOTES
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 She lives in [facility] [place] [facility]. She works for [employer]. Her primary care physician is Dr. Delilah Kelley.               She was hospitalized at [facility] from 10/23/2024 to 11/6/2024.              She was hospitalized at [facility] from 11/15/2024 to 1/18/2024.              She was hospitalized at [facility] from 12/9/2024 to 12/19/2024.              She was hospitalized at [facility] from 12/25/2024 to 1/5/2025.              She was hospitalized at [facility] from 1/5/2025 to 1/31/2025. She was discharged to [facility] inpatient rehab until 2/18/2025.               She presented to [facility] on 3/2/2025 with rectal pain and bleeding. She receives her care in the [facility] system, not at [facility]. History was provided by her daughter Sue Barros. She is followed by Hematology at [facility] and has been on immunomodulator  infusions every 8 weeks (Soliris, now Ultomiris, last dose 2/24/2025). She has severe debility, poor appetite, dysphagia to solids, mostly consumes liquid, but her daughter was concerned her nutritional intake is inadequate and inquired about feeding tube placement for chronic nutrition. She had watery stool on the day of presentation. Her sister noted that she had gingival bleeding. She reported an anal lesion with tenderness and slow bleeding. She has not required frequent transfusions. Her daughter noted that all home antihypertensive medications were discontinued since she was discharged from inpatient rehab.              In the emergency department, she appeared ill, lethargic, unable to fully participate in interview, periodically awakening with pain. Labs showed hemoglobin 14.3 g/dL (from 13.2 on 2/24/2025). Repeat hemoglobin was 10.4. Alkaline phosphatase was 251 U/L, total bilirubin was 2.2 mg/dL, AST was 733 U/L, ALT was 478 U/L. She was given 2.1 liters of IV fluids, vancomycin, piperacillin-tazobactam, 4 mg of IV morphine, 80 mg of IV pantoprazole, topical lidocaine for rectal pain. She was admitted to Hospital Medicine Team S.     Overview/Hospital Course:  GGT was elevated at 447 U/L. LDH was elevated. Colorectal Surgery, Hematology, Nephrology, and Case Management were consulted. She expressed desire to go to [facility] since she [place] [facility]. Her mental status has been declining since October 2024. She has memory loss, disorientation, and does not remember her illness. Her mother had dementia. She had chest pain on 3/3/2025 while getting dialysis. EKG showed sinus tachycardia. Troponin was 1102 ng/L. Repeat was 704. Hematology recommended giving 2 units of fresh frozen plasma (FFP) and starting low rate heparin infusion then transitioning to apixaban 2.5 mg twice daily if she had no further bleeding. She was given another 2 units of FFP. Her daughter requested gastrostomy tube  placement for malnutrition. On 3/4/2025, heparin was held due to recurrent bleeding. She had intermittent somnolence. She was kept NPO. Speech Language Pathology was consulted. She was hypoglycemic so was put on 10% dextrose drip. Speech Language Pathology recommended full liquid diet. LFTs trended down. Heparin infusion was restarted on 3/5/2025. On 3/6/2025, her daughter decided that she did not want transfer to a [facility] hospital, instead she wanted to transfer her medical care to the [facility] system. Hemoglobin and hematocrit remained stable with no evidence of recurrent bleeding on 3/7/2025.     Interval History: On heparin drip and D10 drip. Can transition heparin drip to apixaban 2.5 mg BID but will hold until PEG placed.    Review of Systems   Unable to perform ROS: Mental status change     Objective:     Vital Signs (Most Recent):  Temp: 98.1 °F (36.7 °C) (03/07/25 0810)  Pulse: 104 (03/07/25 0810)  Resp: 17 (03/07/25 0810)  BP: 108/65 (03/07/25 0810)  SpO2: (!) 92 % (03/07/25 0810) Vital Signs (24h Range):  Temp:  [97.8 °F (36.6 °C)-98.2 °F (36.8 °C)] 98.1 °F (36.7 °C)  Pulse:  [] 104  Resp:  [12-38] 17  SpO2:  [92 %-100 %] 92 %  BP: ()/(55-93) 108/65     Weight: 72.1 kg (158 lb 15.2 oz)  Body mass index is 26.45 kg/m².    Intake/Output Summary (Last 24 hours) at 3/7/2025 0856  Last data filed at 3/6/2025 1821  Gross per 24 hour   Intake 300 ml   Output 1600 ml   Net -1300 ml         Physical Exam  Vitals and nursing note reviewed.   Constitutional:       General: She is not in acute distress.     Appearance: She is well-developed. She is ill-appearing. She is not diaphoretic.      Interventions: She is not intubated.  Pulmonary:      Effort: Pulmonary effort is normal. No accessory muscle usage or respiratory distress. She is not intubated.   Skin:     General: Skin is warm and dry.      Coloration: Skin is not jaundiced or pale.   Neurological:      Mental Status: She is confused.      Motor: No  seizure activity.   Psychiatric:         Attention and Perception: She is inattentive.         Mood and Affect: Affect is blunt.         Behavior: Behavior is not aggressive or hyperactive.         Cognition and Memory: Cognition is impaired. Memory is impaired.               Significant Labs: All pertinent labs within the past 24 hours have been reviewed.  CBC:   Recent Labs   Lab 03/05/25  1428 03/06/25  0407 03/07/25  0612   WBC 8.67 8.63 9.13   HGB 8.3* 8.9* 8.7*   HCT 25.9* 28.2* 28.2*   PLT 70* 67* 69*       Significant Imaging: I have reviewed all pertinent imaging results/findings within the past 24 hours.      Assessment & Plan  Rectal bleeding  Holding home aspirin and apixaban. Has been on heparin drip. Appreciate Colorectal Surgery. Subsided. Monitor for recurrence.  Unspecified severe protein-calorie malnutrition  Severe malnutrition patient with recent hx of worsening dysphagia, has had w/u for scleroderma, has hiatal hernia and ?esophageal dysmotility  - dysphagia to solids.   Developed wernicke encephalopathy from nutritional deficiency     Daughter reports concern of inadequate intake has had severe weight loss in recent months with multiple complex health conditions. She requested PEG placement. Nutrition gave recommendations on tube feeds. Giving minced and moist diet. Add Novasource Renal supplements.  Dysphagia  Appreciate SLP. Advanced diet to minced and moist. Stop dextrose drip and monitor for hypoglycemia. Start Novasource Renal supplements for associated malnutrition.  Wernicke encephalopathy  Continue on home thiamine supplementation.     GERD without esophagitis  Continue home pantoprazole.    ESRD (end stage renal disease)  Nephrology managing dialysis.    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 Last Ultomiris dose 2/24/2025. Outpatient hematologist is Roma Cantu MD at [facility]. Consulted Hematology here. Appreciate assistance with  management.  Elevated transaminase level  Hepatic steatosis on CT. Worsening hepatic function may contribute to her worsening coagulopathy elevated PT/PTT. Stable or slightly improved since admission.  Acute thrombosis of right internal jugular vein  Diagnosed 1/20/2025, on apixaban at home. Held due to GI bleed. Started heparin drip 3/5/2025. Transition to apixaban after PEG placement.  History of Helicobacter pylori infection  Diagnosed via EGD and reported treated in [facility] [place].     Renal hematoma, left, sequela  In January, due to biopsy in 2024. Most recent imaging showed resolving appearance of remote hematoma.   Anemia of chronic renal failure, stage 5  Anemia is likely due to acute blood loss which was from GI bleed and active hemolysis due to HUS.  . Most recent hemoglobin and hematocrit are listed below.  Recent Labs     03/05/25  1428 03/06/25  0407 03/07/25  0612   HGB 8.3* 8.9* 8.7*   HCT 25.9* 28.2* 28.2*     Plan  - Monitor serial CBC: Every 12 hours  - Transfuse PRBC if patient becomes hemodynamically unstable, symptomatic or H/H drops below 7/21.  - Patient has not received any PRBC transfusions to date  - Patient's anemia is currently improving  - correcting coagulopathy  Metabolic encephalopathy  Waxing and waning since October 2024 due to HUS. Lacks capacity, unable to communicate about her illness. Daughter Sue Barros wanted her to go to [facility] for continuity of care but has changed her mind and she will transition her medical care to [facility].  VTE Risk Mitigation (From admission, onward)           Ordered     heparin (porcine) injection 1,000 Units  As needed (PRN)         03/03/25 1524     IP VTE HIGH RISK PATIENT  Once         03/03/25 0416     Place sequential compression device  Until discontinued         03/03/25 0416     Reason for No Pharmacological VTE Prophylaxis  Once        Question:  Reasons:  Answer:  Active Bleeding    03/03/25 0416                     Discharge Planning   PATRICIA: 3/8/2025     Code Status: Full Code   Medical Readiness for Discharge Date:   Discharge Plan A: Home with family, Home Health                        Edi Staton MD  Department of Hospital Medicine   [facility] - Telemetry Stepdown

## 2025-03-07 NOTE — ASSESSMENT & PLAN NOTE
Appreciate SLP. Advanced diet to minced and moist. Stop dextrose drip and monitor for hypoglycemia. Start Novasource Renal supplements for associated malnutrition.

## 2025-03-07 NOTE — PLAN OF CARE
Problem: Adult Inpatient Plan of Care  Goal: Plan of Care Review  Outcome: Progressing  Goal: Patient-Specific Goal (Individualized)  Outcome: Progressing  Goal: Absence of Hospital-Acquired Illness or Injury  Outcome: Progressing  Goal: Optimal Comfort and Wellbeing  Outcome: Progressing  Goal: Readiness for Transition of Care  Outcome: Progressing     Problem: Infection  Goal: Absence of Infection Signs and Symptoms  Outcome: Progressing     Problem: Hemodialysis  Goal: Safe, Effective Therapy Delivery  Outcome: Progressing  Goal: Effective Tissue Perfusion  Outcome: Progressing  Goal: Absence of Infection Signs and Symptoms  Outcome: Progressing     Problem: Wound  Goal: Optimal Coping  Outcome: Progressing  Goal: Optimal Functional Ability  Outcome: Progressing  Goal: Absence of Infection Signs and Symptoms  Outcome: Progressing  Goal: Improved Oral Intake  Outcome: Progressing  Goal: Optimal Pain Control and Function  Outcome: Progressing  Goal: Skin Health and Integrity  Outcome: Progressing  Goal: Optimal Wound Healing  Outcome: Progressing     Problem: Skin Injury Risk Increased  Goal: Skin Health and Integrity  Outcome: Progressing     Problem: Fall Injury Risk  Goal: Absence of Fall and Fall-Related Injury  Outcome: Progressing  Patient in bed. No complaints voiced. NADN at this time. Safety measures in place. Call light in reach.

## 2025-03-07 NOTE — PLAN OF CARE
Stable without further bleeding. No further recs. We will sign off.    Elan Recinos DO  Hematology/Oncology Fellow, PGY-IV  [facility] [place]

## 2025-03-07 NOTE — PLAN OF CARE
Completed HD yesterday evening at 1830, labs are stable will plan for HD tomorrow then resume MWF dialysis schedule.

## 2025-03-07 NOTE — CONSULTS
[facility] - Telemetry Stepdown  Gastroenterology  Consult Note    Patient Name: Madelaine Barros  MRN: [id number]  Admission Date: 3/2/2025  Hospital Length of Stay: 4 days  Code Status: Full Code   Attending Provider: Eid Staton MD   Consulting Provider: Shayy Garcia MD  Primary Care Physician: Delilah Kelley MD  Principal Problem:Rectal bleeding    Inpatient consult to Gastroenterology  Consult performed by: Shayy Garcia MD  Consult ordered by: Edi Staton MD        Subjective:     HPI:  Madelaine Laws is a 60yo female with a hx of htn, atypical HUS, ESR on dialysis, anemia, GERD, wernicke encephalopathy, dysphagia, neuropathy, hx of latent syphilis (treated), TIA, duodenal ulcer and schatzki ring (12/11/2024), and hx of IJ DVT (on eliquis) admitted for rectal bleeding which was managed by CRS and has since subsided. She was recently admitted to OSH and started on eliquis for RIJ DVT. She is still being managed for malnutrition and dysphagia. GI has been consulted for PEG tube placement. Eliquis has been held since admission in favor for heparin drip. Heparin stopped early this morning. She is currently resting comfortable with complaints.     On 12/22/24 she was taken for a diagnostic EGD at [facility] which showed a nonbleeding ulcer, hiatal hernia, Schatzki ring, visible concern for possible eosinophilic esophagitis though pathology negative, and subjective hypomotility. Pathology positive for chronic antral gastritis.   Esophagram 12/27 revealed no abnormality      Past Medical History:   Diagnosis Date    Allergic rhinitis 06/01/2019    Diabetes mellitus     Duodenal ulcer 12/11/2024    Bulb; 4mm; NO SRH on exam of 11 Dec 24      GERD without esophagitis 08/15/2024    Hiatal hernia 12/11/2024    History of Helicobacter pylori infection 03/02/2025    Hypertension     Hypertensive emergency 10/2024    Latent syphilis 11/2024    [place] [facility] [place]    MAHA (microangiopathic  hemolytic anemia) 11/03/2024    Polyp of colon 03/05/2024    Schatzki's ring 12/11/2024    TIA (transient ischemic attack) 10/2024    Type 2 MI (myocardial infarction) 11/2024    secondary to hypertensive emergency, normal ECHO - [facility] ADMIT    Wernicke encephalopathy 02/13/2025       Past Surgical History:   Procedure Laterality Date    HYSTERECTOMY      TUBAL LIGATION         Review of patient's allergies indicates:  No Known Allergies  Family History       Problem Relation (Age of Onset)    Scleroderma Other          Tobacco Use    Smoking status: Never    Smokeless tobacco: Not on file   Substance and Sexual Activity    Alcohol use: Not Currently     Comment: occasionally    Drug use: No    Sexual activity: Not Currently     Review of Systems   Gastrointestinal:  Negative for abdominal pain and blood in stool.    - limited by ams, but no current complaints.  Objective:     Vital Signs (Most Recent):  Temp: 98.1 °F (36.7 °C) (03/07/25 0810)  Pulse: 98 (03/07/25 0930)  Resp: 17 (03/07/25 0810)  BP: 108/65 (03/07/25 0810)  SpO2: (!) 92 % (03/07/25 0810) Vital Signs (24h Range):  Temp:  [97.8 °F (36.6 °C)-98.2 °F (36.8 °C)] 98.1 °F (36.7 °C)  Pulse:  [] 98  Resp:  [12-20] 17  SpO2:  [92 %-100 %] 92 %  BP: ()/(55-93) 108/65     Weight: 72.1 kg (158 lb 15.2 oz) (03/04/25 0958)  Body mass index is 26.45 kg/m².      Intake/Output Summary (Last 24 hours) at 3/7/2025 1116  Last data filed at 3/6/2025 1821  Gross per 24 hour   Intake 300 ml   Output 1600 ml   Net -1300 ml       Lines/Drains/Airways       Central Venous Catheter Line  Duration                  Hemodialysis Catheter right subclavian -- days              Peripheral Intravenous Line  Duration                  Peripheral IV - Single Lumen 03/02/25 1555 22 G Anterior;Distal;Right Wrist 4 days         Peripheral IV - Single Lumen 03/05/25 1833 22 G Anterior;Distal;Right Forearm 1 day                     Physical Exam  Constitutional:       General: She  is not in acute distress.     Appearance: Normal appearance.   Cardiovascular:      Rate and Rhythm: Normal rate and regular rhythm.   Pulmonary:      Effort: Pulmonary effort is normal. No respiratory distress.   Abdominal:      General: Abdomen is flat. There is no distension.      Palpations: Abdomen is soft.      Tenderness: There is no abdominal tenderness. There is no guarding.   Skin:     General: Skin is warm.   Neurological:      Mental Status: She is alert.      Comments: Confused  Calm, cooperative, alert          Significant Labs:  CBC:   Recent Labs   Lab 03/05/25  1428 03/06/25  0407 03/07/25  0612   WBC 8.67 8.63 9.13   HGB 8.3* 8.9* 8.7*   HCT 25.9* 28.2* 28.2*   PLT 70* 67* 69*       Significant Imaging:  Imaging results within the past 24 hours have been reviewed.  CT AP 3/2/25 -    1. Gastroduodenitis involving the gastric pylorus and duodenal bulb.  Minimal small bowel wall thickening in the left hemiabdomen and mild diffuse colonic wall thickening which could be exaggerated by decompressed state.  Suggest correlation for any clinical signs of enteritis or colitis.  2. Small volume pericholecystic fluid is likely reactive in the setting of adjacent gastroduodenitis or liver dysfunction.  No cholelithiasis or other evidence of cholecystitis.  3. Circumferential bladder wall thickening which may be seen in setting of cystitis.  Recommend correlation with urinalysis.  4. Small volume subcapsular fluid along the posterior lower pole left kidney of uncertain etiology.  5. Anasarca.  6. Probable hepatic steatosis and nonspecific subtle heterogeneous enhancement of the liver.  Suggest correlation with laboratory values and risk factors for hepatitis.  7. Additional findings as above.    Assessment/Plan:     GI  Dysphagia  59yF with progressive ams over multiple months and persistent issues with dysphagia leasing to malnutrition. GI has been asked to evaluate for placement of a PEG tube. While she does  have multiple considerations such as hiatal hernia, prior Schatzki ring dilation, subjective hypomotility and untreated H. Pylori, she does not have any direct contraindications. Rectal bleeding is now resolved and hgb has remained stable for multiple days.    - okay for tolerated diet for now  - NPO Sunday night  - plan for PEG placement Monday  - optimize hydration prior to procedure        Thank you for your consult. I will follow-up with patient. Please contact us if you have any additional questions.    Shayy Garcia MD  Gastroenterology  [facility] - Telemetry Stepdown

## 2025-03-07 NOTE — PROGRESS NOTES
I have reviewed the notes, assessments, and/or procedures performed this visit, and I concur with the documentation.    I am not sure how much of the poor intake is due to dysphagia, I suspect it is more due to encephalopathy and lethargy    But a reasonable option is PEG tube placement. She did have some solid food this am, so we were unable to do the procedure today  But can do on monday

## 2025-03-07 NOTE — HPI
Madelaine Laws is a 58yo female with a hx of htn, atypical HUS, ESR on dialysis, anemia, GERD, wernicke encephalopathy, dysphagia, neuropathy, hx of latent syphilis (treated), TIA, duodenal ulcer and schatzki ring (12/11/2024), and hx of IJ DVT (on eliquis) admitted for rectal bleeding which was managed by CRS and has since subsided. She was recently admitted to OSH and started on eliquis for RIJ DVT. She is still being managed for malnutrition and dysphagia. GI has been consulted for PEG tube placement. Eliquis has been held since admission in favor for heparin drip. Heparin stopped early this morning. She is currently resting comfortable with complaints.     On 12/22/24 she was taken [place] [facility] [place] showed a nonbleeding ulcer, hiatal hernia, Schatzki ring, visible concern for possible eosinophilic esophagitis though pathology negative, and subjective hypomotility. Pathology positive for chronic antral gastritis.   Esophagram 12/27 revealed no abnormality

## 2025-03-07 NOTE — ASSESSMENT & PLAN NOTE
59yF with progressive ams over multiple months and persistent issues with dysphagia leasing to malnutrition. GI has been asked to evaluate for placement of a PEG tube. While she does have multiple considerations such as hiatal hernia, prior Schatzki ring dilation, subjective hypomotility and untreated H. Pylori, she does not have any direct contraindications. Rectal bleeding is now resolved and hgb has remained stable for multiple days.    - okay for tolerated diet for now  - NPO Sunday night  - plan for PEG placement Monday  - optimize hydration prior to procedure

## 2025-03-07 NOTE — ASSESSMENT & PLAN NOTE
Diagnosed 1/20/2025, on apixaban at home. Held due to GI bleed. Started heparin drip 3/5/2025. Transition to apixaban after PEG placement.

## 2025-03-07 NOTE — ASSESSMENT & PLAN NOTE
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 . Most recent hemoglobin and hematocrit are listed below.  Recent Labs     03/05/25  1428 03/06/25  0407 03/07/25  0612   HGB 8.3* 8.9* 8.7*   HCT 25.9* 28.2* 28.2*     Plan  - Monitor serial CBC: Every 12 hours  - Transfuse PRBC if patient becomes hemodynamically unstable, symptomatic or H/H drops below 7/21.  - Patient has not received any PRBC transfusions to date  - Patient's anemia is currently improving  - correcting coagulopathy

## 2025-03-07 NOTE — ASSESSMENT & PLAN NOTE
Waxing and waning since October 2024 due to HUS. Lacks capacity, unable to communicate about her illness. Daughter Sue Barros wanted her to go to [facility] for continuity of care but has changed her mind and she will transition her medical care to [facility].

## 2025-03-07 NOTE — PLAN OF CARE
CM sent home health referral to [facility] via [facility], in case patient discharges over weekend.      Tete Goetz RN     [phone]

## 2025-03-07 NOTE — ASSESSMENT & PLAN NOTE
Holding home aspirin and apixaban. Has been on heparin drip. Appreciate Colorectal Surgery. Subsided. Monitor for recurrence.

## 2025-03-07 NOTE — NURSING
3 hours  dialysis  treatment completed . 1.3 L Uf removed .Both lumens of  HD catheter were flushed, Heparin locked  and wrapped with tape and gauze .  Report  given to Primary Nurse.

## 2025-03-07 NOTE — ASSESSMENT & PLAN NOTE
Hepatic steatosis on CT. Worsening hepatic function may contribute to her worsening coagulopathy elevated PT/PTT. Stable or slightly improved since admission.

## 2025-03-07 NOTE — SUBJECTIVE & OBJECTIVE
Interval History: On heparin drip and D10 drip. Can transition heparin drip to apixaban 2.5 mg BID but will hold until PEG placed.    Review of Systems   Unable to perform ROS: Mental status change     Objective:     Vital Signs (Most Recent):  Temp: 98.1 °F (36.7 °C) (03/07/25 0810)  Pulse: 104 (03/07/25 0810)  Resp: 17 (03/07/25 0810)  BP: 108/65 (03/07/25 0810)  SpO2: (!) 92 % (03/07/25 0810) Vital Signs (24h Range):  Temp:  [97.8 °F (36.6 °C)-98.2 °F (36.8 °C)] 98.1 °F (36.7 °C)  Pulse:  [] 104  Resp:  [12-38] 17  SpO2:  [92 %-100 %] 92 %  BP: ()/(55-93) 108/65     Weight: 72.1 kg (158 lb 15.2 oz)  Body mass index is 26.45 kg/m².    Intake/Output Summary (Last 24 hours) at 3/7/2025 0856  Last data filed at 3/6/2025 1821  Gross per 24 hour   Intake 300 ml   Output 1600 ml   Net -1300 ml         Physical Exam  Vitals and nursing note reviewed.   Constitutional:       General: She is not in acute distress.     Appearance: She is well-developed. She is ill-appearing. She is not diaphoretic.      Interventions: She is not intubated.  Pulmonary:      Effort: Pulmonary effort is normal. No accessory muscle usage or respiratory distress. She is not intubated.   Skin:     General: Skin is warm and dry.      Coloration: Skin is not jaundiced or pale.   Neurological:      Mental Status: She is confused.      Motor: No seizure activity.   Psychiatric:         Attention and Perception: She is inattentive.         Mood and Affect: Affect is blunt.         Behavior: Behavior is not aggressive or hyperactive.         Cognition and Memory: Cognition is impaired. Memory is impaired.               Significant Labs: All pertinent labs within the past 24 hours have been reviewed.  CBC:   Recent Labs   Lab 03/05/25  1428 03/06/25  0407 03/07/25  0612   WBC 8.67 8.63 9.13   HGB 8.3* 8.9* 8.7*   HCT 25.9* 28.2* 28.2*   PLT 70* 67* 69*       Significant Imaging: I have reviewed all pertinent imaging results/findings within  the past 24 hours.

## 2025-03-08 LAB
ABO + RH BLD: NORMAL
ABO + RH BLD: NORMAL
ANISOCYTOSIS BLD QL SMEAR: SLIGHT
APTT PPP: 93.1 SEC (ref 21–32)
BASO STIPL BLD QL SMEAR: ABNORMAL
BASOPHILS # BLD AUTO: 0.01 K/UL (ref 0–0.2)
BASOPHILS # BLD AUTO: 0.02 K/UL (ref 0–0.2)
BASOPHILS NFR BLD: 0.1 % (ref 0–1.9)
BASOPHILS NFR BLD: 0.2 % (ref 0–1.9)
BLD GP AB SCN CELLS X3 SERPL QL: NORMAL
BLD GP AB SCN CELLS X3 SERPL QL: NORMAL
BLD PROD TYP BPU: NORMAL
BLOOD UNIT EXPIRATION DATE: NORMAL
BLOOD UNIT TYPE CODE: 6200
BLOOD UNIT TYPE: NORMAL
BURR CELLS BLD QL SMEAR: ABNORMAL
CODING SYSTEM: NORMAL
CROSSMATCH INTERPRETATION: NORMAL
DIFFERENTIAL METHOD BLD: ABNORMAL
DIFFERENTIAL METHOD BLD: ABNORMAL
DISPENSE STATUS: NORMAL
EOSINOPHIL # BLD AUTO: 0.2 K/UL (ref 0–0.5)
EOSINOPHIL # BLD AUTO: 0.2 K/UL (ref 0–0.5)
EOSINOPHIL NFR BLD: 2 % (ref 0–8)
EOSINOPHIL NFR BLD: 2.2 % (ref 0–8)
ERYTHROCYTE [DISTWIDTH] IN BLOOD BY AUTOMATED COUNT: 19.9 % (ref 11.5–14.5)
ERYTHROCYTE [DISTWIDTH] IN BLOOD BY AUTOMATED COUNT: 20.7 % (ref 11.5–14.5)
GIANT PLATELETS BLD QL SMEAR: PRESENT
HCT VFR BLD AUTO: 17.9 % (ref 37–48.5)
HCT VFR BLD AUTO: 21.4 % (ref 37–48.5)
HGB BLD-MCNC: 5.7 G/DL (ref 12–16)
HGB BLD-MCNC: 6.7 G/DL (ref 12–16)
HYPOCHROMIA BLD QL SMEAR: ABNORMAL
IMM GRANULOCYTES # BLD AUTO: 0.03 K/UL (ref 0–0.04)
IMM GRANULOCYTES # BLD AUTO: 0.06 K/UL (ref 0–0.04)
IMM GRANULOCYTES NFR BLD AUTO: 0.3 % (ref 0–0.5)
IMM GRANULOCYTES NFR BLD AUTO: 0.7 % (ref 0–0.5)
LYMPHOCYTES # BLD AUTO: 2.5 K/UL (ref 1–4.8)
LYMPHOCYTES # BLD AUTO: 3.2 K/UL (ref 1–4.8)
LYMPHOCYTES NFR BLD: 25.7 % (ref 18–48)
LYMPHOCYTES NFR BLD: 35.2 % (ref 18–48)
MCH RBC QN AUTO: 27.1 PG (ref 27–31)
MCH RBC QN AUTO: 27.5 PG (ref 27–31)
MCHC RBC AUTO-ENTMCNC: 31.3 G/DL (ref 32–36)
MCHC RBC AUTO-ENTMCNC: 31.8 G/DL (ref 32–36)
MCV RBC AUTO: 85 FL (ref 82–98)
MCV RBC AUTO: 88 FL (ref 82–98)
MONOCYTES # BLD AUTO: 1 K/UL (ref 0.3–1)
MONOCYTES # BLD AUTO: 1.2 K/UL (ref 0.3–1)
MONOCYTES NFR BLD: 10.9 % (ref 4–15)
MONOCYTES NFR BLD: 12 % (ref 4–15)
NEUTROPHILS # BLD AUTO: 4.7 K/UL (ref 1.8–7.7)
NEUTROPHILS # BLD AUTO: 5.9 K/UL (ref 1.8–7.7)
NEUTROPHILS NFR BLD: 51 % (ref 38–73)
NEUTROPHILS NFR BLD: 59.7 % (ref 38–73)
NRBC BLD-RTO: 0 /100 WBC
NRBC BLD-RTO: 0 /100 WBC
PLATELET # BLD AUTO: 56 K/UL (ref 150–450)
PLATELET # BLD AUTO: 84 K/UL (ref 150–450)
PLATELET BLD QL SMEAR: ABNORMAL
PMV BLD AUTO: ABNORMAL FL (ref 9.2–12.9)
PMV BLD AUTO: ABNORMAL FL (ref 9.2–12.9)
POCT GLUCOSE: 113 MG/DL (ref 70–110)
POCT GLUCOSE: 122 MG/DL (ref 70–110)
POCT GLUCOSE: 122 MG/DL (ref 70–110)
POCT GLUCOSE: 123 MG/DL (ref 70–110)
POCT GLUCOSE: 125 MG/DL (ref 70–110)
POCT GLUCOSE: 126 MG/DL (ref 70–110)
POCT GLUCOSE: 29 MG/DL (ref 70–110)
POCT GLUCOSE: 42 MG/DL (ref 70–110)
POCT GLUCOSE: 46 MG/DL (ref 70–110)
POIKILOCYTOSIS BLD QL SMEAR: SLIGHT
POLYCHROMASIA BLD QL SMEAR: ABNORMAL
RBC # BLD AUTO: 2.1 M/UL (ref 4–5.4)
RBC # BLD AUTO: 2.44 M/UL (ref 4–5.4)
SCHISTOCYTES BLD QL SMEAR: ABNORMAL
SCHISTOCYTES BLD QL SMEAR: PRESENT
SPECIMEN OUTDATE: NORMAL
SPECIMEN OUTDATE: NORMAL
TRANS ERYTHROCYTES VOL PATIENT: NORMAL ML
WBC # BLD AUTO: 9.17 K/UL (ref 3.9–12.7)
WBC # BLD AUTO: 9.9 K/UL (ref 3.9–12.7)

## 2025-03-08 PROCEDURE — 86985 SPLIT BLOOD OR PRODUCTS: CPT | Performed by: HOSPITALIST

## 2025-03-08 PROCEDURE — 36415 COLL VENOUS BLD VENIPUNCTURE: CPT | Performed by: HOSPITALIST

## 2025-03-08 PROCEDURE — 63600175 PHARM REV CODE 636 W HCPCS: Performed by: HOSPITALIST

## 2025-03-08 PROCEDURE — 25000003 PHARM REV CODE 250: Performed by: HOSPITALIST

## 2025-03-08 PROCEDURE — P9011 BLOOD SPLIT UNIT: HCPCS | Performed by: HOSPITALIST

## 2025-03-08 PROCEDURE — 20600001 HC STEP DOWN PRIVATE ROOM

## 2025-03-08 PROCEDURE — 86920 COMPATIBILITY TEST SPIN: CPT | Performed by: HOSPITALIST

## 2025-03-08 PROCEDURE — 85730 THROMBOPLASTIN TIME PARTIAL: CPT | Performed by: HOSPITALIST

## 2025-03-08 PROCEDURE — 90935 HEMODIALYSIS ONE EVALUATION: CPT | Mod: ,,,

## 2025-03-08 PROCEDURE — 85025 COMPLETE CBC W/AUTO DIFF WBC: CPT | Performed by: HOSPITALIST

## 2025-03-08 PROCEDURE — 86850 RBC ANTIBODY SCREEN: CPT | Performed by: HOSPITALIST

## 2025-03-08 RX ORDER — MUPIROCIN 20 MG/G
OINTMENT TOPICAL 2 TIMES DAILY
Status: DISPENSED | OUTPATIENT
Start: 2025-03-08 | End: 2025-03-13

## 2025-03-08 RX ADMIN — PROCHLORPERAZINE EDISYLATE 5 MG: 5 INJECTION INTRAMUSCULAR; INTRAVENOUS at 09:03

## 2025-03-08 RX ADMIN — DEXTROSE MONOHYDRATE: 100 INJECTION, SOLUTION INTRAVENOUS at 01:03

## 2025-03-08 RX ADMIN — FOLIC ACID 1000 MCG: 1 TABLET ORAL at 09:03

## 2025-03-08 RX ADMIN — MIRTAZAPINE 15 MG: 15 TABLET, FILM COATED ORAL at 09:03

## 2025-03-08 RX ADMIN — PANTOPRAZOLE SODIUM 40 MG: 40 TABLET, DELAYED RELEASE ORAL at 06:03

## 2025-03-08 RX ADMIN — HEPARIN SODIUM AND DEXTROSE 9 UNITS/KG/HR: 10000; 5 INJECTION INTRAVENOUS at 01:03

## 2025-03-08 RX ADMIN — MUPIROCIN: 20 OINTMENT TOPICAL at 10:03

## 2025-03-08 RX ADMIN — Medication 100 MG: at 09:03

## 2025-03-08 RX ADMIN — Medication 2 SPRAY: at 09:03

## 2025-03-08 RX ADMIN — ESCITALOPRAM OXALATE 10 MG: 5 TABLET, FILM COATED ORAL at 09:03

## 2025-03-08 RX ADMIN — Medication 1000 UNITS: at 09:03

## 2025-03-08 RX ADMIN — MUPIROCIN: 20 OINTMENT TOPICAL at 09:03

## 2025-03-08 RX ADMIN — NEPHROCAP 1 CAPSULE: 1 CAP ORAL at 09:03

## 2025-03-08 NOTE — PROGRESS NOTES
Patient arrived to the [facility] by stretcher. Maintenance dialysis started via right IJ tunneled catheter.Vitals stable. Labs drawn.

## 2025-03-08 NOTE — PLAN OF CARE
Problem: Adult Inpatient Plan of Care  Goal: Plan of Care Review  Outcome: Not Progressing  Goal: Patient-Specific Goal (Individualized)  Outcome: Not Progressing  Goal: Absence of Hospital-Acquired Illness or Injury  Outcome: Not Progressing  Goal: Optimal Comfort and Wellbeing  Outcome: Not Progressing  Goal: Readiness for Transition of Care  Outcome: Not Progressing     Problem: Infection  Goal: Absence of Infection Signs and Symptoms  Outcome: Not Progressing     Problem: Hemodialysis  Goal: Safe, Effective Therapy Delivery  Outcome: Not Progressing  Goal: Effective Tissue Perfusion  Outcome: Not Progressing  Goal: Absence of Infection Signs and Symptoms  Outcome: Not Progressing   Patient in HD today. Blood transfusion held

## 2025-03-08 NOTE — NURSING
"RAPID RESPONSE NURSE CHART REVIEW        Chart Reviewed: 03/07/2025, 8:21 PM    MRN: [id number]  Bed: 8087/8087 A    Dx: Rectal bleeding    Madelaine Barros has a past medical history of Allergic rhinitis, Diabetes mellitus, Duodenal ulcer, GERD without esophagitis, Hiatal hernia, History of Helicobacter pylori infection, Hypertension, Hypertensive emergency, Latent syphilis, MAHA (microangiopathic hemolytic anemia), Polyp of colon, Schatzki's ring, TIA (transient ischemic attack), Type 2 MI (myocardial infarction), and Wernicke encephalopathy.    Last VS: /84   Pulse (!) 202   Temp 98.5 °F (36.9 °C) (Oral)   Resp 17   Ht 5' 5" (1.651 m)   Wt 72.1 kg (158 lb 15.2 oz)   SpO2 (!) 80%   Breastfeeding No   BMI 26.45 kg/m²     24H Vital Sign Range:  Temp:  [98 °F (36.7 °C)-98.5 °F (36.9 °C)]   Pulse:  []   Resp:  [17-18]   BP: (105-118)/(65-87)   SpO2:  [80 %-98 %]     Level of Consciousness (AVPU): alert    Recent Labs     03/05/25  1428 03/06/25  0407 03/07/25  0612   WBC 8.67 8.63 9.13   HGB 8.3* 8.9* 8.7*   HCT 25.9* 28.2* 28.2*   PLT 70* 67* 69*       Recent Labs     03/05/25  0730   *   K 3.5   CL 96   CO2 23   BUN 23*   CREATININE 4.0*      PHOS 1.9*   MG 1.6        Recent Labs     03/04/25  2133   PH 7.475*   PCO2 32.0*   PO2 152*   HCO3 23.6*   POCSATURATED 99   BE 0        OXYGEN:  Flow (L/min) (Oxygen Therapy): 2          MEWS score: 4    Rounding completed with charge [facility] for  and SpO2 80 reports erroneous VS. No additional concerns verbalized at this time. Instructed to call [phone] for further concerns or assistance.    Carolyn Boyle RN       "

## 2025-03-08 NOTE — ASSESSMENT & PLAN NOTE
Diagnosed via EGD and reported [place] [facility] [place]

## 2025-03-08 NOTE — PROGRESS NOTES
[facility]     Patient currently on hemodialysis for removal of uremic toxins and volume.     Patient seen and evaluated on hemodialysis, tolerating treatment, see HD flowsheet for vitals and assessments.     Labs have been reviewed and the dialysate bath has been adjusted.        Assessment/Plan:     -Patient ESRD, seen on HD, tolerating treatment well, w/o complaints   -UF goal of 0  -Hgb 6.7 this AM. Initially planned for 1 unit RBC with HD, however primary team subsequently decided to trend hgb given no signs of active bleed. Hemodynamically stable.  -CBC and CMP drawn at beginning of HD, plan for rinse back from HD if any significant drop in hgb or for any active signs of bleeding, would then need transfusion prior to restarting any HD tx  -Renal diet, if not NPO   -Strict I/O's and daily weights  -Daily renal function panels  -Keep MAP >65 while on HD   -Phos low, replete phos PRN, no indication for binders  -Discussed plan of care with my attending provider Dr. Wu  -Will continue to follow while inpatient      Olivia Carrasquillo PA-C  Nephrology

## 2025-03-08 NOTE — ASSESSMENT & PLAN NOTE
Chronic. Last Ultomiris dose 2/24/2025. Outpatient hematologist is Roma Cantu MD at [facility]. Consulted Hematology here. Appreciate assistance with management.

## 2025-03-08 NOTE — ASSESSMENT & PLAN NOTE
Diagnosed 1/20/2025, on apixaban at home. Held due to GI bleed. Started heparin drip 3/5/2025. Transition to apixaban when no further evidence of blood loss. Hold heparin drip for now due to significant drop in hemoglobin. Has right arm swelling. Get ultrasound to evaluate.

## 2025-03-08 NOTE — PLAN OF CARE
Problem: Adult Inpatient Plan of Care  Goal: Plan of Care Review  Outcome: Progressing  Goal: Patient-Specific Goal (Individualized)  Outcome: Progressing  Goal: Absence of Hospital-Acquired Illness or Injury  Outcome: Progressing  Goal: Optimal Comfort and Wellbeing  Outcome: Progressing  Goal: Readiness for Transition of Care  Outcome: Progressing     Problem: Infection  Goal: Absence of Infection Signs and Symptoms  Outcome: Progressing     Problem: Hemodialysis  Goal: Safe, Effective Therapy Delivery  Outcome: Progressing  Goal: Effective Tissue Perfusion  Outcome: Progressing  Goal: Absence of Infection Signs and Symptoms  Outcome: Progressing     Problem: Wound  Goal: Optimal Coping  Outcome: Progressing  Goal: Optimal Functional Ability  Outcome: Progressing  Goal: Absence of Infection Signs and Symptoms  Outcome: Progressing  Goal: Improved Oral Intake  Outcome: Progressing  Goal: Optimal Pain Control and Function  Outcome: Progressing  Goal: Skin Health and Integrity  Outcome: Progressing  Goal: Optimal Wound Healing  Outcome: Progressing     Problem: Skin Injury Risk Increased  Goal: Skin Health and Integrity  Outcome: Progressing     Problem: Fall Injury Risk  Goal: Absence of Fall and Fall-Related Injury  Outcome: Progressing     Patient in bed. No complaints voiced. NADN at this time. Safety measures in place. Call light within reach.

## 2025-03-08 NOTE — SUBJECTIVE & OBJECTIVE
Interval History: On heparin drip and D10 drip. New right arm swelling. Has known right IJ thrombus but has been on heparin drip. Hemoglobin has decreased below 7. Has only been transfused FFP, not PRBCs. Will need PRBCs.    Review of Systems   Unable to perform ROS: Mental status change     Objective:     Vital Signs (Most Recent):  Temp: 98.3 °F (36.8 °C) (03/08/25 0722)  Pulse: 104 (03/08/25 0722)  Resp: 18 (03/08/25 0722)  BP: 127/83 (03/08/25 0722)  SpO2: 100 % (03/08/25 0722) Vital Signs (24h Range):  Temp:  [98 °F (36.7 °C)-98.5 °F (36.9 °C)] 98.3 °F (36.8 °C)  Pulse:  [] 104  Resp:  [15-18] 18  SpO2:  [80 %-100 %] 100 %  BP: (105-143)/(67-99) 127/83     Weight: 72.1 kg (158 lb 15.2 oz)  Body mass index is 26.45 kg/m².    Intake/Output Summary (Last 24 hours) at 3/8/2025 0945  Last data filed at 3/7/2025 2100  Gross per 24 hour   Intake 280 ml   Output --   Net 280 ml         Physical Exam  Vitals and nursing note reviewed.   Constitutional:       General: She is not in acute distress.     Appearance: She is well-developed. She is ill-appearing. She is not diaphoretic.      Interventions: She is not intubated.  Pulmonary:      Effort: Pulmonary effort is normal. No accessory muscle usage or respiratory distress. She is not intubated.   Musculoskeletal:         General: Swelling present.   Skin:     General: Skin is warm and dry.      Coloration: Skin is not jaundiced or pale.   Neurological:      Mental Status: She is confused.      Motor: No seizure activity.   Psychiatric:         Attention and Perception: She is inattentive.         Mood and Affect: Affect is blunt.         Behavior: Behavior is not aggressive or hyperactive.         Cognition and Memory: Cognition is impaired. Memory is impaired.               Significant Labs: All pertinent labs within the past 24 hours have been reviewed.  CBC:   Recent Labs   Lab 03/07/25  0612 03/08/25  0850   WBC 9.13 9.90   HGB 8.7* 6.7*   HCT 28.2* 21.4*    PLT 69* 56*       Significant Imaging: I have reviewed all pertinent imaging results/findings within the past 24 hours.

## 2025-03-08 NOTE — NURSING
Patient going off unit for HD. Notified HD of hold for blood transfusion. Patient left per stretcher in no apparent distress. Oxygen and IVF continues.

## 2025-03-08 NOTE — ASSESSMENT & PLAN NOTE
Waxing and waning since October 2024 due to HUS. Lacks capacity, unable to communicate about her illness. Daughter Sue Barros wanted her to go to [facility] for continuity of care but has changed her mind and she will transition her medical care to [facility].

## 2025-03-08 NOTE — PROGRESS NOTES
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 She lives in [facility]. She works for [employer]. Her primary care physician is Dr. Delilah Kelley.               She was hospitalized at [facility] from 10/23/2024 to 11/6/2024.              She was hospitalized at [facility] from 11/15/2024 to 1/18/2024.              She was hospitalized at [facility] from 12/9/2024 to 12/19/2024.              She was hospitalized at [facility] from 12/25/2024 to 1/5/2025.              She was hospitalized at [facility] from 1/5/2025 to 1/31/2025. [place] [facility] [place] [facility] until 2/18/2025.               She presented to [facility] on 3/2/2025 with rectal pain and bleeding. She [place] her [facility] in [facility], not at [facility]. History was provided by her daughter Sue Barros. She is followed by Hematology at [facility] and has been on immunomodulator  infusions every 8 weeks (Soliris, now Ultomiris, last dose 2/24/2025). She has severe debility, poor appetite, dysphagia to solids, mostly consumes liquid, but her daughter was concerned her nutritional intake is inadequate and inquired about feeding tube placement for chronic nutrition. She had watery stool on the day of presentation. Her sister noted that she had gingival bleeding. She reported an anal lesion with tenderness and slow bleeding. She has not required frequent transfusions. Her daughter noted that all home antihypertensive medications were discontinued since she was discharged from inpatient rehab.              In the emergency department, she appeared ill, lethargic, unable to fully participate in interview, periodically awakening with pain. Labs showed hemoglobin 14.3 g/dL (from 13.2 on 2/24/2025). Repeat hemoglobin was 10.4. Alkaline phosphatase was 251 U/L, total bilirubin was 2.2 mg/dL, AST was 733 U/L, ALT was 478 U/L. She was given 2.1 liters of IV fluids, vancomycin, piperacillin-tazobactam, 4 mg of IV morphine, 80 mg of IV pantoprazole, topical lidocaine for rectal pain. She was admitted to Hospital Medicine Team S.     Overview/Hospital Course:  GGT was elevated at 447 U/L. LDH was elevated. Colorectal Surgery, Hematology, Nephrology, and Case Management were consulted. She expressed desire to go to [facility] since she gets her care at [facility]. Her mental status has been declining since October 2024. She has memory loss, disorientation, and does not remember her illness. Her mother had dementia. She had chest pain on 3/3/2025 while getting dialysis. EKG showed sinus tachycardia. Troponin was 1102 ng/L. Repeat was 704. Hematology recommended giving 2 units of fresh frozen plasma (FFP) and starting low rate heparin infusion then transitioning to apixaban 2.5 mg twice daily if she had no further bleeding. She was given another 2 units of FFP. Her daughter requested gastrostomy tube  placement for malnutrition. On 3/4/2025, heparin was held due to recurrent bleeding. She had intermittent somnolence. She was kept NPO. Speech Language Pathology was consulted. She was hypoglycemic so was put on 10% dextrose drip. Speech Language Pathology recommended full liquid diet. LFTs trended down. Heparin infusion was restarted on 3/5/2025. On 3/6/2025, her daughter decided that she did not want transfer [place] a [facility], instead she wanted to transfer her medical care to the [facility] system. Hemoglobin and hematocrit remained stable with no evidence of recurrent bleeding on 3/7/2025. Heparin drip was continued. Hemoglobin decreased to 6.7 g/dL on 3/8/2025. She developed right arm swelling.     Interval History: On heparin drip and D10 drip. New right arm swelling. Has known right IJ thrombus but has been on heparin drip. Hemoglobin has decreased below 7. Has only been transfused FFP, not PRBCs. Will need PRBCs.    Review of Systems   Unable to perform ROS: Mental status change     Objective:     Vital Signs (Most Recent):  Temp: 98.3 °F (36.8 °C) (03/08/25 0722)  Pulse: 104 (03/08/25 0722)  Resp: 18 (03/08/25 0722)  BP: 127/83 (03/08/25 0722)  SpO2: 100 % (03/08/25 0722) Vital Signs (24h Range):  Temp:  [98 °F (36.7 °C)-98.5 °F (36.9 °C)] 98.3 °F (36.8 °C)  Pulse:  [] 104  Resp:  [15-18] 18  SpO2:  [80 %-100 %] 100 %  BP: (105-143)/(67-99) 127/83     Weight: 72.1 kg (158 lb 15.2 oz)  Body mass index is 26.45 kg/m².    Intake/Output Summary (Last 24 hours) at 3/8/2025 0945  Last data filed at 3/7/2025 2100  Gross per 24 hour   Intake 280 ml   Output --   Net 280 ml         Physical Exam  Vitals and nursing note reviewed.   Constitutional:       General: She is not in acute distress.     Appearance: She is well-developed. She is ill-appearing. She is not diaphoretic.      Interventions: She is not intubated.  Pulmonary:      Effort: Pulmonary effort is normal. No accessory muscle usage or respiratory  distress. She is not intubated.   Musculoskeletal:         General: Swelling present.   Skin:     General: Skin is warm and dry.      Coloration: Skin is not jaundiced or pale.   Neurological:      Mental Status: She is confused.      Motor: No seizure activity.   Psychiatric:         Attention and Perception: She is inattentive.         Mood and Affect: Affect is blunt.         Behavior: Behavior is not aggressive or hyperactive.         Cognition and Memory: Cognition is impaired. Memory is impaired.               Significant Labs: All pertinent labs within the past 24 hours have been reviewed.  CBC:   Recent Labs   Lab 03/07/25  0612 03/08/25  0850   WBC 9.13 9.90   HGB 8.7* 6.7*   HCT 28.2* 21.4*   PLT 69* 56*       Significant Imaging: I have reviewed all pertinent imaging results/findings within the past 24 hours.      Assessment & Plan  Rectal bleeding  Holding home aspirin and apixaban. Has been on heparin drip. Appreciate Colorectal Surgery. Subsided. Monitor for recurrence.  Unspecified severe protein-calorie malnutrition  Severe malnutrition patient with recent hx of worsening dysphagia, has had w/u for scleroderma, has hiatal hernia and ?esophageal dysmotility  - dysphagia to solids.   Developed wernicke encephalopathy from nutritional deficiency     Daughter reports concern of inadequate intake has had severe weight loss in recent months with multiple complex health conditions. She requested PEG placement. Nutrition gave recommendations on tube feeds. Giving minced and moist diet. Add Novasource Renal supplements.  Dysphagia  Appreciate SLP. Advanced diet to minced and moist. Stop dextrose drip and monitor for hypoglycemia. Start Novasource Renal supplements for associated malnutrition.  Wernicke encephalopathy  Continue on home thiamine supplementation.     GERD without esophagitis  Continue home pantoprazole.    ESRD (end stage renal disease)  Nephrology managing dialysis.    Atypical HUS (hemolytic  uremic syndrome) with mutation in thrombomodulin gene  Chronic. Last Ultomiris dose 2/24/2025. Outpatient hematologist is Roma Cantu MD at [facility]. Consulted Hematology here. Appreciate assistance with management.  Elevated transaminase level  Hepatic steatosis on CT. Worsening hepatic function may contribute to her worsening coagulopathy elevated PT/PTT. Stable or slightly improved since admission.  Acute thrombosis of right internal jugular vein  Diagnosed 1/20/2025, on apixaban at home. Held due to GI bleed. Started heparin drip 3/5/2025. Transition to apixaban when no further evidence of blood loss. Hold heparin drip for now due to significant drop in hemoglobin. Has right arm swelling. Get ultrasound to evaluate.  History of Helicobacter pylori infection  Diagnosed via EGD and reported [place] [facility].     Renal hematoma, left, sequela  In January, due to biopsy in 2024. Most recent imaging showed resolving appearance of remote hematoma.   Anemia of chronic renal failure, stage 5  Anemia is likely due to acute blood loss which was from GI bleed and active hemolysis due to HUS.  . Most recent hemoglobin and hematocrit are listed below.  Recent Labs     03/06/25  0407 03/07/25  0612 03/08/25  0850   HGB 8.9* 8.7* 6.7*   HCT 28.2* 28.2* 21.4*     Plan  - Monitor serial CBC  - Transfuse PRBC if patient becomes hemodynamically unstable, symptomatic or H/H drops below 7/21.  - Transfuse pRBCs  Metabolic encephalopathy  Waxing and waning since October 2024 due to HUS. Lacks capacity, unable to communicate about her illness. Daughter Sue Barros wanted her to go to [facility] for continuity of care but has changed her mind and she will transition her medical care to [facility].    VTE Risk Mitigation (From admission, onward)           Ordered     heparin 25,000 units in dextrose 5% (100 units/ml) IV bolus from bag LOW INTENSITY nomogram - [facility]  As needed (PRN)        Question:  Heparin Infusion  Adjustment (DO NOT MODIFY ANSWER)  Answer:  [email or web address]\epic\Images\Pharmacy\HeparinInfusions\heparin LOW INTENSITY nomogram for [facility] [id number]    03/07/25 1505     heparin 25,000 units in dextrose 5% (100 units/ml) IV bolus from bag LOW INTENSITY nomogram - [facility]  As needed (PRN)        Question:  Heparin Infusion Adjustment (DO NOT MODIFY ANSWER)  Answer:  [email or web address]\epic\Images\Pharmacy\HeparinInfusions\heparin LOW INTENSITY nomogram for [facility] [id number]    03/07/25 1505     heparin (porcine) injection 1,000 Units  As needed (PRN)         03/03/25 1524     IP VTE HIGH RISK PATIENT  Once         03/03/25 0416     Place sequential compression device  Until discontinued         03/03/25 0416     Reason for No Pharmacological VTE Prophylaxis  Once        Question:  Reasons:  Answer:  Active Bleeding    03/03/25 0416                    Discharge Planning   PATRICIA: 3/11/2025     Code Status: Full Code   Medical Readiness for Discharge Date:   Discharge Plan A: Home with family, Home Health              Critical Care Time: 30 Minutes of critical care time was spent in the care of the patient. This included management of organ failures related to critical illness, titration of continuous infusions, review of pertinent labs and imaging studies, discussion of the patient with consulting services, and discussion of the patients condition with the patient and family.             Edi Staton MD  Department of Hospital Medicine   [facility] - Telemetry Stepdown

## 2025-03-08 NOTE — ASSESSMENT & PLAN NOTE
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 . Most recent hemoglobin and hematocrit are listed below.  Recent Labs     03/06/25  0407 03/07/25  0612 03/08/25  0850   HGB 8.9* 8.7* 6.7*   HCT 28.2* 28.2* 21.4*     Plan  - Monitor serial CBC  - Transfuse PRBC if patient becomes hemodynamically unstable, symptomatic or H/H drops below 7/21.  - Transfuse pRBCs

## 2025-03-08 NOTE — CONSULTS
VASCULAR ACCESS NOTE       Bed:8087/8087 A    22G x 1.75IN PIV placed in Left Forearm by NAUN using Ultrasound Guidance.    Indication: PVA  Attempts: 1    Sourav Mathis RN

## 2025-03-09 LAB
BASOPHILS # BLD AUTO: 0.02 K/UL (ref 0–0.2)
BASOPHILS NFR BLD: 0.2 % (ref 0–1.9)
BLD PROD TYP BPU: NORMAL
BLOOD UNIT EXPIRATION DATE: NORMAL
BLOOD UNIT TYPE CODE: 6200
BLOOD UNIT TYPE: NORMAL
CODING SYSTEM: NORMAL
CROSSMATCH INTERPRETATION: NORMAL
DIFFERENTIAL METHOD BLD: ABNORMAL
DISPENSE STATUS: NORMAL
EOSINOPHIL # BLD AUTO: 0.2 K/UL (ref 0–0.5)
EOSINOPHIL NFR BLD: 1.6 % (ref 0–8)
ERYTHROCYTE [DISTWIDTH] IN BLOOD BY AUTOMATED COUNT: 17.1 % (ref 11.5–14.5)
GLUCOSE SERPL-MCNC: 94 MG/DL (ref 70–110)
HCT VFR BLD AUTO: 25.6 % (ref 37–48.5)
HGB BLD-MCNC: 8.2 G/DL (ref 12–16)
IMM GRANULOCYTES # BLD AUTO: 0.05 K/UL (ref 0–0.04)
IMM GRANULOCYTES NFR BLD AUTO: 0.4 % (ref 0–0.5)
LYMPHOCYTES # BLD AUTO: 1.9 K/UL (ref 1–4.8)
LYMPHOCYTES NFR BLD: 15.1 % (ref 18–48)
MCH RBC QN AUTO: 28.7 PG (ref 27–31)
MCHC RBC AUTO-ENTMCNC: 32 G/DL (ref 32–36)
MCV RBC AUTO: 90 FL (ref 82–98)
MONOCYTES # BLD AUTO: 1.3 K/UL (ref 0.3–1)
MONOCYTES NFR BLD: 10.9 % (ref 4–15)
NEUTROPHILS # BLD AUTO: 8.8 K/UL (ref 1.8–7.7)
NEUTROPHILS NFR BLD: 71.8 % (ref 38–73)
NRBC BLD-RTO: 0 /100 WBC
PLATELET # BLD AUTO: 70 K/UL (ref 150–450)
PMV BLD AUTO: ABNORMAL FL (ref 9.2–12.9)
POCT GLUCOSE: 102 MG/DL (ref 70–110)
POCT GLUCOSE: 80 MG/DL (ref 70–110)
POCT GLUCOSE: 82 MG/DL (ref 70–110)
POCT GLUCOSE: 84 MG/DL (ref 70–110)
POCT GLUCOSE: 90 MG/DL (ref 70–110)
POCT GLUCOSE: 93 MG/DL (ref 70–110)
POCT GLUCOSE: 94 MG/DL (ref 70–110)
POCT GLUCOSE: 99 MG/DL (ref 70–110)
RBC # BLD AUTO: 2.86 M/UL (ref 4–5.4)
TRANS ERYTHROCYTES VOL PATIENT: NORMAL ML
WBC # BLD AUTO: 12.25 K/UL (ref 3.9–12.7)

## 2025-03-09 PROCEDURE — P9011 BLOOD SPLIT UNIT: HCPCS | Performed by: FAMILY MEDICINE

## 2025-03-09 PROCEDURE — 30233N1 TRANSFUSION OF NONAUTOLOGOUS RED BLOOD CELLS INTO PERIPHERAL VEIN, PERCUTANEOUS APPROACH: ICD-10-PCS | Performed by: HOSPITALIST

## 2025-03-09 PROCEDURE — 25000003 PHARM REV CODE 250: Performed by: HOSPITALIST

## 2025-03-09 PROCEDURE — 86920 COMPATIBILITY TEST SPIN: CPT | Performed by: FAMILY MEDICINE

## 2025-03-09 PROCEDURE — 85025 COMPLETE CBC W/AUTO DIFF WBC: CPT | Performed by: HOSPITALIST

## 2025-03-09 PROCEDURE — 20600001 HC STEP DOWN PRIVATE ROOM

## 2025-03-09 PROCEDURE — 86985 SPLIT BLOOD OR PRODUCTS: CPT | Performed by: FAMILY MEDICINE

## 2025-03-09 PROCEDURE — 36415 COLL VENOUS BLD VENIPUNCTURE: CPT | Performed by: HOSPITALIST

## 2025-03-09 RX ORDER — HYDROCODONE BITARTRATE AND ACETAMINOPHEN 500; 5 MG/1; MG/1
TABLET ORAL
Status: DISCONTINUED | OUTPATIENT
Start: 2025-03-09 | End: 2025-03-12

## 2025-03-09 RX ADMIN — PANTOPRAZOLE SODIUM 40 MG: 40 TABLET, DELAYED RELEASE ORAL at 06:03

## 2025-03-09 RX ADMIN — MUPIROCIN: 20 OINTMENT TOPICAL at 09:03

## 2025-03-09 RX ADMIN — PANTOPRAZOLE SODIUM 40 MG: 40 TABLET, DELAYED RELEASE ORAL at 04:03

## 2025-03-09 RX ADMIN — Medication 1000 UNITS: at 09:03

## 2025-03-09 RX ADMIN — DEXTROSE MONOHYDRATE: 100 INJECTION, SOLUTION INTRAVENOUS at 06:03

## 2025-03-09 RX ADMIN — NEPHROCAP 1 CAPSULE: 1 CAP ORAL at 09:03

## 2025-03-09 RX ADMIN — Medication 100 MG: at 09:03

## 2025-03-09 RX ADMIN — ESCITALOPRAM OXALATE 10 MG: 5 TABLET, FILM COATED ORAL at 09:03

## 2025-03-09 RX ADMIN — FOLIC ACID 1000 MCG: 1 TABLET ORAL at 09:03

## 2025-03-09 NOTE — ASSESSMENT & PLAN NOTE
Waxing and waning since October 2024 due to HUS. Lacks capacity, unable to communicate about her illness. Daughter Sue Barros wanted her to go to [facility] for continuity of care but has changed her mind and she will transition her medical care to [facility].

## 2025-03-09 NOTE — SUBJECTIVE & OBJECTIVE
Interval History: Holding heparin drip due to drop in hemoglobin and hematocrit yesterday. Awaiting right upper extremity venous ultrasound to assess status of known right IJ thrombus and assess new right arm swelling. Hypertensive. No overt GI bleed.    Review of Systems   Unable to perform ROS: Mental status change     Objective:     Vital Signs (Most Recent):  Temp: 98 °F (36.7 °C) (03/09/25 0345)  Pulse: 98 (03/09/25 0801)  Resp: 17 (03/09/25 0345)  BP: (!) 168/93 (03/09/25 0345)  SpO2: 100 % (03/09/25 0500) Vital Signs (24h Range):  Temp:  [97.5 °F (36.4 °C)-98.4 °F (36.9 °C)] 98 °F (36.7 °C)  Pulse:  [] 98  Resp:  [17-19] 17  SpO2:  [99 %-100 %] 100 %  BP: (119-168)/(81-99) 168/93     Weight: 72.1 kg (158 lb 15.2 oz)  Body mass index is 26.45 kg/m².    Intake/Output Summary (Last 24 hours) at 3/9/2025 0845  Last data filed at 3/9/2025 0345  Gross per 24 hour   Intake 909.33 ml   Output 276 ml   Net 633.33 ml         Physical Exam  Vitals and nursing note reviewed.   Constitutional:       General: She is not in acute distress.     Appearance: She is well-developed. She is ill-appearing. She is not diaphoretic.      Interventions: She is not intubated.  Pulmonary:      Effort: Pulmonary effort is normal. No accessory muscle usage or respiratory distress. She is not intubated.   Skin:     General: Skin is warm and dry.      Coloration: Skin is not jaundiced or pale.   Neurological:      Mental Status: She is confused.      Motor: No seizure activity.   Psychiatric:         Attention and Perception: She is inattentive.         Mood and Affect: Affect is blunt.         Behavior: Behavior is not aggressive or hyperactive.         Cognition and Memory: Cognition is impaired. Memory is impaired.               Significant Labs: All pertinent labs within the past 24 hours have been reviewed.  CBC:   Recent Labs   Lab 03/08/25  0850 03/08/25  1641   WBC 9.90 9.17   HGB 6.7* 5.7*   HCT 21.4* 17.9*   PLT 56* 84*        Significant Imaging: I have reviewed all pertinent imaging results/findings within the past 24 hours.

## 2025-03-09 NOTE — PLAN OF CARE
Problem: Adult Inpatient Plan of Care  Goal: Plan of Care Review  Outcome: Progressing  Goal: Absence of Hospital-Acquired Illness or Injury  Outcome: Progressing  Goal: Optimal Comfort and Wellbeing  Outcome: Progressing     Problem: Infection  Goal: Absence of Infection Signs and Symptoms  Outcome: Progressing     Problem: Skin Injury Risk Increased  Goal: Skin Health and Integrity  Outcome: Progressing     Problem: Fall Injury Risk  Goal: Absence of Fall and Fall-Related Injury  Outcome: Progressing

## 2025-03-09 NOTE — PROGRESS NOTES
Dialysis ended early after 1 hour , 15 minutes due to low hemoglobin per CHEYANNE Suero.  Right IJ tunneled catheter flushed, normal saline locked, capped and ends wrapped with sterile gauze. Report given to Jayla. Patient returned to her room by dannielle. Vitals stable.

## 2025-03-09 NOTE — NURSING
-AAOx4, VSS, afebrile, on 2L NC sating at 100%  -Pt somnolent, but easily arouses to voice and touch  -D10 running at 50ml/hr   -No complaints at this time  -No falls reported this shift  -Bed low and locked, call light in reach

## 2025-03-09 NOTE — ASSESSMENT & PLAN NOTE
Chronic. Last Ultomiris dose 2/24/2025. Outpatient hematologist is Roma Cantu MD at [facility]. Consulted Hematology here. Appreciate assistance with management.

## 2025-03-09 NOTE — ASSESSMENT & PLAN NOTE
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 . Most recent hemoglobin and hematocrit are listed below.  Recent Labs     03/07/25  0612 03/08/25  0850 03/08/25  1641   HGB 8.7* 6.7* 5.7*   HCT 28.2* 21.4* 17.9*     Plan  - Monitor serial CBC  - Transfuse PRBC if patient becomes hemodynamically unstable, symptomatic or H/H drops below 7/21.  - Transfused pRBCs 3/8/2025.

## 2025-03-09 NOTE — PROGRESS NOTES
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 She lives in [facility]. She works for [employer]. Her primary care physician is Dr. Delilah Kelley.               She was hospitalized at [facility] from 10/23/2024 to 11/6/2024.              She was hospitalized at [facility] from 11/15/2024 to 1/18/2024.              She was hospitalized at [facility] from 12/9/2024 to 12/19/2024.              She was hospitalized at [facility] from 12/25/2024 to 1/5/2025.              She was hospitalized at [facility] from 1/5/2025 to 1/31/2025. She was [place] [facility] [place] until 2/18/2025.               She presented to [facility] Emergency [facility] on 3/2/2025 with rectal pain and bleeding. She receives her care in the [facility], not at [facility]. History was provided by her daughter Sue Barros. She is followed by Hematology at [facility] and has been on immunomodulator  infusions every 8 weeks (Soliris, now Ultomiris, last dose 2/24/2025). She has severe debility, poor appetite, dysphagia to solids, mostly consumes liquid, but her daughter was concerned her nutritional intake is inadequate and inquired about feeding tube placement for chronic nutrition. She had watery stool on the day of presentation. Her sister noted that she had gingival bleeding. She reported an anal lesion with tenderness and slow bleeding. She has not required frequent transfusions. Her daughter noted that all home antihypertensive medications were discontinued since she was discharged from inpatient rehab.              In the emergency department, she appeared ill, lethargic, unable to fully participate in interview, periodically awakening with pain. Labs showed hemoglobin 14.3 g/dL (from 13.2 on 2/24/2025). Repeat hemoglobin was 10.4. Alkaline phosphatase was 251 U/L, total bilirubin was 2.2 mg/dL, AST was 733 U/L, ALT was 478 U/L. She was given 2.1 liters of IV fluids, vancomycin, piperacillin-tazobactam, 4 mg of IV morphine, 80 mg of IV pantoprazole, topical lidocaine for rectal pain. She was admitted to Hospital Medicine Team S.     Overview/Hospital Course:  GGT was elevated at 447 U/L. LDH was elevated. Colorectal Surgery, Hematology, Nephrology, and Case Management were consulted. She expressed desire to go to [facility] since she [facility] [place] [facility]. Her mental status has been declining since October 2024. She has memory loss, disorientation, and does not remember her illness. Her mother had dementia. She had chest pain on 3/3/2025 while getting dialysis. EKG showed sinus tachycardia. Troponin was 1102 ng/L. Repeat was 704. Hematology recommended giving 2 units of fresh frozen plasma (FFP) and starting low rate heparin infusion then transitioning to apixaban 2.5 mg twice daily if she had no further bleeding. She was given another 2 units of FFP. Her daughter requested gastrostomy tube  placement for malnutrition. On 3/4/2025, heparin was held due to recurrent bleeding. She had intermittent somnolence. She was kept NPO. Speech Language Pathology was consulted. She was hypoglycemic so was put on 10% dextrose drip. Speech Language Pathology recommended full liquid diet. LFTs trended down. Heparin infusion was restarted on 3/5/2025. On 3/6/2025, her daughter decided that she did not want transfer to a [facility] hospital, instead she wanted to transfer her medical care to the [facility] system. Hemoglobin and hematocrit remained stable with no evidence of recurrent bleeding on 3/7/2025. Heparin drip was continued. She had epistaxis on 3/7/2025. Hemoglobin decreased to 6.7 g/dL on 3/8/2025. She developed right arm swelling. She was transfused 1 unit of packed red blood cells (PRBCs).     Interval History: Holding heparin drip due to drop in hemoglobin and hematocrit yesterday. Awaiting right upper extremity venous ultrasound to assess status of known right IJ thrombus and assess new right arm swelling. Hypertensive. No overt GI bleed.    Review of Systems   Unable to perform ROS: Mental status change     Objective:     Vital Signs (Most Recent):  Temp: 98 °F (36.7 °C) (03/09/25 0345)  Pulse: 98 (03/09/25 0801)  Resp: 17 (03/09/25 0345)  BP: (!) 168/93 (03/09/25 0345)  SpO2: 100 % (03/09/25 0500) Vital Signs (24h Range):  Temp:  [97.5 °F (36.4 °C)-98.4 °F (36.9 °C)] 98 °F (36.7 °C)  Pulse:  [] 98  Resp:  [17-19] 17  SpO2:  [99 %-100 %] 100 %  BP: (119-168)/(81-99) 168/93     Weight: 72.1 kg (158 lb 15.2 oz)  Body mass index is 26.45 kg/m².    Intake/Output Summary (Last 24 hours) at 3/9/2025 0845  Last data filed at 3/9/2025 0345  Gross per 24 hour   Intake 909.33 ml   Output 276 ml   Net 633.33 ml         Physical Exam  Vitals and nursing note reviewed.   Constitutional:       General: She is not in acute distress.     Appearance: She is well-developed. She is ill-appearing. She is not diaphoretic.       Interventions: She is not intubated.  Pulmonary:      Effort: Pulmonary effort is normal. No accessory muscle usage or respiratory distress. She is not intubated.   Skin:     General: Skin is warm and dry.      Coloration: Skin is not jaundiced or pale.   Neurological:      Mental Status: She is confused.      Motor: No seizure activity.   Psychiatric:         Attention and Perception: She is inattentive.         Mood and Affect: Affect is blunt.         Behavior: Behavior is not aggressive or hyperactive.         Cognition and Memory: Cognition is impaired. Memory is impaired.               Significant Labs: All pertinent labs within the past 24 hours have been reviewed.  CBC:   Recent Labs   Lab 03/08/25  0850 03/08/25  1641   WBC 9.90 9.17   HGB 6.7* 5.7*   HCT 21.4* 17.9*   PLT 56* 84*       Significant Imaging: I have reviewed all pertinent imaging results/findings within the past 24 hours.      Assessment & Plan  Rectal bleeding  Holding home aspirin and apixaban. Has been on heparin drip. Appreciate Colorectal Surgery. Subsided. Monitor for recurrence.  Unspecified severe protein-calorie malnutrition  Severe malnutrition patient with recent hx of worsening dysphagia, has had w/u for scleroderma, has hiatal hernia and ?esophageal dysmotility  - dysphagia to solids.   Developed wernicke encephalopathy from nutritional deficiency     Daughter reports concern of inadequate intake has had severe weight loss in recent months with multiple complex health conditions. She requested PEG placement. Nutrition gave recommendations on tube feeds. Giving minced and moist diet. Add Novasource Renal supplements.  Dysphagia  Appreciate SLP. Advanced diet to minced and moist. Stop dextrose drip and monitor for hypoglycemia. Start Novasource Renal supplements for associated malnutrition.  Wernicke encephalopathy  Continue on home thiamine supplementation.     GERD without esophagitis  Continue home pantoprazole.    ESRD (end  stage renal disease)  Nephrology managing dialysis.    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 Last Ultomiris dose 2/24/2025. Outpatient hematologist is Roma Cantu MD at [facility]. Consulted Hematology here. Appreciate assistance with management.  Elevated transaminase level  Hepatic steatosis on CT. Worsening hepatic function may contribute to her worsening coagulopathy elevated PT/PTT. Stable or slightly improved since admission.  Acute thrombosis of right internal jugular vein  Diagnosed 1/20/2025, on apixaban at home. Held due to GI bleed. Started heparin drip 3/5/2025. Transition to apixaban when no further evidence of blood loss. Hold heparin drip for now due to significant drop in hemoglobin. Has right arm swelling. Get ultrasound to evaluate.  History of Helicobacter pylori infection  Diagnosed via EGD and reported treated in [facility].     Renal hematoma, left, sequela  In January, due to biopsy in 2024. Most recent imaging showed resolving appearance of remote hematoma.   Anemia of chronic renal failure, stage 5  Anemia is likely due to acute blood loss which was from GI bleed and active hemolysis due to HUS.  . Most recent hemoglobin and hematocrit are listed below.  Recent Labs     03/07/25  0612 03/08/25  0850 03/08/25  1641   HGB 8.7* 6.7* 5.7*   HCT 28.2* 21.4* 17.9*     Plan  - Monitor serial CBC  - Transfuse PRBC if patient becomes hemodynamically unstable, symptomatic or H/H drops below 7/21.  - Transfused pRBCs 3/8/2025.  Metabolic encephalopathy  Waxing and waning since October 2024 due to HUS. Lacks capacity, unable to communicate about her illness. Daughter Sue Barros wanted her to go to [facility] for continuity of care but has changed her mind and she will transition her medical care to [facility].  VTE Risk Mitigation (From admission, onward)           Ordered     heparin (porcine) injection 1,000 Units  As needed (PRN)          03/03/25 1524     IP VTE HIGH RISK PATIENT  Once         03/03/25 0416     Place sequential compression device  Until discontinued         03/03/25 0416     Reason for No Pharmacological VTE Prophylaxis  Once        Question:  Reasons:  Answer:  Active Bleeding    03/03/25 0416                    Discharge Planning   PATRICIA: 3/11/2025     Code Status: Full Code   Medical Readiness for Discharge Date:   Discharge Plan A: Home with family, Home Health                        Edi Staton MD  Department of Hospital Medicine   [facility] - Telemetry Stepdown

## 2025-03-10 ENCOUNTER — ANESTHESIA EVENT (OUTPATIENT)
Dept: ENDOSCOPY | Facility: HOSPITAL | Age: 60
End: 2025-03-10
Payer: COMMERCIAL

## 2025-03-10 LAB
ALBUMIN SERPL BCP-MCNC: 2 G/DL (ref 3.5–5.2)
ALBUMIN SERPL BCP-MCNC: 2.2 G/DL (ref 3.5–5.2)
ALP SERPL-CCNC: 267 U/L (ref 40–150)
ALT SERPL W/O P-5'-P-CCNC: 225 U/L (ref 10–44)
ANION GAP SERPL CALC-SCNC: 6 MMOL/L (ref 8–16)
ANION GAP SERPL CALC-SCNC: 9 MMOL/L (ref 8–16)
APTT PPP: 30.8 SEC (ref 21–32)
APTT PPP: 30.8 SEC (ref 21–32)
APTT PPP: >150 SEC (ref 21–32)
AST SERPL-CCNC: 219 U/L (ref 10–40)
BASOPHILS # BLD AUTO: 0.03 K/UL (ref 0–0.2)
BASOPHILS # BLD AUTO: 0.04 K/UL (ref 0–0.2)
BASOPHILS NFR BLD: 0.2 % (ref 0–1.9)
BASOPHILS NFR BLD: 0.4 % (ref 0–1.9)
BILIRUB SERPL-MCNC: 1.5 MG/DL (ref 0.1–1)
BUN SERPL-MCNC: 13 MG/DL (ref 6–20)
BUN SERPL-MCNC: 25 MG/DL (ref 6–20)
CALCIUM SERPL-MCNC: 8.2 MG/DL (ref 8.7–10.5)
CALCIUM SERPL-MCNC: 8.4 MG/DL (ref 8.7–10.5)
CHLORIDE SERPL-SCNC: 90 MMOL/L (ref 95–110)
CHLORIDE SERPL-SCNC: 95 MMOL/L (ref 95–110)
CO2 SERPL-SCNC: 20 MMOL/L (ref 23–29)
CO2 SERPL-SCNC: 22 MMOL/L (ref 23–29)
CREAT SERPL-MCNC: 2.7 MG/DL (ref 0.5–1.4)
CREAT SERPL-MCNC: 4.1 MG/DL (ref 0.5–1.4)
DIFFERENTIAL METHOD BLD: ABNORMAL
DIFFERENTIAL METHOD BLD: ABNORMAL
EOSINOPHIL # BLD AUTO: 0.3 K/UL (ref 0–0.5)
EOSINOPHIL # BLD AUTO: 0.3 K/UL (ref 0–0.5)
EOSINOPHIL NFR BLD: 2.5 % (ref 0–8)
EOSINOPHIL NFR BLD: 3.1 % (ref 0–8)
ERYTHROCYTE [DISTWIDTH] IN BLOOD BY AUTOMATED COUNT: 16.7 % (ref 11.5–14.5)
ERYTHROCYTE [DISTWIDTH] IN BLOOD BY AUTOMATED COUNT: 16.8 % (ref 11.5–14.5)
EST. GFR  (NO RACE VARIABLE): 11.9 ML/MIN/1.73 M^2
EST. GFR  (NO RACE VARIABLE): 19.7 ML/MIN/1.73 M^2
GLUCOSE SERPL-MCNC: 82 MG/DL (ref 70–110)
GLUCOSE SERPL-MCNC: 86 MG/DL (ref 70–110)
HCT VFR BLD AUTO: 22.1 % (ref 37–48.5)
HCT VFR BLD AUTO: 24.1 % (ref 37–48.5)
HGB BLD-MCNC: 7.4 G/DL (ref 12–16)
HGB BLD-MCNC: 8.2 G/DL (ref 12–16)
IMM GRANULOCYTES # BLD AUTO: 0.04 K/UL (ref 0–0.04)
IMM GRANULOCYTES # BLD AUTO: 0.07 K/UL (ref 0–0.04)
IMM GRANULOCYTES NFR BLD AUTO: 0.4 % (ref 0–0.5)
IMM GRANULOCYTES NFR BLD AUTO: 0.6 % (ref 0–0.5)
INR PPP: 1 (ref 0.8–1.2)
LYMPHOCYTES # BLD AUTO: 2.6 K/UL (ref 1–4.8)
LYMPHOCYTES # BLD AUTO: 3.5 K/UL (ref 1–4.8)
LYMPHOCYTES NFR BLD: 26.4 % (ref 18–48)
LYMPHOCYTES NFR BLD: 28.7 % (ref 18–48)
MCH RBC QN AUTO: 28.6 PG (ref 27–31)
MCH RBC QN AUTO: 28.7 PG (ref 27–31)
MCHC RBC AUTO-ENTMCNC: 33.5 G/DL (ref 32–36)
MCHC RBC AUTO-ENTMCNC: 34 G/DL (ref 32–36)
MCV RBC AUTO: 84 FL (ref 82–98)
MCV RBC AUTO: 85 FL (ref 82–98)
MONOCYTES # BLD AUTO: 1.4 K/UL (ref 0.3–1)
MONOCYTES # BLD AUTO: 1.9 K/UL (ref 0.3–1)
MONOCYTES NFR BLD: 13.8 % (ref 4–15)
MONOCYTES NFR BLD: 15.4 % (ref 4–15)
NEUTROPHILS # BLD AUTO: 5.5 K/UL (ref 1.8–7.7)
NEUTROPHILS # BLD AUTO: 6.4 K/UL (ref 1.8–7.7)
NEUTROPHILS NFR BLD: 52.6 % (ref 38–73)
NEUTROPHILS NFR BLD: 55.9 % (ref 38–73)
NRBC BLD-RTO: 0 /100 WBC
NRBC BLD-RTO: 0 /100 WBC
PHOSPHATE SERPL-MCNC: 2.7 MG/DL (ref 2.7–4.5)
PLATELET # BLD AUTO: 86 K/UL (ref 150–450)
PLATELET # BLD AUTO: 99 K/UL (ref 150–450)
PMV BLD AUTO: ABNORMAL FL (ref 9.2–12.9)
PMV BLD AUTO: ABNORMAL FL (ref 9.2–12.9)
POCT GLUCOSE: 108 MG/DL (ref 70–110)
POCT GLUCOSE: 69 MG/DL (ref 70–110)
POCT GLUCOSE: 81 MG/DL (ref 70–110)
POCT GLUCOSE: 83 MG/DL (ref 70–110)
POCT GLUCOSE: 88 MG/DL (ref 70–110)
POCT GLUCOSE: 92 MG/DL (ref 70–110)
POCT GLUCOSE: 93 MG/DL (ref 70–110)
POTASSIUM SERPL-SCNC: 4.3 MMOL/L (ref 3.5–5.1)
POTASSIUM SERPL-SCNC: 4.7 MMOL/L (ref 3.5–5.1)
PROT SERPL-MCNC: 5 G/DL (ref 6–8.4)
PROTHROMBIN TIME: 11.4 SEC (ref 9–12.5)
RBC # BLD AUTO: 2.59 M/UL (ref 4–5.4)
RBC # BLD AUTO: 2.86 M/UL (ref 4–5.4)
SODIUM SERPL-SCNC: 118 MMOL/L (ref 136–145)
SODIUM SERPL-SCNC: 124 MMOL/L (ref 136–145)
WBC # BLD AUTO: 12.22 K/UL (ref 3.9–12.7)
WBC # BLD AUTO: 9.81 K/UL (ref 3.9–12.7)

## 2025-03-10 PROCEDURE — 85730 THROMBOPLASTIN TIME PARTIAL: CPT | Performed by: HOSPITALIST

## 2025-03-10 PROCEDURE — 25000003 PHARM REV CODE 250: Performed by: HOSPITALIST

## 2025-03-10 PROCEDURE — 85025 COMPLETE CBC W/AUTO DIFF WBC: CPT | Performed by: HOSPITALIST

## 2025-03-10 PROCEDURE — 80053 COMPREHEN METABOLIC PANEL: CPT | Performed by: HOSPITALIST

## 2025-03-10 PROCEDURE — 63600175 PHARM REV CODE 636 W HCPCS: Performed by: STUDENT IN AN ORGANIZED HEALTH CARE EDUCATION/TRAINING PROGRAM

## 2025-03-10 PROCEDURE — 36415 COLL VENOUS BLD VENIPUNCTURE: CPT | Performed by: HOSPITALIST

## 2025-03-10 PROCEDURE — 85610 PROTHROMBIN TIME: CPT | Performed by: HOSPITALIST

## 2025-03-10 PROCEDURE — 90935 HEMODIALYSIS ONE EVALUATION: CPT | Mod: ,,, | Performed by: NURSE PRACTITIONER

## 2025-03-10 PROCEDURE — 80069 RENAL FUNCTION PANEL: CPT

## 2025-03-10 PROCEDURE — 63600175 PHARM REV CODE 636 W HCPCS: Performed by: HOSPITALIST

## 2025-03-10 PROCEDURE — 20600001 HC STEP DOWN PRIVATE ROOM

## 2025-03-10 PROCEDURE — 80100016 HC MAINTENANCE HEMODIALYSIS

## 2025-03-10 RX ORDER — SODIUM CHLORIDE 9 MG/ML
INJECTION, SOLUTION INTRAVENOUS ONCE
OUTPATIENT
Start: 2025-03-10 | End: 2025-03-10

## 2025-03-10 RX ORDER — HEPARIN SODIUM,PORCINE/D5W 25000/250
0-40 INTRAVENOUS SOLUTION INTRAVENOUS CONTINUOUS
Status: DISCONTINUED | OUTPATIENT
Start: 2025-03-10 | End: 2025-03-12

## 2025-03-10 RX ORDER — SODIUM CHLORIDE 9 MG/ML
INJECTION, SOLUTION INTRAVENOUS CONTINUOUS
Status: DISCONTINUED | OUTPATIENT
Start: 2025-03-10 | End: 2025-03-10

## 2025-03-10 RX ADMIN — NEPHROCAP 1 CAPSULE: 1 CAP ORAL at 08:03

## 2025-03-10 RX ADMIN — HEPARIN SODIUM 1000 UNITS: 1000 INJECTION, SOLUTION INTRAVENOUS; SUBCUTANEOUS at 06:03

## 2025-03-10 RX ADMIN — Medication 100 MG: at 08:03

## 2025-03-10 RX ADMIN — Medication 1000 UNITS: at 08:03

## 2025-03-10 RX ADMIN — FOLIC ACID 1000 MCG: 1 TABLET ORAL at 08:03

## 2025-03-10 RX ADMIN — MUPIROCIN: 20 OINTMENT TOPICAL at 08:03

## 2025-03-10 RX ADMIN — MIRTAZAPINE 15 MG: 15 TABLET, FILM COATED ORAL at 09:03

## 2025-03-10 RX ADMIN — ESCITALOPRAM OXALATE 10 MG: 5 TABLET, FILM COATED ORAL at 08:03

## 2025-03-10 RX ADMIN — MUPIROCIN: 20 OINTMENT TOPICAL at 09:03

## 2025-03-10 RX ADMIN — HEPARIN SODIUM 12 UNITS/KG/HR: 10000 INJECTION, SOLUTION INTRAVENOUS at 11:03

## 2025-03-10 RX ADMIN — DEXTROSE MONOHYDRATE: 100 INJECTION, SOLUTION INTRAVENOUS at 05:03

## 2025-03-10 NOTE — PLAN OF CARE
[facility] declined due [facility] already following from referral from [facility]. [facility] declined due staffing issues.    CM sent HH referral to [facility] via [facility].        Tete Goetz RN     [phone]

## 2025-03-10 NOTE — ASSESSMENT & PLAN NOTE
Appreciate SLP. Advanced diet to minced and moist. Giving dextrose drip for hypoglycemia. Giving Novasource Renal supplements for associated malnutrition. GI will place PEG on 3/11/2025.

## 2025-03-10 NOTE — ASSESSMENT & PLAN NOTE
Diagnosed 1/20/2025, on apixaban at home. Held due to GI bleed. Started heparin drip 3/5/2025. Transition to apixaban when no further evidence of blood loss. Held heparin drip on 3/8/2025 due to significant drop in hemoglobin. Ultrasound showed persistent thrombus along with thrombus in subclavian vein, although it us unknown if this was there on 1/20/2025. Resume heparin drip at low intensity instead of high intensity.

## 2025-03-10 NOTE — PROGRESS NOTES
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 She lives in [place]. She works for [employer]. Her primary care physician is Dr. Delilah Kelley.               She was hospitalized at [facility] from 10/23/2024 to 11/6/2024.              She was hospitalized at [facility] from 11/15/2024 to 1/18/2024.              She was hospitalized at [facility] from 12/9/2024 to 12/19/2024.              She was hospitalized at [facility] from 12/25/2024 to 1/5/2025.              She was hospitalized at [facility] from 1/5/2025 to 1/31/2025. She was discharged to [facility] inpatient rehab until 2/18/2025.               She presented to [facility] Emergency Department on 3/2/2025 with rectal pain and bleeding. She receives her care in the [facility], not at [facility]. History was provided by her daughter Sue Barros. She is followed by Hematology at [facility] and has been on immunomodulator  infusions every 8 weeks (Soliris, now Ultomiris, last dose 2/24/2025). She has severe debility, poor appetite, dysphagia to solids, mostly consumes liquid, but her daughter was concerned her nutritional intake is inadequate and inquired about feeding tube placement for chronic nutrition. She had watery stool on the day of presentation. Her sister noted that she had gingival bleeding. She reported an anal lesion with tenderness and slow bleeding. She has not required frequent transfusions. Her daughter noted that all home antihypertensive medications were discontinued since she was discharged from inpatient rehab.              In the emergency department, she appeared ill, lethargic, unable to fully participate in interview, periodically awakening with pain. Labs showed hemoglobin 14.3 g/dL (from 13.2 on 2/24/2025). Repeat hemoglobin was 10.4. Alkaline phosphatase was 251 U/L, total bilirubin was 2.2 mg/dL, AST was 733 U/L, ALT was 478 U/L. She was given 2.1 liters of IV fluids, vancomycin, piperacillin-tazobactam, 4 mg of IV morphine, 80 mg of IV pantoprazole, topical lidocaine for rectal pain. She was admitted to Hospital Medicine Team S.     Overview/Hospital Course:  GGT was elevated at 447 U/L. LDH was elevated. Colorectal Surgery, Hematology, Nephrology, and Case Management were consulted. She expressed desire to go to [facility] since she gets her care at [facility]. Her mental status has been declining since October 2024. She has memory loss, disorientation, and does not remember her illness. Her mother had dementia. She had chest pain on 3/3/2025 while getting dialysis. EKG showed sinus tachycardia. Troponin was 1102 ng/L. Repeat was 704. Hematology recommended giving 2 units of fresh frozen plasma (FFP) and starting low rate heparin infusion then transitioning to apixaban 2.5 mg twice daily if she had no further bleeding. She was given another 2 units of FFP. Her daughter requested gastrostomy tube  placement for malnutrition. On 3/4/2025, heparin was held due to recurrent bleeding. She had intermittent somnolence. She was kept NPO. Speech Language Pathology was consulted. She was hypoglycemic so was put on 10% dextrose drip. Speech Language Pathology recommended full liquid diet. LFTs trended down. Heparin infusion was restarted on 3/5/2025. On 3/6/2025, her daughter decided that she did not want transfer to a [facility] hospital, instead she wanted to transfer her medical care to the [facility] system. Hemoglobin and hematocrit remained stable with no evidence of recurrent bleeding on 3/7/2025. Heparin drip was continued. She had epistaxis on 3/7/2025. Hemoglobin decreased to 6.7 g/dL on 3/8/2025. She developed right arm swelling. Repeat hemoglobin was 5.7. Heparin drip was held. She was transfused 1 unit of packed red blood cells (PRBCs). Hemoglobin improved to 8.2. Right upper extremity venous ultrasound showed known right internal jugular thrombus as well as subclavian thrombus, although the subclavian vein was not visualized when the internal jugular thrombus was diagnosed, so it was unknown if it was new. Hemoglobin was stable on 3/10/2025.     Interval History: Holding heparin drip due to drop in hemoglobin and hematocrit 2 days ago. Right upper extremity venous ultrasound showed known right IJ thrombus and possible new subclavian thrombus. Will resume heparin drip. GI plans PEG placement tomorrow.    Review of Systems   Unable to perform ROS: Mental status change     Objective:     Vital Signs (Most Recent):  Temp: 98.1 °F (36.7 °C) (03/10/25 0814)  Pulse: 98 (03/10/25 0814)  Resp: 18 (03/10/25 0814)  BP: (!) 152/97 (03/10/25 0814)  SpO2: 100 % (03/10/25 0814) Vital Signs (24h Range):  Temp:  [97.9 °F (36.6 °C)-98.9 °F (37.2 °C)] 98.1 °F (36.7 °C)  Pulse:  [] 98  Resp:  [13-18] 18  SpO2:  [100 %] 100 %  BP: (142-159)/(92-98) 152/97     Weight: 72.1 kg (158 lb 15.2 oz)  Body mass index is 26.45  kg/m².    Intake/Output Summary (Last 24 hours) at 3/10/2025 1008  Last data filed at 3/10/2025 0634  Gross per 24 hour   Intake 120 ml   Output --   Net 120 ml         Physical Exam  Vitals and nursing note reviewed.   Constitutional:       General: She is not in acute distress.     Appearance: She is well-developed. She is ill-appearing. She is not diaphoretic.      Interventions: She is not intubated.  Pulmonary:      Effort: Pulmonary effort is normal. No accessory muscle usage or respiratory distress. She is not intubated.   Skin:     General: Skin is warm and dry.      Coloration: Skin is not jaundiced or pale.   Neurological:      Mental Status: She is confused.      Motor: No seizure activity.   Psychiatric:         Attention and Perception: She is inattentive.         Mood and Affect: Affect is blunt.         Behavior: Behavior is not aggressive or hyperactive.         Cognition and Memory: Cognition is impaired. Memory is impaired.               Significant Labs: All pertinent labs within the past 24 hours have been reviewed.  CBC:   Recent Labs   Lab 03/08/25  1641 03/09/25  0636 03/10/25  0620   WBC 9.17 12.25 12.22   HGB 5.7* 8.2* 8.2*   HCT 17.9* 25.6* 24.1*   PLT 84* 70* 86*       Significant Imaging: I have reviewed all pertinent imaging results/findings within the past 24 hours.  US Upper Extremity Veins Right 3/9/25: FINDINGS:   Central veins: Occlusive thrombosis in the internal jugular vein and lateral subclavian vein.  The central subclavian and axillary veins patent and free of thrombus.   Arm veins: The brachial and basilic veins are patent and compressible.  The cephalic vein is not visualized.   Contralateral subclavian/internal jugular veins: The left subclavian and internal jugular veins are patent and free of thrombus.   Other findings: None.   Impression:  Occlusive thrombus in the right internal jugular and subclavian veins.       Assessment & Plan  Rectal bleeding  Holding home aspirin  and apixaban. Has been on heparin drip. Appreciate Colorectal Surgery. Subsided. Monitor for recurrence.  Unspecified severe protein-calorie malnutrition  Severe malnutrition patient with recent hx of worsening dysphagia, has had w/u for scleroderma, has hiatal hernia and ?esophageal dysmotility  - dysphagia to solids.   Developed wernicke encephalopathy from nutritional deficiency     Daughter reports concern of inadequate intake has had severe weight loss in recent months with multiple complex health conditions. She requested PEG placement. Nutrition gave recommendations on tube feeds. Giving minced and moist diet. Add Novasource Renal supplements.  Dysphagia  Appreciate SLP. Advanced diet to minced and moist. Giving dextrose drip for hypoglycemia. Giving Novasource Renal supplements for associated malnutrition. GI will place PEG on 3/11/2025.  Wernicke encephalopathy  Continue on home thiamine supplementation.     GERD without esophagitis  Continue home pantoprazole.    ESRD (end stage renal disease)  Nephrology managing dialysis.    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 Last Ultomiris dose 2/24/2025. Outpatient hematologist is [facility] Cantu MD at [facility]. Consulted Hematology here. Appreciate assistance with management.  Elevated transaminase level  Hepatic steatosis on CT. Worsening hepatic function may contribute to her worsening coagulopathy elevated PT/PTT. Stable or slightly improved since admission.  Acute thrombosis of right internal jugular vein  Diagnosed 1/20/2025, on apixaban at home. Held due to GI bleed. Started heparin drip 3/5/2025. Transition to apixaban when no further evidence of blood loss. Held heparin drip on 3/8/2025 due to significant drop in hemoglobin. Ultrasound showed persistent thrombus along with thrombus in subclavian vein, although it us unknown if this was there on 1/20/2025. Resume heparin drip at low intensity instead of high  intensity.  History of Helicobacter pylori infection  Diagnosed via EGD and reported [place] [facility].     Renal hematoma, left, sequela  In January, due to biopsy in 2024. Most recent imaging showed resolving appearance of remote hematoma.   Anemia of chronic renal failure, stage 5  Anemia is likely due to acute blood loss which was from GI bleed and active hemolysis due to HUS.  . Most recent hemoglobin and hematocrit are listed below.  Recent Labs     03/08/25  1641 03/09/25  0636 03/10/25  0620   HGB 5.7* 8.2* 8.2*   HCT 17.9* 25.6* 24.1*     Plan  - Monitor serial CBC  - Transfuse PRBC if patient becomes hemodynamically unstable, symptomatic or H/H drops below 7/21.  - Transfused pRBCs 3/8/2025. Stable on 3/10/2025.  Metabolic encephalopathy  Waxing and waning since October 2024 due to HUS. Lacks capacity, unable to communicate about her illness. Daughter Sue Barros wanted her to go to [facility] for continuity of care but has changed her mind and she will transition her medical care to [facility].  VTE Risk Mitigation (From admission, onward)           Ordered     heparin 25,000 units in dextrose 5% (100 units/ml) IV bolus from bag LOW INTENSITY nomogram - [facility]  As needed (PRN)        Question:  Heparin Infusion Adjustment (DO NOT MODIFY ANSWER)  Answer:  [email or web address]\[employer]\Images\Pharmacy\HeparinInfusions\heparin LOW INTENSITY nomogram for [facility] [id number]    03/10/25 1008     heparin 25,000 units in dextrose 5% (100 units/ml) IV bolus from bag LOW INTENSITY nomogram - [facility]  As needed (PRN)        Question:  Heparin Infusion Adjustment (DO NOT MODIFY ANSWER)  Answer:  [email or web address]\[employer]\Images\Pharmacy\HeparinInfusions\heparin LOW INTENSITY nomogram for [facility] [id number]    03/10/25 1008     heparin 25,000 units in dextrose 5% (100 units/ml) IV bolus from bag LOW INTENSITY nomogram - [facility]  Once        Question:  Heparin Infusion Adjustment (DO NOT MODIFY ANSWER)  Answer:   [email or web address]\ep[facility]\Images\Pharmacy\HeparinInfusions\heparin LOW INTENSITY nomogram for [facility] [id number].pdf    03/10/25 1008     heparin 25,000 units in dextrose 5% 250 mL (100 units/mL) infusion LOW INTENSITY nomogram - [facility]  Continuous        Question:  Begin at (units/kg/hr)  Answer:  12    03/10/25 1008     heparin (porcine) injection 1,000 Units  As needed (PRN)         03/03/25 1524     IP VTE HIGH RISK PATIENT  Once         03/03/25 0416     Place sequential compression device  Until discontinued         03/03/25 0416     Reason for No Pharmacological VTE Prophylaxis  Once        Question:  Reasons:  Answer:  Active Bleeding    03/03/25 0416                    Discharge Planning   PATRICIA: 3/11/2025     Code Status: Full Code   Medical Readiness for Discharge Date:   Discharge Plan A: Home with family, Home Health                        Edi Staton MD  Department of Hospital Medicine   [facility] - Telemetry Stepdown

## 2025-03-10 NOTE — ANESTHESIA PREPROCEDURE EVALUATION
[facility]  Anesthesia Pre-Operative Evaluation         Patient Name: Madelaine Barros  YOB: 1965  MRN: [id number]    SUBJECTIVE:     Pre-operative evaluation for Procedure(s) (LRB):  EGD (ESOPHAGOGASTRODUODENOSCOPY) (N/A)     03/10/2025    Madelaine Barros is a 59 y.o. female with hypertension, atypical HUS, ESRD on HD (MWF), anemia, GERD, Wernicke encephalopathy, dysphagia, neuropathy, history of latent syphilis (treated).    Admitted to the floor with rectal bleeding.    Patient now presents for the above procedure(s).    Echo Summary  No results found for this or any previous visit.       Prev airway:  Reyes 3 grade 1 view:  ETT 01/13/25; 1044; Video laryngoscopy, Stylet; Oral Standard; No; 7 mm; Cuffed; Auscultation, Capnometry, Fiberoptic visualization, Palpation of cuff, Symmetrical chest wall movement; Pink tape; (reyes); 3; 1 (grade I view with ease)       LDA:        Hemodialysis Catheter right subclavian (Active)   Line Necessity Review CRRT/HD 03/09/25 1954   Verification by X-ray Yes 03/09/25 1954   Site Assessment No drainage;No swelling;No redness 03/09/25 1954   Line Securement Device Secured with sutureless device 03/09/25 1954   Dressing Type CHG impregnated dressing/sponge 03/09/25 1954   Dressing Status Clean;Dry;Intact 03/09/25 1954   Dressing Intervention Integrity maintained 03/09/25 1954   Date on Dressing 03/04/25 03/08/25 1930   Dressing Due to be Changed 03/11/25 03/09/25 1954   Venous Patency/Care normal saline locked 03/08/25 1745   Arterial Patency/Care normal saline locked 03/08/25 1745   Waveform Not being transduced 03/08/25 0800   Number of days:             Peripheral IV - Single Lumen 03/08/25 1155 22 G 1 3/4 in Anterior;Left Forearm (Active)   Site Assessment Clean;Dry;Intact 03/10/25 0335   Line Securement Device Secured with sutureless device 03/09/25 2000   Extremity Assessment Distal to IV No abnormal discoloration 03/08/25 1930    Line Status Infusing 03/10/25 0335   Dressing Status Clean;Dry;Intact 03/10/25 0335   Dressing Intervention Integrity maintained 03/10/25 0335   Dressing Change Due 03/11/25 03/08/25 0800   Site Change Due 03/11/25 03/09/25 2000   Reason Not Rotated Not due 03/09/25 2000   Number of days: 1       Drips:   D10W   Intravenous Continuous 50 mL/hr at 03/10/25 0537 New Bag at 03/10/25 0537       Problem List[1]    Review of patient's allergies indicates:  No Known Allergies    Current Inpatient Medications:   EScitalopram oxalate  10 mg Oral Daily    folic acid  1,000 mcg Oral Daily    mirtazapine  15 mg Oral QHS    mupirocin   Nasal BID    pantoprazole  40 mg Oral BID AC    thiamine  100 mg Oral Daily    vitamin D  1,000 Units Oral Daily    vitamin renal formula (B-complex-vitamin c-folic acid)  1 capsule Oral Daily       Medications Ordered Prior to Encounter[2]    Past Surgical History:   Procedure Laterality Date    HYSTERECTOMY      TUBAL LIGATION         Social History:  Tobacco Use: Low Risk  (3/4/2025)    [facility]    Patient History     Smoking Tobacco Use: Never     Smokeless Tobacco Use: Never     Passive Exposure: Not on file      Alcohol Use: Not At Risk (3/5/2025)    AUDIT-C     Frequency of Alcohol Consumption: Never     Average Number of Drinks: Patient does not drink     Frequency of Binge Drinking: Never        OBJECTIVE:     Vital Signs Range (Last 24H):  Temp:  [36.6 °C (97.9 °F)-37.2 °C (98.9 °F)]   Pulse:  []   Resp:  [13-18]   BP: (142-159)/(92-98)   SpO2:  [98 %-100 %]       Significant Labs:  Lab Results   Component Value Date    WBC 12.22 03/10/2025    HGB 8.2 (L) 03/10/2025    HCT 24.1 (L) 03/10/2025    PLT 86 (L) 03/10/2025    CHOL 237 (H) 05/12/2022    TRIG 70 05/12/2022    HDL 69 05/12/2022     (H) 03/05/2025     (H) 03/05/2025     (L) 03/05/2025    K 3.5 03/05/2025    CL 96 03/05/2025    CREATININE 4.0 (H) 03/05/2025    BUN 23 (H) 03/05/2025     CO2 23 03/05/2025    TSH 5.94 (H) 01/06/2025    INR 1.2 03/05/2025    HGBA1C 4.0 (L) 02/13/2025       Diagnostic Studies: No relevant studies.    EKG:   Results for orders placed or performed during the hospital encounter of 03/02/25   EKG 12-lead    Collection Time: 03/04/25  8:27 AM   Result Value Ref Range    QRS Duration 76 ms    [facility] QTC Calculation 487 ms    Narrative    Test Reason : R07.9,    Vent. Rate :  96 BPM     Atrial Rate :  96 BPM     P-R Int : 142 ms          QRS Dur :  76 ms      QT Int : 386 ms       P-R-T Axes :  74  22  56 degrees    QTcB Int : 487 ms    Normal sinus rhythm  Prolonged QT  Abnormal ECG  When compared with ECG of 03-Mar-2025 15:03,  No significant change was found  Confirmed by Chantel Mcmillan ([id number]) on 3/4/2025 9:14:23 PM    Referred By: AAAREFERRAL SELF           Confirmed By: Chantel Mcmillan       2D ECHO:  TTE:  No results found for this or any previous visit.    ZAYNAB:  No results found for this or any previous visit.    ASSESSMENT/PLAN:           Pre-op Assessment    I have reviewed the Patient Summary Reports.     I have reviewed the Nursing Notes. I have reviewed the NPO Status.   I have reviewed the Medications.     Review of Systems  Anesthesia Hx:             Denies Family Hx of Anesthesia complications.    Denies Personal Hx of Anesthesia complications.                    Hematology/Oncology:       -- Anemia:                                  Cardiovascular:     Hypertension  Past MI                                           Renal/:  Chronic Renal Disease, ESRD                Hepatic/GI:   PUD, Hiatal Hernia, GERD                Neurological:  TIA,  Neuromuscular Disease,                                   Endocrine:  Diabetes               Physical Exam  General: Well nourished, Cooperative, Alert and Oriented    Airway:  Mallampati: III   Mouth Opening: Normal  TM Distance: Normal  Tongue: Normal  Neck ROM: Normal ROM    Dental:  Periodontal  disease    Chest/Lungs:  Normal Respiratory Rate    Heart:  Rate: Normal        Anesthesia Plan  Type of Anesthesia, risks & benefits discussed:    Anesthesia Type: Gen ETT, Gen Natural Airway, Gen Supraglottic Airway, MAC  Intra-op Monitoring Plan: Standard ASA Monitors  Post Op Pain Control Plan: multimodal analgesia  Induction:  IV and rapid sequence  Airway Plan: Direct and Video, Post-Induction  Informed Consent: Informed consent signed with the Patient and all parties understand the risks and agree with anesthesia plan.  All questions answered.   ASA Score: 3  Day of Surgery Review of History & Physical: H&P Update referred to the surgeon/provider.    Ready For Surgery From Anesthesia Perspective.     .           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     Current Outpatient Medications on File Prior to Encounter   Medication Sig Dispense Refill    aspirin 81 MG Chew Take 1 tablet by mouth once daily.      atorvastatin (LIPITOR) 80 MG tablet Take 1 tablet by mouth once daily.      ELIQUIS 5 mg Tab Take 5 mg by mouth 2 (two) times daily.      epoetin pily-epbx (RETACRIT) 10,000 unit/mL imjection Inject 10,000 Units into the skin every Mon, Wed, Fri.      EScitalopram oxalate  (LEXAPRO) 10 MG tablet Take 1 tablet by mouth once daily.      folic acid (FOLVITE) 1 MG tablet Take 1,000 mcg by mouth once daily.      mirtazapine (REMERON) 15 MG tablet Take 15 mg by mouth every evening.      pantoprazole (PROTONIX) 40 MG tablet Take 40 mg by mouth once daily.      VITAMIN B-1 100 MG tablet Take 100 mg by mouth once daily.      vitamin D (VITAMIN D3) 1000 units Tab Take 1 tablet by mouth once daily.      vitamin renal formula, B-complex-vitamin c-folic acid, (NEPHROCAP) 1 mg Cap Take 1 capsule by mouth once daily.      azelastine (ASTELIN) 137 mcg (0.1 %) nasal spray 1 spray (137 mcg total) by Nasal route 2 (two) times daily. 30 mL 0

## 2025-03-10 NOTE — ASSESSMENT & PLAN NOTE
Waxing and waning since October 2024 due to HUS. Lacks capacity, unable to communicate about her illness. Daughter Sue Barros wanted her to go to [facility] for continuity of care but has changed her mind and she will transition her medical care to [facility].

## 2025-03-10 NOTE — PLAN OF CARE
[facility] declined due to inadequate staffing.   CM sent HH referral to [facility] via Epic.      Tete Goetz RN     [phone]

## 2025-03-10 NOTE — PROGRESS NOTES
[facility] HEMODIALYSIS NOTE     Patient currently on hemodialysis for removal of uremic toxins and volume.     Patient seen and evaluated on hemodialysis, tolerating treatment, see HD flowsheet for vitals and assessments.    Labs have been reviewed and the dialysate bath has been adjusted.       Assessment/Plan:    ESRD  -Patient seen on HD, tolerating treatment well, w/o complaints   -UF goal of 1.5- 2L  -continues on D10 @ 50ml/hour   -Hyponatremia, Na bath adjusted. RFP daily   -Renal diet, if not NPO   -Strict I/O's and daily weights  -Daily renal function panels  -Keep MAP >65 while on HD   -Hgb goal 10-11, epo with HD   -phos 1.9 - replete phos as needed   -Will continue to follow while inpatient     Gladys Feliciano DNP-FNP, C  Nephrology  Pager: [phone]

## 2025-03-10 NOTE — ASSESSMENT & PLAN NOTE
Chronic. Last Ultomiris dose 2/24/2025. Outpatient hematologist is Roma Cantu MD at [facility]. Consulted Hematology here. Appreciate assistance with management.

## 2025-03-10 NOTE — SUBJECTIVE & OBJECTIVE
Interval History: Holding heparin drip due to drop in hemoglobin and hematocrit 2 days ago. Right upper extremity venous ultrasound showed known right IJ thrombus and possible new subclavian thrombus. Will resume heparin drip. GI plans PEG placement tomorrow.    Review of Systems   Unable to perform ROS: Mental status change     Objective:     Vital Signs (Most Recent):  Temp: 98.1 °F (36.7 °C) (03/10/25 0814)  Pulse: 98 (03/10/25 0814)  Resp: 18 (03/10/25 0814)  BP: (!) 152/97 (03/10/25 0814)  SpO2: 100 % (03/10/25 0814) Vital Signs (24h Range):  Temp:  [97.9 °F (36.6 °C)-98.9 °F (37.2 °C)] 98.1 °F (36.7 °C)  Pulse:  [] 98  Resp:  [13-18] 18  SpO2:  [100 %] 100 %  BP: (142-159)/(92-98) 152/97     Weight: 72.1 kg (158 lb 15.2 oz)  Body mass index is 26.45 kg/m².    Intake/Output Summary (Last 24 hours) at 3/10/2025 1008  Last data filed at 3/10/2025 0634  Gross per 24 hour   Intake 120 ml   Output --   Net 120 ml         Physical Exam  Vitals and nursing note reviewed.   Constitutional:       General: She is not in acute distress.     Appearance: She is well-developed. She is ill-appearing. She is not diaphoretic.      Interventions: She is not intubated.  Pulmonary:      Effort: Pulmonary effort is normal. No accessory muscle usage or respiratory distress. She is not intubated.   Skin:     General: Skin is warm and dry.      Coloration: Skin is not jaundiced or pale.   Neurological:      Mental Status: She is confused.      Motor: No seizure activity.   Psychiatric:         Attention and Perception: She is inattentive.         Mood and Affect: Affect is blunt.         Behavior: Behavior is not aggressive or hyperactive.         Cognition and Memory: Cognition is impaired. Memory is impaired.               Significant Labs: All pertinent labs within the past 24 hours have been reviewed.  CBC:   Recent Labs   Lab 03/08/25  1641 03/09/25  0636 03/10/25  0620   WBC 9.17 12.25 12.22   HGB 5.7* 8.2* 8.2*   HCT  17.9* 25.6* 24.1*   PLT 84* 70* 86*       Significant Imaging: I have reviewed all pertinent imaging results/findings within the past 24 hours.  US Upper Extremity Veins Right 3/9/25: FINDINGS:   Central veins: Occlusive thrombosis in the internal jugular vein and lateral subclavian vein.  The central subclavian and axillary veins patent and free of thrombus.   Arm veins: The brachial and basilic veins are patent and compressible.  The cephalic vein is not visualized.   Contralateral subclavian/internal jugular veins: The left subclavian and internal jugular veins are patent and free of thrombus.   Other findings: None.   Impression:  Occlusive thrombus in the right internal jugular and subclavian veins.

## 2025-03-10 NOTE — PROGRESS NOTES
03/10/25 1800   Post-Hemodialysis Assessment   Blood Volume Processed (Liters) 57.4 L   Dialyzer Clearance Lightly streaked  (yellow)   Duration of Treatment 180 minutes   Net Fluid Removal 1606     HD tx ended 30 min early d/t drop in BP; pt BP returned to baseline after rinse back. Blood returned and PC heparin locked without issue. Report given to primary RN, back to unit via stretcher with dialysis tech.

## 2025-03-10 NOTE — ASSESSMENT & PLAN NOTE
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 . Most recent hemoglobin and hematocrit are listed below.  Recent Labs     03/08/25  1641 03/09/25  0636 03/10/25  0620   HGB 5.7* 8.2* 8.2*   HCT 17.9* 25.6* 24.1*     Plan  - Monitor serial CBC  - Transfuse PRBC if patient becomes hemodynamically unstable, symptomatic or H/H drops below 7/21.  - Transfused pRBCs 3/8/2025. Stable on 3/10/2025.

## 2025-03-10 NOTE — PT/OT/SLP PROGRESS
Speech Language Pathology      Madelaine Barros  MRN: [id number]    Patient not seen today secondary to Patient NPO for pending procedure later service day upon attempt. ST to continue to monitor and follow up per ST POC.     3/10/2025

## 2025-03-11 ENCOUNTER — ANESTHESIA (OUTPATIENT)
Dept: ENDOSCOPY | Facility: HOSPITAL | Age: 60
End: 2025-03-11
Payer: COMMERCIAL

## 2025-03-11 PROBLEM — E86.0 DEHYDRATION WITH HYPONATREMIA: Status: ACTIVE | Noted: 2025-03-10

## 2025-03-11 PROBLEM — E87.1 DEHYDRATION WITH HYPONATREMIA: Status: ACTIVE | Noted: 2025-03-10

## 2025-03-11 LAB
ALBUMIN SERPL BCP-MCNC: 2.1 G/DL (ref 3.5–5.2)
ANION GAP SERPL CALC-SCNC: 7 MMOL/L (ref 8–16)
APTT PPP: 121.3 SEC (ref 21–32)
APTT PPP: 31.3 SEC (ref 21–32)
APTT PPP: 36.5 SEC (ref 21–32)
APTT PPP: 80.8 SEC (ref 21–32)
APTT PPP: >150 SEC (ref 21–32)
APTT PPP: >150 SEC (ref 21–32)
BUN SERPL-MCNC: 15 MG/DL (ref 6–20)
CALCIUM SERPL-MCNC: 8.1 MG/DL (ref 8.7–10.5)
CHLORIDE SERPL-SCNC: 93 MMOL/L (ref 95–110)
CO2 SERPL-SCNC: 22 MMOL/L (ref 23–29)
CREAT SERPL-MCNC: 3 MG/DL (ref 0.5–1.4)
ERYTHROCYTE [DISTWIDTH] IN BLOOD BY AUTOMATED COUNT: 16.6 % (ref 11.5–14.5)
EST. GFR  (NO RACE VARIABLE): 17.4 ML/MIN/1.73 M^2
GLUCOSE SERPL-MCNC: 69 MG/DL (ref 70–110)
HCT VFR BLD AUTO: 24.5 % (ref 37–48.5)
HGB BLD-MCNC: 8 G/DL (ref 12–16)
MAGNESIUM SERPL-MCNC: 1.6 MG/DL (ref 1.6–2.6)
MCH RBC QN AUTO: 28.3 PG (ref 27–31)
MCHC RBC AUTO-ENTMCNC: 32.7 G/DL (ref 32–36)
MCV RBC AUTO: 87 FL (ref 82–98)
PHOSPHATE SERPL-MCNC: 2.8 MG/DL (ref 2.7–4.5)
PLATELET # BLD AUTO: 136 K/UL (ref 150–450)
PLATELET BLD QL SMEAR: ABNORMAL
PMV BLD AUTO: ABNORMAL FL (ref 9.2–12.9)
POCT GLUCOSE: 50 MG/DL (ref 70–110)
POCT GLUCOSE: 66 MG/DL (ref 70–110)
POCT GLUCOSE: 71 MG/DL (ref 70–110)
POCT GLUCOSE: 71 MG/DL (ref 70–110)
POCT GLUCOSE: 74 MG/DL (ref 70–110)
POCT GLUCOSE: 80 MG/DL (ref 70–110)
POCT GLUCOSE: 80 MG/DL (ref 70–110)
POCT GLUCOSE: 86 MG/DL (ref 70–110)
POCT GLUCOSE: 94 MG/DL (ref 70–110)
POCT GLUCOSE: 98 MG/DL (ref 70–110)
POTASSIUM SERPL-SCNC: 4 MMOL/L (ref 3.5–5.1)
RBC # BLD AUTO: 2.83 M/UL (ref 4–5.4)
SCHISTOCYTES BLD QL SMEAR: ABNORMAL
SODIUM SERPL-SCNC: 122 MMOL/L (ref 136–145)
SODIUM SERPL-SCNC: 124 MMOL/L (ref 136–145)
SODIUM SERPL-SCNC: 124 MMOL/L (ref 136–145)
SODIUM SERPL-SCNC: 125 MMOL/L (ref 136–145)
SODIUM SERPL-SCNC: 125 MMOL/L (ref 136–145)
WBC # BLD AUTO: 9.86 K/UL (ref 3.9–12.7)

## 2025-03-11 PROCEDURE — 0DH63UZ INSERTION OF FEEDING DEVICE INTO STOMACH, PERCUTANEOUS APPROACH: ICD-10-PCS | Performed by: HOSPITALIST

## 2025-03-11 PROCEDURE — 80069 RENAL FUNCTION PANEL: CPT | Performed by: FAMILY MEDICINE

## 2025-03-11 PROCEDURE — 83735 ASSAY OF MAGNESIUM: CPT | Performed by: FAMILY MEDICINE

## 2025-03-11 PROCEDURE — 25000003 PHARM REV CODE 250: Performed by: STUDENT IN AN ORGANIZED HEALTH CARE EDUCATION/TRAINING PROGRAM

## 2025-03-11 PROCEDURE — 36415 COLL VENOUS BLD VENIPUNCTURE: CPT | Performed by: FAMILY MEDICINE

## 2025-03-11 PROCEDURE — 25000003 PHARM REV CODE 250: Performed by: HOSPITALIST

## 2025-03-11 PROCEDURE — 43246 EGD PLACE GASTROSTOMY TUBE: CPT | Mod: ,,, | Performed by: INTERNAL MEDICINE

## 2025-03-11 PROCEDURE — 25000003 PHARM REV CODE 250: Performed by: FAMILY MEDICINE

## 2025-03-11 PROCEDURE — 27201018 HC KIT, PEG (ANY): Performed by: INTERNAL MEDICINE

## 2025-03-11 PROCEDURE — 97530 THERAPEUTIC ACTIVITIES: CPT

## 2025-03-11 PROCEDURE — 63600175 PHARM REV CODE 636 W HCPCS: Performed by: NURSE ANESTHETIST, CERTIFIED REGISTERED

## 2025-03-11 PROCEDURE — 84295 ASSAY OF SERUM SODIUM: CPT | Mod: 91 | Performed by: FAMILY MEDICINE

## 2025-03-11 PROCEDURE — 97166 OT EVAL MOD COMPLEX 45 MIN: CPT

## 2025-03-11 PROCEDURE — 85027 COMPLETE CBC AUTOMATED: CPT | Performed by: FAMILY MEDICINE

## 2025-03-11 PROCEDURE — 99900035 HC TECH TIME PER 15 MIN (STAT)

## 2025-03-11 PROCEDURE — 37000009 HC ANESTHESIA EA ADD 15 MINS: Performed by: INTERNAL MEDICINE

## 2025-03-11 PROCEDURE — 27000221 HC OXYGEN, UP TO 24 HOURS

## 2025-03-11 PROCEDURE — 99232 SBSQ HOSP IP/OBS MODERATE 35: CPT | Mod: ,,, | Performed by: NURSE PRACTITIONER

## 2025-03-11 PROCEDURE — 94761 N-INVAS EAR/PLS OXIMETRY MLT: CPT

## 2025-03-11 PROCEDURE — 37000008 HC ANESTHESIA 1ST 15 MINUTES: Performed by: INTERNAL MEDICINE

## 2025-03-11 PROCEDURE — 63600175 PHARM REV CODE 636 W HCPCS: Performed by: HOSPITALIST

## 2025-03-11 PROCEDURE — 85730 THROMBOPLASTIN TIME PARTIAL: CPT | Performed by: HOSPITALIST

## 2025-03-11 PROCEDURE — 43246 EGD PLACE GASTROSTOMY TUBE: CPT | Performed by: INTERNAL MEDICINE

## 2025-03-11 PROCEDURE — 84295 ASSAY OF SERUM SODIUM: CPT | Mod: 91 | Performed by: HOSPITALIST

## 2025-03-11 PROCEDURE — 20600001 HC STEP DOWN PRIVATE ROOM

## 2025-03-11 PROCEDURE — 82962 GLUCOSE BLOOD TEST: CPT | Performed by: INTERNAL MEDICINE

## 2025-03-11 RX ORDER — PROPOFOL 10 MG/ML
VIAL (ML) INTRAVENOUS
Status: DISCONTINUED | OUTPATIENT
Start: 2025-03-11 | End: 2025-03-11

## 2025-03-11 RX ORDER — PROPOFOL 10 MG/ML
VIAL (ML) INTRAVENOUS CONTINUOUS PRN
Status: DISCONTINUED | OUTPATIENT
Start: 2025-03-11 | End: 2025-03-11

## 2025-03-11 RX ORDER — METOCLOPRAMIDE HYDROCHLORIDE 5 MG/ML
10 INJECTION INTRAMUSCULAR; INTRAVENOUS EVERY 10 MIN PRN
Status: DISCONTINUED | OUTPATIENT
Start: 2025-03-11 | End: 2025-03-11

## 2025-03-11 RX ORDER — LIDOCAINE HYDROCHLORIDE 20 MG/ML
INJECTION INTRAVENOUS
Status: DISCONTINUED | OUTPATIENT
Start: 2025-03-11 | End: 2025-03-11

## 2025-03-11 RX ORDER — CEFAZOLIN 2 G/1
2 INJECTION, POWDER, FOR SOLUTION INTRAMUSCULAR; INTRAVENOUS ONCE AS NEEDED
Status: DISCONTINUED | OUTPATIENT
Start: 2025-03-11 | End: 2025-03-13

## 2025-03-11 RX ORDER — SODIUM CHLORIDE 9 MG/ML
INJECTION, SOLUTION INTRAVENOUS ONCE
OUTPATIENT
Start: 2025-03-11 | End: 2025-03-11

## 2025-03-11 RX ORDER — HYDROMORPHONE HYDROCHLORIDE 1 MG/ML
0.2 INJECTION, SOLUTION INTRAMUSCULAR; INTRAVENOUS; SUBCUTANEOUS EVERY 5 MIN PRN
Status: DISCONTINUED | OUTPATIENT
Start: 2025-03-11 | End: 2025-03-11

## 2025-03-11 RX ORDER — MIRTAZAPINE 15 MG/1
15 TABLET, FILM COATED ORAL NIGHTLY
Status: DISCONTINUED | OUTPATIENT
Start: 2025-03-11 | End: 2025-03-24

## 2025-03-11 RX ORDER — GLUCAGON 1 MG
1 KIT INJECTION
Status: DISCONTINUED | OUTPATIENT
Start: 2025-03-11 | End: 2025-03-11

## 2025-03-11 RX ORDER — HALOPERIDOL LACTATE 5 MG/ML
0.5 INJECTION, SOLUTION INTRAMUSCULAR EVERY 10 MIN PRN
Status: DISCONTINUED | OUTPATIENT
Start: 2025-03-11 | End: 2025-03-11

## 2025-03-11 RX ORDER — SODIUM CHLORIDE 9 MG/ML
INJECTION, SOLUTION INTRAVENOUS CONTINUOUS
Status: ACTIVE | OUTPATIENT
Start: 2025-03-11 | End: 2025-03-11

## 2025-03-11 RX ORDER — TRAMADOL HYDROCHLORIDE 50 MG/1
50 TABLET ORAL ONCE
Status: COMPLETED | OUTPATIENT
Start: 2025-03-11 | End: 2025-03-11

## 2025-03-11 RX ADMIN — DEXTROSE MONOHYDRATE 500 ML: 50 INJECTION, SOLUTION INTRAVENOUS at 03:03

## 2025-03-11 RX ADMIN — Medication 100 MG: at 09:03

## 2025-03-11 RX ADMIN — PANTOPRAZOLE SODIUM 40 MG: 40 TABLET, DELAYED RELEASE ORAL at 05:03

## 2025-03-11 RX ADMIN — LIDOCAINE HYDROCHLORIDE 50 MG: 20 INJECTION INTRAVENOUS at 04:03

## 2025-03-11 RX ADMIN — ESCITALOPRAM OXALATE 10 MG: 5 TABLET, FILM COATED ORAL at 09:03

## 2025-03-11 RX ADMIN — PROPOFOL 150 MCG/KG/MIN: 10 INJECTION, EMULSION INTRAVENOUS at 04:03

## 2025-03-11 RX ADMIN — SODIUM CHLORIDE: 9 INJECTION, SOLUTION INTRAVENOUS at 08:03

## 2025-03-11 RX ADMIN — PROPOFOL 50 MG: 10 INJECTION, EMULSION INTRAVENOUS at 04:03

## 2025-03-11 RX ADMIN — DEXTROSE MONOHYDRATE 12.5 G: 25 INJECTION, SOLUTION INTRAVENOUS at 01:03

## 2025-03-11 RX ADMIN — FOLIC ACID 1000 MCG: 1 TABLET ORAL at 09:03

## 2025-03-11 RX ADMIN — MIRTAZAPINE 15 MG: 15 TABLET, FILM COATED ORAL at 10:03

## 2025-03-11 RX ADMIN — TRAMADOL HYDROCHLORIDE 50 MG: 50 TABLET, COATED ORAL at 10:03

## 2025-03-11 RX ADMIN — MUPIROCIN: 20 OINTMENT TOPICAL at 08:03

## 2025-03-11 RX ADMIN — SODIUM CHLORIDE: 9 INJECTION, SOLUTION INTRAVENOUS at 01:03

## 2025-03-11 RX ADMIN — NEPHROCAP 1 CAPSULE: 1 CAP ORAL at 09:03

## 2025-03-11 RX ADMIN — MUPIROCIN: 20 OINTMENT TOPICAL at 09:03

## 2025-03-11 RX ADMIN — Medication 1000 UNITS: at 09:03

## 2025-03-11 RX ADMIN — HEPARIN SODIUM 8 UNITS/KG/HR: 10000 INJECTION, SOLUTION INTRAVENOUS at 12:03

## 2025-03-11 NOTE — ASSESSMENT & PLAN NOTE
Appreciate SLP. Advanced diet to minced and moist. Was giving dextrose drip for hypoglycemia but this caused hyponatremia so changed to normal saline and will have to watch for hypoglycemia. GI will place PEG today.

## 2025-03-11 NOTE — TRANSFER OF CARE
Anesthesia Transfer of Care Note    Patient: Madelaine Barros    Procedure(s) Performed: Procedure(s) (LRB):  EGD (ESOPHAGOGASTRODUODENOSCOPY) (N/A)    Patient location: PACU    Anesthesia Type: general    Transport from OR: Transported from OR on room air with adequate spontaneous ventilation    Post pain: adequate analgesia    Post assessment: no apparent anesthetic complications and tolerated procedure well    Post vital signs: stable    Level of consciousness: sedated    Nausea/Vomiting: no nausea/vomiting    Complications: none    Transfer of care protocol was followed      Last vitals:

## 2025-03-11 NOTE — NURSING
Pt unable to come to dialysis at this time, pt is josephine to have procedure today per primary nurse Shannon, Charge nurse Anil and ARUN Feliciano made aware

## 2025-03-11 NOTE — ASSESSMENT & PLAN NOTE
Chronic. Last Ultomiris dose 2/24/2025. Outpatient hematologist is Roma Cantu MD at [facility]. Consulted Hematology here. Appreciate assistance with management.

## 2025-03-11 NOTE — ASSESSMENT & PLAN NOTE
Diagnosed 1/20/2025, on apixaban at home. Held due to GI bleed. Started heparin drip 3/5/2025. Transition to apixaban when no further evidence of blood loss. Held heparin drip on 3/8/2025 due to significant drop in hemoglobin. Ultrasound showed persistent thrombus along with thrombus in subclavian vein, although it us unknown if this was there on 1/20/2025. Resumed heparin drip on 3/10/2025 at low intensity instead of high intensity.

## 2025-03-11 NOTE — PT/OT/SLP PROGRESS
Physical Therapy      Patient Name:  Madelaine Barros   MRN:  [id number]    Patient not seen today secondary to off the floor for procedure. Will follow-up again as able.

## 2025-03-11 NOTE — TREATMENT PLAN
GI Treatment Plan    20 F PEG tube placed without any immediate complications.   External bumper at 2.5 cm tomy.    Please follow the post-PEG recommendations including:   - Today: After 4 hours, may use PEG today for only medications and water  - Tomorrow: may use PEG tomorrow for feedings  - Flushes: flush PEG daily with 60 ml water  - Please consult nutrition for tube feed goals and recommendations.    Care Instructions  - antibiotic ointment to site, clean site with soap and water daily and dry thoroughly and clean site daily with half-strength hydrogen peroxide for three days, then soap and water daily.  - Dressing: change dressing on top of bumper daily    Svetlana Hdz MD  Gastroenterology Fellow

## 2025-03-11 NOTE — NURSING
[facility] - Telemetry Stepdown  Wound Care    Patient Name:  Madelaine Barros   MRN:  [id number]  Date: 3/11/2025  Diagnosis: Rectal bleeding    History:     Past Medical History:   Diagnosis Date    Allergic rhinitis 06/01/2019    Diabetes mellitus     Duodenal ulcer 12/11/2024    Bulb; 4mm; NO SRH on exam of 11 Dec 24      GERD without esophagitis 08/15/2024    Hiatal hernia 12/11/2024    History of Helicobacter pylori infection 03/02/2025    Hypertension     Hypertensive emergency 10/2024    Latent syphilis 11/2024    treated with PCN  - [facility] Admit    MAHA (microangiopathic hemolytic anemia) 11/03/2024    Polyp of colon 03/05/2024    Schatzki's ring 12/11/2024    TIA (transient ischemic attack) 10/2024    Type 2 MI (myocardial infarction) 11/2024    secondary to hypertensive emergency, normal ECHO - [facility] ADMIT    Wernicke encephalopathy 02/13/2025       Social History[1]    Precautions:     Allergies as of 03/02/2025    (No Known Allergies)       [facility] Assessment Details/Treatment     Wound care follow up for perineal/buttocks moisture associated dermatitis. Patient assisted with turning for skin evaluation. Moisture associated dermatitis to perineum and buttocks resolved. Recommend continuing Triad barrier cream per orders. Heels intact. Continue care per nursing. Contact wound care dept for any further needs.     03/11/25 1023        Wound 03/02/25 1500 Moisture associated dermatitis medial Perineum   Date First Assessed/Time First Assessed: 03/02/25 1500   Present on Original Admission: Yes  Primary Wound Type: (c) Moisture associated dermatitis  Orientation: medial  Location: Perineum   Appearance Intact   Periwound Area Intact   Care   (triad)          [1]   Social History  Socioeconomic History    Marital status: Single   Tobacco Use    Smoking status: Never   Substance and Sexual Activity    Alcohol use: Not Currently     Comment: occasionally    Drug use: No    Sexual activity: Not Currently     Social  Drivers of Health     Financial Resource Strain: Medium Risk (3/5/2025)    Overall Financial Resource Strain (CARDIA)     Difficulty of Paying Living Expenses: Somewhat hard   Food Insecurity: Food Insecurity Present (3/5/2025)    Hunger Vital Sign     Worried About Running Out of Food in the Last Year: Sometimes true     Ran Out of Food in the Last Year: Sometimes true   Transportation Needs: No Transportation Needs (1/31/2025)    Received from [facility]    PRAPARE - Transportation     Lack of Transportation (Medical): No     Lack of Transportation (Non-Medical): No   Physical Activity: Inactive (3/5/2025)    Exercise Vital Sign     Days of Exercise per Week: 0 days     Minutes of Exercise per Session: 0 min   Stress: Patient Declined (3/5/2025)    [employer] - Occupational Stress Questionnaire     Feeling of Stress : Patient declined   Recent Concern: Stress - Stress Concern Present (1/7/2025)    Received from [facility] - Occupational Stress Questionnaire     Feeling of Stress : To some extent   Housing Stability: Low Risk  (3/5/2025)    Housing Stability Vital Sign     Unable to Pay for Housing in the Last Year: No     Homeless in the Last Year: No   Recent Concern: Housing Stability - High Risk (1/31/2025)    Received from [facility]    Housing Stability Vital Sign     Unable to Pay for Housing in the Last Year: Yes     Number of Times Moved in the Last Year: 1     Homeless in the Last Year: No

## 2025-03-11 NOTE — ASSESSMENT & PLAN NOTE
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 . Most recent hemoglobin and hematocrit are listed below.  Recent Labs     03/10/25  0620 03/10/25  1516 03/11/25  0305   HGB 8.2* 7.4* 8.0*   HCT 24.1* 22.1* 24.5*     Plan  - Monitor serial CBC  - Transfuse PRBC if patient becomes hemodynamically unstable, symptomatic or H/H drops below 7/21.  - Transfused pRBCs 3/8/2025. Stable on 3/10/2025.

## 2025-03-11 NOTE — PT/OT/SLP PROGRESS
Speech Language Pathology      Madelaine Barros  MRN: [id number]    Patient not seen today secondary to NPO for scheduled procedure. Will follow-up next available service date per SLP poc.

## 2025-03-11 NOTE — ASSESSMENT & PLAN NOTE
Waxing and waning since October 2024 due to HUS. Lacks capacity, unable to communicate about her illness. Daughter Sue Barros wanted her to go to [facility] for continuity of care but has changed her mind and she will transition her medical care to [facility].

## 2025-03-11 NOTE — ASSESSMENT & PLAN NOTE
Outpatient HD Information:  -Dialysis modality: Hemodialysis  -HD TX days: Monday/Wednesday/Friday  -Last HD TX prior to hospital admission: ?  -Dialysis access: dialysis catheter  -Residual urine: Anuric   -EDW: ?    Recommendations    -Hyponatremia in setting of ESRD. HD again today for hyponatremia. Na bath adjusted.   -1.5L fluid restriction  -Renal diet when not NPO  -no indication for phos binders

## 2025-03-11 NOTE — PT/OT/SLP EVAL
Occupational Therapy   Evaluation    Name: Madelaine Barros  MRN: [id number]  Admitting Diagnosis: Rectal bleeding  Recent Surgery: Procedure(s) (LRB):  EGD (ESOPHAGOGASTRODUODENOSCOPY) (N/A) * Day of Surgery *    Recommendations:     Discharge Recommendations: Moderate Intensity Therapy  Discharge Equipment Recommendations:  hospital bed  Barriers to discharge:  None    Assessment:     Madelaine Barros is a 59 y.o. female with a medical diagnosis of Rectal bleeding.  She presents with decreased independence with ADL's. Performance deficits affecting function: weakness, impaired endurance, impaired self care skills, impaired functional mobility, impaired balance, decreased safety awareness, decreased lower extremity function, decreased upper extremity function, impaired cardiopulmonary response to activity, impaired cognition.      Rehab Prognosis: Fair; patient would benefit from acute skilled OT services to address these deficits and reach maximum level of function.       Plan:     Patient to be seen 4 x/week to address the above listed problems via self-care/home management, therapeutic activities, therapeutic exercises, neuromuscular re-education, cognitive retraining  Plan of Care Expires: 04/10/25  Plan of Care Reviewed with: patient, sibling    Subjective     Chief Complaint: fatigue  Patient/Family Comments/goals: Pt reported that she has never been this week.    Occupational Profile:  Living Environment: Pt reported that she resides with daughter & son in 2 story house with 1 step to enter & 15 steps to 2nd floor with right rail.  Pt's bed is on 2nd floor & full bathrooms are on both floors with bathtubs for bathing. Per sister, pt is currently staying on the 1st floor with her. Per sister, pt has required progressively increasing (A) since October of 2024.  For the last 2 weeks pt has used w/c for mobility requiring Mod (A) for transfers. Pt required another person to propel the w/c.  Pt has required  (A) with total ADL's since 12/24 & since 1/25 has been having bed baths due to weakness. 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 Pt enjoys movies.  Equipment Used at Home: wheelchair, shower chair, walker, rolling, cane, straight  Assistance upon Discharge: family    Pain/Comfort:  Pain Rating 1: 0/10  Pain Rating Post-Intervention 1: 0/10    Patients cultural, spiritual, Mormonism conflicts given the current situation: no    Objective:     Communicated with: RN prior to session.  Patient found supine with telemetry, pulse ox (continuous), oxygen, PureWick (no family present; later sister arrived at room) upon OT entry to room.    General Precautions: Standard, fall, NPO, aspiration  Orthopedic Precautions: N/A  Braces: N/A    Occupational Performance:    Bed Mobility:    Patient completed Rolling/Turning to Right with minimum assistance  Patient completed Scooting/Bridging with moderate assistance scooting forward on EOB, total (A) with drawsheet transfer up HOB while supine  Patient completed Supine to Sit with maximal assistance  Patient completed Sit to Supine with maximal assistance    Functional Mobility/Transfers:  NT due to pt dizziness at EOB    Activities of Daily Living:  Grooming: minimum assistance with wiping mouth while seated EOB with (A) for balance  Upper Body Dressing: maximal assistance donning gown around back while seated EOB  Lower Body Dressing: total assistance to don socks    Cognitive/Visual Perceptual:  Cognitive/Psychosocial Skills:     -       Oriented to: Person, Place, Time, and increased time for time   -       Follows Commands/attention:Follows one-step commands  -       Memory: Impaired STM  -       Safety awareness/insight to disability: impaired     Physical Exam:  Edema:  Mild BUE  Sensation:    -       Intact  Dominant hand:    -       right  Upper Extremity Range of Motion:  BUE WFL except ~90* shoulder flexion  Upper Extremity Strength: BUE ~3+/5 except 3-/5  shoulder flexion   Strength: BUE fair    AMPAC 6 Click ADL:  AMPAC Total Score: 12    Treatment & Education:  Pt required CGA-Min (A) for postural control while seated EOB.  Provided verbal & physical cues to facilitate postural control. Provided education on therex pt could perform with BUE & BLE while supine in bed to facilitate increased strength & endurance.  Provided education on need for (A) with OOB activities.  Provided education regarding role of OT, POC, & discharge recommendations with pt verbalizing understanding.  Pt had no further questions & when asked whether there were any concerns pt reported none.        Patient left supine with all lines intact, call button in reach, bed alarm on, and RN notified    GOALS:   Multidisciplinary Problems       Occupational Therapy Goals          Problem: Occupational Therapy    Goal Priority Disciplines Outcome Interventions   Occupational Therapy Goal     OT, PT/OT Progressing    Description: Goals to be met by: 4/10/25     Patient will increase functional independence with ADLs by performing:    UE Dressing with Minimal Assistance.  LE Dressing with Moderate Assistance.  Grooming while seated with Supervision.  Toileting from bedside commode with Moderate Assistance for hygiene and clothing management.   Sitting at edge of bed x15 minutes with Stand-by Assistance.  Rolling to Bilateral with Supervision.   Supine to sit with Minimal Assistance.  Stand pivot transfers with Moderate Assistance.                         DME Justifications:  Madelaine requires a hospital bed due to her requiring positioning of the body in ways not feasible with an ordinary bed due to limited ability and cannot independently make changes in body position without the use of the bed.The positioning of the body cannot be sufficiently resolved by the use of pillows and wedges.    History:     Past Medical History:   Diagnosis Date    Allergic rhinitis 06/01/2019    Diabetes mellitus      Duodenal ulcer 12/11/2024    Bulb; 4mm; NO SRH on exam of 11 Dec 24      GERD without esophagitis 08/15/2024    Hiatal hernia 12/11/2024    History of Helicobacter pylori infection 03/02/2025    Hypertension     Hypertensive emergency 10/2024    Latent syphilis 11/2024    treated with PCN  - [facility] Admit    MAHA (microangiopathic hemolytic anemia) 11/03/2024    Polyp of colon 03/05/2024    Schatzki's ring 12/11/2024    TIA (transient ischemic attack) 10/2024    Type 2 MI (myocardial infarction) 11/2024    secondary to hypertensive emergency, normal ECHO - [facility] ADMIT    Wernicke encephalopathy 02/13/2025         Past Surgical History:   Procedure Laterality Date    HYSTERECTOMY      TUBAL LIGATION         Time Tracking:     OT Date of Treatment: 03/11/25  OT Start Time: 1125  OT Stop Time: 1153  OT Total Time (min): 28 min    Billable Minutes:Evaluation 18  Therapeutic Activity 10    3/11/2025

## 2025-03-11 NOTE — PLAN OF CARE
Problem: Occupational Therapy  Goal: Occupational Therapy Goal  Description: Goals to be met by: 4/10/25     Patient will increase functional independence with ADLs by performing:    UE Dressing with Minimal Assistance.  LE Dressing with Moderate Assistance.  Grooming while seated with Supervision.  Toileting from bedside commode with Moderate Assistance for hygiene and clothing management.   Sitting at edge of bed x15 minutes with Stand-by Assistance.  Rolling to Bilateral with Supervision.   Supine to sit with Minimal Assistance.  Stand pivot transfers with Moderate Assistance.    Outcome: Progressing     OT eval completed.

## 2025-03-11 NOTE — SUBJECTIVE & OBJECTIVE
Interval History: Resumed heparin drip. Hemoglobin stable. Changed dextrose to normal saline to treat hyponatremia. Monitoring for hypoglycemia. EGD this afternoon.    Review of Systems   Unable to perform ROS: Mental status change     Objective:     Vital Signs (Most Recent):  Temp: 97.6 °F (36.4 °C) (03/11/25 0745)  Pulse: 99 (03/11/25 0120)  Resp: 13 (03/11/25 0120)  BP: (!) 154/98 (03/11/25 0745)  SpO2: 100 % (03/11/25 0120) Vital Signs (24h Range):  Temp:  [97.6 °F (36.4 °C)-98.1 °F (36.7 °C)] 97.6 °F (36.4 °C)  Pulse:  [] 99  Resp:  [13-18] 13  SpO2:  [100 %] 100 %  BP: ()/() 154/98     Weight: 72.1 kg (158 lb 15.2 oz)  Body mass index is 26.45 kg/m².    Intake/Output Summary (Last 24 hours) at 3/11/2025 0920  Last data filed at 3/10/2025 2144  Gross per 24 hour   Intake 560 ml   Output 2106 ml   Net -1546 ml         Physical Exam  Vitals and nursing note reviewed.   Constitutional:       General: She is not in acute distress.     Appearance: She is well-developed. She is ill-appearing. She is not diaphoretic.      Interventions: She is not intubated.  Pulmonary:      Effort: Pulmonary effort is normal. No accessory muscle usage or respiratory distress. She is not intubated.   Skin:     General: Skin is warm and dry.      Coloration: Skin is not jaundiced or pale.   Neurological:      Mental Status: She is confused.      Motor: No seizure activity.   Psychiatric:         Attention and Perception: She is inattentive.         Mood and Affect: Affect is blunt.         Behavior: Behavior is not aggressive or hyperactive.         Cognition and Memory: Cognition is impaired. Memory is impaired.               Significant Labs: All pertinent labs within the past 24 hours have been reviewed.  CBC:   Recent Labs   Lab 03/10/25  0620 03/10/25  1516 03/11/25  0305   WBC 12.22 9.81 9.86   HGB 8.2* 7.4* 8.0*   HCT 24.1* 22.1* 24.5*   PLT 86* 99* 136*       Significant Imaging: I have reviewed all pertinent  imaging results/findings within the past 24 hours.

## 2025-03-11 NOTE — PLAN OF CARE
SW assigned to pt's care team. SW anticipates Home Health.  Per MD;  Resumed heparin drip. Hemoglobin stable. Changed dextrose to normal saline to treat hyponatremia. Monitoring for hypoglycemia. EGD this afternoon     Discharge Plan A and Plan B have been determined by review of patient's clinical status, future medical and therapeutic needs, and coverage/benefits for post-acute care in coordination with multidisciplinary team members.      SW will continue to follow for d/c needs.     Update on Home health services:    1.[facility]- Pending  2.[facility]- Pending     3.[facility]- pending      Will follow.               Soco Moser LMSW  - [facility]

## 2025-03-11 NOTE — ASSESSMENT & PLAN NOTE
Hepatic steatosis on CT. Worsening hepatic function may contribute to her worsening coagulopathy elevated PT/PTT. Has significantly improved.

## 2025-03-11 NOTE — PROVATION PATIENT INSTRUCTIONS
Discharge Summary/Instructions after an Endoscopic Procedure  Patient Name: Madelaine Barros  Patient MRN: [id number]  Patient YOB: 1965 Tuesday, March 11, 2025  Anup Bernard MD  Dear patient,  As a result of recent federal legislation (The Federal Cures Act), you may   receive lab or pathology results from your procedure in your [facility]   account before your physician is able to contact you. Your physician or   their representative will relay the results to you with their   recommendations at their soonest availability.  Thank you,  RESTRICTIONS:  During your procedure today, you received medications for sedation.  These   medications may affect your judgment, balance and coordination.  Therefore,   for 24 hours, you have the following restrictions:   - DO NOT drive a car, operate machinery, make legal/financial decisions,   sign important papers or drink alcohol.    ACTIVITY:  Today: no heavy lifting, straining or running due to procedural   sedation/anesthesia.  The following day: return to full activity including work.  DIET:  Eat and drink normally unless instructed otherwise.     TREATMENT FOR COMMON SIDE EFFECTS:  - Mild abdominal pain, nausea, belching, bloating or excessive gas:  rest,   eat lightly and use a heating pad.  - Sore Throat: treat with throat lozenges and/or gargle with warm salt   water.  - Because air was used during the procedure, expelling large amounts of air   from your rectum or belching is normal.  - If a bowel prep was taken, you may not have a bowel movement for 1-3 days.    This is normal.  SYMPTOMS TO WATCH FOR AND REPORT TO YOUR PHYSICIAN:  1. Abdominal pain or bloating, other than gas cramps.  2. Chest pain.  3. Back pain.  4. Signs of infection such as: chills or fever occurring within 24 hours   after the procedure.  5. Rectal bleeding, which would show as bright red, maroon, or black stools.   (A tablespoon of blood from the rectum is not serious, especially  if   hemorrhoids are present.)  6. Vomiting.  7. Weakness or dizziness.  GO DIRECTLY TO THE NEAREST EMERGENCY ROOM IF YOU HAVE ANY OF THE FOLLOWING:      Difficulty breathing              Chills and/or fever over 101 F   Persistent vomiting and/or vomiting blood   Severe abdominal pain   Severe chest pain   Black, tarry stools   Bleeding- more than one tablespoon   Any other symptom or condition that you feel may need urgent attention  Your doctor recommends these additional instructions:  If any biopsies were taken, your doctors clinic will contact you in 1 to 2   weeks with any results.  - Return patient to hospital enciso for ongoing care.   - NPO.   - Continue present medications.   - Resume heparin at prior dose today.   - Please follow the post-PEG recommendations including: Nutrition consult   for formula and volume, may use PEG today for meds and water, may use PEG   tomorrow for feedings and flush PEG daily with 60 ml water.  For questions, problems or results please call your physician - Anup Bernard MD at Work:  [phone].  [facility] [place] [facility], EMERGENCY ROOM PHONE NUMBER: [phone]  IF A COMPLICATION OR EMERGENCY SITUATION ARISES AND YOU ARE UNABLE TO REACH   YOUR PHYSICIAN - GO DIRECTLY TO THE EMERGENCY ROOM.  Anup Bernard MD  3/11/2025 5:38:03 PM  This report has been verified and signed electronically.  Dear patient,  As a result of recent federal legislation (The Federal Cures Act), you may   receive lab or pathology results from your procedure in your [facility]   account before your physician is able to contact you. Your physician or   their representative will relay the results to you with their   recommendations at their soonest availability.  Thank you,  PROVATION

## 2025-03-11 NOTE — INTERVAL H&P NOTE
The patient has been examined and the H&P has been reviewed:    Pre-Procedure H and P Addendum    Patient seen and examined.  History and exam unchanged from prior history and physical.      Procedure: EGD with PEG placement  Indication: Dysphagia   ASA Class: per anesthesiology  Airway: normal  Neck Mobility: full range of motion  Mallampatti score: per anesthesia  History of anesthesia problems: no  Family history of anesthesia problems: no  Anesthesia Plan: MAC    Active Hospital Problems    Diagnosis  POA    *Rectal bleeding [K62.5]  Yes    Dehydration with hyponatremia [E86.0, E87.1]  No    Renal hematoma, left, sequela [S37.012S]  Not Applicable    Anemia of chronic renal failure, stage 5 [N18.5, D63.1]  Yes     Chronic    Metabolic encephalopathy [G93.41]  Yes    Elevated transaminase level [R74.01]  Yes    Acute thrombosis of right internal jugular vein [I82.C11]  Yes    History of Helicobacter pylori infection [Z86.19]  Yes    Wernicke encephalopathy [E51.2]  Yes     Chronic    ESRD (end stage renal disease) [N18.6]  Yes     Chronic    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

## 2025-03-11 NOTE — PROGRESS NOTES
[facility] - Telemetry Stepdown  Nephrology  Progress Note    Patient Name: Madelaine Barros  MRN: [id number]  Admission Date: 3/2/2025  Hospital Length of Stay: 8 days  Attending Provider: Edi Staton MD   Primary Care Physician: Delilah Kelley MD  Principal Problem:Rectal bleeding    Subjective:     Interval History: HD yesterday, tolerated well. Net UF 1.6L. Na 122 this morning. Continues on heparin. D10 changed to NS.     Review of patient's allergies indicates:  No Known Allergies  Current Facility-Administered Medications   Medication Frequency    0.9%  NaCl infusion (for blood administration) Q24H PRN    0.9% NaCl infusion Continuous    aluminum-magnesium hydroxide-simethicone 200-200-20 mg/5 mL suspension 30 mL QID PRN    dextrose 50% injection 12.5 g PRN    dextrose 50% injection 25 g PRN    EScitalopram oxalate tablet 10 mg Daily    folic acid tablet 1,000 mcg Daily    glucagon (human recombinant) injection 1 mg PRN    glucose chewable tablet 16 g PRN    glucose chewable tablet 24 g PRN    heparin (porcine) injection 1,000 Units PRN    heparin 25,000 units in dextrose 5% (100 units/ml) IV bolus from bag LOW INTENSITY nomogram - [facility] PRN    heparin 25,000 units in dextrose 5% (100 units/ml) IV bolus from bag LOW INTENSITY nomogram - [facility] PRN    heparin 25,000 units in dextrose 5% 250 mL (100 units/mL) infusion LOW INTENSITY nomogram - [facility] Continuous    melatonin tablet 6 mg Nightly PRN    mirtazapine tablet 15 mg QHS    mupirocin 2 % ointment BID    naloxone 0.4 mg/mL injection 0.02 mg PRN    ondansetron injection 4 mg Q8H PRN    pantoprazole EC tablet 40 mg BID AC    polyethylene glycol packet 17 g Daily PRN    prochlorperazine injection Soln 5 mg Q6H PRN    senna-docusate 8.6-50 mg per tablet 1 tablet BID PRN    sodium chloride 0.9% flush 10 mL Q6H PRN    thiamine tablet 100 mg Daily    vitamin D 1000 units tablet 1,000 Units Daily    vitamin renal formula (B-complex-vitamin c-folic acid) 1 mg  per capsule 1 capsule Daily       Objective:     Vital Signs (Most Recent):  Temp: 98.2 °F (36.8 °C) (03/11/25 1158)  Pulse: 102 (03/11/25 1158)  Resp: 18 (03/11/25 1158)  BP: (!) 143/94 (03/11/25 1158)  SpO2: 99 % (03/11/25 1158) Vital Signs (24h Range):  Temp:  [97.6 °F (36.4 °C)-98.2 °F (36.8 °C)] 98.2 °F (36.8 °C)  Pulse:  [] 102  Resp:  [13-18] 18  SpO2:  [99 %-100 %] 99 %  BP: ()/() 143/94     Weight: 72.1 kg (158 lb 15.2 oz) (03/04/25 0958)  Body mass index is 26.45 kg/m².  Body surface area is 1.82 meters squared.    I/O last 3 completed shifts:  In: 680 [P.O.:180; Other:500]  Out: 2106 [Other:2106]     Physical Exam  Vitals and nursing note reviewed.   Constitutional:       General: She is not in acute distress.     Appearance: She is well-developed. She is ill-appearing. She is not diaphoretic.      Interventions: She is not intubated.  Pulmonary:      Effort: Pulmonary effort is normal. No accessory muscle usage or respiratory distress. She is not intubated.   Skin:     General: Skin is warm and dry.      Coloration: Skin is not jaundiced or pale.   Neurological:      Motor: No seizure activity.   Psychiatric:         Attention and Perception: She is attentive.         Behavior: Behavior is not aggressive or hyperactive.         Cognition and Memory: Cognition is not impaired. Memory is not impaired.        Significant Labs:  CBC:   Recent Labs   Lab 03/11/25  0305   WBC 9.86   RBC 2.83*   HGB 8.0*   HCT 24.5*   *   MCV 87   MCH 28.3   MCHC 32.7     CMP:   Recent Labs   Lab 03/10/25  1516 03/10/25  2126 03/11/25  0305 03/11/25  0812   GLU 82   < > 69*  --    CALCIUM 8.2*   < > 8.1*  --    ALBUMIN 2.0*   < > 2.1*  --    PROT 5.0*  --   --   --    *   < > 122* 125*   K 4.7   < > 4.0  --    CO2 22*   < > 22*  --    CL 90*   < > 93*  --    BUN 25*   < > 15  --    CREATININE 4.1*   < > 3.0*  --    ALKPHOS 267*  --   --   --    *  --   --   --    *  --   --   --     BILITOT 1.5*  --   --   --     < > = values in this interval not displayed.     All labs within the past 24 hours have been reviewed.         Assessment/Plan:     Renal/  ESRD (end stage renal disease)  Outpatient HD Information:  -Dialysis modality: Hemodialysis  -HD TX days: Monday/Wednesday/Friday  -Last HD TX prior to hospital admission: ?  -Dialysis access: dialysis catheter  -Residual urine: Anuric   -EDW: ?    Recommendations    -Hyponatremia in setting of ESRD. HD again today for hyponatremia. Na bath adjusted.   -1.5L fluid restriction  -Renal diet when not NPO  -no indication for phos binders    Oncology  Anemia of chronic renal failure, stage 5  At goal Hgb of 10-11     GI  * Rectal bleeding  - per primary         Thank you for your consult. I will follow-up with patient. Please contact us if you have any additional questions.    Gladys Feliciano DNP  Nephrology  [facility] - Telemetry Stepdown

## 2025-03-11 NOTE — ASSESSMENT & PLAN NOTE
Hyponatremia is likely due to hypotonic fluids and poor oral intake. The patient's most recent sodium results are listed below.  Recent Labs     03/10/25  1516 03/10/25  2126 03/11/25  0305   * 124* 122*     Plan  - Correct the sodium by 4-6mEq in 24 hours.   - Will treat the hyponatremia with IV fluids as follows: NS  - Monitor sodium Every 4 hours.   - Patient hyponatremia is improving

## 2025-03-11 NOTE — SUBJECTIVE & OBJECTIVE
Interval History: HD yesterday, tolerated well. Net UF 1.6L. Na 122 this morning. Continues on heparin. D10 changed to NS.     Review of patient's allergies indicates:  No Known Allergies  Current Facility-Administered Medications   Medication Frequency    0.9%  NaCl infusion (for blood administration) Q24H PRN    0.9% NaCl infusion Continuous    aluminum-magnesium hydroxide-simethicone 200-200-20 mg/5 mL suspension 30 mL QID PRN    dextrose 50% injection 12.5 g PRN    dextrose 50% injection 25 g PRN    EScitalopram oxalate tablet 10 mg Daily    folic acid tablet 1,000 mcg Daily    glucagon (human recombinant) injection 1 mg PRN    glucose chewable tablet 16 g PRN    glucose chewable tablet 24 g PRN    heparin (porcine) injection 1,000 Units PRN    heparin 25,000 units in dextrose 5% (100 units/ml) IV bolus from bag LOW INTENSITY nomogram - [facility] PRN    heparin 25,000 units in dextrose 5% (100 units/ml) IV bolus from bag LOW INTENSITY nomogram - [facility] PRN    heparin 25,000 units in dextrose 5% 250 mL (100 units/mL) infusion LOW INTENSITY nomogram - [facility] Continuous    melatonin tablet 6 mg Nightly PRN    mirtazapine tablet 15 mg QHS    mupirocin 2 % ointment BID    naloxone 0.4 mg/mL injection 0.02 mg PRN    ondansetron injection 4 mg Q8H PRN    pantoprazole EC tablet 40 mg BID AC    polyethylene glycol packet 17 g Daily PRN    prochlorperazine injection Soln 5 mg Q6H PRN    senna-docusate 8.6-50 mg per tablet 1 tablet BID PRN    sodium chloride 0.9% flush 10 mL Q6H PRN    thiamine tablet 100 mg Daily    vitamin D 1000 units tablet 1,000 Units Daily    vitamin renal formula (B-complex-vitamin c-folic acid) 1 mg per capsule 1 capsule Daily       Objective:     Vital Signs (Most Recent):  Temp: 98.2 °F (36.8 °C) (03/11/25 1158)  Pulse: 102 (03/11/25 1158)  Resp: 18 (03/11/25 1158)  BP: (!) 143/94 (03/11/25 1158)  SpO2: 99 % (03/11/25 1158) Vital Signs (24h Range):  Temp:  [97.6 °F (36.4 °C)-98.2 °F (36.8 °C)] 98.2 °F (36.8  °C)  Pulse:  [] 102  Resp:  [13-18] 18  SpO2:  [99 %-100 %] 99 %  BP: ()/() 143/94     Weight: 72.1 kg (158 lb 15.2 oz) (03/04/25 0958)  Body mass index is 26.45 kg/m².  Body surface area is 1.82 meters squared.    I/O last 3 completed shifts:  In: 680 [P.O.:180; Other:500]  Out: 2106 [Other:2106]     Physical Exam  Vitals and nursing note reviewed.   Constitutional:       General: She is not in acute distress.     Appearance: She is well-developed. She is ill-appearing. She is not diaphoretic.      Interventions: She is not intubated.  Pulmonary:      Effort: Pulmonary effort is normal. No accessory muscle usage or respiratory distress. She is not intubated.   Skin:     General: Skin is warm and dry.      Coloration: Skin is not jaundiced or pale.   Neurological:      Motor: No seizure activity.   Psychiatric:         Attention and Perception: She is attentive.         Behavior: Behavior is not aggressive or hyperactive.         Cognition and Memory: Cognition is not impaired. Memory is not impaired.        Significant Labs:  CBC:   Recent Labs   Lab 03/11/25  0305   WBC 9.86   RBC 2.83*   HGB 8.0*   HCT 24.5*   *   MCV 87   MCH 28.3   MCHC 32.7     CMP:   Recent Labs   Lab 03/10/25  1516 03/10/25  2126 03/11/25  0305 03/11/25  0812   GLU 82   < > 69*  --    CALCIUM 8.2*   < > 8.1*  --    ALBUMIN 2.0*   < > 2.1*  --    PROT 5.0*  --   --   --    *   < > 122* 125*   K 4.7   < > 4.0  --    CO2 22*   < > 22*  --    CL 90*   < > 93*  --    BUN 25*   < > 15  --    CREATININE 4.1*   < > 3.0*  --    ALKPHOS 267*  --   --   --    *  --   --   --    *  --   --   --    BILITOT 1.5*  --   --   --     < > = values in this interval not displayed.     All labs within the past 24 hours have been reviewed.          8.12

## 2025-03-11 NOTE — PROGRESS NOTES
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 She lives in [facility]. She works for [employer]. Her primary care physician is Dr. Delilah Kelley.               She was hospitalized at [facility] from 10/23/2024 to 11/6/2024.              She was hospitalized at [facility] from 11/15/2024 to 1/18/2024.              She was hospitalized at [facility] from 12/9/2024 to 12/19/2024.              She was hospitalized at [facility] from 12/25/2024 to 1/5/2025.              She was hospitalized at [facility] from 1/5/2025 to 1/31/2025. She was discharged [place] [facility] [place] until 2/18/2025.               She presented to [facility] on 3/2/2025 with rectal pain and bleeding. She receives her care in the [facility], not at [facility]. History was provided by her daughter Sue Barros. She is followed by Hematology at [facility] and has been on immunomodulator  infusions every 8 weeks (Soliris, now Ultomiris, last dose 2/24/2025). She has severe debility, poor appetite, dysphagia to solids, mostly consumes liquid, but her daughter was concerned her nutritional intake is inadequate and inquired about feeding tube placement for chronic nutrition. She had watery stool on the day of presentation. Her sister noted that she had gingival bleeding. She reported an anal lesion with tenderness and slow bleeding. She has not required frequent transfusions. Her daughter noted that all home antihypertensive medications were discontinued since she was discharged from inpatient rehab.              In the emergency department, she appeared ill, lethargic, unable to fully participate in interview, periodically awakening with pain. Labs showed hemoglobin 14.3 g/dL (from 13.2 on 2/24/2025). Repeat hemoglobin was 10.4. Alkaline phosphatase was 251 U/L, total bilirubin was 2.2 mg/dL, AST was 733 U/L, ALT was 478 U/L. She was given 2.1 liters of IV fluids, vancomycin, piperacillin-tazobactam, 4 mg of IV morphine, 80 mg of IV pantoprazole, topical lidocaine for rectal pain. She was admitted to Hospital Medicine Team S.     Overview/Hospital Course:  GGT was elevated at 447 U/L. LDH was elevated. Colorectal Surgery, Hematology, Nephrology, and Case Management were consulted. She expressed desire to go to [facility] since [place] [facility] Her mental status has been declining since October 2024. She has memory loss, disorientation, and does not remember her illness. Her mother had dementia. She had chest pain on 3/3/2025 while getting dialysis. EKG showed sinus tachycardia. Troponin was 1102 ng/L. Repeat was 704. Hematology recommended giving 2 units of fresh frozen plasma (FFP) and starting low rate heparin infusion then transitioning to apixaban 2.5 mg twice daily if she had no further bleeding. She was given another 2 units of FFP. Her daughter requested gastrostomy tube  placement for malnutrition. On 3/4/2025, heparin was held due to recurrent bleeding. She had intermittent somnolence. She was kept NPO. Speech Language Pathology was consulted. She was hypoglycemic so was put on 10% dextrose drip. Speech Language Pathology recommended full liquid diet. LFTs trended down. Heparin infusion was restarted on 3/5/2025. On 3/6/2025, her daughter decided that she did not want transfer to a [facility], instead she wanted to transfer her medical care to the [facility] system. Hemoglobin and hematocrit remained stable with no evidence of recurrent bleeding on 3/7/2025. Heparin drip was continued. She had epistaxis on 3/7/2025. Hemoglobin decreased to 6.7 g/dL on 3/8/2025. She developed right arm swelling. Repeat hemoglobin was 5.7. Heparin drip was held. She was transfused 1 unit of packed red blood cells (PRBCs). Hemoglobin improved to 8.2. Right upper extremity venous ultrasound showed known right internal jugular thrombus as well as subclavian thrombus, although the subclavian vein was not visualized when the internal jugular thrombus was diagnosed, so it was unknown if it was new. Hemoglobin was stable on 3/10/2025. Heparin drip was resumed. Sodium was found to be 118 mmol/L. She had been on dextrose drip for days to treat hypoglycemia due to poor oral intake. She was given normal saline instead.     Interval History: Resumed heparin drip. Hemoglobin stable. Changed dextrose to normal saline to treat hyponatremia. Monitoring for hypoglycemia. EGD this afternoon.    Review of Systems   Unable to perform ROS: Mental status change     Objective:     Vital Signs (Most Recent):  Temp: 97.6 °F (36.4 °C) (03/11/25 0745)  Pulse: 99 (03/11/25 0120)  Resp: 13 (03/11/25 0120)  BP: (!) 154/98 (03/11/25 0745)  SpO2: 100 % (03/11/25 0120) Vital Signs (24h Range):  Temp:  [97.6 °F (36.4 °C)-98.1 °F (36.7 °C)] 97.6 °F (36.4 °C)  Pulse:  [] 99  Resp:  [13-18] 13  SpO2:  [100 %] 100 %  BP:  ()/() 154/98     Weight: 72.1 kg (158 lb 15.2 oz)  Body mass index is 26.45 kg/m².    Intake/Output Summary (Last 24 hours) at 3/11/2025 0920  Last data filed at 3/10/2025 2144  Gross per 24 hour   Intake 560 ml   Output 2106 ml   Net -1546 ml         Physical Exam  Vitals and nursing note reviewed.   Constitutional:       General: She is not in acute distress.     Appearance: She is well-developed. She is ill-appearing. She is not diaphoretic.      Interventions: She is not intubated.  Pulmonary:      Effort: Pulmonary effort is normal. No accessory muscle usage or respiratory distress. She is not intubated.   Skin:     General: Skin is warm and dry.      Coloration: Skin is not jaundiced or pale.   Neurological:      Mental Status: She is confused.      Motor: No seizure activity.   Psychiatric:         Attention and Perception: She is inattentive.         Mood and Affect: Affect is blunt.         Behavior: Behavior is not aggressive or hyperactive.         Cognition and Memory: Cognition is impaired. Memory is impaired.               Significant Labs: All pertinent labs within the past 24 hours have been reviewed.  CBC:   Recent Labs   Lab 03/10/25  0620 03/10/25  1516 03/11/25  0305   WBC 12.22 9.81 9.86   HGB 8.2* 7.4* 8.0*   HCT 24.1* 22.1* 24.5*   PLT 86* 99* 136*       Significant Imaging: I have reviewed all pertinent imaging results/findings within the past 24 hours.      Assessment & Plan  Rectal bleeding  Holding home aspirin and apixaban. Has been on heparin drip. Appreciate Colorectal Surgery. Subsided. Monitor for recurrence.  Unspecified severe protein-calorie malnutrition  Severe malnutrition patient with recent hx of worsening dysphagia, has had w/u for scleroderma, has hiatal hernia and ?esophageal dysmotility  - dysphagia to solids.   Developed wernicke encephalopathy from nutritional deficiency     Daughter reports concern of inadequate intake has had severe weight loss in recent  months with multiple complex health conditions. She requested PEG placement. Nutrition gave recommendations on tube feeds. Giving minced and moist diet, Novasource Renal supplements.  Dysphagia  Appreciate SLP. Advanced diet to minced and moist. Was giving dextrose drip for hypoglycemia but this caused hyponatremia so changed to normal saline and will have to watch for hypoglycemia. GI will place PEG today.  Wernicke encephalopathy  Continue on home thiamine supplementation.     GERD without esophagitis  Continue home pantoprazole.    ESRD (end stage renal disease)  Nephrology managing dialysis.    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 Last Ultomiris dose 2/24/2025. Outpatient hematologist is [place]. Consulted Hematology here. Appreciate assistance with management.  Elevated transaminase level  Hepatic steatosis on CT. Worsening hepatic function may contribute to her worsening coagulopathy elevated PT/PTT. Has significantly improved.  Acute thrombosis of right internal jugular vein  Diagnosed 1/20/2025, on apixaban at home. Held due to GI bleed. Started heparin drip 3/5/2025. Transition to apixaban when no further evidence of blood loss. Held heparin drip on 3/8/2025 due to significant drop in hemoglobin. Ultrasound showed persistent thrombus along with thrombus in subclavian vein, although it us unknown if this was there on 1/20/2025. Resumed heparin drip on 3/10/2025 at low intensity instead of high intensity.  History of Helicobacter pylori infection  Diagnosed via EGD and reported treated in [facility].     Renal hematoma, left, sequela  In January, due to biopsy in 2024. Most recent imaging showed resolving appearance of remote hematoma.   Anemia of chronic renal failure, stage 5  Anemia is likely due to acute blood loss which was from GI bleed and active hemolysis due to HUS.  . Most recent hemoglobin and hematocrit are listed  below.  Recent Labs     03/10/25  0620 03/10/25  1516 03/11/25  0305   HGB 8.2* 7.4* 8.0*   HCT 24.1* 22.1* 24.5*     Plan  - Monitor serial CBC  - Transfuse PRBC if patient becomes hemodynamically unstable, symptomatic or H/H drops below 7/21.  - Transfused pRBCs 3/8/2025. Stable on 3/10/2025.  Metabolic encephalopathy  Waxing and waning since October 2024 due to HUS. Lacks capacity, unable to communicate about her illness. Daughter Sue Barros wanted her to go to [facility] for continuity of care but has changed her mind and she will transition her medical care to [facility].  Dehydration with hyponatremia  Hyponatremia is likely due to hypotonic fluids and poor oral intake. The patient's most recent sodium results are listed below.  Recent Labs     03/10/25  1516 03/10/25  2126 03/11/25  0305   * 124* 122*     Plan  - Correct the sodium by 4-6mEq in 24 hours.   - Will treat the hyponatremia with IV fluids as follows: NS  - Monitor sodium Every 4 hours.   - Patient hyponatremia is improving    VTE Risk Mitigation (From admission, onward)           Ordered     heparin 25,000 units in dextrose 5% (100 units/ml) IV bolus from bag LOW INTENSITY nomogram - [facility]  As needed (PRN)        Question:  Heparin Infusion Adjustment (DO NOT MODIFY ANSWER)  Answer:  \\[email or web address]\Images\[email or web address]\HeparinInfusions\heparin LOW INTENSITY nomogram for [facility] [id number]    03/10/25 1008     heparin 25,000 units in dextrose 5% (100 units/ml) IV bolus from bag LOW INTENSITY nomogram - [facility]  As needed (PRN)        Question:  Heparin Infusion Adjustment (DO NOT MODIFY ANSWER)  Answer:  \[email or web address]\Images\Pharmacy\HeparinInfusions\heparin LOW INTENSITY nomogram for [facility] [id number]    03/10/25 1008     heparin 25,000 units in dextrose 5% 250 mL (100 units/mL) infusion LOW INTENSITY nomogram - [facility]  Continuous        Question:  Begin at (units/kg/hr)  Answer:  12    03/10/25 1008     heparin (porcine) injection 1,000  Units  As needed (PRN)         03/03/25 1524     IP VTE HIGH RISK PATIENT  Once         03/03/25 0416     Place sequential compression device  Until discontinued         03/03/25 0416     Reason for No Pharmacological VTE Prophylaxis  Once        Question:  Reasons:  Answer:  Active Bleeding    03/03/25 0416                    Discharge Planning   PATRICIA: 3/11/2025     Code Status: Full Code   Medical Readiness for Discharge Date:   Discharge Plan A: Home with family, Home Health                Please place Justification for DME        Edi Staton MD  Department of Hospital Medicine   [facility] - Telemetry Stepdown

## 2025-03-12 ENCOUNTER — OFFICE VISIT (OUTPATIENT)
Dept: DIALYSIS | Facility: HOSPITAL | Age: 60
End: 2025-03-12
Attending: EMERGENCY MEDICINE
Payer: COMMERCIAL

## 2025-03-12 PROBLEM — Z93.1 PEG (PERCUTANEOUS ENDOSCOPIC GASTROSTOMY) STATUS: Status: ACTIVE | Noted: 2025-03-12

## 2025-03-12 LAB
ABO + RH BLD: NORMAL
ALBUMIN SERPL BCP-MCNC: 2.1 G/DL (ref 3.5–5.2)
ALP SERPL-CCNC: 222 U/L (ref 40–150)
ALT SERPL W/O P-5'-P-CCNC: 133 U/L (ref 10–44)
ANION GAP SERPL CALC-SCNC: 8 MMOL/L (ref 8–16)
APTT PPP: 31.5 SEC (ref 21–32)
APTT PPP: >150 SEC (ref 21–32)
APTT PPP: >150 SEC (ref 21–32)
AST SERPL-CCNC: 145 U/L (ref 10–40)
BASOPHILS # BLD AUTO: 0.01 K/UL (ref 0–0.2)
BASOPHILS NFR BLD: 0.1 % (ref 0–1.9)
BILIRUB SERPL-MCNC: 1.3 MG/DL (ref 0.1–1)
BLD GP AB SCN CELLS X3 SERPL QL: NORMAL
BLD PROD TYP BPU: NORMAL
BLOOD UNIT EXPIRATION DATE: NORMAL
BLOOD UNIT TYPE CODE: 6200
BLOOD UNIT TYPE: NORMAL
BUN SERPL-MCNC: 20 MG/DL (ref 6–20)
CALCIUM SERPL-MCNC: 8 MG/DL (ref 8.7–10.5)
CHLORIDE SERPL-SCNC: 95 MMOL/L (ref 95–110)
CO2 SERPL-SCNC: 20 MMOL/L (ref 23–29)
CODING SYSTEM: NORMAL
CREAT SERPL-MCNC: 3.8 MG/DL (ref 0.5–1.4)
CROSSMATCH INTERPRETATION: NORMAL
DIFFERENTIAL METHOD BLD: ABNORMAL
DISPENSE STATUS: NORMAL
EOSINOPHIL # BLD AUTO: 0 K/UL (ref 0–0.5)
EOSINOPHIL NFR BLD: 0.1 % (ref 0–8)
ERYTHROCYTE [DISTWIDTH] IN BLOOD BY AUTOMATED COUNT: 16.6 % (ref 11.5–14.5)
EST. GFR  (NO RACE VARIABLE): 13.1 ML/MIN/1.73 M^2
GLUCOSE SERPL-MCNC: 60 MG/DL (ref 70–110)
HCT VFR BLD AUTO: 20.4 % (ref 37–48.5)
HGB BLD-MCNC: 6.6 G/DL (ref 12–16)
IMM GRANULOCYTES # BLD AUTO: 0.03 K/UL (ref 0–0.04)
IMM GRANULOCYTES NFR BLD AUTO: 0.3 % (ref 0–0.5)
LYMPHOCYTES # BLD AUTO: 1.6 K/UL (ref 1–4.8)
LYMPHOCYTES NFR BLD: 15 % (ref 18–48)
MCH RBC QN AUTO: 28 PG (ref 27–31)
MCHC RBC AUTO-ENTMCNC: 32.4 G/DL (ref 32–36)
MCV RBC AUTO: 86 FL (ref 82–98)
MONOCYTES # BLD AUTO: 0.7 K/UL (ref 0.3–1)
MONOCYTES NFR BLD: 6.5 % (ref 4–15)
NEUTROPHILS # BLD AUTO: 8.1 K/UL (ref 1.8–7.7)
NEUTROPHILS NFR BLD: 78 % (ref 38–73)
NRBC BLD-RTO: 0 /100 WBC
PLATELET # BLD AUTO: 154 K/UL (ref 150–450)
PMV BLD AUTO: 11.1 FL (ref 9.2–12.9)
POCT GLUCOSE: 42 MG/DL (ref 70–110)
POCT GLUCOSE: 50 MG/DL (ref 70–110)
POCT GLUCOSE: 59 MG/DL (ref 70–110)
POCT GLUCOSE: 67 MG/DL (ref 70–110)
POCT GLUCOSE: 72 MG/DL (ref 70–110)
POCT GLUCOSE: 73 MG/DL (ref 70–110)
POCT GLUCOSE: 74 MG/DL (ref 70–110)
POCT GLUCOSE: 76 MG/DL (ref 70–110)
POCT GLUCOSE: 88 MG/DL (ref 70–110)
POTASSIUM SERPL-SCNC: 4.5 MMOL/L (ref 3.5–5.1)
PROT SERPL-MCNC: 5.2 G/DL (ref 6–8.4)
RBC # BLD AUTO: 2.36 M/UL (ref 4–5.4)
SODIUM SERPL-SCNC: 123 MMOL/L (ref 136–145)
SPECIMEN OUTDATE: NORMAL
TRANS ERYTHROCYTES VOL PATIENT: NORMAL ML
WBC # BLD AUTO: 10.41 K/UL (ref 3.9–12.7)

## 2025-03-12 PROCEDURE — 25000003 PHARM REV CODE 250: Performed by: HOSPITALIST

## 2025-03-12 PROCEDURE — 90935 HEMODIALYSIS ONE EVALUATION: CPT | Mod: ,,, | Performed by: NURSE PRACTITIONER

## 2025-03-12 PROCEDURE — 20600001 HC STEP DOWN PRIVATE ROOM

## 2025-03-12 PROCEDURE — 80100016 HC MAINTENANCE HEMODIALYSIS

## 2025-03-12 PROCEDURE — 84295 ASSAY OF SERUM SODIUM: CPT | Performed by: FAMILY MEDICINE

## 2025-03-12 PROCEDURE — 86920 COMPATIBILITY TEST SPIN: CPT | Performed by: HOSPITALIST

## 2025-03-12 PROCEDURE — 92526 ORAL FUNCTION THERAPY: CPT

## 2025-03-12 PROCEDURE — 85730 THROMBOPLASTIN TIME PARTIAL: CPT | Mod: 91 | Performed by: HOSPITALIST

## 2025-03-12 PROCEDURE — 63600175 PHARM REV CODE 636 W HCPCS: Performed by: INTERNAL MEDICINE

## 2025-03-12 PROCEDURE — 25000003 PHARM REV CODE 250: Performed by: FAMILY MEDICINE

## 2025-03-12 PROCEDURE — 80053 COMPREHEN METABOLIC PANEL: CPT | Performed by: HOSPITALIST

## 2025-03-12 PROCEDURE — 99231 SBSQ HOSP IP/OBS SF/LOW 25: CPT | Mod: ,,,

## 2025-03-12 PROCEDURE — 86850 RBC ANTIBODY SCREEN: CPT | Performed by: INTERNAL MEDICINE

## 2025-03-12 PROCEDURE — 63600175 PHARM REV CODE 636 W HCPCS: Performed by: HOSPITALIST

## 2025-03-12 PROCEDURE — P9021 RED BLOOD CELLS UNIT: HCPCS | Performed by: HOSPITALIST

## 2025-03-12 PROCEDURE — 85025 COMPLETE CBC W/AUTO DIFF WBC: CPT | Performed by: FAMILY MEDICINE

## 2025-03-12 PROCEDURE — 97535 SELF CARE MNGMENT TRAINING: CPT

## 2025-03-12 RX ORDER — MORPHINE SULFATE 2 MG/ML
2 INJECTION, SOLUTION INTRAMUSCULAR; INTRAVENOUS ONCE
Status: COMPLETED | OUTPATIENT
Start: 2025-03-12 | End: 2025-03-12

## 2025-03-12 RX ORDER — METHOCARBAMOL 500 MG/1
1000 TABLET, FILM COATED ORAL ONCE
Status: DISCONTINUED | OUTPATIENT
Start: 2025-03-12 | End: 2025-03-12

## 2025-03-12 RX ORDER — METHOCARBAMOL 500 MG/1
1000 TABLET, FILM COATED ORAL ONCE
Status: DISCONTINUED | OUTPATIENT
Start: 2025-03-12 | End: 2025-03-17

## 2025-03-12 RX ADMIN — Medication 100 MG: at 01:03

## 2025-03-12 RX ADMIN — NEPHROCAP 1 CAPSULE: 1 CAP ORAL at 01:03

## 2025-03-12 RX ADMIN — DEXTROSE MONOHYDRATE 12.5 G: 25 INJECTION, SOLUTION INTRAVENOUS at 03:03

## 2025-03-12 RX ADMIN — Medication 16 G: at 06:03

## 2025-03-12 RX ADMIN — DEXTROSE MONOHYDRATE 25 G: 25 INJECTION, SOLUTION INTRAVENOUS at 01:03

## 2025-03-12 RX ADMIN — Medication 1000 UNITS: at 01:03

## 2025-03-12 RX ADMIN — FOLIC ACID 1000 MCG: 1 TABLET ORAL at 01:03

## 2025-03-12 RX ADMIN — ONDANSETRON 4 MG: 2 INJECTION INTRAMUSCULAR; INTRAVENOUS at 12:03

## 2025-03-12 RX ADMIN — MORPHINE SULFATE 2 MG: 2 INJECTION, SOLUTION INTRAMUSCULAR; INTRAVENOUS at 03:03

## 2025-03-12 RX ADMIN — MUPIROCIN: 20 OINTMENT TOPICAL at 08:03

## 2025-03-12 RX ADMIN — PANTOPRAZOLE SODIUM 40 MG: 40 TABLET, DELAYED RELEASE ORAL at 03:03

## 2025-03-12 RX ADMIN — DEXTROSE MONOHYDRATE 12.5 G: 25 INJECTION, SOLUTION INTRAVENOUS at 07:03

## 2025-03-12 RX ADMIN — MIRTAZAPINE 15 MG: 15 TABLET, FILM COATED ORAL at 08:03

## 2025-03-12 RX ADMIN — APIXABAN 5 MG: 5 TABLET, FILM COATED ORAL at 08:03

## 2025-03-12 RX ADMIN — ESCITALOPRAM OXALATE 10 MG: 5 TABLET, FILM COATED ORAL at 01:03

## 2025-03-12 RX ADMIN — ONDANSETRON 4 MG: 2 INJECTION INTRAMUSCULAR; INTRAVENOUS at 01:03

## 2025-03-12 NOTE — ASSESSMENT & PLAN NOTE
Appreciate SLP. Advanced diet to minced and moist. Appreciate GI. PEG placed 3/11/2025. Will start enteral water and tube feeds recommended by Nutrition.

## 2025-03-12 NOTE — PROGRESS NOTES
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 She lives in [facility]. She works for [employer]. Her primary care physician is Dr. Delilah Kelley.               She was hospitalized at [facility] from 10/23/2024 to 11/6/2024.              She was hospitalized at [facility] from 11/15/2024 to 1/18/2024.              She was hospitalized at [facility] from 12/9/2024 to 12/19/2024.              She was hospitalized at [facility] from 12/25/2024 to 1/5/2025.              She was hospitalized at [facility] from 1/5/2025 to 1/31/2025. She was discharged to [facility] [place] [place] rehab until 2/18/2025.               She presented to [facility] Emergency Department on 3/2/2025 with rectal pain and bleeding. She receives her care in the [facility] [place] [place] system, not at [facility]. History was provided by her daughter Sue Barros. She is followed by Hematology at [facility] and has been on immunomodulator  infusions every 8 weeks (Soliris, now Ultomiris, last dose 2/24/2025). She has severe debility, poor appetite, dysphagia to solids, mostly consumes liquid, but her daughter was concerned her nutritional intake is inadequate and inquired about feeding tube placement for chronic nutrition. She had watery stool on the day of presentation. Her sister noted that she had gingival bleeding. She reported an anal lesion with tenderness and slow bleeding. She has not required frequent transfusions. Her daughter noted that all home antihypertensive medications were discontinued since she was discharged from inpatient rehab.              In the emergency department, she appeared ill, lethargic, unable to fully participate in interview, periodically awakening with pain. Labs showed hemoglobin 14.3 g/dL (from 13.2 on 2/24/2025). Repeat hemoglobin was 10.4. Alkaline phosphatase was 251 U/L, total bilirubin was 2.2 mg/dL, AST was 733 U/L, ALT was 478 U/L. She was given 2.1 liters of IV fluids, vancomycin, piperacillin-tazobactam, 4 mg of IV morphine, 80 mg of IV pantoprazole, topical lidocaine for rectal pain. She was admitted to Hospital Medicine Team S.     Overview/Hospital Course:  GGT was elevated at 447 U/L. LDH was elevated. Colorectal Surgery, Hematology, Nephrology, and Case Management were consulted. She expressed desire to go to [facility] since she gets [place] [facility]. Her mental status has been declining since October 2024. She has memory loss, disorientation, and does not remember her illness. Her mother had dementia. She had chest pain on 3/3/2025 while getting dialysis. EKG showed sinus tachycardia. Troponin was 1102 ng/L. Repeat was 704. Hematology recommended giving 2 units of fresh frozen plasma (FFP) and starting low rate heparin infusion then transitioning to apixaban 2.5 mg twice daily if she had no further bleeding. She was given another 2 units of FFP. Her daughter requested gastrostomy tube  placement for malnutrition. On 3/4/2025, heparin was held due to recurrent bleeding. She had intermittent somnolence. She was kept NPO. Speech Language Pathology was consulted. She was hypoglycemic so was put on 10% dextrose drip. Speech Language Pathology recommended full liquid diet. LFTs trended down. Heparin infusion was restarted on 3/5/2025. On 3/6/2025, her daughter decided that she did not want transfer to a [facility] hospital, instead she wanted to transfer her medical care to the [facility] system. Hemoglobin and hematocrit remained stable with no evidence of recurrent bleeding on 3/7/2025. Heparin drip was continued. She had epistaxis on 3/7/2025. Hemoglobin decreased to 6.7 g/dL on 3/8/2025. She developed right arm swelling. Repeat hemoglobin was 5.7. Heparin drip was held. She was transfused 1 unit of packed red blood cells (PRBCs). Hemoglobin improved to 8.2. Right upper extremity venous ultrasound showed known right internal jugular thrombus as well as subclavian thrombus, although the subclavian vein was not visualized when the internal jugular thrombus was diagnosed, so it was unknown if it was new. Hemoglobin was stable on 3/10/2025. Heparin drip was resumed. Sodium was found to be 118 mmol/L. She had been on dextrose drip for days to treat hypoglycemia due to poor oral intake. She was given normal saline instead. Gastroenterology placed a gastrostomy tube on 3/11/2025. Around midnight that night, she vomited twice. Nausea resolved.     Interval History: Switch heparin drip to apixaban. Start using PEG. She reports no nausea. Abdominal X-ray was done around 5 am this morning but no report is available yet as of 14:27.    Review of Systems   Respiratory:  Negative for cough and shortness of breath.    Cardiovascular:  Negative for chest pain.   Gastrointestinal:  Negative for abdominal pain and nausea.     Objective:     Vital Signs (Most Recent):  Temp: 97.9 °F (36.6 °C) (03/12/25 1200)  Pulse: 94  (03/12/25 1200)  Resp: 18 (03/12/25 1200)  BP: (!) 136/97 (03/12/25 1200)  SpO2: 100 % (03/12/25 0810) Vital Signs (24h Range):  Temp:  [97.5 °F (36.4 °C)-98.7 °F (37.1 °C)] 97.9 °F (36.6 °C)  Pulse:  [] 94  Resp:  [10-19] 18  SpO2:  [81 %-100 %] 100 %  BP: (106-158)/() 136/97     Weight: 72.1 kg (158 lb 15.2 oz)  Body mass index is 26.45 kg/m².    Intake/Output Summary (Last 24 hours) at 3/12/2025 1425  Last data filed at 3/12/2025 1100  Gross per 24 hour   Intake 275 ml   Output --   Net 275 ml         Physical Exam  Vitals and nursing note reviewed.   Constitutional:       General: She is not in acute distress.     Appearance: She is well-developed. She is ill-appearing. She is not diaphoretic.      Interventions: She is not intubated.  Pulmonary:      Effort: Pulmonary effort is normal. No accessory muscle usage or respiratory distress. She is not intubated.   Abdominal:      General: There is no distension.      Palpations: Abdomen is soft.      Tenderness: There is no abdominal tenderness. There is no guarding.   Skin:     General: Skin is warm and dry.      Coloration: Skin is not jaundiced or pale.   Neurological:      Mental Status: She is alert.      Motor: No tremor or seizure activity.   Psychiatric:         Attention and Perception: Attention normal.         Mood and Affect: Affect is blunt.         Behavior: Behavior is not aggressive, hyperactive or combative.               Significant Labs: All pertinent labs within the past 24 hours have been reviewed.  CBC:   Recent Labs   Lab 03/10/25  1516 03/11/25  0305 03/12/25  0601   WBC 9.81 9.86 10.41   HGB 7.4* 8.0* 6.6*   HCT 22.1* 24.5* 20.4*   PLT 99* 136* 154       Significant Imaging: I have reviewed all pertinent imaging results/findings within the past 24 hours.      Assessment & Plan  Rectal bleeding  Appreciate Colorectal Surgery. Subsided. Monitor for recurrence.  Unspecified severe protein-calorie malnutrition  Severe malnutrition  patient with recent hx of worsening dysphagia, has had w/u for scleroderma, has hiatal hernia and ?esophageal dysmotility  - dysphagia to solids.   Developed wernicke encephalopathy from nutritional deficiency     Daughter reports concern of inadequate intake has had severe weight loss in recent months with multiple complex health conditions. She requested PEG placement. Nutrition gave recommendations on tube feeds. Giving minced and moist diet, Novasource Renal supplements.  Dysphagia  Appreciate SLP. Advanced diet to minced and moist. Appreciate GI. PEG placed 3/11/2025. Will start enteral water and tube feeds recommended by Nutrition.  Wernicke encephalopathy  Continue on home thiamine supplementation.     GERD without esophagitis  Continue home pantoprazole.    ESRD (end stage renal disease)  Nephrology managing dialysis.    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 Last Ultomiris dose 2/24/2025. Outpatient hematologist is Roma Cantu MD at [facility]. Consulted Hematology here. Appreciate assistance with management.  Elevated transaminase level  Hepatic steatosis on CT. Worsening hepatic function may contribute to her worsening coagulopathy elevated PT/PTT. Has significantly improved.  Acute thrombosis of right internal jugular vein  Diagnosed 1/20/2025, on apixaban at home. Held due to GI bleed. Started heparin drip 3/5/2025. Transition to home apixaban   History of Helicobacter pylori infection  Diagnosed via EGD and reported [place] in [facility].     Renal hematoma, left, sequela  In January, due to biopsy in 2024. Most recent imaging showed resolving appearance of remote hematoma.   Anemia of chronic renal failure, stage 5  Anemia is likely due to acute blood loss which was from GI bleed and active hemolysis due to HUS.  . Most recent hemoglobin and hematocrit are listed below.  Recent Labs     03/10/25  1516 03/11/25  0305 03/12/25  0601   HGB 7.4* 8.0* 6.6*    HCT 22.1* 24.5* 20.4*     Plan  - Monitor serial CBC  - Transfuse PRBC if patient becomes hemodynamically unstable, symptomatic or H/H drops below 7/21.  - Transfused pRBCs 3/8/2025 after having epistaxis.  - Stable until she had some bleeding at new PEG site. Transfuse again 3/12/2025.  Metabolic encephalopathy  Waxing and waning since October 2024 due to HUS. Lacks capacity, unable to communicate about her illness. Daughter Sue Barros wanted her to go to [facility] for continuity of care but has changed her mind and she will transition her medical care to [facility].  Hyponatremia  Hyponatremia is likely due to hypotonic fluids and poor oral intake. The patient's most recent sodium results are listed below.  Recent Labs     03/11/25 2034 03/12/25  0102 03/12/25  0601   * 123* 123*  123*     Plan  - Monitor.    VTE Risk Mitigation (From admission, onward)           Ordered     apixaban tablet 5 mg  2 times daily         03/12/25 1148     heparin (porcine) injection 1,000 Units  As needed (PRN)         03/03/25 1524     IP VTE HIGH RISK PATIENT  Once         03/03/25 0416     Place sequential compression device  Until discontinued         03/03/25 0416     Reason for No Pharmacological VTE Prophylaxis  Once        Question:  Reasons:  Answer:  Active Bleeding    03/03/25 0416                    Discharge Planning   PATRICIA: 3/13/2025     Code Status: Full Code   Medical Readiness for Discharge Date:   Discharge Plan A: Home with family, Home Health                Please place Justification for DME        Edi Staton MD  Department of Hospital Medicine   [facility] - Telemetry Stepdown

## 2025-03-12 NOTE — PROGRESS NOTES
[facility] - Telemetry Stepdown  Adult Nutrition  Progress Note    SUMMARY     Recommendations  1. Initiate TF via PEG of Novasource @10ml/hr, advancing as tolerated to a goal rate of 40ml/hr to provide 1920 kcals, 87g of proteins, 691ml. Free water flushes of 210ml Q4H to provide an additional 1260ml. Total free water= 1951 ml.     --Avoid holding TF for residuals less than 500mL unless associated with other s/s of intolerance such as N/V/D, abd pain/distention.     2. Monitor weight, labs, and residuals     Goals: Pt will meet 85% of EEN/EPN by RD follow up    Nutrition Goal Status: goal not met  Communication of RD Recs:  (POC)    Nutrition Discharge Planning  Nutrition Discharge Planning: Enteral nutrition (comments)  Therapeutic diet (comments): NPO  Oral supplement regimen (comments): 0  Enteral nutrition (comments): Novasource Renal    Assessment and Plan  Nutrition Problem  Inadequate oral intake     Related to (etiology):   Decreased ability to consume sufficient needs     Signs and Symptoms (as evidenced by):   NPO   Severe debility, poor appetite, dysphagia to solid, mostly consuming liquids      Interventions:  Collaboration by nutrition professional with other providers   Enteral Nutrition- Novasource Renal     Nutrition Diagnosis Status:   New    Malnutrition Assessment  Unable to assess at this time, will attempt at follow up     Reason for Assessment  Reason For Assessment: RD follow-up  Diagnosis:  (Rectal bleeding)  General Information Comments: Pt is currently NPO. PEG placed 3/11/25. ESRD on HD. Lethargic. Mental status declining since October 2024. Anil-11/medial perineum. HD today. Rectal bleeding. Multiple hospitalizations at [facility] from October 2024-Jan 2025. Weight stable for the last 2 months.    Nutrition/Diet History  Nutrition Intake History: [facility]  Food Preferences: [facility]  Spiritual, Cultural Beliefs, Buddhism Practices, Values that Affect Care: no  Factors Affecting Nutritional Intake:  "decreased appetite, chewing difficulties/inability to chew food, difficulty/impaired swallowing    Food Insecurity: Food Insecurity Present (3/5/2025)    Hunger Vital Sign     Worried About Running Out of Food in the Last Year: Sometimes true     Ran Out of Food in the Last Year: Sometimes true     Anthropometrics  Height: 5' 5" (165.1 cm)  Height (inches): 65 in  Weight: 72.1 kg (158 lb 15.2 oz)  Weight (lb): 158.95 lb  Ideal Body Weight (IBW), Female: 125 lb  % Ideal Body Weight, Female (lb): 127.16 %  BMI (Calculated): 26.5  BMI Grade: 25 - 29.9 - overweight  Usual Body Weight (UBW), k.1 kg (1/3/25)  % Usual Body Weight: 100.21  % Weight Change From Usual Weight: 0 %     Lab/Procedures/Meds  Pertinent Labs Reviewed: reviewed  Pertinent Labs Comments: Na 123, CO2 20, Creatinine 3.8, Ca 8, GFR 10, Glucose 60, , Cholesterol 237  Pertinent Medications Reviewed: reviewed  Pertinent Medications Comments: folic acid, pantoprazole, thiamine, vit D 1000 units, renal vitamin    Estimated/Assessed Needs  Weight Used For Calorie Calculations: 72.1 kg (158 lb 15.2 oz)  Energy Calorie Requirements (kcal): 9444-0845 kcals (25-30 kcals/kg)  Energy Need Method: Kcal/kg  Protein Requirements: 72-86g (1-1.2g/kg)  Weight Used For Protein Calculations: 72.1 kg (158 lb 15.2 oz)     Estimated Fluid Requirement Method: RDA Method  RDA Method (mL): 1802  CHO Requirement: 225g    Nutrition Prescription Ordered  Current Diet Order: NPO  Oral Nutrition Supplement: 0    Evaluation of Received Nutrient/Fluid Intake  I/O: 560/2106  Energy Calories Required: not meeting needs  Protein Required: not meeting needs  Fluid Required: not meeting needs  Comments: LBM-3/12/25  Tolerance: not tolerating  % Intake of Estimated Energy Needs: Other: NPO  % Meal Intake: NPO    Nutrition Risk  Level of Risk/Frequency of Follow-up: high (2x/week)     Monitor and Evaluation  Monitor and Evaluation: Protein intake, Diet order, Weight, " Gastrointestinal profile, Electrolyte and renal panel     Nutrition Follow-Up  RD Follow-up?: Yes

## 2025-03-12 NOTE — ASSESSMENT & PLAN NOTE
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 . Most recent hemoglobin and hematocrit are listed below.  Recent Labs     03/10/25  1516 03/11/25  0305 03/12/25  0601   HGB 7.4* 8.0* 6.6*   HCT 22.1* 24.5* 20.4*     Plan  - Monitor serial CBC  - Transfuse PRBC if patient becomes hemodynamically unstable, symptomatic or H/H drops below 7/21.  - Transfused pRBCs 3/8/2025 after having epistaxis.  - Stable until she had some bleeding at new PEG site. Transfuse again 3/12/2025.

## 2025-03-12 NOTE — PT/OT/SLP PROGRESS
"Speech Language Pathology Treatment/Discharge    Patient Name:  Madelaine Barros   MRN:  [id number]  Admitting Diagnosis: Rectal bleeding    Recommendations:                 General Recommendations:  Follow-up not indicated  Diet recommendations: From an oropharyngeal standpoint, pt is safe to initiate Regular Diet - IDDSI Level 7, Liquid Diet Level: Thin liquids - IDDSI Level 0 for pleasure/comfort if given MD clearance   Aspiration Precautions: 1 bite/sip at a time, Feed only when awake/alert, continue alternate means of nutrition given ongoing concern for ability to orally maintain nutrition, Frequent oral care, HOB to 90 degrees, Meds whole 1 at a time, Monitor for s/s of aspiration, Small bites/sips, and Standard aspiration precautions   General Precautions: Standard, aspiration  Communication strategies:  go to room if call light pushed    Assessment:     Madelaine Barros is a 59 y.o. female with an SLP diagnosis of a largely functional oropharyngeal swallow. Prior to PEG placement yesterday, SLP with ongoing concern and caution with advanced diet textures only 2/2 pt lethargy and poor PO intake with pt adequately tolerating minced and moist texture. She is now s/p PEG placement 2/2 concerns for ability to orally maintain nutrition. This date, pt presented with increased levels of alertness. Recommending initiation of regular/thins for comfort/pleasure as MD provides clearance. No further skilled SLP services warranted at this time.    Subjective     "Thank you so much for your help"  Pt unaccompanied  RN agreeable to SLP session and gave clearance for PO intake.     Pain/Comfort:  Pain Rating 1: 0/10    Respiratory Status: Room air    Objective:     Has the patient been evaluated by SLP for swallowing?   Yes  Keep patient NPO? No     Pt seen for ongoing assessment of swallow function this date s/p PEG placement yesterday. Pt able to maintain adequate levels of alertness to participate in conversation " with SLP. Pt accepted regular solids via half sheet cracker and thin liquids via single and consecutive straw sips demonstrating timely AP transit and no overt signs of aspiration. Pt able to achieve complete oral clearance IND. Pt's oropharyngeal swallow function remains largely functional at this time. Given PEG placement to ensure pt receives adequate nutrition, pt remains safe for pleasure feedings of regular solids when awake/alert. SLP educated pt re: SLP services, recs for comfort/pleasure feedings when awake/alert only, PEG to provide nutrition, oral PO intake to promote utilization of oropharyngeal muscles, aspiration precautions, and poc to which she demonstrated understanding. All questions were answered to her satisfaction. As MD in agreement, recommending initiation of regular solid diet with thin liquids for comfort/pleasure and meds whole one at a time with standard aspiration precautions when pt is awake/alert only. No further acute SLP needs at this time.     Goals:   Multidisciplinary Problems       SLP Goals          Problem: SLP    Goal Priority Disciplines Outcome   SLP Goal     SLP Progressing   Description: Speech Language Pathology Goals  Goals expected to be met by 3/18:   1) Pt will tolerate least restrictive diet in order to maintain adequate nutrition/hydration.     Recommending full liquid diet. SLP to follow.                            Plan:     Patient to be seen:  4 x/week   Plan of Care expires:  04/04/25  Plan of Care reviewed with:  patient   SLP Follow-Up:  No       Discharge recommendations:  No Therapy Indicated   Barriers to Discharge:  None    Time Tracking:     SLP Treatment Date:   03/12/25  Speech Start Time:  1340  Speech Stop Time:  1357     Speech Total Time (min):  17 min    Billable Minutes: Treatment Swallowing Dysfunction 9 and Self Care/Home Management Training 8    03/12/2025

## 2025-03-12 NOTE — ASSESSMENT & PLAN NOTE
Diagnosed via EGD and reported treated in [facility] system.

## 2025-03-12 NOTE — NURSING TRANSFER
Nursing Transfer Note      3/11/2025   7:31 PM    Nurse giving handoff:FAREED Buchanan PACU  Nurse receiving handoff:FAREED Joseph [facility]    Reason patient is being transferred: post anesthesia    Transfer To: [id number]    Transfer via stretcher    Transfer with  to O2    Transported by PCT, RN    Transfer Vital Signs:  Blood Pressure:154/92  Heart Rate:90  O2:100%-- 2LNC  Temperature:  Respirations:11      Additional Lines: Oxygen    Medicines sent: n/a    Any special needs or follow-up needed: n/a    Patient belongings transferred with patient: No    Chart send with patient: Yes    Notified: no family listed in contacts    Patient reassessed at: 3/11/25@ 1930     Upon arrival to floor: cardiac monitor applied, patient oriented to room, call bell in reach, and bed in lowest position

## 2025-03-12 NOTE — SUBJECTIVE & OBJECTIVE
Interval History: Switch heparin drip to apixaban. Start using PEG. She reports no nausea. Abdominal X-ray was done around 5 am this morning but no report is available yet as of 14:27.    Review of Systems   Respiratory:  Negative for cough and shortness of breath.    Cardiovascular:  Negative for chest pain.   Gastrointestinal:  Negative for abdominal pain and nausea.     Objective:     Vital Signs (Most Recent):  Temp: 97.9 °F (36.6 °C) (03/12/25 1200)  Pulse: 94 (03/12/25 1200)  Resp: 18 (03/12/25 1200)  BP: (!) 136/97 (03/12/25 1200)  SpO2: 100 % (03/12/25 0810) Vital Signs (24h Range):  Temp:  [97.5 °F (36.4 °C)-98.7 °F (37.1 °C)] 97.9 °F (36.6 °C)  Pulse:  [] 94  Resp:  [10-19] 18  SpO2:  [81 %-100 %] 100 %  BP: (106-158)/() 136/97     Weight: 72.1 kg (158 lb 15.2 oz)  Body mass index is 26.45 kg/m².    Intake/Output Summary (Last 24 hours) at 3/12/2025 1425  Last data filed at 3/12/2025 1100  Gross per 24 hour   Intake 275 ml   Output --   Net 275 ml         Physical Exam  Vitals and nursing note reviewed.   Constitutional:       General: She is not in acute distress.     Appearance: She is well-developed. She is ill-appearing. She is not diaphoretic.      Interventions: She is not intubated.  Pulmonary:      Effort: Pulmonary effort is normal. No accessory muscle usage or respiratory distress. She is not intubated.   Abdominal:      General: There is no distension.      Palpations: Abdomen is soft.      Tenderness: There is no abdominal tenderness. There is no guarding.   Skin:     General: Skin is warm and dry.      Coloration: Skin is not jaundiced or pale.   Neurological:      Mental Status: She is alert.      Motor: No tremor or seizure activity.   Psychiatric:         Attention and Perception: Attention normal.         Mood and Affect: Affect is blunt.         Behavior: Behavior is not aggressive, hyperactive or combative.               Significant Labs: All pertinent labs within the past 24  hours have been reviewed.  CBC:   Recent Labs   Lab 03/10/25  1516 03/11/25  0305 03/12/25  0601   WBC 9.81 9.86 10.41   HGB 7.4* 8.0* 6.6*   HCT 22.1* 24.5* 20.4*   PLT 99* 136* 154       Significant Imaging: I have reviewed all pertinent imaging results/findings within the past 24 hours.

## 2025-03-12 NOTE — PROGRESS NOTES
Dialysis completed. Right IJ tunneled catheter flushed, normal saline locked and ends wrapped with sterile gauze. Dialyzed for 3.5 hours with fluid removal of 1.5 liters. Patient received one unit of blood during dialysis . Tolerated well. Returned to her room by stretcher.

## 2025-03-12 NOTE — ANESTHESIA POSTPROCEDURE EVALUATION
Anesthesia Post Evaluation    Patient: Madelaine Barros    Procedure(s) Performed: Procedure(s) (LRB):  EGD (ESOPHAGOGASTRODUODENOSCOPY) (N/A)    Final Anesthesia Type: general      Patient location during evaluation: [facility]  Patient participation: Yes- Able to Participate  Level of consciousness: awake and alert  Post-procedure vital signs: reviewed and stable  Pain management: adequate  Airway patency: patent  SANDRA mitigation strategies: Multimodal analgesia  PONV status at discharge: No PONV  Anesthetic complications: no      Cardiovascular status: blood pressure returned to baseline and hemodynamically stable  Respiratory status: unassisted and spontaneous ventilation  Hydration status: euvolemic  Follow-up not needed.              Vitals Value Taken Time   /81 03/12/25 06:02   Temp 37.1 °C (98.7 °F) 03/12/25 05:20   Pulse 94 03/12/25 06:24   Resp 10 03/12/25 06:24   SpO2 100 % 03/12/25 06:24   Vitals shown include unfiled device data.      Event Time   Out of Recovery 19:37:00         Pain/Jhoana Score: Pain Rating Prior to Med Admin: 10 (3/12/2025  3:02 AM)  Pain Rating Post Med Admin: 0 (3/12/2025  3:32 AM)  Jhoana Score: 9 (3/11/2025  6:15 PM)

## 2025-03-12 NOTE — PT/OT/SLP PROGRESS
Occupational Therapy      Patient Name:  Madelaine Barros   MRN:  [id number]    Patient not seen today secondary to  (pt away at HD at time of session attempt.  OT unable to return later in day to re-attempt.). Will follow-up as appropriate.    3/12/2025

## 2025-03-12 NOTE — ASSESSMENT & PLAN NOTE
Waxing and waning since October 2024 due to HUS. Lacks capacity, unable to communicate about her illness. Daughter Sue Barros wanted her to go to [facility] for continuity of care but has changed her mind and she will transition her medical care to [facility].

## 2025-03-12 NOTE — PROGRESS NOTES
Patient arrived to the [facility] by stretcher. Maintenance dialysis started via right IJ tunneled catheter. Glucose check of 88. Vitals stable.

## 2025-03-12 NOTE — PT/OT/SLP PROGRESS
Physical Therapy      Patient Name:  Madelaine Barros   MRN:  [id number]    Patient not seen today secondary to  (pt not seen due to being in dialysis. PT was not able to make 2nd attempt for treatment.). Will follow-up at a later date.  3/12/2025  .

## 2025-03-12 NOTE — NURSING
"Pt. C/o excruciating pain to LUQ PEG site and is guarding abd, crying and moaning in pain. Site appears to be swollen with small amount of blood from site.On call SD Pruitt MD notified and made aware of current situation. One time dose of pain med. ordered to be given through PEG. Nurse asked about an xray or something that verifies placement. Response was "only NG tubes are verified by xray.""Okay to give meds and water through PEG tube since it has been four hours since placement." Nurse gave med. Prior to being given 7ml of residual noted. Will cont. to monitor.   "

## 2025-03-12 NOTE — PLAN OF CARE
CM faxed home health referral to [facility] via [employer]. CM also emailed referral to Rc Franco with [facility].      [facility] declined - staffing.    Tete Goetz RN     [phone]

## 2025-03-12 NOTE — ASSESSMENT & PLAN NOTE
Diagnosed 1/20/2025, on apixaban at home. Held due to GI bleed. Started heparin drip 3/5/2025. Transition to home apixaban

## 2025-03-12 NOTE — PROGRESS NOTES
[facility] STAFF HEMODIALYSIS NOTE     Patient currently on hemodialysis for removal of uremic toxins and volume.     Patient seen and evaluated on hemodialysis, tolerating treatment, see HD flowsheet for vitals and assessments.    Labs have been reviewed and the dialysate bath has been adjusted.       Assessment/Plan:    ESRD  -Patient seen on HD, tolerating treatment well, w/o complaints   -Hyponatremia in setting of ESRD. Pt requiring heparin gtt. Was also on D10 gtt. Na bath adjusted.   -PEG tube placed yesterday.   -UF goal of 1.5L  -Hgb 6.6- transfusing 1 unit prbcs   -Renal diet, if not NPO   -Strict I/O's and daily weights  -Daily renal function panels  -Keep MAP >65 while on HD   -Hgb goal 10-11  -Will continue to follow while inpatient     Gladys Feliciano DNP-FNP, C  Nephrology  Pager: [phone]

## 2025-03-12 NOTE — SUBJECTIVE & OBJECTIVE
Subjective:     Interval History: s/p PEG placement yesterday. bumper at 2.5 cm.     Objective:     Vital Signs (Most Recent):  Temp: 97.9 °F (36.6 °C) (03/12/25 1200)  Pulse: 95 (03/12/25 1447)  Resp: 18 (03/12/25 1200)  BP: (!) 136/97 (03/12/25 1200)  SpO2: 100 % (03/12/25 0810) Vital Signs (24h Range):  Temp:  [97.5 °F (36.4 °C)-98.7 °F (37.1 °C)] 97.9 °F (36.6 °C)  Pulse:  [] 95  Resp:  [10-19] 18  SpO2:  [81 %-100 %] 100 %  BP: (106-158)/() 136/97     Weight: 72.1 kg (158 lb 15.2 oz) (03/04/25 0958)  Body mass index is 26.45 kg/m².      Intake/Output Summary (Last 24 hours) at 3/12/2025 1502  Last data filed at 3/12/2025 1100  Gross per 24 hour   Intake 275 ml   Output --   Net 275 ml       Lines/Drains/Airways       Central Venous Catheter Line  Duration                  Hemodialysis Catheter right subclavian -- days              Drain  Duration                  Gastrostomy/Enterostomy 03/11/25 1710 Gastrostomy tube w/o balloon LUQ <1 day              Peripheral Intravenous Line  Duration                  Peripheral IV - Single Lumen 03/10/25 1150 22 G Anterior;Right Hand 2 days                     Physical Exam  Vitals and nursing note reviewed.   Constitutional:       General: She is not in acute distress.     Appearance: She is well-developed. She is ill-appearing. She is not diaphoretic.      Interventions: She is not intubated.  Pulmonary:      Effort: Pulmonary effort is normal. No accessory muscle usage or respiratory distress. She is not intubated.   Abdominal:      General: There is no distension.      Palpations: Abdomen is soft.      Tenderness: There is no abdominal tenderness. There is no guarding.   Skin:     General: Skin is warm and dry.      Coloration: Skin is not jaundiced or pale.   Neurological:      Mental Status: She is alert.      Motor: No tremor or seizure activity.   Psychiatric:         Attention and Perception: Attention normal.         Mood and Affect: Affect is  blunt.         Behavior: Behavior is not aggressive, hyperactive or combative.         Significant Labs:  Recent Lab Results  (Last 5 results in the past 24 hours)        03/12/25  1304   03/12/25  1149   03/12/25  0824   03/12/25  0739   03/12/25  0601        Unit Blood Type Code         6200  [P]       Unit Expiration         [id number]  [P]       Unit Blood Type         A POS  [P]       Albumin         2.1       ALP         222       ALT         133       Anion Gap         8       PTT         >150.0  Comment: Refer to local heparin nomogram for intensity/dose specific   therapeutic   range.  APTT critical result(s) called and verbal readback obtained from   Opal Duque RN. by LAURIE 03/12/2025 07:02         AST         145       Baso #         0.01       Basophil %         0.1       BILIRUBIN TOTAL         1.3  Comment: For infants and newborns, interpretation of results should be based  on gestational age, weight and in agreement with clinical  observations.    Premature Infant recommended reference ranges:  Up to 24 hours.............<8.0 mg/dL  Up to 48 hours............<12.0 mg/dL  3-5 days..................<15.0 mg/dL  6-29 days.................<15.0 mg/dL         BUN         20       Calcium         8.0       Chloride         95       CO2         20       CODING SYSTEM         [id number]  [P]       Creatinine         3.8       Crossmatch Interpretation         Compatible  [P]       Differential Method         Automated       DISPENSE STATUS         ISSUED  [P]       eGFR         13.1       Eos #         0.0       Eos %         0.1       Glucose         60       Gran # (ANC)         8.1       Gran %         78.0       Group & Rh         A POS       Hematocrit         20.4       Hemoglobin         6.6       Immature Grans (Abs)         0.03  Comment: Mild elevation in immature granulocytes is non specific and   can be seen in a variety of conditions including stress response,   acute inflammation, trauma and  pregnancy. Correlation with other   laboratory and clinical findings is essential.         Immature Granulocytes         0.3       INDIRECT NOEL         NEG       Lymph #         1.6       Lymph %         15.0       MCH         28.0       MCHC         32.4       MCV         86       Mono #         0.7       Mono %         6.5       MPV         11.1       nRBC         0       Platelet Count         154       POCT Glucose 42   72   88   67         Potassium         4.5       Product Code         [id number]  [P]       PROTEIN TOTAL         5.2       RBC         2.36       RDW         16.6       Sodium         123                123       Specimen Outdate         03/15/2025 23:59       UNIT NUMBER         [id number]  [P]       WBC         10.41                               [P] - Preliminary Result                 Significant Imaging:  Imaging results within the past 24 hours have been reviewed.

## 2025-03-12 NOTE — PLAN OF CARE
Problem: Adult Inpatient Plan of Care  Goal: Plan of Care Review  Outcome: Progressing  Goal: Patient-Specific Goal (Individualized)  Outcome: Progressing  Goal: Absence of Hospital-Acquired Illness or Injury  Outcome: Progressing  Goal: Optimal Comfort and Wellbeing  Outcome: Progressing  Goal: Readiness for Transition of Care  Outcome: Progressing     Problem: Infection  Goal: Absence of Infection Signs and Symptoms  Outcome: Progressing     Problem: Hemodialysis  Goal: Safe, Effective Therapy Delivery  Outcome: Progressing  Goal: Effective Tissue Perfusion  Outcome: Progressing  Goal: Absence of Infection Signs and Symptoms  Outcome: Progressing     Problem: Wound  Goal: Optimal Coping  Outcome: Progressing  Goal: Optimal Functional Ability  Outcome: Progressing  Goal: Absence of Infection Signs and Symptoms  Outcome: Progressing  Goal: Improved Oral Intake  Outcome: Progressing  Goal: Optimal Pain Control and Function  Outcome: Progressing  Goal: Skin Health and Integrity  Outcome: Progressing  Goal: Optimal Wound Healing  Outcome: Progressing     Problem: Skin Injury Risk Increased  Goal: Skin Health and Integrity  Outcome: Progressing     Problem: Fall Injury Risk  Goal: Absence of Fall and Fall-Related Injury  Outcome: Progressing     Problem: Chronic Kidney Disease  Goal: Electrolyte Balance  Outcome: Progressing  Goal: Fluid Balance  Outcome: Progressing

## 2025-03-12 NOTE — PLAN OF CARE
Recommendations  1. Initiate TF via PEG of Novasource @10ml/hr, advancing as tolerated to a goal rate of 40ml/hr to provide 1920 kcals, 87g of proteins, 691ml. Free water flushes of 210ml Q4H to provide an additional 1260ml. Total free water= 1951 ml.     --Avoid holding TF for residuals less than 500mL unless associated with other s/s of intolerance such as N/V/D, abd pain/distention.     2. Monitor weight, labs, and residuals     Goals: Pt will meet 85% of EEN/EPN by RD follow up

## 2025-03-12 NOTE — ASSESSMENT & PLAN NOTE
Severe malnutrition patient with recent hx of worsening dysphagia, has had w/u for scleroderma, has hiatal hernia and ?esophageal dysmotility  - dysphagia to solids.   Developed wernicke encephalopathy from nutritional deficiency     Daughter reports concern of inadequate intake has had severe weight loss in recent months with multiple complex health conditions. She requested PEG placement. Nutrition gave recommendations on tube feeds. Giving minced and moist diet, Novasource Renal supplements.

## 2025-03-12 NOTE — PLAN OF CARE
[facility] declined patient. CM faxed home health referral to [facility] [phone], [phone].    Tete Goetz RN     [phone]

## 2025-03-12 NOTE — ASSESSMENT & PLAN NOTE
Hyponatremia is likely due to hypotonic fluids and poor oral intake. The patient's most recent sodium results are listed below.  Recent Labs     03/11/25 2034 03/12/25  0102 03/12/25  0601   * 123* 123*  123*     Plan  - Monitor.

## 2025-03-12 NOTE — ASSESSMENT & PLAN NOTE
GI Treatment Plan    PEG site examined. Bumper at 2.5 cm tomy at skin level. Bumper rotates 360 degrees with ease. Abdomen is soft and non-tender.     - May start using PEG for feeding.   - Be advised to elevate head end of bed to 30 degrees or more while using PEG.   - Please consult nutrition for tube feed goals and recommendations.    Care Instructions  - Flushes: flush PEG daily with 60 ml water  - Antibiotic ointment to site, clean site with soap and water daily and dry thoroughly and clean site daily with half-strength hydrogen peroxide for three days, then soap and water daily.  - Dressing: change dressing on top of bumper daily    Thank you for involving us in the care of Madelaine Barros. We are signing-off. Please call with any additional questions, concerns or changes in the patient's clinical status.    Neeru Barahona, FNP-C  Gastroenterology

## 2025-03-12 NOTE — ASSESSMENT & PLAN NOTE
Chronic. Last Ultomiris dose 2/24/2025. Outpatient hematologist is Roma Cantu MD at [facility]. Consulted Hematology here. Appreciate assistance with management.

## 2025-03-12 NOTE — PROGRESS NOTES
[facility] - Telemetry Stepdown  Gastroenterology  Progress Note    Patient Name: Madelaine Barros  MRN: [id number]  Admission Date: 3/2/2025  Hospital Length of Stay: 9 days  Code Status: Full Code   Attending Provider: Edi Staton MD  Consulting Provider: OMARI Galvez  Primary Care Physician: Delilah Kelley MD  Principal Problem: Rectal bleeding        Subjective:     Interval History: s/p PEG placement yesterday. bumper at 2.5 cm.     Objective:     Vital Signs (Most Recent):  Temp: 97.9 °F (36.6 °C) (03/12/25 1200)  Pulse: 95 (03/12/25 1447)  Resp: 18 (03/12/25 1200)  BP: (!) 136/97 (03/12/25 1200)  SpO2: 100 % (03/12/25 0810) Vital Signs (24h Range):  Temp:  [97.5 °F (36.4 °C)-98.7 °F (37.1 °C)] 97.9 °F (36.6 °C)  Pulse:  [] 95  Resp:  [10-19] 18  SpO2:  [81 %-100 %] 100 %  BP: (106-158)/() 136/97     Weight: 72.1 kg (158 lb 15.2 oz) (03/04/25 0958)  Body mass index is 26.45 kg/m².      Intake/Output Summary (Last 24 hours) at 3/12/2025 1502  Last data filed at 3/12/2025 1100  Gross per 24 hour   Intake 275 ml   Output --   Net 275 ml       Lines/Drains/Airways       Central Venous Catheter Line  Duration                  Hemodialysis Catheter right subclavian -- days              Drain  Duration                  Gastrostomy/Enterostomy 03/11/25 1710 Gastrostomy tube w/o balloon LUQ <1 day              Peripheral Intravenous Line  Duration                  Peripheral IV - Single Lumen 03/10/25 1150 22 G Anterior;Right Hand 2 days                     Physical Exam  Vitals and nursing note reviewed.   Constitutional:       General: She is not in acute distress.     Appearance: She is well-developed. She is ill-appearing. She is not diaphoretic.      Interventions: She is not intubated.  Pulmonary:      Effort: Pulmonary effort is normal. No accessory muscle usage or respiratory distress. She is not intubated.   Abdominal:      General: There is no distension.      Palpations:  Abdomen is soft.      Tenderness: There is no abdominal tenderness. There is no guarding.   Skin:     General: Skin is warm and dry.      Coloration: Skin is not jaundiced or pale.   Neurological:      Mental Status: She is alert.      Motor: No tremor or seizure activity.   Psychiatric:         Attention and Perception: Attention normal.         Mood and Affect: Affect is blunt.         Behavior: Behavior is not aggressive, hyperactive or combative.         Significant Labs:  Recent Lab Results  (Last 5 results in the past 24 hours)        03/12/25  1304   03/12/25  1149   03/12/25  0824   03/12/25  0739   03/12/25  0601        Unit Blood Type Code         6200  [P]       Unit Expiration         [id number]  [P]       Unit Blood Type         A POS  [P]       Albumin         2.1       ALP         222       ALT         133       Anion Gap         8       PTT         >150.0  Comment: Refer to local heparin nomogram for intensity/dose specific   therapeutic   range.  APTT critical result(s) called and verbal readback obtained from   Opal Duque RN. by LAURIE 03/12/2025 07:02         AST         145       Baso #         0.01       Basophil %         0.1       BILIRUBIN TOTAL         1.3  Comment: For infants and newborns, interpretation of results should be based  on gestational age, weight and in agreement with clinical  observations.    Premature Infant recommended reference ranges:  Up to 24 hours.............<8.0 mg/dL  Up to 48 hours............<12.0 mg/dL  3-5 days..................<15.0 mg/dL  6-29 days.................<15.0 mg/dL         BUN         20       Calcium         8.0       Chloride         95       CO2         20       CODING SYSTEM         [id number]  [P]       Creatinine         3.8       Crossmatch Interpretation         Compatible  [P]       Differential Method         Automated       DISPENSE STATUS         ISSUED  [P]       eGFR         13.1       Eos #         0.0       Eos %         0.1        Glucose         60       Gran # (ANC)         8.1       Gran %         78.0       Group & Rh         A POS       Hematocrit         20.4       Hemoglobin         6.6       Immature Grans (Abs)         0.03  Comment: Mild elevation in immature granulocytes is non specific and   can be seen in a variety of conditions including stress response,   acute inflammation, trauma and pregnancy. Correlation with other   laboratory and clinical findings is essential.         Immature Granulocytes         0.3       INDIRECT NOEL         NEG       Lymph #         1.6       Lymph %         15.0       MCH         28.0       MCHC         32.4       MCV         86       Mono #         0.7       Mono %         6.5       MPV         11.1       nRBC         0       Platelet Count         154       POCT Glucose 42   72   88   67         Potassium         4.5       Product Code         [id number]  [P]       PROTEIN TOTAL         5.2       RBC         2.36       RDW         16.6       Sodium         123                123       Specimen Outdate         03/15/2025 23:59       UNIT NUMBER         [id number]  [P]       WBC         10.41                               [P] - Preliminary Result                 Significant Imaging:  Imaging results within the past 24 hours have been reviewed.  Assessment/Plan:     GI  PEG (percutaneous endoscopic gastrostomy) status  GI Treatment Plan    PEG site examined. Bumper at 2.5 cm tomy at skin level. Bumper rotates 360 degrees with ease. Abdomen is soft and non-tender.     - May start using PEG for feeding.   - Be advised to elevate head end of bed to 30 degrees or more while using PEG.   - Please consult nutrition for tube feed goals and recommendations.    Care Instructions  - Flushes: flush PEG daily with 60 ml water  - Antibiotic ointment to site, clean site with soap and water daily and dry thoroughly and clean site daily with half-strength hydrogen peroxide for three days, then soap and water  daily.  - Dressing: change dressing on top of bumper daily    Thank you for involving us in the care of Madelaine Barros. We are signing-off. Please call with any additional questions, concerns or changes in the patient's clinical status.    OMARI Galvez  Gastroenterology       Case discussed with Dr. Bernard   Thank you for your consult. I will sign off. Please contact us if you have any additional questions.    OMARI Galvez  Gastroenterology  [facility] - Telemetry Stepdown

## 2025-03-12 NOTE — NURSING
Pt. Vomited x2 a moderate amount of green liquid. On call SD Pruitt MD notified and made aware. New orders put in.

## 2025-03-13 LAB
ALBUMIN SERPL BCP-MCNC: 2.2 G/DL (ref 3.5–5.2)
ALP SERPL-CCNC: 233 U/L (ref 40–150)
ALT SERPL W/O P-5'-P-CCNC: 70 U/L (ref 10–44)
ANION GAP SERPL CALC-SCNC: 11 MMOL/L (ref 8–16)
AST SERPL-CCNC: 116 U/L (ref 10–40)
BASOPHILS # BLD AUTO: 0.02 K/UL (ref 0–0.2)
BASOPHILS NFR BLD: 0.3 % (ref 0–1.9)
BILIRUB SERPL-MCNC: 1.9 MG/DL (ref 0.1–1)
BUN SERPL-MCNC: 10 MG/DL (ref 6–20)
CALCIUM SERPL-MCNC: 7.8 MG/DL (ref 8.7–10.5)
CHLORIDE SERPL-SCNC: 93 MMOL/L (ref 95–110)
CO2 SERPL-SCNC: 22 MMOL/L (ref 23–29)
CREAT SERPL-MCNC: 2.3 MG/DL (ref 0.5–1.4)
DIFFERENTIAL METHOD BLD: ABNORMAL
EOSINOPHIL # BLD AUTO: 0.2 K/UL (ref 0–0.5)
EOSINOPHIL NFR BLD: 2.4 % (ref 0–8)
ERYTHROCYTE [DISTWIDTH] IN BLOOD BY AUTOMATED COUNT: 15.6 % (ref 11.5–14.5)
EST. GFR  (NO RACE VARIABLE): 23.9 ML/MIN/1.73 M^2
GLUCOSE SERPL-MCNC: 65 MG/DL (ref 70–110)
HCT VFR BLD AUTO: 26.6 % (ref 37–48.5)
HGB BLD-MCNC: 9 G/DL (ref 12–16)
IMM GRANULOCYTES # BLD AUTO: 0.03 K/UL (ref 0–0.04)
IMM GRANULOCYTES NFR BLD AUTO: 0.4 % (ref 0–0.5)
LYMPHOCYTES # BLD AUTO: 1.8 K/UL (ref 1–4.8)
LYMPHOCYTES NFR BLD: 22.3 % (ref 18–48)
MCH RBC QN AUTO: 28.9 PG (ref 27–31)
MCHC RBC AUTO-ENTMCNC: 33.8 G/DL (ref 32–36)
MCV RBC AUTO: 86 FL (ref 82–98)
MONOCYTES # BLD AUTO: 0.8 K/UL (ref 0.3–1)
MONOCYTES NFR BLD: 10.4 % (ref 4–15)
NEUTROPHILS # BLD AUTO: 5.2 K/UL (ref 1.8–7.7)
NEUTROPHILS NFR BLD: 64.2 % (ref 38–73)
NRBC BLD-RTO: 0 /100 WBC
PLATELET # BLD AUTO: 146 K/UL (ref 150–450)
PMV BLD AUTO: 11.4 FL (ref 9.2–12.9)
POCT GLUCOSE: 81 MG/DL (ref 70–110)
POCT GLUCOSE: 83 MG/DL (ref 70–110)
POCT GLUCOSE: 88 MG/DL (ref 70–110)
POCT GLUCOSE: 97 MG/DL (ref 70–110)
POTASSIUM SERPL-SCNC: 3.4 MMOL/L (ref 3.5–5.1)
PROT SERPL-MCNC: 5.2 G/DL (ref 6–8.4)
RBC # BLD AUTO: 3.11 M/UL (ref 4–5.4)
SODIUM SERPL-SCNC: 126 MMOL/L (ref 136–145)
SODIUM SERPL-SCNC: 126 MMOL/L (ref 136–145)
WBC # BLD AUTO: 8 K/UL (ref 3.9–12.7)

## 2025-03-13 PROCEDURE — 84295 ASSAY OF SERUM SODIUM: CPT | Performed by: NURSE PRACTITIONER

## 2025-03-13 PROCEDURE — 25000003 PHARM REV CODE 250: Performed by: HOSPITALIST

## 2025-03-13 PROCEDURE — 80053 COMPREHEN METABOLIC PANEL: CPT | Performed by: HOSPITALIST

## 2025-03-13 PROCEDURE — 97535 SELF CARE MNGMENT TRAINING: CPT

## 2025-03-13 PROCEDURE — 25000003 PHARM REV CODE 250: Performed by: FAMILY MEDICINE

## 2025-03-13 PROCEDURE — 36415 COLL VENOUS BLD VENIPUNCTURE: CPT | Performed by: NURSE PRACTITIONER

## 2025-03-13 PROCEDURE — 97162 PT EVAL MOD COMPLEX 30 MIN: CPT

## 2025-03-13 PROCEDURE — 36415 COLL VENOUS BLD VENIPUNCTURE: CPT | Performed by: HOSPITALIST

## 2025-03-13 PROCEDURE — 85025 COMPLETE CBC W/AUTO DIFF WBC: CPT | Performed by: HOSPITALIST

## 2025-03-13 PROCEDURE — 97530 THERAPEUTIC ACTIVITIES: CPT

## 2025-03-13 PROCEDURE — 20600001 HC STEP DOWN PRIVATE ROOM

## 2025-03-13 PROCEDURE — 25000003 PHARM REV CODE 250: Performed by: STUDENT IN AN ORGANIZED HEALTH CARE EDUCATION/TRAINING PROGRAM

## 2025-03-13 RX ORDER — OXYCODONE AND ACETAMINOPHEN 5; 325 MG/1; MG/1
1 TABLET ORAL EVERY 8 HOURS PRN
Refills: 0 | Status: DISCONTINUED | OUTPATIENT
Start: 2025-03-13 | End: 2025-03-21

## 2025-03-13 RX ADMIN — APIXABAN 5 MG: 5 TABLET, FILM COATED ORAL at 08:03

## 2025-03-13 RX ADMIN — POTASSIUM BICARBONATE 25 MEQ: 978 TABLET, EFFERVESCENT ORAL at 05:03

## 2025-03-13 RX ADMIN — NEPHROCAP 1 CAPSULE: 1 CAP ORAL at 08:03

## 2025-03-13 RX ADMIN — Medication 1000 UNITS: at 08:03

## 2025-03-13 RX ADMIN — Medication 6 MG: at 10:03

## 2025-03-13 RX ADMIN — PANTOPRAZOLE SODIUM 40 MG: 40 TABLET, DELAYED RELEASE ORAL at 05:03

## 2025-03-13 RX ADMIN — FOLIC ACID 1000 MCG: 1 TABLET ORAL at 08:03

## 2025-03-13 RX ADMIN — OXYCODONE HYDROCHLORIDE AND ACETAMINOPHEN 1 TABLET: 5; 325 TABLET ORAL at 10:03

## 2025-03-13 RX ADMIN — Medication 100 MG: at 08:03

## 2025-03-13 RX ADMIN — APIXABAN 5 MG: 5 TABLET, FILM COATED ORAL at 09:03

## 2025-03-13 RX ADMIN — MIRTAZAPINE 15 MG: 15 TABLET, FILM COATED ORAL at 09:03

## 2025-03-13 RX ADMIN — ESCITALOPRAM OXALATE 10 MG: 5 TABLET, FILM COATED ORAL at 08:03

## 2025-03-13 NOTE — PT/OT/SLP PROGRESS
Occupational Therapy   Co-Treatment    Name: Madelaine Barros  MRN: [id number]  Admitting Diagnosis:  Rectal bleeding  2 Days Post-Op    Recommendations:     Discharge Recommendations: Moderate Intensity Therapy  Discharge Equipment Recommendations:  none  Barriers to discharge:  None    Assessment:     Madelaine Barros is a 59 y.o. female with a medical diagnosis of Rectal bleeding.  She presents with good participation and motivation.  Pt with improved clarity of mentation & increased endurance for EOB activities on this date. Performance deficits affecting function are weakness, impaired endurance, impaired self care skills, impaired functional mobility, impaired balance, decreased safety awareness, decreased lower extremity function, decreased upper extremity function, impaired cardiopulmonary response to activity. Co-evaluation/treatment performed due to patient's multiple deficits requiring two skilled therapists to safely assess patient's ability to complete ADLs and functional mobility in addition to accommodating for patient's activity tolerance while in acute care setting.      Rehab Prognosis:  Fair; patient would benefit from acute skilled OT services to address these deficits and reach maximum level of function.       Plan:     Patient to be seen 4 x/week to address the above listed problems via self-care/home management, therapeutic activities, therapeutic exercises, neuromuscular re-education  Plan of Care Expires: 04/10/25  Plan of Care Reviewed with: patient, sibling    Subjective     Chief Complaint: none stated  Patient/Family Comments/goals: Pt was verbally agreeable to therapy session.  Pain/Comfort:  Pain Rating 1: 0/10  Pain Rating Post-Intervention 1: 0/10    Objective:     Communicated with: RN prior to session.  Patient found supine with pulse ox (continuous), telemetry, PEG Tube (sister present during session) upon OT entry to room.    General Precautions: Standard, fall, aspiration,  NPO    Orthopedic Precautions:N/A  Braces: N/A     Occupational Performance:     Bed Mobility:    Patient completed Rolling/Turning to Left with  moderate assistance  Patient completed Scooting/Bridging with total assistance scooting forward on EOB; dependent drawsheet transfer up HOB while supine  Patient completed Supine to Sit with maximal assistance  Patient completed Sit to Supine with maximal assistance     Functional Mobility/Transfers:  Patient completed Sit <> Stand Transfer with maximal assistance and of 2 persons  with  rolling walker from EOB    Activities of Daily Living:  Grooming: minimum assistance with brushing teeth while seated EOB  Upper Body Dressing: minimum assistance donning gown around back while seated EOB  Lower Body Dressing: total assistance donning socks  Toileting: maximal assistance while supine due to mild BM incontinence      AMPA 6 Click ADL: 13    Treatment & Education:  Pt required SBA-Min (A) for postural control while seated EOB x 15 minutes with (A) due to rightward & posterior lean.  Provided verbal & physical cues to facilitate postural control. Pt oriented to person, place & year.  Provided daily orientation to orient to month.  Pt had no further questions & when asked whether there were any concerns pt reported none.        Patient left supine with all lines intact, call button in reach, bed alarm on, RN notified, and sister present    GOALS:   Multidisciplinary Problems       Occupational Therapy Goals          Problem: Occupational Therapy    Goal Priority Disciplines Outcome Interventions   Occupational Therapy Goal     OT, PT/OT Progressing    Description: Goals to be met by: 4/10/25     Patient will increase functional independence with ADLs by performing:    UE Dressing with Minimal Assistance.  LE Dressing with Moderate Assistance.  Grooming while seated with Supervision.  Toileting from bedside commode with Moderate Assistance for hygiene and clothing management.    Sitting at edge of bed x15 minutes with Stand-by Assistance.  Rolling to Bilateral with Supervision.   Supine to sit with Minimal Assistance.  Stand pivot transfers with Moderate Assistance.                           Time Tracking:     OT Date of Treatment: 03/13/25  OT Start Time: 0924  OT Stop Time: 0953  OT Total Time (min): 29 min    Billable Minutes:Self Care/Home Management 29    OT/LULI: OT          3/13/2025

## 2025-03-13 NOTE — PLAN OF CARE
Problem: Occupational Therapy  Goal: Occupational Therapy Goal  Description: Goals to be met by: 4/10/25     Patient will increase functional independence with ADLs by performing:    UE Dressing with Minimal Assistance.  LE Dressing with Moderate Assistance.  Grooming while seated with Supervision.  Toileting from bedside commode with Moderate Assistance for hygiene and clothing management.   Sitting at edge of bed x15 minutes with Stand-by Assistance.  Rolling to Bilateral with Supervision.   Supine to sit with Minimal Assistance.  Stand pivot transfers with Moderate Assistance.    Outcome: Progressing     Goals remain appropriate

## 2025-03-13 NOTE — ASSESSMENT & PLAN NOTE
Waxing and waning since October 2024 due to HUS. Lacks capacity, unable to communicate about her illness. Daughter Sue Barros wanted her to go to [facility] for continuity of care but has changed her mind and she will transition her medical care to [facility].

## 2025-03-13 NOTE — ASSESSMENT & PLAN NOTE
Appreciate SLP. Advanced diet to minced and moist. Appreciate GI. PEG placed 3/11/2025. Will start enteral water and tube feeds recommended by Nutrition.  FWF stopped per Nephrology request.

## 2025-03-13 NOTE — ASSESSMENT & PLAN NOTE
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 . Most recent hemoglobin and hematocrit are listed below.  Recent Labs     03/11/25  0305 03/12/25  0601 03/13/25  0420   HGB 8.0* 6.6* 9.0*   HCT 24.5* 20.4* 26.6*     Plan  - Monitor serial CBC  - Transfuse PRBC if patient becomes hemodynamically unstable, symptomatic or H/H drops below 7/21.  - Transfused pRBCs 3/8/2025 after having epistaxis.  - Stable until she had some bleeding at new PEG site. Transfuse again 3/12/2025.

## 2025-03-13 NOTE — PLAN OF CARE
Problem: Adult Inpatient Plan of Care  Goal: Readiness for Transition of Care  Outcome: Not Progressing     Problem: Adult Inpatient Plan of Care  Goal: Optimal Comfort and Wellbeing  Outcome: Not Progressing     Problem: Adult Inpatient Plan of Care  Goal: Absence of Hospital-Acquired Illness or Injury  Outcome: Not Progressing

## 2025-03-13 NOTE — ASSESSMENT & PLAN NOTE
Chronic. Last Ultomiris dose 2/24/2025. Outpatient hematologist is Roma Cantu MD at [facility]. Consulted Hematology here. Appreciate assistance with management.

## 2025-03-13 NOTE — PLAN OF CARE
Problem: Physical Therapy  Goal: Physical Therapy Goal  Description: Goals to be met by: 25     Patient will increase functional independence with mobility by performin. Supine to sit with Modified Pope   2. Sit to supine with Modified Pope  3. Sit to stand transfer with Modified Pope with AD as needed  4. Gait  x 150 feet with Supervision using Rolling Walker.     Outcome: Progressing   Pt evaluated and goals appropriate. 3/13/2025

## 2025-03-13 NOTE — PT/OT/SLP EVAL
Physical Therapy Evaluation & Co-treatment with OT    Patient Name:  Madelaine Barros   MRN:  [id number]    Recommendations:     Discharge Recommendations: Moderate Intensity Therapy   Discharge Equipment Recommendations: none   Barriers to discharge: Inaccessible home    Assessment:     Madelaine Barros is a 59 y.o. female admitted with a medical diagnosis of Rectal bleeding.  She presents with the following impairments/functional limitations: impaired endurance, gait instability, impaired balance, impaired functional mobility Pt was able to tolerate therapy, she requires mod-max assistance with bed mobility and transfers. Pt would benefit from skilled therapy 4x/week to improve functional mobility and independence. Pt will benefit from moderate intensity therapy once medically stable.    Rehab Prognosis: Good; patient would benefit from acute skilled PT services to address these deficits and reach maximum level of function.    Recent Surgery: Procedure(s) (LRB):  EGD (ESOPHAGOGASTRODUODENOSCOPY) (N/A) 2 Days Post-Op    Plan:     During this hospitalization, patient to be seen 4 x/week to address the identified rehab impairments via gait training, therapeutic activities and progress toward the following goals:    Plan of Care Expires:  04/09/25    Subjective     Chief Complaint: Fatigue  Patient/Family Comments/goals: get out of bed  Pain/Comfort:  Pain Rating 1: 0/10    Patients cultural, spiritual, Sikh conflicts given the current situation: no    Living Environment:  Pt lives with her daughter and son in a 2 story home with 1 [address] and 15 steps with right rail to the 2nd floor. Pt primarily lives on the first floor. Pt has access to bathroom on both floors. Per sister, pt had to use a wheelchair before being admitted as she was progessing in decline since October 2024, otherwise used a RW and cane as needed.   Prior to admission, patients level of function was moderate assistance from sister to perform  transfers and ADLs.  Equipment used at home: wheelchair, walker, rolling, shower chair, cane, straight.  DME owned (not currently used): none.  Upon discharge, patient will have assistance from daughter, son, and sister.    Objective:     Communicated with RN prior to session.  Patient found supine with telemetry, pulse ox (continuous) (hep lock IV, R subclavian dialysis), PEG tube upon PT entry to room.    General Precautions: Standard, fall  Orthopedic Precautions:    Braces: N/A  Respiratory Status: Room air    Exams:  Cognitive Exam:  Patient is oriented to Person, Situation, and Year  RLE ROM: WFL  RLE Strength: Deficits: MMT 2+/5 for knee extension, hip flexion, and dorsiflexion  LLE ROM: WFL  LLE Strength: Deficits: MMT 2+/5 for knee extension, hip flexion, and dorsiflexion    Functional Mobility:  Bed Mobility: pt given verbal cues for hand positioning and sequencing.  Rolling Left:  2x, modA x2  Scooting: total assistance  Supine to Sit: maximal assistance  Sit to Supine: maximal assistance  Transfers:     Sit to Stand:  maximal assistance x2 with rolling walker, buttocks clearance off bed  Balance:   Sitting static balance: SBA to min A to correct posterior lean; pt sat at EOB for 15 minutes  Sitting dynamic balance: min A      AM-PAC 6 CLICK MOBILITY  Total Score:11       Treatment & Education:  Pt an sister was educated on PT role and POC verbally. Pt and sister expressed understanding verbally.    Patient left supine with all lines intact, call button in reach, and sister present.    GOALS:   Multidisciplinary Problems       Physical Therapy Goals          Problem: Physical Therapy    Goal Priority Disciplines Outcome Interventions   Physical Therapy Goal     PT, PT/OT Progressing    Description: Goals to be met by: 25     Patient will increase functional independence with mobility by performin. Supine to sit with Modified Yeso   2. Sit to supine with Modified Yeso  3. Sit  to stand transfer with Modified [place] with AD as needed  4. Gait  x 150 feet with Supervision using Rolling Walker.                          DME Justifications:  No DME recommended requiring DME justifications    History:     Past Medical History:   Diagnosis Date    Allergic rhinitis 06/01/2019    Diabetes mellitus     Duodenal ulcer 12/11/2024    Bulb; 4mm; NO SRH on exam of 11 Dec 24      GERD without esophagitis 08/15/2024    Hiatal hernia 12/11/2024    History of Helicobacter pylori infection 03/02/2025    Hypertension     Hypertensive emergency 10/2024    Latent syphilis 11/2024    treated with PCN  - [facility] Admit    MAHA (microangiopathic hemolytic anemia) 11/03/2024    Polyp of colon 03/05/2024    Schatzki's ring 12/11/2024    TIA (transient ischemic attack) 10/2024    Type 2 MI (myocardial infarction) 11/2024    secondary to hypertensive emergency, normal ECHO - [facility] ADMIT    Wernicke encephalopathy 02/13/2025       Past Surgical History:   Procedure Laterality Date    ESOPHAGOGASTRODUODENOSCOPY N/A 3/11/2025    Procedure: EGD (ESOPHAGOGASTRODUODENOSCOPY);  Surgeon: Anup Bernard MD;  Location: [facility] ([address] [place] [address]);  Service: Endoscopy;  Laterality: N/A;    HYSTERECTOMY      TUBAL LIGATION         Time Tracking:     PT Received On: 03/13/25  PT Start Time: 0924     PT Stop Time: 0953  PT Total Time (min): 29 min     Billable Minutes: Evaluation 8 and Therapeutic Activity 21 03/13/2025

## 2025-03-13 NOTE — PROGRESS NOTES
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 She lives in [facility]. She works for [employer]. Her primary care physician is Dr. Delilah Kelley.               She was hospitalized at [facility] from 10/23/2024 to 11/6/2024.              She was hospitalized at [facility] from 11/15/2024 to 1/18/2024.              She was hospitalized at [facility] from 12/9/2024 to 12/19/2024.              She was hospitalized at [facility] from 12/25/2024 to 1/5/2025.              She was hospitalized at [facility] from 1/5/2025 to 1/31/2025. She was discharged to [facility] inpatient rehab until 2/18/2025.               She presented to [facility] Emergency Department on 3/2/2025 with rectal pain and bleeding. She receives her care in the [facility] system, not at [facility]. History was provided by her daughter Sue Barros. She is followed by Hematology at [facility] and has been on immunomodulator  infusions every 8 weeks (Soliris, now Ultomiris, last dose 2/24/2025). She has severe debility, poor appetite, dysphagia to solids, mostly consumes liquid, but her daughter was concerned her nutritional intake is inadequate and inquired about feeding tube placement for chronic nutrition. She had watery stool on the day of presentation. Her sister noted that she had gingival bleeding. She reported an anal lesion with tenderness and slow bleeding. She has not required frequent transfusions. Her daughter noted that all home antihypertensive medications were discontinued since she was discharged from inpatient rehab.              In the emergency department, she appeared ill, lethargic, unable to fully participate in interview, periodically awakening with pain. Labs showed hemoglobin 14.3 g/dL (from 13.2 on 2/24/2025). Repeat hemoglobin was 10.4. Alkaline phosphatase was 251 U/L, total bilirubin was 2.2 mg/dL, AST was 733 U/L, ALT was 478 U/L. She was given 2.1 liters of IV fluids, vancomycin, piperacillin-tazobactam, 4 mg of IV morphine, 80 mg of IV pantoprazole, topical lidocaine for rectal pain. She was admitted to Hospital Medicine Team S.     Overview/Hospital Course:  GGT was elevated at 447 U/L. LDH was elevated. Colorectal Surgery, Hematology, Nephrology, and Case Management were consulted. She expressed desire to go to [facility] since she gets her care at [facility]. Her mental status has been declining since October 2024. She has memory loss, disorientation, and does not remember her illness. Her mother had dementia. She had chest pain on 3/3/2025 while getting dialysis. EKG showed sinus tachycardia. Troponin was 1102 ng/L. Repeat was 704. Hematology recommended giving 2 units of fresh frozen plasma (FFP) and starting low rate heparin infusion then transitioning to apixaban 2.5 mg twice daily if she had no further bleeding. She was given another 2 units of FFP. Her daughter requested gastrostomy tube  placement for malnutrition. On 3/4/2025, heparin was held due to recurrent bleeding. She had intermittent somnolence. She was kept NPO. Speech Language Pathology was consulted. She was hypoglycemic so was put on 10% dextrose drip. Speech Language Pathology recommended full liquid diet. LFTs trended down. Heparin infusion was restarted on 3/5/2025. On 3/6/2025, her daughter decided that she did not want transfer to a [facility] hospital, instead she wanted to transfer her medical care to the [facility] system. Hemoglobin and hematocrit remained stable with no evidence of recurrent bleeding on 3/7/2025. Heparin drip was continued. She had epistaxis on 3/7/2025. Hemoglobin decreased to 6.7 g/dL on 3/8/2025. She developed right arm swelling. Repeat hemoglobin was 5.7. Heparin drip was held. She was transfused 1 unit of packed red blood cells (PRBCs). Hemoglobin improved to 8.2. Right upper extremity venous ultrasound showed known right internal jugular thrombus as well as subclavian thrombus, although the subclavian vein was not visualized when the internal jugular thrombus was diagnosed, so it was unknown if it was new. Hemoglobin was stable on 3/10/2025. Heparin drip was resumed. Sodium was found to be 118 mmol/L. She had been on dextrose drip for days to treat hypoglycemia due to poor oral intake. She was given normal saline instead. Gastroenterology placed a gastrostomy tube on 3/11/2025. Around midnight that night, she vomited twice. Nausea resolved. Tolerating PEG and switched to apixaban. Hyponatremia improving.    Interval History: No acute events overnight. Hemoglobin with increase to 9.0 along with some improvement  in hyponatremia. PEG without complication.    Review of Systems   Constitutional:  Negative for fatigue.   Respiratory:  Negative for shortness of breath.    Cardiovascular:  Negative for chest pain.   Neurological:  Negative for headaches.     Objective:     Vital Signs (Most Recent):  Temp: 98.2 °F (36.8  °C) (03/13/25 1539)  Pulse: 91 (03/13/25 1539)  Resp: 12 (03/13/25 1539)  BP: (!) 156/88 (03/13/25 1539)  SpO2: 100 % (03/13/25 1539) Vital Signs (24h Range):  Temp:  [98.2 °F (36.8 °C)-98.6 °F (37 °C)] 98.2 °F (36.8 °C)  Pulse:  [91-97] 91  Resp:  [12-17] 12  SpO2:  [100 %] 100 %  BP: (112-167)/(66-93) 156/88     Weight: 73 kg (160 lb 15 oz)  Body mass index is 26.78 kg/m².    Intake/Output Summary (Last 24 hours) at 3/13/2025 1550  Last data filed at 3/12/2025 2044  Gross per 24 hour   Intake 65 ml   Output --   Net 65 ml         Physical Exam  Constitutional:       General: She is not in acute distress.  HENT:      Head: Normocephalic and atraumatic.   Cardiovascular:      Rate and Rhythm: Normal rate and regular rhythm.   Abdominal:      General: There is no distension.      Tenderness: There is no abdominal tenderness.      Comments: PEG in place   Neurological:      Mental Status: Mental status is at baseline.               Significant Labs: All pertinent labs within the past 24 hours have been reviewed.  CBC:   Recent Labs   Lab 03/12/25  0601 03/13/25  0420   WBC 10.41 8.00   HGB 6.6* 9.0*   HCT 20.4* 26.6*    146*     CMP:   Recent Labs   Lab 03/12/25  0601 03/13/25  0420 03/13/25  1110   *  123* 126* 126*   K 4.5 3.4*  --    CL 95 93*  --    CO2 20* 22*  --    GLU 60* 65*  --    BUN 20 10  --    CREATININE 3.8* 2.3*  --    CALCIUM 8.0* 7.8*  --    PROT 5.2* 5.2*  --    ALBUMIN 2.1* 2.2*  --    BILITOT 1.3* 1.9*  --    ALKPHOS 222* 233*  --    * 116*  --    * 70*  --    ANIONGAP 8 11  --        Significant Imaging: I have reviewed all pertinent imaging results/findings within the past 24 hours.    XR ABDOMEN PORTABLE     CLINICAL HISTORY:  PEG placed today, vomiting after 1st use;     TECHNIQUE:  Two overlapping supine AP images of abdomen.     COMPARISON:  Present exam interpreted after review of the abdomen and pelvis CT March 2, 2022     FINDINGS:  Interval placement of a PEG  tube in which the tip projects about the paravertebral left mid abdomen at the level of the L1-L2 disc space.  If clinically important to confirm satisfactory position of PEG tube within the stomach, consider injection of water-soluble contrast through the PEG tube under fluoroscopy or acquiring a film of the abdomen immediately following injection of water-soluble contrast through the PEG tube at bedside.  Nonobstructive bowel gas pattern.  No free air noted on these images.  Scattered spinal degenerative changes.  Mild right hip joint space and moderate to severe left hip joint space osteoarthritic changes.         Assessment & Plan  Rectal bleeding  Appreciate Colorectal Surgery. Subsided. Monitor for recurrence.  Unspecified severe protein-calorie malnutrition  Severe malnutrition patient with recent hx of worsening dysphagia, has had w/u for scleroderma, has hiatal hernia and ?esophageal dysmotility  - dysphagia to solids.   Developed wernicke encephalopathy from nutritional deficiency     Daughter reports concern of inadequate intake has had severe weight loss in recent months with multiple complex health conditions. She requested PEG placement. Nutrition gave recommendations on tube feeds. Giving minced and moist diet, Novasource Renal supplements.  Dysphagia  Appreciate SLP. Advanced diet to minced and moist. Appreciate GI. PEG placed 3/11/2025. Will start enteral water and tube feeds recommended by Nutrition.  FWF stopped per Nephrology request.  Wernicke encephalopathy  Continue on home thiamine supplementation.     GERD without esophagitis  Continue home pantoprazole.    ESRD (end stage renal disease)  Nephrology managing dialysis.    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 Last Ultomiris dose 2/24/2025. Outpatient hematologist is Roma Cantu MD at [facility]. Consulted Hematology here. Appreciate assistance with management.  Elevated transaminase  level  Hepatic steatosis on CT. Worsening hepatic function may contribute to her worsening coagulopathy elevated PT/PTT. Has significantly improved.  Acute thrombosis of right internal jugular vein  Diagnosed 1/20/2025, on apixaban at home. Held due to GI bleed. Started heparin drip 3/5/2025. Transition to home apixaban   History of Helicobacter pylori infection  Diagnosed via EGD and reported treated in [facility].     Renal hematoma, left, sequela  In January, due to biopsy in 2024. Most recent imaging showed resolving appearance of remote hematoma.   Anemia of chronic renal failure, stage 5  Anemia is likely due to acute blood loss which was from GI bleed and active hemolysis due to HUS.  . Most recent hemoglobin and hematocrit are listed below.  Recent Labs     03/11/25  0305 03/12/25  0601 03/13/25  0420   HGB 8.0* 6.6* 9.0*   HCT 24.5* 20.4* 26.6*     Plan  - Monitor serial CBC  - Transfuse PRBC if patient becomes hemodynamically unstable, symptomatic or H/H drops below 7/21.  - Transfused pRBCs 3/8/2025 after having epistaxis.  - Stable until she had some bleeding at new PEG site. Transfuse again 3/12/2025.  Metabolic encephalopathy  Waxing and waning since October 2024 due to HUS. Lacks capacity, unable to communicate about her illness. Daughter Sue Barros wanted her to go to [facility] for continuity of care but has changed her mind and she will transition her medical care to [facility].  Hyponatremia  Hyponatremia is likely due to hypotonic fluids and poor oral intake. The patient's most recent sodium results are listed below.  Recent Labs     03/12/25  0601 03/13/25  0420 03/13/25  1110   *  123* 126* 126*     Plan  - Monitor.  improving    VTE Risk Mitigation (From admission, onward)           Ordered     apixaban tablet 5 mg  2 times daily         03/12/25 1148     heparin (porcine) injection 1,000 Units  As needed (PRN)         03/03/25 1524     IP VTE HIGH RISK PATIENT  Once          03/03/25 0416     Place sequential compression device  Until discontinued         03/03/25 0416     Reason for No Pharmacological VTE Prophylaxis  Once        Question:  Reasons:  Answer:  Active Bleeding    03/03/25 0416                    Discharge Planning   PATRICIA: 3/14/2025     Code Status: Full Code   Medical Readiness for Discharge Date:   Discharge Plan A: Home with family, Home Health                Please place Justification for DME        Douglas Mattson MD  Department of Hospital Medicine   [facility] - Telemetry Stepdown

## 2025-03-13 NOTE — ASSESSMENT & PLAN NOTE
Hyponatremia is likely due to hypotonic fluids and poor oral intake. The patient's most recent sodium results are listed below.  Recent Labs     03/12/25  0601 03/13/25  0420 03/13/25  1110   *  123* 126* 126*     Plan  - Monitor.  improving

## 2025-03-13 NOTE — SUBJECTIVE & OBJECTIVE
Interval History: No acute events overnight. Hemoglobin with increase to 9.0 along with some improvement  in hyponatremia. PEG without complication.    Review of Systems   Constitutional:  Negative for fatigue.   Respiratory:  Negative for shortness of breath.    Cardiovascular:  Negative for chest pain.   Neurological:  Negative for headaches.     Objective:     Vital Signs (Most Recent):  Temp: 98.2 °F (36.8 °C) (03/13/25 1539)  Pulse: 91 (03/13/25 1539)  Resp: 12 (03/13/25 1539)  BP: (!) 156/88 (03/13/25 1539)  SpO2: 100 % (03/13/25 1539) Vital Signs (24h Range):  Temp:  [98.2 °F (36.8 °C)-98.6 °F (37 °C)] 98.2 °F (36.8 °C)  Pulse:  [91-97] 91  Resp:  [12-17] 12  SpO2:  [100 %] 100 %  BP: (112-167)/(66-93) 156/88     Weight: 73 kg (160 lb 15 oz)  Body mass index is 26.78 kg/m².    Intake/Output Summary (Last 24 hours) at 3/13/2025 1550  Last data filed at 3/12/2025 2044  Gross per 24 hour   Intake 65 ml   Output --   Net 65 ml         Physical Exam  Constitutional:       General: She is not in acute distress.  HENT:      Head: Normocephalic and atraumatic.   Cardiovascular:      Rate and Rhythm: Normal rate and regular rhythm.   Abdominal:      General: There is no distension.      Tenderness: There is no abdominal tenderness.      Comments: PEG in place   Neurological:      Mental Status: Mental status is at baseline.               Significant Labs: All pertinent labs within the past 24 hours have been reviewed.  CBC:   Recent Labs   Lab 03/12/25  0601 03/13/25 0420   WBC 10.41 8.00   HGB 6.6* 9.0*   HCT 20.4* 26.6*    146*     CMP:   Recent Labs   Lab 03/12/25  0601 03/13/25  0420 03/13/25  1110   *  123* 126* 126*   K 4.5 3.4*  --    CL 95 93*  --    CO2 20* 22*  --    GLU 60* 65*  --    BUN 20 10  --    CREATININE 3.8* 2.3*  --    CALCIUM 8.0* 7.8*  --    PROT 5.2* 5.2*  --    ALBUMIN 2.1* 2.2*  --    BILITOT 1.3* 1.9*  --    ALKPHOS 222* 233*  --    * 116*  --    * 70*  --     ANIONGAP 8 11  --        Significant Imaging: I have reviewed all pertinent imaging results/findings within the past 24 hours.    XR ABDOMEN PORTABLE     CLINICAL HISTORY:  PEG placed today, vomiting after 1st use;     TECHNIQUE:  Two overlapping supine AP images of abdomen.     COMPARISON:  Present exam interpreted after review of the abdomen and pelvis CT March 2, 2022     FINDINGS:  Interval placement of a PEG tube in which the tip projects about the paravertebral left mid abdomen at the level of the L1-L2 disc space.  If clinically important to confirm satisfactory position of PEG tube within the stomach, consider injection of water-soluble contrast through the PEG tube under fluoroscopy or acquiring a film of the abdomen immediately following injection of water-soluble contrast through the PEG tube at bedside.  Nonobstructive bowel gas pattern.  No free air noted on these images.  Scattered spinal degenerative changes.  Mild right hip joint space and moderate to severe left hip joint space osteoarthritic changes.

## 2025-03-14 PROBLEM — E87.6 HYPOKALEMIA: Status: ACTIVE | Noted: 2025-03-14

## 2025-03-14 LAB
ALBUMIN SERPL BCP-MCNC: 2.1 G/DL (ref 3.5–5.2)
ALP SERPL-CCNC: 204 U/L (ref 40–150)
ALT SERPL W/O P-5'-P-CCNC: 30 U/L (ref 10–44)
ANION GAP SERPL CALC-SCNC: 8 MMOL/L (ref 8–16)
AST SERPL-CCNC: 85 U/L (ref 10–40)
BASOPHILS # BLD AUTO: 0.03 K/UL (ref 0–0.2)
BASOPHILS NFR BLD: 0.3 % (ref 0–1.9)
BILIRUB SERPL-MCNC: 1.4 MG/DL (ref 0.1–1)
BUN SERPL-MCNC: 17 MG/DL (ref 6–20)
CALCIUM SERPL-MCNC: 7.8 MG/DL (ref 8.7–10.5)
CHLORIDE SERPL-SCNC: 95 MMOL/L (ref 95–110)
CO2 SERPL-SCNC: 25 MMOL/L (ref 23–29)
CREAT SERPL-MCNC: 2.6 MG/DL (ref 0.5–1.4)
DIFFERENTIAL METHOD BLD: ABNORMAL
EOSINOPHIL # BLD AUTO: 0 K/UL (ref 0–0.5)
EOSINOPHIL NFR BLD: 0.4 % (ref 0–8)
ERYTHROCYTE [DISTWIDTH] IN BLOOD BY AUTOMATED COUNT: 15.5 % (ref 11.5–14.5)
EST. GFR  (NO RACE VARIABLE): 20.6 ML/MIN/1.73 M^2
GLUCOSE SERPL-MCNC: 82 MG/DL (ref 70–110)
HCT VFR BLD AUTO: 26.2 % (ref 37–48.5)
HGB BLD-MCNC: 8.6 G/DL (ref 12–16)
IMM GRANULOCYTES # BLD AUTO: 0.04 K/UL (ref 0–0.04)
IMM GRANULOCYTES NFR BLD AUTO: 0.4 % (ref 0–0.5)
LYMPHOCYTES # BLD AUTO: 1.4 K/UL (ref 1–4.8)
LYMPHOCYTES NFR BLD: 13 % (ref 18–48)
MCH RBC QN AUTO: 28.4 PG (ref 27–31)
MCHC RBC AUTO-ENTMCNC: 32.8 G/DL (ref 32–36)
MCV RBC AUTO: 87 FL (ref 82–98)
MONOCYTES # BLD AUTO: 0.9 K/UL (ref 0.3–1)
MONOCYTES NFR BLD: 8.4 % (ref 4–15)
NEUTROPHILS # BLD AUTO: 8.5 K/UL (ref 1.8–7.7)
NEUTROPHILS NFR BLD: 77.5 % (ref 38–73)
NRBC BLD-RTO: 0 /100 WBC
PLATELET # BLD AUTO: 171 K/UL (ref 150–450)
PMV BLD AUTO: 10.2 FL (ref 9.2–12.9)
POCT GLUCOSE: 104 MG/DL (ref 70–110)
POCT GLUCOSE: 80 MG/DL (ref 70–110)
POCT GLUCOSE: 82 MG/DL (ref 70–110)
POTASSIUM SERPL-SCNC: 3.2 MMOL/L (ref 3.5–5.1)
PROT SERPL-MCNC: 5.2 G/DL (ref 6–8.4)
RBC # BLD AUTO: 3.03 M/UL (ref 4–5.4)
SODIUM SERPL-SCNC: 128 MMOL/L (ref 136–145)
WBC # BLD AUTO: 10.94 K/UL (ref 3.9–12.7)

## 2025-03-14 PROCEDURE — 80100016 HC MAINTENANCE HEMODIALYSIS

## 2025-03-14 PROCEDURE — 36415 COLL VENOUS BLD VENIPUNCTURE: CPT | Performed by: HOSPITALIST

## 2025-03-14 PROCEDURE — 25000003 PHARM REV CODE 250: Performed by: FAMILY MEDICINE

## 2025-03-14 PROCEDURE — 20600001 HC STEP DOWN PRIVATE ROOM

## 2025-03-14 PROCEDURE — 63600175 PHARM REV CODE 636 W HCPCS: Performed by: STUDENT IN AN ORGANIZED HEALTH CARE EDUCATION/TRAINING PROGRAM

## 2025-03-14 PROCEDURE — 85025 COMPLETE CBC W/AUTO DIFF WBC: CPT | Performed by: HOSPITALIST

## 2025-03-14 PROCEDURE — 90935 HEMODIALYSIS ONE EVALUATION: CPT | Mod: ,,, | Performed by: NURSE PRACTITIONER

## 2025-03-14 PROCEDURE — 25000003 PHARM REV CODE 250: Performed by: HOSPITALIST

## 2025-03-14 PROCEDURE — 80053 COMPREHEN METABOLIC PANEL: CPT | Performed by: STUDENT IN AN ORGANIZED HEALTH CARE EDUCATION/TRAINING PROGRAM

## 2025-03-14 PROCEDURE — 25000003 PHARM REV CODE 250: Performed by: NURSE PRACTITIONER

## 2025-03-14 RX ORDER — SODIUM CHLORIDE 9 MG/ML
INJECTION, SOLUTION INTRAVENOUS ONCE
Status: COMPLETED | OUTPATIENT
Start: 2025-03-14 | End: 2025-03-14

## 2025-03-14 RX ADMIN — HEPARIN SODIUM 1000 UNITS: 1000 INJECTION, SOLUTION INTRAVENOUS; SUBCUTANEOUS at 12:03

## 2025-03-14 RX ADMIN — PANTOPRAZOLE SODIUM 40 MG: 40 TABLET, DELAYED RELEASE ORAL at 06:03

## 2025-03-14 RX ADMIN — Medication 6 MG: at 09:03

## 2025-03-14 RX ADMIN — MIRTAZAPINE 15 MG: 15 TABLET, FILM COATED ORAL at 09:03

## 2025-03-14 RX ADMIN — SODIUM CHLORIDE: 9 INJECTION, SOLUTION INTRAVENOUS at 09:03

## 2025-03-14 RX ADMIN — APIXABAN 5 MG: 5 TABLET, FILM COATED ORAL at 09:03

## 2025-03-14 NOTE — NURSING
Dialysis tx started to the right chest perm cath per orders placed by ARUN Feliciano:    Order Questions    Question Answer   Antibiotics on HD? No   Duration of Treatment 3.5 hours   Dialyzer [id number]   Dialysate Temperature (C) 36.5   Target  mL/min   If unable to maintain flow due to inadequate vascular access patency, patient intolerance (i.e. chest pain, access discomfort) or elevated venous pressure, adjust blood flow rate to a minimum of _____mL/min 100    mL/min   K+ Potassium per Protocol   Ca++ Calcium per Protocol   Na+ 135 MEQ/L   Bicarb Bicarbonate per Protocol   Access to be used Other (please specify)   Target UF 2L   If unable to maintain this UFR due to patient intolerance (i.e. hypotension, chest pain, muscle cramping, nausea or vomiting), adjust UFR to achieve a minimum of _______ liters of UF 0   Fluid Removal Instructions maintain SBP > 90 mmHG

## 2025-03-14 NOTE — NURSING
Pt arrived to dialysis unit without cardiac monitoring. Will use bedside cardiac monitoring during tx

## 2025-03-14 NOTE — ASSESSMENT & PLAN NOTE
Waxing and waning since October 2024 due to HUS. Lacks capacity, unable to communicate about her illness. Daughter Sue Barros wanted her to go to [facility] for continuity of care but has changed her mind and she will transition her medical care to [facility].

## 2025-03-14 NOTE — PROGRESS NOTES
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 She lives in [facility]. She works for [employer]. Her primary care physician is Dr. Delilah Kelley.               She was hospitalized at [facility] from 10/23/2024 to 11/6/2024.              She was hospitalized at [facility] from 11/15/2024 to 1/18/2024.              She was hospitalized at [facility] from 12/9/2024 to 12/19/2024.              She was hospitalized at [facility] from 12/25/2024 to 1/5/2025.              She was hospitalized at [facility] from 1/5/2025 to 1/31/2025. She was discharged to [facility] inpatient rehab until 2/18/2025.               She presented to [facility] Emergency Department on 3/2/2025 with rectal pain and bleeding. She receives her care in the [facility] system, not at [facility]. History was provided by her daughter Sue Barros. She is followed by Hematology at [facility] and has been on immunomodulator  infusions every 8 weeks (Soliris, now Ultomiris, last dose 2/24/2025). She has severe debility, poor appetite, dysphagia to solids, mostly consumes liquid, but her daughter was concerned her nutritional intake is inadequate and inquired about feeding tube placement for chronic nutrition. She had watery stool on the day of presentation. Her sister noted that she had gingival bleeding. She reported an anal lesion with tenderness and slow bleeding. She has not required frequent transfusions. Her daughter noted that all home antihypertensive medications were discontinued since she was discharged from inpatient rehab.              In the emergency department, she appeared ill, lethargic, unable to fully participate in interview, periodically awakening with pain. Labs showed hemoglobin 14.3 g/dL (from 13.2 on 2/24/2025). Repeat hemoglobin was 10.4. Alkaline phosphatase was 251 U/L, total bilirubin was 2.2 mg/dL, AST was 733 U/L, ALT was 478 U/L. She was given 2.1 liters of IV fluids, vancomycin, piperacillin-tazobactam, 4 mg of IV morphine, 80 mg of IV pantoprazole, topical lidocaine for rectal pain. She was admitted to Hospital Medicine Team S.     Overview/Hospital Course:  GGT was elevated at 447 U/L. LDH was elevated. Colorectal Surgery, Hematology, Nephrology, and Case Management were consulted. She expressed desire to go to [facility] since she gets her care at [facility]. Her mental status has been declining since October 2024. She has memory loss, disorientation, and does not remember her illness. Her mother had dementia. She had chest pain on 3/3/2025 while getting dialysis. EKG showed sinus tachycardia. Troponin was 1102 ng/L. Repeat was 704. Hematology recommended giving 2 units of fresh frozen plasma (FFP) and starting low rate heparin infusion then transitioning to apixaban 2.5 mg twice daily if she had no further bleeding. She was given another 2 units of FFP. Her daughter requested gastrostomy tube  placement for malnutrition. On 3/4/2025, heparin was held due to recurrent bleeding. She had intermittent somnolence. She was kept NPO. Speech Language Pathology was consulted. She was hypoglycemic so was put on 10% dextrose drip. Speech Language Pathology recommended full liquid diet. LFTs trended down. Heparin infusion was restarted on 3/5/2025. On 3/6/2025, her daughter decided that she did not want transfer to a [facility] hospital, instead she wanted to transfer her medical care to the [facility] system. Hemoglobin and hematocrit remained stable with no evidence of recurrent bleeding on 3/7/2025. Heparin drip was continued. She had epistaxis on 3/7/2025. Hemoglobin decreased to 6.7 g/dL on 3/8/2025. She developed right arm swelling. Repeat hemoglobin was 5.7. Heparin drip was held. She was transfused 1 unit of packed red blood cells (PRBCs). Hemoglobin improved to 8.2. Right upper extremity venous ultrasound showed known right internal jugular thrombus as well as subclavian thrombus, although the subclavian vein was not visualized when the internal jugular thrombus was diagnosed, so it was unknown if it was new. Hemoglobin was stable on 3/10/2025. Heparin drip was resumed. Sodium was found to be 118 mmol/L. She had been on dextrose drip for days to treat hypoglycemia due to poor oral intake. She was given normal saline instead. Gastroenterology placed a gastrostomy tube on 3/11/2025. Around midnight that night, she vomited twice. Nausea resolved. Tolerating PEG and switched to apixaban. Hyponatremia improving.    Interval History: Patient with fatigue and nausea status post dialysis but tolerated well, otherwise. Hemoglobin remains stable at 8.6.  tube feeds running.       Review of Systems   Constitutional:  Positive for fatigue. Negative for chills and fever.   Respiratory:  Negative for shortness of breath.    Cardiovascular:  Negative for chest pain and palpitations.   Gastrointestinal:  Positive for nausea.  Negative for abdominal pain.   Musculoskeletal:  Negative for arthralgias.   Neurological:  Negative for light-headedness and headaches.     Objective:     Vital Signs (Most Recent):  Temp: 98.8 °F (37.1 °C) (03/14/25 0500)  Pulse: 94 (03/14/25 1200)  Resp: 17 (03/14/25 0045)  BP: (!) 138/96 (03/14/25 1200)  SpO2: 100 % (03/14/25 0045) Vital Signs (24h Range):  Temp:  [98.2 °F (36.8 °C)-98.8 °F (37.1 °C)] 98.8 °F (37.1 °C)  Pulse:  [88-98] 94  Resp:  [12-17] 17  SpO2:  [100 %] 100 %  BP: (121-160)/(70-96) 138/96     Weight: 73 kg (160 lb 15 oz)  Body mass index is 26.78 kg/m².    Intake/Output Summary (Last 24 hours) at 3/14/2025 1217  Last data filed at 3/14/2025 0636  Gross per 24 hour   Intake 210 ml   Output --   Net 210 ml         Physical Exam  Constitutional:       General: She is not in acute distress.     Appearance: She is not toxic-appearing.   HENT:      Head: Normocephalic and atraumatic.   Cardiovascular:      Rate and Rhythm: Normal rate and regular rhythm.   Pulmonary:      Effort: Pulmonary effort is normal. No respiratory distress.   Abdominal:      Comments: PEG in place with tube feeds running   Skin:     Coloration: Skin is not jaundiced.   Neurological:      Mental Status: She is alert and oriented to person, place, and time.               Significant Labs: All pertinent labs within the past 24 hours have been reviewed.  CBC:   Recent Labs   Lab 03/13/25  0420 03/14/25  0548   WBC 8.00 10.94   HGB 9.0* 8.6*   HCT 26.6* 26.2*   * 171     CMP:   Recent Labs   Lab 03/13/25  0420 03/13/25  1110 03/14/25  0930   * 126* 128*   K 3.4*  --  3.2*   CL 93*  --  95   CO2 22*  --  25   GLU 65*  --  82   BUN 10  --  17   CREATININE 2.3*  --  2.6*   CALCIUM 7.8*  --  7.8*   PROT 5.2*  --  5.2*   ALBUMIN 2.2*  --  2.1*   BILITOT 1.9*  --  1.4*   ALKPHOS 233*  --  204*   *  --  85*   ALT 70*  --  30   ANIONGAP 11  --  8       Significant Imaging: I have reviewed all pertinent imaging  results/findings within the past 24 hours.      Assessment & Plan  Rectal bleeding  Appreciate Colorectal Surgery. Subsided. Monitor for recurrence.  Hemoglobin stable at 8.6  Unspecified severe protein-calorie malnutrition  Severe malnutrition patient with recent hx of worsening dysphagia, has had w/u for scleroderma, has hiatal hernia and ?esophageal dysmotility  - dysphagia to solids.   Developed wernicke encephalopathy from nutritional deficiency     Daughter reports concern of inadequate intake has had severe weight loss in recent months with multiple complex health conditions. She requested PEG placement. Nutrition gave recommendations on tube feeds. Giving minced and moist diet, Novasource Renal supplements.  Dysphagia  Appreciate SLP. Advanced diet to minced and moist. Appreciate GI. PEG placed 3/11/2025. Will start enteral water and tube feeds recommended by Nutrition.  FWF stopped per Nephrology request.  Wernicke encephalopathy  Continue on home thiamine supplementation.     GERD without esophagitis  Continue home pantoprazole.    ESRD (end stage renal disease)  Nephrology managing dialysis.    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 Last Ultomiris dose 2/24/2025. Outpatient hematologist is Roma Cantu MD at [facility]. Consulted Hematology here. Appreciate assistance with management.  Elevated transaminase level  Hepatic steatosis on CT. Worsening hepatic function may contribute to her worsening coagulopathy elevated PT/PTT. Has significantly improved.  Acute thrombosis of right internal jugular vein  Diagnosed 1/20/2025, on apixaban at home. Held due to GI bleed. Started heparin drip 3/5/2025. Transition to home apixaban   History of Helicobacter pylori infection  Diagnosed via EGD and reported treated in [facility] system.     Renal hematoma, left, sequela  In January, due to biopsy in 2024. Most recent imaging showed resolving appearance of remote hematoma.    Anemia of chronic renal failure, stage 5  Anemia is likely due to acute blood loss which was from GI bleed and active hemolysis due to HUS.  . Most recent hemoglobin and hematocrit are listed below.  Recent Labs     03/12/25  0601 03/13/25  0420 03/14/25  0548   HGB 6.6* 9.0* 8.6*   HCT 20.4* 26.6* 26.2*     Plan  - Monitor serial CBC  - Transfuse PRBC if patient becomes hemodynamically unstable, symptomatic or H/H drops below 7/21.  - Transfused pRBCs 3/8/2025 after having epistaxis.  - Stable until she had some bleeding at new PEG site. Transfuse again 3/12/2025.  - now stable  Metabolic encephalopathy  Waxing and waning since October 2024 due to HUS. Lacks capacity, unable to communicate about her illness. Daughter Sue Barros wanted her to go to [facility] for continuity of care but has changed her mind and she will transition her medical care to [facility].  Hyponatremia  Hyponatremia is likely due to hypotonic fluids and poor oral intake. The patient's most recent sodium results are listed below.  Recent Labs     03/13/25  0420 03/13/25  1110 03/14/25  0930   * 126* 128*     Plan  - Monitor.  improving    Hypokalemia  Patient's most recent potassium results are listed below.   Recent Labs     03/12/25  0601 03/13/25  0420 03/14/25  0930   K 4.5 3.4* 3.2*     Plan  - Replete potassium per protocol  - Monitor potassium Daily  - Patient's hypokalemia is stable  - Repleting with tablet per g tube  VTE Risk Mitigation (From admission, onward)           Ordered     apixaban tablet 5 mg  2 times daily         03/12/25 1148     heparin (porcine) injection 1,000 Units  As needed (PRN)         03/03/25 1524     IP VTE HIGH RISK PATIENT  Once         03/03/25 0416     Place sequential compression device  Until discontinued         03/03/25 0416     Reason for No Pharmacological VTE Prophylaxis  Once        Question:  Reasons:  Answer:  Active Bleeding    03/03/25 0416                    Discharge  Planning   PATRICIA: 3/16/2025     Code Status: Full Code   Medical Readiness for Discharge Date:   Discharge Plan A: Home with family, Home Health                Douglas Mattson MD  Department of Hospital Medicine   [facility] - Telemetry Stepdown

## 2025-03-14 NOTE — ASSESSMENT & PLAN NOTE
Hyponatremia is likely due to hypotonic fluids and poor oral intake. The patient's most recent sodium results are listed below.  Recent Labs     03/13/25  0420 03/13/25  1110 03/14/25  0930   * 126* 128*     Plan  - Monitor.  improving

## 2025-03-14 NOTE — PROGRESS NOTES
[facility] STAFF HEMODIALYSIS NOTE     Patient currently on hemodialysis for removal of uremic toxins and volume.     Patient seen and evaluated on hemodialysis, tolerating treatment, see HD flowsheet for vitals and assessments.    Labs have been reviewed and the dialysate bath has been adjusted.       Assessment/Plan:    ESRD  -Patient seen on HD, tolerating treatment well, w/o complaints   -Hyponatremia in setting of ESRD. FWF discontinued yesterday. 1L fluid restrictions. Na bath adjusted. Labs pending today.   -UF goal of 2L  -Renal diet, if not NPO   -Strict I/O's and daily weights  -Daily renal function panels  -Keep MAP >65 while on HD   -Hgb goal 10-11, hold epo in setting of acute thrombosis   -Will continue to follow while inpatient     Gladys Feliciano DNP-FNP, C  Nephrology  Pager: [phone]

## 2025-03-14 NOTE — ASSESSMENT & PLAN NOTE
Patient's most recent potassium results are listed below.   Recent Labs     03/12/25  0601 03/13/25  0420 03/14/25  0930   K 4.5 3.4* 3.2*     Plan  - Replete potassium per protocol  - Monitor potassium Daily  - Patient's hypokalemia is stable  - Repleting with tablet per g tube

## 2025-03-14 NOTE — ASSESSMENT & PLAN NOTE
Diagnosed via EGD and reported treated in [facility] system.      Statement Selected

## 2025-03-14 NOTE — PLAN OF CARE
Pt navigator Karen sent a message to the clinic nurse to contact pt to schedule her hospital f/u visit.

## 2025-03-14 NOTE — SUBJECTIVE & OBJECTIVE
Interval History: Patient with fatigue and nausea status post dialysis but tolerated well, otherwise. Hemoglobin remains stable at 8.6.  tube feeds running.       Review of Systems   Constitutional:  Positive for fatigue. Negative for chills and fever.   Respiratory:  Negative for shortness of breath.    Cardiovascular:  Negative for chest pain and palpitations.   Gastrointestinal:  Positive for nausea. Negative for abdominal pain.   Musculoskeletal:  Negative for arthralgias.   Neurological:  Negative for light-headedness and headaches.     Objective:     Vital Signs (Most Recent):  Temp: 98.8 °F (37.1 °C) (03/14/25 0500)  Pulse: 94 (03/14/25 1200)  Resp: 17 (03/14/25 0045)  BP: (!) 138/96 (03/14/25 1200)  SpO2: 100 % (03/14/25 0045) Vital Signs (24h Range):  Temp:  [98.2 °F (36.8 °C)-98.8 °F (37.1 °C)] 98.8 °F (37.1 °C)  Pulse:  [88-98] 94  Resp:  [12-17] 17  SpO2:  [100 %] 100 %  BP: (121-160)/(70-96) 138/96     Weight: 73 kg (160 lb 15 oz)  Body mass index is 26.78 kg/m².    Intake/Output Summary (Last 24 hours) at 3/14/2025 1217  Last data filed at 3/14/2025 0636  Gross per 24 hour   Intake 210 ml   Output --   Net 210 ml         Physical Exam  Constitutional:       General: She is not in acute distress.     Appearance: She is not toxic-appearing.   HENT:      Head: Normocephalic and atraumatic.   Cardiovascular:      Rate and Rhythm: Normal rate and regular rhythm.   Pulmonary:      Effort: Pulmonary effort is normal. No respiratory distress.   Abdominal:      Comments: PEG in place with tube feeds running   Skin:     Coloration: Skin is not jaundiced.   Neurological:      Mental Status: She is alert and oriented to person, place, and time.               Significant Labs: All pertinent labs within the past 24 hours have been reviewed.  CBC:   Recent Labs   Lab 03/13/25  0420 03/14/25  0548   WBC 8.00 10.94   HGB 9.0* 8.6*   HCT 26.6* 26.2*   * 171     CMP:   Recent Labs   Lab 03/13/25  1688  03/13/25  1110 03/14/25  0930   * 126* 128*   K 3.4*  --  3.2*   CL 93*  --  95   CO2 22*  --  25   GLU 65*  --  82   BUN 10  --  17   CREATININE 2.3*  --  2.6*   CALCIUM 7.8*  --  7.8*   PROT 5.2*  --  5.2*   ALBUMIN 2.2*  --  2.1*   BILITOT 1.9*  --  1.4*   ALKPHOS 233*  --  204*   *  --  85*   ALT 70*  --  30   ANIONGAP 11  --  8       Significant Imaging: I have reviewed all pertinent imaging results/findings within the past 24 hours.

## 2025-03-14 NOTE — ASSESSMENT & PLAN NOTE
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 . Most recent hemoglobin and hematocrit are listed below.  Recent Labs     03/12/25  0601 03/13/25  0420 03/14/25  0548   HGB 6.6* 9.0* 8.6*   HCT 20.4* 26.6* 26.2*     Plan  - Monitor serial CBC  - Transfuse PRBC if patient becomes hemodynamically unstable, symptomatic or H/H drops below 7/21.  - Transfused pRBCs 3/8/2025 after having epistaxis.  - Stable until she had some bleeding at new PEG site. Transfuse again 3/12/2025.  - now stable

## 2025-03-15 PROBLEM — Z86.19 HISTORY OF HELICOBACTER PYLORI INFECTION: Status: RESOLVED | Noted: 2025-03-02 | Resolved: 2025-03-15

## 2025-03-15 PROBLEM — I82.C11 ACUTE THROMBOSIS OF RIGHT INTERNAL JUGULAR VEIN: Status: RESOLVED | Noted: 2025-03-02 | Resolved: 2025-03-15

## 2025-03-15 PROBLEM — E51.2 WERNICKE ENCEPHALOPATHY: Chronic | Status: RESOLVED | Noted: 2025-02-13 | Resolved: 2025-03-15

## 2025-03-15 PROBLEM — G93.41 METABOLIC ENCEPHALOPATHY: Status: RESOLVED | Noted: 2025-03-03 | Resolved: 2025-03-15

## 2025-03-15 PROBLEM — E87.6 HYPOKALEMIA: Status: RESOLVED | Noted: 2025-03-14 | Resolved: 2025-03-15

## 2025-03-15 LAB
ALBUMIN SERPL BCP-MCNC: 2.3 G/DL (ref 3.5–5.2)
ALP SERPL-CCNC: 248 U/L (ref 40–150)
ALT SERPL W/O P-5'-P-CCNC: 25 U/L (ref 10–44)
ANION GAP SERPL CALC-SCNC: 9 MMOL/L (ref 8–16)
AST SERPL-CCNC: 89 U/L (ref 10–40)
BASOPHILS # BLD AUTO: 0.04 K/UL (ref 0–0.2)
BASOPHILS NFR BLD: 0.4 % (ref 0–1.9)
BILIRUB SERPL-MCNC: 1.4 MG/DL (ref 0.1–1)
BUN SERPL-MCNC: 18 MG/DL (ref 6–20)
CALCIUM SERPL-MCNC: 8.3 MG/DL (ref 8.7–10.5)
CHLORIDE SERPL-SCNC: 96 MMOL/L (ref 95–110)
CO2 SERPL-SCNC: 23 MMOL/L (ref 23–29)
CREAT SERPL-MCNC: 2.4 MG/DL (ref 0.5–1.4)
DIFFERENTIAL METHOD BLD: ABNORMAL
EOSINOPHIL # BLD AUTO: 0.2 K/UL (ref 0–0.5)
EOSINOPHIL NFR BLD: 2.3 % (ref 0–8)
ERYTHROCYTE [DISTWIDTH] IN BLOOD BY AUTOMATED COUNT: 15.6 % (ref 11.5–14.5)
EST. GFR  (NO RACE VARIABLE): 22.7 ML/MIN/1.73 M^2
GLUCOSE SERPL-MCNC: 74 MG/DL (ref 70–110)
HCT VFR BLD AUTO: 26.7 % (ref 37–48.5)
HGB BLD-MCNC: 8.7 G/DL (ref 12–16)
IMM GRANULOCYTES # BLD AUTO: 0.04 K/UL (ref 0–0.04)
IMM GRANULOCYTES NFR BLD AUTO: 0.4 % (ref 0–0.5)
LYMPHOCYTES # BLD AUTO: 1.5 K/UL (ref 1–4.8)
LYMPHOCYTES NFR BLD: 16.4 % (ref 18–48)
MCH RBC QN AUTO: 28.5 PG (ref 27–31)
MCHC RBC AUTO-ENTMCNC: 32.6 G/DL (ref 32–36)
MCV RBC AUTO: 88 FL (ref 82–98)
MONOCYTES # BLD AUTO: 0.8 K/UL (ref 0.3–1)
MONOCYTES NFR BLD: 8.7 % (ref 4–15)
NEUTROPHILS # BLD AUTO: 6.6 K/UL (ref 1.8–7.7)
NEUTROPHILS NFR BLD: 71.8 % (ref 38–73)
NRBC BLD-RTO: 0 /100 WBC
PLATELET # BLD AUTO: 208 K/UL (ref 150–450)
PMV BLD AUTO: 10.4 FL (ref 9.2–12.9)
POCT GLUCOSE: 103 MG/DL (ref 70–110)
POCT GLUCOSE: 88 MG/DL (ref 70–110)
POCT GLUCOSE: 89 MG/DL (ref 70–110)
POTASSIUM SERPL-SCNC: 3.8 MMOL/L (ref 3.5–5.1)
PROT SERPL-MCNC: 5.9 G/DL (ref 6–8.4)
RBC # BLD AUTO: 3.05 M/UL (ref 4–5.4)
SODIUM SERPL-SCNC: 128 MMOL/L (ref 136–145)
WBC # BLD AUTO: 9.18 K/UL (ref 3.9–12.7)

## 2025-03-15 PROCEDURE — 25000003 PHARM REV CODE 250: Performed by: HOSPITALIST

## 2025-03-15 PROCEDURE — 80053 COMPREHEN METABOLIC PANEL: CPT | Performed by: NURSE PRACTITIONER

## 2025-03-15 PROCEDURE — 99232 SBSQ HOSP IP/OBS MODERATE 35: CPT | Mod: ,,, | Performed by: NURSE PRACTITIONER

## 2025-03-15 PROCEDURE — 20600001 HC STEP DOWN PRIVATE ROOM

## 2025-03-15 PROCEDURE — 85025 COMPLETE CBC W/AUTO DIFF WBC: CPT | Performed by: HOSPITALIST

## 2025-03-15 PROCEDURE — 25000003 PHARM REV CODE 250: Performed by: FAMILY MEDICINE

## 2025-03-15 PROCEDURE — 36415 COLL VENOUS BLD VENIPUNCTURE: CPT | Performed by: NURSE PRACTITIONER

## 2025-03-15 PROCEDURE — 36415 COLL VENOUS BLD VENIPUNCTURE: CPT | Performed by: HOSPITALIST

## 2025-03-15 RX ORDER — ACETAMINOPHEN 325 MG/1
650 TABLET ORAL EVERY 8 HOURS PRN
Status: DISCONTINUED | OUTPATIENT
Start: 2025-03-15 | End: 2025-03-24

## 2025-03-15 RX ORDER — HYDRALAZINE HYDROCHLORIDE 50 MG/1
50 TABLET, FILM COATED ORAL EVERY 8 HOURS
Status: DISCONTINUED | OUTPATIENT
Start: 2025-03-15 | End: 2025-03-16

## 2025-03-15 RX ADMIN — Medication 1000 UNITS: at 08:03

## 2025-03-15 RX ADMIN — APIXABAN 5 MG: 5 TABLET, FILM COATED ORAL at 08:03

## 2025-03-15 RX ADMIN — ESCITALOPRAM OXALATE 10 MG: 5 TABLET, FILM COATED ORAL at 08:03

## 2025-03-15 RX ADMIN — PANTOPRAZOLE SODIUM 40 MG: 40 TABLET, DELAYED RELEASE ORAL at 06:03

## 2025-03-15 RX ADMIN — HYDRALAZINE HYDROCHLORIDE 50 MG: 50 TABLET ORAL at 08:03

## 2025-03-15 RX ADMIN — NEPHROCAP 1 CAPSULE: 1 CAP ORAL at 08:03

## 2025-03-15 RX ADMIN — FOLIC ACID 1000 MCG: 1 TABLET ORAL at 08:03

## 2025-03-15 RX ADMIN — PANTOPRAZOLE SODIUM 40 MG: 40 TABLET, DELAYED RELEASE ORAL at 05:03

## 2025-03-15 RX ADMIN — Medication 100 MG: at 08:03

## 2025-03-15 RX ADMIN — MIRTAZAPINE 15 MG: 15 TABLET, FILM COATED ORAL at 08:03

## 2025-03-15 NOTE — SUBJECTIVE & OBJECTIVE
Interval History: HD yesterday, tolerated well. Pending labs this morning.     Review of patient's allergies indicates:  No Known Allergies  Current Facility-Administered Medications   Medication Frequency    aluminum-magnesium hydroxide-simethicone 200-200-20 mg/5 mL suspension 30 mL QID PRN    apixaban tablet 5 mg BID    dextrose 50% injection 12.5 g PRN    dextrose 50% injection 25 g PRN    EScitalopram oxalate tablet 10 mg Daily    folic acid tablet 1,000 mcg Daily    glucagon (human recombinant) injection 1 mg PRN    glucose chewable tablet 16 g PRN    glucose chewable tablet 24 g PRN    heparin (porcine) injection 1,000 Units PRN    melatonin tablet 6 mg Nightly PRN    methocarbamoL tablet 1,000 mg Once    mirtazapine tablet 15 mg QHS    naloxone 0.4 mg/mL injection 0.02 mg PRN    ondansetron injection 4 mg Q8H PRN    oxyCODONE-acetaminophen 5-325 mg per tablet 1 tablet Q8H PRN    pantoprazole EC tablet 40 mg BID AC    polyethylene glycol packet 17 g Daily PRN    potassium bicarbonate disintegrating tablet 50 mEq Once    prochlorperazine injection Soln 5 mg Q6H PRN    senna-docusate 8.6-50 mg per tablet 1 tablet BID PRN    sodium chloride 0.9% flush 10 mL Q6H PRN    thiamine tablet 100 mg Daily    vitamin D 1000 units tablet 1,000 Units Daily    vitamin renal formula (B-complex-vitamin c-folic acid) 1 mg per capsule 1 capsule Daily       Objective:     Vital Signs (Most Recent):  Temp: 99.3 °F (37.4 °C) (03/15/25 0821)  Pulse: 95 (03/15/25 0821)  Resp: 20 (03/15/25 0821)  BP: (!) 160/97 (03/15/25 0821)  SpO2: 98 % (03/15/25 0821) Vital Signs (24h Range):  Temp:  [98.2 °F (36.8 °C)-99.3 °F (37.4 °C)] 99.3 °F (37.4 °C)  Pulse:  [] 95  Resp:  [17-20] 20  SpO2:  [98 %-99 %] 98 %  BP: (126-169)/() 160/97     Weight: 73 kg (160 lb 15 oz) (03/13/25 0030)  Body mass index is 26.78 kg/m².  Body surface area is 1.83 meters squared.    I/O last 3 completed shifts:  In: 810 [I.V.:300; Other:300;  NG/GT:210]  Out: 1903 [Other:1903]     Physical Exam  Vitals and nursing note reviewed.   Constitutional:       General: She is not in acute distress.     Appearance: She is well-developed. She is ill-appearing. She is not diaphoretic.      Interventions: She is not intubated.  Pulmonary:      Effort: Pulmonary effort is normal. No accessory muscle usage or respiratory distress. She is not intubated.   Musculoskeletal:      Right lower leg: No edema.      Left lower leg: No edema.   Skin:     General: Skin is warm and dry.      Coloration: Skin is not jaundiced or pale.   Neurological:      Motor: No seizure activity.   Psychiatric:         Attention and Perception: She is attentive.         Behavior: Behavior is not aggressive or hyperactive.         Cognition and Memory: Cognition is not impaired. Memory is not impaired.          Significant Labs:  CBC:   Recent Labs   Lab 03/15/25  0625   WBC 9.18   RBC 3.05*   HGB 8.7*   HCT 26.7*      MCV 88   MCH 28.5   MCHC 32.6     CMP:   Recent Labs   Lab 03/14/25  0930   GLU 82   CALCIUM 7.8*   ALBUMIN 2.1*   PROT 5.2*   *   K 3.2*   CO2 25   CL 95   BUN 17   CREATININE 2.6*   ALKPHOS 204*   ALT 30   AST 85*   BILITOT 1.4*     All labs within the past 24 hours have been reviewed.

## 2025-03-15 NOTE — PLAN OF CARE
Problem: Hemodialysis  Goal: Safe, Effective Therapy Delivery  Outcome: Progressing  Goal: Effective Tissue Perfusion  Outcome: Progressing  Goal: Absence of Infection Signs and Symptoms  Outcome: Progressing       Dialysis tx ended to the right chest perm cath, heparin locked, capped, sterile dressing changed.    Net fluid removed: 1303 ml    Report given to nurse [place] pt [facility]

## 2025-03-15 NOTE — PLAN OF CARE
Recommendations     --For bolus feed, recommend Novasource Renal @ 4 cans per day (1 breakfast, 1 lunch, 2 dinner) to provide 1900 kcal, 948 mL volume, 172 g CHO, 88 g protein, 672 mL free water    -Free water flush per provider       --RD to monitor intake, tolerance     Goals:   1.  75% nutritional needs met with diet/EN/PN    2. Maintain dry weight during admission     Nutrition Goal Status: progressing towards goal  Communication of RD Recs:  (POC)

## 2025-03-15 NOTE — PROGRESS NOTES
[facility] - Telemetry Stepdown  Nephrology  Progress Note    Patient Name: Madelaine Barros  MRN: [id number]  Admission Date: 3/2/2025  Hospital Length of Stay: 12 days  Attending Provider: Douglas Mattson MD   Primary Care Physician: Delilah Kelley MD  Principal Problem:Rectal bleeding    Subjective:     Interval History: HD yesterday, tolerated well. Pending labs this morning.     Review of patient's allergies indicates:  No Known Allergies  Current Facility-Administered Medications   Medication Frequency    aluminum-magnesium hydroxide-simethicone 200-200-20 mg/5 mL suspension 30 mL QID PRN    apixaban tablet 5 mg BID    dextrose 50% injection 12.5 g PRN    dextrose 50% injection 25 g PRN    EScitalopram oxalate tablet 10 mg Daily    folic acid tablet 1,000 mcg Daily    glucagon (human recombinant) injection 1 mg PRN    glucose chewable tablet 16 g PRN    glucose chewable tablet 24 g PRN    heparin (porcine) injection 1,000 Units PRN    melatonin tablet 6 mg Nightly PRN    methocarbamoL tablet 1,000 mg Once    mirtazapine tablet 15 mg QHS    naloxone 0.4 mg/mL injection 0.02 mg PRN    ondansetron injection 4 mg Q8H PRN    oxyCODONE-acetaminophen 5-325 mg per tablet 1 tablet Q8H PRN    pantoprazole EC tablet 40 mg BID AC    polyethylene glycol packet 17 g Daily PRN    potassium bicarbonate disintegrating tablet 50 mEq Once    prochlorperazine injection Soln 5 mg Q6H PRN    senna-docusate 8.6-50 mg per tablet 1 tablet BID PRN    sodium chloride 0.9% flush 10 mL Q6H PRN    thiamine tablet 100 mg Daily    vitamin D 1000 units tablet 1,000 Units Daily    vitamin renal formula (B-complex-vitamin c-folic acid) 1 mg per capsule 1 capsule Daily       Objective:     Vital Signs (Most Recent):  Temp: 99.3 °F (37.4 °C) (03/15/25 0821)  Pulse: 95 (03/15/25 0821)  Resp: 20 (03/15/25 0821)  BP: (!) 160/97 (03/15/25 0821)  SpO2: 98 % (03/15/25 0821) Vital Signs (24h Range):  Temp:  [98.2 °F (36.8 °C)-99.3 °F (37.4 °C)]  99.3 °F (37.4 °C)  Pulse:  [] 95  Resp:  [17-20] 20  SpO2:  [98 %-99 %] 98 %  BP: (126-169)/() 160/97     Weight: 73 kg (160 lb 15 oz) (03/13/25 0030)  Body mass index is 26.78 kg/m².  Body surface area is 1.83 meters squared.    I/O last 3 completed shifts:  In: 810 [I.V.:300; Other:300; NG/GT:210]  Out: 1903 [Other:1903]     Physical Exam  Vitals and nursing note reviewed.   Constitutional:       General: She is not in acute distress.     Appearance: She is well-developed. She is ill-appearing. She is not diaphoretic.      Interventions: She is not intubated.  Pulmonary:      Effort: Pulmonary effort is normal. No accessory muscle usage or respiratory distress. She is not intubated.   Musculoskeletal:      Right lower leg: No edema.      Left lower leg: No edema.   Skin:     General: Skin is warm and dry.      Coloration: Skin is not jaundiced or pale.   Neurological:      Motor: No seizure activity.   Psychiatric:         Attention and Perception: She is attentive.         Behavior: Behavior is not aggressive or hyperactive.         Cognition and Memory: Cognition is not impaired. Memory is not impaired.          Significant Labs:  CBC:   Recent Labs   Lab 03/15/25  0625   WBC 9.18   RBC 3.05*   HGB 8.7*   HCT 26.7*      MCV 88   MCH 28.5   MCHC 32.6     CMP:   Recent Labs   Lab 03/14/25  0930   GLU 82   CALCIUM 7.8*   ALBUMIN 2.1*   PROT 5.2*   *   K 3.2*   CO2 25   CL 95   BUN 17   CREATININE 2.6*   ALKPHOS 204*   ALT 30   AST 85*   BILITOT 1.4*     All labs within the past 24 hours have been reviewed.         Assessment/Plan:     Renal/  ESRD (end stage renal disease)  Outpatient HD Information:  -Dialysis modality: Hemodialysis  -HD TX days: Monday/Wednesday/Friday  -Last HD TX prior to hospital admission: ?  -Dialysis access: dialysis catheter  -Residual urine: Anuric   -EDW: ?    Recommendations    -Plan for next HD on Monday 3/17 pending labs. Hyponatremia in setting of ESRD. FWF  have been discontinued.   -Caution in administering K in setting of ESRD. Would not give more than 20 mEQ PO at once and then recheck   -1L fluid restriction  -Renal diet when not NPO  -no indication for phos binders    GI  * Rectal bleeding  - per primary         Thank you for your consult. I will follow-up with patient. Please contact us if you have any additional questions.    Gladys Feliciano DNP  Nephrology  [facility] - Telemetry Stepdown

## 2025-03-15 NOTE — ASSESSMENT & PLAN NOTE
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 . Most recent hemoglobin and hematocrit are listed below.  Recent Labs     03/13/25  0420 03/14/25  0548 03/15/25  0625   HGB 9.0* 8.6* 8.7*   HCT 26.6* 26.2* 26.7*     Plan  - Monitor serial CBC  - Transfuse PRBC if patient becomes hemodynamically unstable, symptomatic or H/H drops below 7/21.  - Transfused pRBCs 3/8/2025 after having epistaxis.  - Stable until she had some bleeding at new PEG site. Transfuse again 3/12/2025.  - now stable

## 2025-03-15 NOTE — PLAN OF CARE
Pt with secured HH however, did not have secure infusion company for feedings. SW contacted [facility] they will be able to run auth for supplies on Monday. Pt will DC tomorrow 3/16 with formula and supplies until insurance approval (Monday). Pt's sister and niece will visit today for teaching. (RN to demonstrate teaching)  MD notified via secure chat.    Aminah GORDON,   Case Management   [phone]

## 2025-03-15 NOTE — ASSESSMENT & PLAN NOTE
Outpatient HD Information:  -Dialysis modality: Hemodialysis  -HD TX days: Monday/Wednesday/Friday  -Last HD TX prior to hospital admission: ?  -Dialysis access: dialysis catheter  -Residual urine: Anuric   -EDW: ?    Recommendations    -Plan for next HD on Monday 3/17 pending labs. Hyponatremia in setting of ESRD. FWF have been discontinued.   -Caution in administering K in setting of ESRD. Would not give more than 20 mEQ PO at once and then recheck   -1L fluid restriction  -Renal diet when not NPO  -no indication for phos binders

## 2025-03-15 NOTE — CONSULTS
[facility] - Telemetry Stepdown  Adult Nutrition  Progress Note    SUMMARY       Recommendations    --For bolus feed, recommend Novasource Renal @ 4 cans per day (1 breakfast, 1 lunch, 2 dinner) to provide 1900 kcal, 948 mL volume, 172 g CHO, 88 g protein, 672 mL free water    -Free water flush per provider      --RD to monitor intake, tolerance    Goals:   1.  75% nutritional needs met with diet/EN/PN    2. Maintain dry weight during admission    Nutrition Goal Status: progressing towards goal  Communication of RD Recs:  (POC)    Nutrition Discharge Planning    Nutrition Discharge Planning: Enteral nutrition (comments)  Therapeutic diet (comments): NPO  Oral supplement regimen (comments): 0  Enteral nutrition (comments): Novasource Renal    Assessment and Plan  Nutrition Problem  Inadequate oral intake      Related to (etiology):   Decreased ability to consume sufficient needs      Signs and Symptoms (as evidenced by):   NPO   Severe debility, poor appetite, dysphagia to solid, mostly consuming liquids       Interventions:  Collaboration by nutrition professional with other providers   Enteral Nutrition- Novasource Renal      Nutrition Diagnosis Status:   Continues    Reason for Assessment    Reason For Assessment: consult  Diagnosis:  (Rectal bleeding)  General Information Comments: Consult received for bolus TF recs.  Patient remains admitted for rectal bleeding.  Gastroenterology placed a gastrostomy tube on 3/11/2025. Around midnight that night, she vomited twice. Nausea resolved. Tolerating PEG.  Continues to deny any residual bloody stool.  Plan for next HD on Monday 3/17 pending labs. Hyponatremia in setting of ESRD. FWF have been discontinued.  Renal diet when not NPO; no indication for phos binders per nephrology.  SLP joce 3/12--pt is safe to initiate Regular Diet - IDDSI Level 7, Liquid Diet Level: Thin liquids - IDDSI Level 0.  SLP with ongoing concern and caution with advanced diet textures only 2/2 pt  "lethargy and poor PO intake with pt adequately tolerating minced and moist texture.  PEG placed 3/11/2025.  Patient is now NPO with TF ordered: NovasThe Smartphone Physicalce Renal @40 mL- running @ goal- meeting needs.  No concern for malnutrition at this time.    Nutrition/Diet History    Nutrition Intake History: [facility]  Food Preferences: [facility]  Spiritual, Cultural Beliefs, Orthodoxy Practices, Values that Affect Care: no  Factors Affecting Nutritional Intake: NPO, difficulty/impaired swallowing, chewing difficulties/inability to chew food    Anthropometrics    Height: 5' 5" (165.1 cm)  Height (inches): 65 in  Weight: 73 kg (160 lb 15 oz)  Weight (lb): 160.94 lb  Weight Method: Bed Scale  Ideal Body Weight (IBW), Female: 125 lb  % Ideal Body Weight, Female (lb): 128.75 %  BMI (Calculated): 26.8  BMI Grade: 25 - 29.9 - overweight  Usual Body Weight (UBW), k.1 kg (1/3/25)  % Usual Body Weight: 100.21  % Weight Change From Usual Weight: 0 %       Lab/Procedures/Meds    Pertinent Labs Reviewed: reviewed  Pertinent Labs Comments: H/H 8.7/26.7(L), Na 128 (L), renal labs consistent with ESRD, Ca 8.3 (L)  Pertinent Medications Reviewed: reviewed  Pertinent Medications Comments: Mirtazapine, pantoprazole, folic acid, thiamine, vit D, renal MVI    Estimated/Assessed Needs    Weight Used For Calorie Calculations: 73 kg (160 lb 15 oz)  Energy Calorie Requirements (kcal): 5235-3102 kcals (25-30 kcals/kg)  Energy Need Method: Kcal/kg  Protein Requirements:  g (1.2-1.5 g/kg)  Weight Used For Protein Calculations: 73 kg (160 lb 15 oz)     Estimated Fluid Requirement Method: RDA Method  RDA Method (mL): 1802  CHO Requirement: 225g      Nutrition Prescription Ordered    Current Diet Order: NPO  Current Nutrition Support Formula Ordered: Novasource Renal  Current Nutrition Support Rate Ordered: 40 (ml)  Current Nutrition Support Frequency Ordered: Continuous  Oral Nutrition Supplement: 0    Evaluation of Received Nutrient/Fluid Intake    Enteral " Calories (kcal): 1920  Enteral Protein (gm): 89  % Kcal Needs: 100%  % Protein Needs: 100  I/O: -  Energy Calories Required: meeting needs  Protein Required: meeting needs  Fluid Required:  (Per provider)  Comments: -  Tolerance: tolerating  % Intake of Estimated Energy Needs: 75 - 100 %  % Meal Intake: NPO    Nutrition Risk    Level of Risk/Frequency of Follow-up: low     Monitor and Evaluation    Monitor and Evaluation: Energy intake, Food and beverage intake, Protein intake, Carbohydrate intake, Diet order, Enteral and parenteral nutrition administration, Food and nutrition knowledge, Weight, Electrolyte and renal panel     Nutrition Follow-Up    RD Follow-up?: Yes

## 2025-03-15 NOTE — NURSING
Bolus feed teaching completed at bedside with patient's sister and niece. Sister and niece verbalized understanding. Sister voiced concerns about patient getting back and forth to dialysis once discharged.  Primary team notified.

## 2025-03-15 NOTE — ASSESSMENT & PLAN NOTE
Hyponatremia is likely due to hypotonic fluids and poor oral intake. The patient's most recent sodium results are listed below.  Recent Labs     03/13/25  1110 03/14/25  0930 03/15/25  1100   * 128* 128*     Plan  - Monitor.  improving

## 2025-03-15 NOTE — SUBJECTIVE & OBJECTIVE
Interval History: patient endorses feeling much improved since yesterday.  Continues to deny any residual bloody stool.  She complains of a mild headache but otherwise no complaints.    Review of Systems   Constitutional:  Negative for chills and fever.   Respiratory:  Negative for chest tightness and shortness of breath.    Cardiovascular:  Negative for chest pain and palpitations.   Gastrointestinal:  Negative for abdominal pain.   Genitourinary:  Negative for difficulty urinating.   Neurological:  Positive for headaches. Negative for light-headedness and numbness.   Psychiatric/Behavioral:  Negative for agitation and behavioral problems.      Objective:     Vital Signs (Most Recent):  Temp: 98.7 °F (37.1 °C) (03/15/25 1554)  Pulse: 96 (03/15/25 1554)  Resp: 19 (03/15/25 1554)  BP: (!) 161/96 (03/15/25 1554)  SpO2: 95 % (03/15/25 1554) Vital Signs (24h Range):  Temp:  [98.7 °F (37.1 °C)-99.3 °F (37.4 °C)] 98.7 °F (37.1 °C)  Pulse:  [] 96  Resp:  [18-20] 19  SpO2:  [95 %-99 %] 95 %  BP: (159-169)/() 161/96     Weight: 73 kg (160 lb 15 oz)  Body mass index is 26.78 kg/m².  No intake or output data in the 24 hours ending 03/15/25 1609      Physical Exam  Constitutional:       General: She is not in acute distress.     Appearance: She is not toxic-appearing.   HENT:      Head: Normocephalic and atraumatic.   Eyes:      Extraocular Movements: Extraocular movements intact.   Cardiovascular:      Rate and Rhythm: Normal rate and regular rhythm.   Pulmonary:      Effort: Pulmonary effort is normal. No respiratory distress.   Abdominal:      Comments: PEG in place with feeds running   Neurological:      General: No focal deficit present.      Mental Status: She is alert and oriented to person, place, and time.   Psychiatric:         Mood and Affect: Mood normal.         Behavior: Behavior normal.               Significant Labs: All pertinent labs within the past 24 hours have been reviewed.  CBC:   Recent Labs    Lab 03/14/25  0548 03/15/25  0625   WBC 10.94 9.18   HGB 8.6* 8.7*   HCT 26.2* 26.7*    208     CMP:   Recent Labs   Lab 03/14/25  0930 03/15/25  1100   * 128*   K 3.2* 3.8   CL 95 96   CO2 25 23   GLU 82 74   BUN 17 18   CREATININE 2.6* 2.4*   CALCIUM 7.8* 8.3*   PROT 5.2* 5.9*   ALBUMIN 2.1* 2.3*   BILITOT 1.4* 1.4*   ALKPHOS 204* 248*   AST 85* 89*   ALT 30 25   ANIONGAP 8 9       Significant Imaging: I have reviewed all pertinent imaging results/findings within the past 24 hours.

## 2025-03-15 NOTE — PROGRESS NOTES
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 [place]. She works for [employer]. Her primary care physician is Dr. Delilah Kelley.               She was hospitalized at [facility] from 10/23/2024 to 11/6/2024.              She was hospitalized at [facility] from 11/15/2024 to 1/18/2024.              She was hospitalized at [facility] from 12/9/2024 to 12/19/2024.              She was hospitalized at [facility] from 12/25/2024 to 1/5/2025.              She was hospitalized at [facility] from 1/5/2025 to 1/31/2025. She was discharged to [facility] inpatient rehab until 2/18/2025.               She presented to [facility] Emergency Department on 3/2/2025 with rectal pain and bleeding. She receives her care in the [facility] system, not at [facility]. History was provided by her daughter Sue Barros. She is followed by Hematology at [facility] and has been on immunomodulator  infusions every 8 weeks (Soliris, now Ultomiris, last dose 2/24/2025). She has severe debility, poor appetite, dysphagia to solids, mostly consumes liquid, but her daughter was concerned her nutritional intake is inadequate and inquired about feeding tube placement for chronic nutrition. She had watery stool on the day of presentation. Her sister noted that she had gingival bleeding. She reported an anal lesion with tenderness and slow bleeding. She has not required frequent transfusions. Her daughter noted that all home antihypertensive medications were discontinued since she was discharged from inpatient rehab.              In the emergency department, she appeared ill, lethargic, unable to fully participate in interview, periodically awakening with pain. Labs showed hemoglobin 14.3 g/dL (from 13.2 on 2/24/2025). Repeat hemoglobin was 10.4. Alkaline phosphatase was 251 U/L, total bilirubin was 2.2 mg/dL, AST was 733 U/L, ALT was 478 U/L. She was given 2.1 liters of IV fluids, vancomycin, piperacillin-tazobactam, 4 mg of IV morphine, 80 mg of IV pantoprazole, topical lidocaine for rectal pain. She was admitted to Hospital Medicine Team S.     Overview/Hospital Course:  GGT was elevated at 447 U/L. LDH was elevated. Colorectal Surgery, Hematology, Nephrology, and Case Management were consulted. She expressed desire to go to [facility] since she gets her care at [facility]. Her mental status has been declining since October 2024. She has memory loss, disorientation, and does not remember her illness. Her mother had dementia. She had chest pain on 3/3/2025 while getting dialysis. EKG showed sinus tachycardia. Troponin was 1102 ng/L. Repeat was 704. Hematology recommended giving 2 units of fresh frozen plasma (FFP) and starting low rate heparin infusion then transitioning to apixaban 2.5 mg twice daily if she had no further bleeding. She was given another 2 units of FFP. Her daughter requested gastrostomy tube  placement for malnutrition. On 3/4/2025, heparin was held due to recurrent bleeding. She had intermittent somnolence. She was kept NPO. Speech Language Pathology was consulted. She was hypoglycemic so was put on 10% dextrose drip. Speech Language Pathology recommended full liquid diet. LFTs trended down. Heparin infusion was restarted on 3/5/2025. On 3/6/2025, her daughter decided that she did not want transfer to a [facility] hospital, instead she wanted to transfer her medical care to the [facility] system. Hemoglobin and hematocrit remained stable with no evidence of recurrent bleeding on 3/7/2025. Heparin drip was continued. She had epistaxis on 3/7/2025. Hemoglobin decreased to 6.7 g/dL on 3/8/2025. She developed right arm swelling. Repeat hemoglobin was 5.7. Heparin drip was held. She was transfused 1 unit of packed red blood cells (PRBCs). Hemoglobin improved to 8.2. Right upper extremity venous ultrasound showed known right internal jugular thrombus as well as subclavian thrombus, although the subclavian vein was not visualized when the internal jugular thrombus was diagnosed, so it was unknown if it was new. Hemoglobin was stable on 3/10/2025. Heparin drip was resumed. Sodium was found to be 118 mmol/L. She had been on dextrose drip for days to treat hypoglycemia due to poor oral intake. She was given normal saline instead. Gastroenterology placed a gastrostomy tube on 3/11/2025. Around midnight that night, she vomited twice. Nausea resolved. Tolerating PEG and switched to apixaban. Hyponatremia improving.    Interval History: patient endorses feeling much improved since yesterday.  Continues to deny any residual bloody stool.  She complains of a mild headache but otherwise no complaints.    Review of Systems   Constitutional:  Negative for chills and fever.   Respiratory:  Negative for chest tightness and shortness of breath.    Cardiovascular:  Negative for chest pain and palpitations.   Gastrointestinal:   Negative for abdominal pain.   Genitourinary:  Negative for difficulty urinating.   Neurological:  Positive for headaches. Negative for light-headedness and numbness.   Psychiatric/Behavioral:  Negative for agitation and behavioral problems.      Objective:     Vital Signs (Most Recent):  Temp: 98.7 °F (37.1 °C) (03/15/25 1554)  Pulse: 96 (03/15/25 1554)  Resp: 19 (03/15/25 1554)  BP: (!) 161/96 (03/15/25 1554)  SpO2: 95 % (03/15/25 1554) Vital Signs (24h Range):  Temp:  [98.7 °F (37.1 °C)-99.3 °F (37.4 °C)] 98.7 °F (37.1 °C)  Pulse:  [] 96  Resp:  [18-20] 19  SpO2:  [95 %-99 %] 95 %  BP: (159-169)/() 161/96     Weight: 73 kg (160 lb 15 oz)  Body mass index is 26.78 kg/m².  No intake or output data in the 24 hours ending 03/15/25 1609      Physical Exam  Constitutional:       General: She is not in acute distress.     Appearance: She is not toxic-appearing.   HENT:      Head: Normocephalic and atraumatic.   Eyes:      Extraocular Movements: Extraocular movements intact.   Cardiovascular:      Rate and Rhythm: Normal rate and regular rhythm.   Pulmonary:      Effort: Pulmonary effort is normal. No respiratory distress.   Abdominal:      Comments: PEG in place with feeds running   Neurological:      General: No focal deficit present.      Mental Status: She is alert and oriented to person, place, and time.   Psychiatric:         Mood and Affect: Mood normal.         Behavior: Behavior normal.               Significant Labs: All pertinent labs within the past 24 hours have been reviewed.  CBC:   Recent Labs   Lab 03/14/25  0548 03/15/25  0625   WBC 10.94 9.18   HGB 8.6* 8.7*   HCT 26.2* 26.7*    208     CMP:   Recent Labs   Lab 03/14/25  0930 03/15/25  1100   * 128*   K 3.2* 3.8   CL 95 96   CO2 25 23   GLU 82 74   BUN 17 18   CREATININE 2.6* 2.4*   CALCIUM 7.8* 8.3*   PROT 5.2* 5.9*   ALBUMIN 2.1* 2.3*   BILITOT 1.4* 1.4*   ALKPHOS 204* 248*   AST 85* 89*   ALT 30 25   ANIONGAP 8 9        Significant Imaging: I have reviewed all pertinent imaging results/findings within the past 24 hours.      Assessment & Plan  Rectal bleeding  Appreciate Colorectal Surgery. Subsided. Monitor for recurrence.  Hemoglobin stable at 8.6  Unspecified severe protein-calorie malnutrition  Severe malnutrition patient with recent hx of worsening dysphagia, has had w/u for scleroderma, has hiatal hernia and ?esophageal dysmotility  - dysphagia to solids.   Developed wernicke encephalopathy from nutritional deficiency     Daughter reports concern of inadequate intake has had severe weight loss in recent months with multiple complex health conditions. She requested PEG placement. Nutrition gave recommendations on tube feeds. Giving minced and moist diet, Novasource Renal supplements.  Dysphagia  Appreciate SLP. Advanced diet to minced and moist. Appreciate GI. PEG placed 3/11/2025. Will start enteral water and tube feeds recommended by Nutrition.  FWF stopped per Nephrology request.  Wernicke encephalopathy  Continue on home thiamine supplementation.     GERD without esophagitis  Continue home pantoprazole.    ESRD (end stage renal disease)  Nephrology managing dialysis.    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 Last Ultomiris dose 2/24/2025. Outpatient hematologist is Roma Cantu MD at [facility]. Consulted Hematology here. Appreciate assistance with management.  Elevated transaminase level  Hepatic steatosis on CT. Worsening hepatic function may contribute to her worsening coagulopathy elevated PT/PTT. Has significantly improved.  Renal hematoma, left, sequela  In January, due to biopsy in 2024. Most recent imaging showed resolving appearance of remote hematoma.   Anemia of chronic renal failure, stage 5  Anemia is likely due to acute blood loss which was from GI bleed and active hemolysis due to HUS.  . Most recent hemoglobin and hematocrit are listed below.  Recent Labs      03/13/25  0420 03/14/25  0548 03/15/25  0625   HGB 9.0* 8.6* 8.7*   HCT 26.6* 26.2* 26.7*     Plan  - Monitor serial CBC  - Transfuse PRBC if patient becomes hemodynamically unstable, symptomatic or H/H drops below 7/21.  - Transfused pRBCs 3/8/2025 after having epistaxis.  - Stable until she had some bleeding at new PEG site. Transfuse again 3/12/2025.  - now stable  Hyponatremia  Hyponatremia is likely due to hypotonic fluids and poor oral intake. The patient's most recent sodium results are listed below.  Recent Labs     03/13/25  1110 03/14/25  0930 03/15/25  1100   * 128* 128*     Plan  - Monitor.  improving    VTE Risk Mitigation (From admission, onward)           Ordered     apixaban tablet 5 mg  2 times daily         03/12/25 1148     heparin (porcine) injection 1,000 Units  As needed (PRN)         03/03/25 1524     IP VTE HIGH RISK PATIENT  Once         03/03/25 0416     Place sequential compression device  Until discontinued         03/03/25 0416     Reason for No Pharmacological VTE Prophylaxis  Once        Question:  Reasons:  Answer:  Active Bleeding    03/03/25 0416                    Discharge Planning   PATRICIA: 3/16/2025     Code Status: Full Code   Medical Readiness for Discharge Date:   Discharge Plan A: Home with family, Home Health                Please place Justification for DME        Douglas Mattson MD  Department of Hospital Medicine   [facility] - Telemetry Stepdown

## 2025-03-15 NOTE — ASSESSMENT & PLAN NOTE
Chronic. Last Ultomiris dose 2/24/2025. Outpatient hematologist is Roma Cantu MD at [facility]. Consulted Hematology here. Appreciate assistance with management.

## 2025-03-16 PROBLEM — I10 HYPERTENSION: Status: ACTIVE | Noted: 2025-03-16

## 2025-03-16 LAB
BASOPHILS # BLD AUTO: 0.06 K/UL (ref 0–0.2)
BASOPHILS NFR BLD: 0.7 % (ref 0–1.9)
DIFFERENTIAL METHOD BLD: ABNORMAL
EOSINOPHIL # BLD AUTO: 0 K/UL (ref 0–0.5)
EOSINOPHIL NFR BLD: 0.2 % (ref 0–8)
ERYTHROCYTE [DISTWIDTH] IN BLOOD BY AUTOMATED COUNT: 15.5 % (ref 11.5–14.5)
HCT VFR BLD AUTO: 31.9 % (ref 37–48.5)
HGB BLD-MCNC: 10.1 G/DL (ref 12–16)
IMM GRANULOCYTES # BLD AUTO: 0.02 K/UL (ref 0–0.04)
IMM GRANULOCYTES NFR BLD AUTO: 0.2 % (ref 0–0.5)
LYMPHOCYTES # BLD AUTO: 1.7 K/UL (ref 1–4.8)
LYMPHOCYTES NFR BLD: 18.9 % (ref 18–48)
MCH RBC QN AUTO: 28.1 PG (ref 27–31)
MCHC RBC AUTO-ENTMCNC: 31.7 G/DL (ref 32–36)
MCV RBC AUTO: 89 FL (ref 82–98)
MONOCYTES # BLD AUTO: 0.7 K/UL (ref 0.3–1)
MONOCYTES NFR BLD: 7.4 % (ref 4–15)
NEUTROPHILS # BLD AUTO: 6.7 K/UL (ref 1.8–7.7)
NEUTROPHILS NFR BLD: 72.6 % (ref 38–73)
NRBC BLD-RTO: 0 /100 WBC
PLATELET # BLD AUTO: 186 K/UL (ref 150–450)
PMV BLD AUTO: 10.1 FL (ref 9.2–12.9)
POCT GLUCOSE: 107 MG/DL (ref 70–110)
POCT GLUCOSE: 111 MG/DL (ref 70–110)
POCT GLUCOSE: 85 MG/DL (ref 70–110)
POCT GLUCOSE: 97 MG/DL (ref 70–110)
RBC # BLD AUTO: 3.59 M/UL (ref 4–5.4)
WBC # BLD AUTO: 9.16 K/UL (ref 3.9–12.7)

## 2025-03-16 PROCEDURE — 25000003 PHARM REV CODE 250: Performed by: HOSPITALIST

## 2025-03-16 PROCEDURE — 25000003 PHARM REV CODE 250: Performed by: FAMILY MEDICINE

## 2025-03-16 PROCEDURE — 20600001 HC STEP DOWN PRIVATE ROOM

## 2025-03-16 PROCEDURE — 25000003 PHARM REV CODE 250: Performed by: STUDENT IN AN ORGANIZED HEALTH CARE EDUCATION/TRAINING PROGRAM

## 2025-03-16 PROCEDURE — 85025 COMPLETE CBC W/AUTO DIFF WBC: CPT | Performed by: HOSPITALIST

## 2025-03-16 PROCEDURE — 36415 COLL VENOUS BLD VENIPUNCTURE: CPT | Performed by: HOSPITALIST

## 2025-03-16 RX ORDER — NIFEDIPINE 30 MG/1
60 TABLET, EXTENDED RELEASE ORAL DAILY
Status: DISCONTINUED | OUTPATIENT
Start: 2025-03-16 | End: 2025-03-19

## 2025-03-16 RX ORDER — CARVEDILOL 12.5 MG/1
12.5 TABLET ORAL 2 TIMES DAILY
Status: DISCONTINUED | OUTPATIENT
Start: 2025-03-17 | End: 2025-03-19

## 2025-03-16 RX ORDER — HYDRALAZINE HYDROCHLORIDE 25 MG/1
25 TABLET, FILM COATED ORAL ONCE
Status: COMPLETED | OUTPATIENT
Start: 2025-03-16 | End: 2025-03-16

## 2025-03-16 RX ADMIN — APIXABAN 5 MG: 5 TABLET, FILM COATED ORAL at 09:03

## 2025-03-16 RX ADMIN — HYDRALAZINE HYDROCHLORIDE 75 MG: 25 TABLET ORAL at 01:03

## 2025-03-16 RX ADMIN — ESCITALOPRAM OXALATE 10 MG: 5 TABLET, FILM COATED ORAL at 08:03

## 2025-03-16 RX ADMIN — FOLIC ACID 1000 MCG: 1 TABLET ORAL at 08:03

## 2025-03-16 RX ADMIN — PANTOPRAZOLE SODIUM 40 MG: 40 TABLET, DELAYED RELEASE ORAL at 04:03

## 2025-03-16 RX ADMIN — APIXABAN 5 MG: 5 TABLET, FILM COATED ORAL at 08:03

## 2025-03-16 RX ADMIN — NEPHROCAP 1 CAPSULE: 1 CAP ORAL at 08:03

## 2025-03-16 RX ADMIN — NIFEDIPINE 60 MG: 30 TABLET, FILM COATED, EXTENDED RELEASE ORAL at 01:03

## 2025-03-16 RX ADMIN — Medication 100 MG: at 08:03

## 2025-03-16 RX ADMIN — OXYCODONE HYDROCHLORIDE AND ACETAMINOPHEN 1 TABLET: 5; 325 TABLET ORAL at 11:03

## 2025-03-16 RX ADMIN — ACETAMINOPHEN 650 MG: 325 TABLET ORAL at 08:03

## 2025-03-16 RX ADMIN — HYDRALAZINE HYDROCHLORIDE 25 MG: 25 TABLET ORAL at 08:03

## 2025-03-16 RX ADMIN — Medication 1000 UNITS: at 08:03

## 2025-03-16 RX ADMIN — HYDRALAZINE HYDROCHLORIDE 50 MG: 50 TABLET ORAL at 06:03

## 2025-03-16 RX ADMIN — MIRTAZAPINE 15 MG: 15 TABLET, FILM COATED ORAL at 09:03

## 2025-03-16 RX ADMIN — HYDRALAZINE HYDROCHLORIDE 75 MG: 25 TABLET ORAL at 09:03

## 2025-03-16 RX ADMIN — ACETAMINOPHEN 650 MG: 325 TABLET ORAL at 10:03

## 2025-03-16 RX ADMIN — PANTOPRAZOLE SODIUM 40 MG: 40 TABLET, DELAYED RELEASE ORAL at 06:03

## 2025-03-16 NOTE — PLAN OF CARE
Problem: Adult Inpatient Plan of Care  Goal: Plan of Care Review  Outcome: Progressing  Goal: Patient-Specific Goal (Individualized)  Outcome: Progressing  Goal: Optimal Comfort and Wellbeing  Outcome: Progressing  Goal: Readiness for Transition of Care  Outcome: Progressing     Problem: Infection  Goal: Absence of Infection Signs and Symptoms  Outcome: Progressing     Problem: Wound  Goal: Optimal Coping  Outcome: Progressing     Problem: Fall Injury Risk  Goal: Absence of Fall and Fall-Related Injury  Outcome: Progressing     Problem: Chronic Kidney Disease  Goal: Electrolyte Balance  Outcome: Progressing  Goal: Fluid Balance  Outcome: Progressing      Patient remains free from falls and injuries through out shift. Patient AAOx4. BP elevated this shift. BP meds adjusted as ordered. C/o of headache treated as well.  Patient denies chest pain and SOB. Patient's family at bedside.  Plan to d/c home as changed to SNF at this time. Plan of care reviewed with patient. Patient verbalizes understanding of plan.

## 2025-03-16 NOTE — ASSESSMENT & PLAN NOTE
Hyponatremia is likely due to hypotonic fluids and poor oral intake. The patient's most recent sodium results are listed below.  Recent Labs     03/14/25  0930 03/15/25  1100   * 128*     Plan  - Monitor.  improving

## 2025-03-16 NOTE — PLAN OF CARE
JEANCARLOS met with the pt's sister at the bedside regarding DC planning. The pt's sister voiced some concerns regarding transporting the pt back and forward to HD the pt has become extremely weak which will cause difficulties with transporting to and from the car. Per the pt's sister the pt completed a two week stay at [facility] that seem to help before this current admit diagnosis. JEANCARLOS informed the pt sister of different DC options- SNF and IPR. JEANCARLOS notified MD of the DC barriers. MD to speak with the family and determined the safest option for DC. HH is secured. JEANCARLOS will continue to follow up.    Aminah GORDON,   Case Management   [phone]

## 2025-03-16 NOTE — PROGRESS NOTES
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 She lives in [facility]. She works for [employer]. Her primary care physician is Dr. Delilah Kelley.               She was hospitalized at [facility] from 10/23/2024 to 11/6/2024.              She was hospitalized at [facility] from 11/15/2024 to 1/18/2024.              She was hospitalized at [facility] from 12/9/2024 to 12/19/2024.              She was hospitalized at [facility] from 12/25/2024 to 1/5/2025.              She was hospitalized at [facility] from 1/5/2025 to 1/31/2025. She was discharged to [facility] inpatient rehab until 2/18/2025.               She presented to [facility] on 3/2/2025 with rectal pain and bleeding. She receives her care in the [facility], not at [facility]. History was provided by her daughter Sue Barros. She is followed by Hematology at [facility] and has been on immunomodulator  infusions every 8 weeks (Soliris, now Ultomiris, last dose 2/24/2025). She has severe debility, poor appetite, dysphagia to solids, mostly consumes liquid, but her daughter was concerned her nutritional intake is inadequate and inquired about feeding tube placement for chronic nutrition. She had watery stool on the day of presentation. Her sister noted that she had gingival bleeding. She reported an anal lesion with tenderness and slow bleeding. She has not required frequent transfusions. Her daughter noted that all home antihypertensive medications were discontinued since she was discharged from inpatient rehab.              In the emergency department, she appeared ill, lethargic, unable to fully participate in interview, periodically awakening with pain. Labs showed hemoglobin 14.3 g/dL (from 13.2 on 2/24/2025). Repeat hemoglobin was 10.4. Alkaline phosphatase was 251 U/L, total bilirubin was 2.2 mg/dL, AST was 733 U/L, ALT was 478 U/L. She was given 2.1 liters of IV fluids, vancomycin, piperacillin-tazobactam, 4 mg of IV morphine, 80 mg of IV pantoprazole, topical lidocaine for rectal pain. She was admitted to Hospital Medicine Team S.     Overview/Hospital Course:  GGT was elevated at 447 U/L. LDH was elevated. Colorectal Surgery, Hematology, Nephrology, and Case Management were consulted. She expressed desire to go to [facility] since she [facility]. Her mental status has been declining since October 2024. She has memory loss, disorientation, and does not remember her illness. Her mother had dementia. She had chest pain on 3/3/2025 while getting dialysis. EKG showed sinus tachycardia. Troponin was 1102 ng/L. Repeat was 704. Hematology recommended giving 2 units of fresh frozen plasma (FFP) and starting low rate heparin infusion then transitioning to apixaban 2.5 mg twice daily if she had no further bleeding. She was given another 2 units of FFP. Her daughter requested gastrostomy tube  placement for malnutrition. On 3/4/2025, heparin was held due to recurrent bleeding. She had intermittent somnolence. She was kept NPO. Speech Language Pathology was consulted. She was hypoglycemic so was put on 10% dextrose drip. Speech Language Pathology recommended full liquid diet. LFTs trended down. Heparin infusion was restarted on 3/5/2025. On 3/6/2025, her daughter decided that she did not want transfer to a [facility] hospital, instead she wanted to transfer her medical care to the [facility] system. Hemoglobin and hematocrit remained stable with no evidence of recurrent bleeding on 3/7/2025. Heparin drip was continued. She had epistaxis on 3/7/2025. Hemoglobin decreased to 6.7 g/dL on 3/8/2025. She developed right arm swelling. Repeat hemoglobin was 5.7. Heparin drip was held. She was transfused 1 unit of packed red blood cells (PRBCs). Hemoglobin improved to 8.2. Right upper extremity venous ultrasound showed known right internal jugular thrombus as well as subclavian thrombus, although the subclavian vein was not visualized when the internal jugular thrombus was diagnosed, so it was unknown if it was new. Hemoglobin was stable on 3/10/2025. Heparin drip was resumed. Sodium was found to be 118 mmol/L. She had been on dextrose drip for days to treat hypoglycemia due to poor oral intake. She was given normal saline instead. Gastroenterology placed a gastrostomy tube on 3/11/2025. Around midnight that night, she vomited twice. Nausea resolved. Tolerating PEG and switched to apixaban. Hyponatremia improving.    Interval History: Patient complains of headache that is bandlike. No visual deficits, nausea. No rectal bleeding.  She has no additional complaints at this time.    Review of Systems   Constitutional:  Negative for fever.   HENT:  Negative for sore throat.    Eyes:  Negative for visual disturbance.   Respiratory:  Negative for cough and shortness of breath.    Cardiovascular:  Negative for chest pain and  palpitations.   Gastrointestinal:  Negative for abdominal pain.   Musculoskeletal:  Negative for arthralgias and back pain.   Neurological:  Positive for headaches. Negative for dizziness, facial asymmetry, speech difficulty and light-headedness.   Psychiatric/Behavioral:  Negative for agitation and behavioral problems.      Objective:     Vital Signs (Most Recent):  Temp: 99 °F (37.2 °C) (03/16/25 0445)  Pulse: 97 (03/16/25 0726)  Resp: 16 (03/16/25 0726)  BP: (!) 171/112 (03/16/25 0726)  SpO2: 100 % (03/16/25 0726) Vital Signs (24h Range):  Temp:  [98.3 °F (36.8 °C)-99 °F (37.2 °C)] 99 °F (37.2 °C)  Pulse:  [96-98] 97  Resp:  [16-19] 16  SpO2:  [95 %-100 %] 100 %  BP: (161-199)/() 171/112     Weight: 73 kg (160 lb 15 oz)  Body mass index is 26.78 kg/m².  No intake or output data in the 24 hours ending 03/16/25 1139      Physical Exam  Constitutional:       Appearance: She is not toxic-appearing.   HENT:      Head: Normocephalic and atraumatic.   Eyes:      Extraocular Movements: Extraocular movements intact.   Pulmonary:      Effort: Pulmonary effort is normal. No respiratory distress.   Abdominal:      Comments: PEG in place with feeds   Neurological:      Mental Status: She is alert and oriented to person, place, and time. Mental status is at baseline.   Psychiatric:         Mood and Affect: Mood normal.         Behavior: Behavior normal.               Significant Labs: All pertinent labs within the past 24 hours have been reviewed.  CBC:   Recent Labs   Lab 03/15/25  0625 03/16/25  0641   WBC 9.18 9.16   HGB 8.7* 10.1*   HCT 26.7* 31.9*    186     CMP:   Recent Labs   Lab 03/15/25  1100   *   K 3.8   CL 96   CO2 23   GLU 74   BUN 18   CREATININE 2.4*   CALCIUM 8.3*   PROT 5.9*   ALBUMIN 2.3*   BILITOT 1.4*   ALKPHOS 248*   AST 89*   ALT 25   ANIONGAP 9       Significant Imaging: I have reviewed all pertinent imaging results/findings within the past 24 hours.      Assessment & Plan  Rectal  bleeding  Appreciate Colorectal Surgery. Subsided. Monitor for recurrence.  Hemoglobin stable at 8.6  Unspecified severe protein-calorie malnutrition  Severe malnutrition patient with recent hx of worsening dysphagia, has had w/u for scleroderma, has hiatal hernia and ?esophageal dysmotility  - dysphagia to solids.   Developed wernicke encephalopathy from nutritional deficiency     Daughter reports concern of inadequate intake has had severe weight loss in recent months with multiple complex health conditions. She requested PEG placement. Nutrition gave recommendations on tube feeds. Giving minced and moist diet, Novasource Renal supplements.  Dysphagia  Appreciate SLP. Advanced diet to minced and moist. Appreciate GI. PEG placed 3/11/2025. Will start enteral water and tube feeds recommended by Nutrition.  FWF stopped per Nephrology request.  Wernicke encephalopathy  Continue on home thiamine supplementation.     GERD without esophagitis  Continue home pantoprazole.    ESRD (end stage renal disease)  Nephrology managing dialysis.    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 Last Ultomiris dose 2/24/2025. Outpatient hematologist is Roma Cantu MD at [facility]. Consulted Hematology here. Appreciate assistance with management.  Elevated transaminase level  Hepatic steatosis on CT. Worsening hepatic function may contribute to her worsening coagulopathy elevated PT/PTT. Has significantly improved.  Renal hematoma, left, sequela  In January, due to biopsy in 2024. Most recent imaging showed resolving appearance of remote hematoma.   Anemia of chronic renal failure, stage 5  Anemia is likely due to acute blood loss which was from GI bleed and active hemolysis due to HUS.  . Most recent hemoglobin and hematocrit are listed below.  Recent Labs     03/14/25  0548 03/15/25  0625 03/16/25  0641   HGB 8.6* 8.7* 10.1*   HCT 26.2* 26.7* 31.9*     Plan  - Monitor serial CBC  - Transfuse  PRBC if patient becomes hemodynamically unstable, symptomatic or H/H drops below 7/21.  - Transfused pRBCs 3/8/2025 after having epistaxis.  - Stable until she had some bleeding at new PEG site. Transfuse again 3/12/2025.  - now stable  Hyponatremia  Hyponatremia is likely due to hypotonic fluids and poor oral intake. The patient's most recent sodium results are listed below.  Recent Labs     03/14/25  0930 03/15/25  1100   * 128*     Plan  - Monitor.  improving    VTE Risk Mitigation (From admission, onward)           Ordered     apixaban tablet 5 mg  2 times daily         03/12/25 1148     heparin (porcine) injection 1,000 Units  As needed (PRN)         03/03/25 1524     IP VTE HIGH RISK PATIENT  Once         03/03/25 0416     Place sequential compression device  Until discontinued         03/03/25 0416     Reason for No Pharmacological VTE Prophylaxis  Once        Question:  Reasons:  Answer:  Active Bleeding    03/03/25 0416                    Discharge Planning   PATRICIA: 3/18/2025     Code Status: Full Code   Medical Readiness for Discharge Date:   Discharge Plan A: Home with family, Home Health                Please place Justification for DME        Douglas Mattson MD  Department of Hospital Medicine   [facility] - Telemetry Stepdown

## 2025-03-16 NOTE — SUBJECTIVE & OBJECTIVE
Interval History: Patient complains of headache that is bandlike. No visual deficits, nausea. No rectal bleeding.  She has no additional complaints at this time.    Review of Systems   Constitutional:  Negative for fever.   HENT:  Negative for sore throat.    Eyes:  Negative for visual disturbance.   Respiratory:  Negative for cough and shortness of breath.    Cardiovascular:  Negative for chest pain and palpitations.   Gastrointestinal:  Negative for abdominal pain.   Musculoskeletal:  Negative for arthralgias and back pain.   Neurological:  Positive for headaches. Negative for dizziness, facial asymmetry, speech difficulty and light-headedness.   Psychiatric/Behavioral:  Negative for agitation and behavioral problems.      Objective:     Vital Signs (Most Recent):  Temp: 99 °F (37.2 °C) (03/16/25 0445)  Pulse: 97 (03/16/25 0726)  Resp: 16 (03/16/25 0726)  BP: (!) 171/112 (03/16/25 0726)  SpO2: 100 % (03/16/25 0726) Vital Signs (24h Range):  Temp:  [98.3 °F (36.8 °C)-99 °F (37.2 °C)] 99 °F (37.2 °C)  Pulse:  [96-98] 97  Resp:  [16-19] 16  SpO2:  [95 %-100 %] 100 %  BP: (161-199)/() 171/112     Weight: 73 kg (160 lb 15 oz)  Body mass index is 26.78 kg/m².  No intake or output data in the 24 hours ending 03/16/25 1139      Physical Exam  Constitutional:       Appearance: She is not toxic-appearing.   HENT:      Head: Normocephalic and atraumatic.   Eyes:      Extraocular Movements: Extraocular movements intact.   Pulmonary:      Effort: Pulmonary effort is normal. No respiratory distress.   Abdominal:      Comments: PEG in place with feeds   Neurological:      Mental Status: She is alert and oriented to person, place, and time. Mental status is at baseline.   Psychiatric:         Mood and Affect: Mood normal.         Behavior: Behavior normal.               Significant Labs: All pertinent labs within the past 24 hours have been reviewed.  CBC:   Recent Labs   Lab 03/15/25  0625 03/16/25  0641   WBC 9.18 9.16    HGB 8.7* 10.1*   HCT 26.7* 31.9*    186     CMP:   Recent Labs   Lab 03/15/25  1100   *   K 3.8   CL 96   CO2 23   GLU 74   BUN 18   CREATININE 2.4*   CALCIUM 8.3*   PROT 5.9*   ALBUMIN 2.3*   BILITOT 1.4*   ALKPHOS 248*   AST 89*   ALT 25   ANIONGAP 9       Significant Imaging: I have reviewed all pertinent imaging results/findings within the past 24 hours.

## 2025-03-16 NOTE — ASSESSMENT & PLAN NOTE
Chronic. Last Ultomiris dose 2/24/2025. Outpatient hematologist is Roma Cantu MD at [facility]. Consulted Hematology here. Appreciate assistance with management.

## 2025-03-16 NOTE — ASSESSMENT & PLAN NOTE
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 . Most recent hemoglobin and hematocrit are listed below.  Recent Labs     03/14/25  0548 03/15/25  0625 03/16/25  0641   HGB 8.6* 8.7* 10.1*   HCT 26.2* 26.7* 31.9*     Plan  - Monitor serial CBC  - Transfuse PRBC if patient becomes hemodynamically unstable, symptomatic or H/H drops below 7/21.  - Transfused pRBCs 3/8/2025 after having epistaxis.  - Stable until she had some bleeding at new PEG site. Transfuse again 3/12/2025.  - now stable

## 2025-03-17 LAB
ALBUMIN SERPL BCP-MCNC: 2.2 G/DL (ref 3.5–5.2)
ALP SERPL-CCNC: 217 U/L (ref 40–150)
ALT SERPL W/O P-5'-P-CCNC: 15 U/L (ref 10–44)
ANION GAP SERPL CALC-SCNC: 11 MMOL/L (ref 8–16)
AST SERPL-CCNC: 67 U/L (ref 10–40)
BASOPHILS # BLD AUTO: 0.07 K/UL (ref 0–0.2)
BASOPHILS NFR BLD: 0.7 % (ref 0–1.9)
BILIRUB SERPL-MCNC: 1 MG/DL (ref 0.1–1)
BUN SERPL-MCNC: 42 MG/DL (ref 6–20)
CALCIUM SERPL-MCNC: 8.3 MG/DL (ref 8.7–10.5)
CHLORIDE SERPL-SCNC: 94 MMOL/L (ref 95–110)
CO2 SERPL-SCNC: 23 MMOL/L (ref 23–29)
CREAT SERPL-MCNC: 3.7 MG/DL (ref 0.5–1.4)
DIFFERENTIAL METHOD BLD: ABNORMAL
EOSINOPHIL # BLD AUTO: 0 K/UL (ref 0–0.5)
EOSINOPHIL NFR BLD: 0.1 % (ref 0–8)
ERYTHROCYTE [DISTWIDTH] IN BLOOD BY AUTOMATED COUNT: 15.9 % (ref 11.5–14.5)
EST. GFR  (NO RACE VARIABLE): 13.5 ML/MIN/1.73 M^2
GLUCOSE SERPL-MCNC: 84 MG/DL (ref 70–110)
HCT VFR BLD AUTO: 29.7 % (ref 37–48.5)
HGB BLD-MCNC: 9.6 G/DL (ref 12–16)
IMM GRANULOCYTES # BLD AUTO: 0.03 K/UL (ref 0–0.04)
IMM GRANULOCYTES NFR BLD AUTO: 0.3 % (ref 0–0.5)
LYMPHOCYTES # BLD AUTO: 1.3 K/UL (ref 1–4.8)
LYMPHOCYTES NFR BLD: 11.9 % (ref 18–48)
MCH RBC QN AUTO: 28.4 PG (ref 27–31)
MCHC RBC AUTO-ENTMCNC: 32.3 G/DL (ref 32–36)
MCV RBC AUTO: 88 FL (ref 82–98)
MONOCYTES # BLD AUTO: 0.7 K/UL (ref 0.3–1)
MONOCYTES NFR BLD: 7 % (ref 4–15)
NEUTROPHILS # BLD AUTO: 8.4 K/UL (ref 1.8–7.7)
NEUTROPHILS NFR BLD: 80 % (ref 38–73)
NRBC BLD-RTO: 0 /100 WBC
PLATELET # BLD AUTO: 221 K/UL (ref 150–450)
PMV BLD AUTO: 10.8 FL (ref 9.2–12.9)
POCT GLUCOSE: 103 MG/DL (ref 70–110)
POCT GLUCOSE: 103 MG/DL (ref 70–110)
POCT GLUCOSE: 137 MG/DL (ref 70–110)
POCT GLUCOSE: 77 MG/DL (ref 70–110)
POTASSIUM SERPL-SCNC: 4.1 MMOL/L (ref 3.5–5.1)
PROT SERPL-MCNC: 5.8 G/DL (ref 6–8.4)
RBC # BLD AUTO: 3.38 M/UL (ref 4–5.4)
SODIUM SERPL-SCNC: 128 MMOL/L (ref 136–145)
WBC # BLD AUTO: 10.47 K/UL (ref 3.9–12.7)

## 2025-03-17 PROCEDURE — 36415 COLL VENOUS BLD VENIPUNCTURE: CPT | Performed by: HOSPITALIST

## 2025-03-17 PROCEDURE — 25000003 PHARM REV CODE 250: Performed by: FAMILY MEDICINE

## 2025-03-17 PROCEDURE — 20600001 HC STEP DOWN PRIVATE ROOM

## 2025-03-17 PROCEDURE — 25000003 PHARM REV CODE 250: Performed by: STUDENT IN AN ORGANIZED HEALTH CARE EDUCATION/TRAINING PROGRAM

## 2025-03-17 PROCEDURE — 97530 THERAPEUTIC ACTIVITIES: CPT | Mod: CQ

## 2025-03-17 PROCEDURE — 25000003 PHARM REV CODE 250: Performed by: HOSPITALIST

## 2025-03-17 PROCEDURE — 80053 COMPREHEN METABOLIC PANEL: CPT | Performed by: HOSPITALIST

## 2025-03-17 PROCEDURE — 80100016 HC MAINTENANCE HEMODIALYSIS

## 2025-03-17 PROCEDURE — 97112 NEUROMUSCULAR REEDUCATION: CPT

## 2025-03-17 PROCEDURE — 63600175 PHARM REV CODE 636 W HCPCS: Performed by: STUDENT IN AN ORGANIZED HEALTH CARE EDUCATION/TRAINING PROGRAM

## 2025-03-17 PROCEDURE — 99232 SBSQ HOSP IP/OBS MODERATE 35: CPT | Mod: ,,, | Performed by: INTERNAL MEDICINE

## 2025-03-17 PROCEDURE — 85025 COMPLETE CBC W/AUTO DIFF WBC: CPT | Performed by: HOSPITALIST

## 2025-03-17 RX ORDER — SODIUM CHLORIDE 9 MG/ML
INJECTION, SOLUTION INTRAVENOUS ONCE
Status: CANCELLED | OUTPATIENT
Start: 2025-03-17 | End: 2025-03-17

## 2025-03-17 RX ADMIN — MIRTAZAPINE 15 MG: 15 TABLET, FILM COATED ORAL at 09:03

## 2025-03-17 RX ADMIN — HEPARIN SODIUM 1000 UNITS: 1000 INJECTION, SOLUTION INTRAVENOUS; SUBCUTANEOUS at 05:03

## 2025-03-17 RX ADMIN — HYDRALAZINE HYDROCHLORIDE 75 MG: 25 TABLET ORAL at 06:03

## 2025-03-17 RX ADMIN — NEPHROCAP 1 CAPSULE: 1 CAP ORAL at 09:03

## 2025-03-17 RX ADMIN — CARVEDILOL 12.5 MG: 12.5 TABLET, FILM COATED ORAL at 09:03

## 2025-03-17 RX ADMIN — APIXABAN 5 MG: 5 TABLET, FILM COATED ORAL at 09:03

## 2025-03-17 RX ADMIN — FOLIC ACID 1000 MCG: 1 TABLET ORAL at 09:03

## 2025-03-17 RX ADMIN — PANTOPRAZOLE SODIUM 40 MG: 40 TABLET, DELAYED RELEASE ORAL at 06:03

## 2025-03-17 RX ADMIN — OXYCODONE HYDROCHLORIDE AND ACETAMINOPHEN 1 TABLET: 5; 325 TABLET ORAL at 11:03

## 2025-03-17 RX ADMIN — NIFEDIPINE 60 MG: 30 TABLET, FILM COATED, EXTENDED RELEASE ORAL at 09:03

## 2025-03-17 RX ADMIN — Medication 6 MG: at 09:03

## 2025-03-17 RX ADMIN — Medication 100 MG: at 09:03

## 2025-03-17 RX ADMIN — Medication 1000 UNITS: at 09:03

## 2025-03-17 RX ADMIN — ESCITALOPRAM OXALATE 10 MG: 5 TABLET, FILM COATED ORAL at 09:03

## 2025-03-17 NOTE — ASSESSMENT & PLAN NOTE
Chronic. Last Ultomiris dose 2/24/2025. Outpatient hematologist is Roma Cantu MD at [facility]. Consulted Hematology here. Appreciate assistance with management.

## 2025-03-17 NOTE — ASSESSMENT & PLAN NOTE
Patient's blood pressure range in the last 24 hours was: BP  Min: 160/101  Max: 178/103.The patient's inpatient anti-hypertensive regimen is listed below:  Current Antihypertensives  hydrALAZINE tablet 75 mg, Every 8 hours, Oral  NIFEdipine 24 hr tablet 60 mg, Daily, Oral  carvediloL tablet 12.5 mg, 2 times daily, Oral    Plan  - BP is uncontrolled, will adjust as follows: re-introducing home meds

## 2025-03-17 NOTE — PLAN OF CARE
CM sent SNF referrals to [facility], [facility], and [facility] per daughter's request.    Tete Goetz RN     [phone]

## 2025-03-17 NOTE — PLAN OF CARE
03/17/25 1515   Post-Acute Status   Post-Acute Authorization Placement   Post-Acute Placement Status Referrals Sent   Coverage [facility] Resources/Appts/Education Provided Appointments scheduled and added to AVS   Discharge Delays (!) Post-Acute Set-up  (plan changed from home w/HH to SNF)   Discharge Plan   Discharge Plan A Skilled Nursing Facility   Discharge Plan B Home with family;Home Health     Spoke with patient's daughter Sue regarding SNF recommendation. Sue advised that she would like referrals sent to [facility], [facility], and [facility].    Patient/family provided list of facilities in-network with patient's payor plan. Providers that are owned, operated, or affiliated with [facility] are included on the list.     Notified that referral sent to below listed facilities from in-network list based on proximity to home/family support:   [facility]  2.   [facility]   3.   [facility]  4.  5. (can send more than 5)    Patient/family instructed to identify preference.    Preferred Facility: (if more than 1, listed in order of descending preference)   [facility]    If an additional preferred facility not listed above is identified, additional referral to be sent. If above facilities unable to accept, will send additional referrals to in-network providers.     Discharge Plan A and Plan B have been determined by review of patient's clinical status, future medical and therapeutic needs, and coverage/benefits for post-acute care in coordination with multidisciplinary team members.    Tete Goetz RN     [phone]

## 2025-03-17 NOTE — PLAN OF CARE
Problem: Adult Inpatient Plan of Care  Goal: Plan of Care Review  Outcome: Progressing  Goal: Patient-Specific Goal (Individualized)  Outcome: Progressing  Goal: Absence of Hospital-Acquired Illness or Injury  Outcome: Progressing  Goal: Optimal Comfort and Wellbeing  Outcome: Progressing  Goal: Readiness for Transition of Care  Outcome: Progressing     Problem: Infection  Goal: Absence of Infection Signs and Symptoms  Outcome: Progressing     Problem: Hemodialysis  Goal: Safe, Effective Therapy Delivery  Outcome: Progressing  Goal: Effective Tissue Perfusion  Outcome: Progressing  Goal: Absence of Infection Signs and Symptoms  Outcome: Progressing     Problem: Wound  Goal: Optimal Coping  Outcome: Progressing  Goal: Optimal Functional Ability  Outcome: Progressing  Goal: Absence of Infection Signs and Symptoms  Outcome: Progressing  Goal: Improved Oral Intake  Outcome: Progressing  Goal: Optimal Pain Control and Function  Outcome: Progressing  Goal: Skin Health and Integrity  Outcome: Progressing  Goal: Optimal Wound Healing  Outcome: Progressing     Problem: Skin Injury Risk Increased  Goal: Skin Health and Integrity  Outcome: Progressing     Problem: Fall Injury Risk  Goal: Absence of Fall and Fall-Related Injury  Outcome: Progressing     Problem: Chronic Kidney Disease  Goal: Electrolyte Balance  Outcome: Progressing  Goal: Fluid Balance  Outcome: Progressing     Problem: Chronic Kidney Disease  Goal: Electrolyte Balance  Outcome: Progressing  Goal: Fluid Balance  Outcome: Progressing  Goal: Minimize Renal Failure Effects  Outcome: Progressing

## 2025-03-17 NOTE — ASSESSMENT & PLAN NOTE
Hyponatremia is likely due to hypotonic fluids and poor oral intake. The patient's most recent sodium results are listed below.  Recent Labs     03/15/25  1100 03/17/25  0446   * 128*     Plan  - Monitor.  improving

## 2025-03-17 NOTE — PT/OT/SLP PROGRESS
Physical Therapy Treatment    Patient Name:  Madelaine Barros   MRN:  [id number]    Recommendations:     Discharge Recommendations: Moderate Intensity Therapy  Discharge Equipment Recommendations: none  Barriers to discharge:  current level of assistance required     Assessment:     Madelaine Barros is a 59 y.o. female admitted with a medical diagnosis of Rectal bleeding.  She presents with the following impairments/functional limitations: weakness, impaired endurance, impaired self care skills, impaired functional mobility, impaired balance, decreased safety awareness, decreased lower extremity function, decreased upper extremity function, impaired cardiopulmonary response to activity Pt tolerated treatment session fairly well today. Pt is still requiring assistance for bed mobility, and EOB sitting. Pt was able to sit EOB for ~5-8 minutes, before having to return supine due to increased dizziness. BP during EOB sitting (137/89). Patient remains appropriate for continued skilled services within the acute environment and goals remain appropriate.   .    Rehab Prognosis: Fair; patient would benefit from acute skilled PT services to address these deficits and reach maximum level of function.    Recent Surgery: Procedure(s) (LRB):  EGD (ESOPHAGOGASTRODUODENOSCOPY) (N/A) 6 Days Post-Op    Plan:     During this hospitalization, patient to be seen 4 x/week to address the identified rehab impairments via gait training, therapeutic activities and progress toward the following goals:    Plan of Care Expires:  04/09/25    Subjective     Chief Complaint: Dizziness   Patient/Family Comments/goals: pt agreeable to PT   Pain/Comfort:  Pain Rating 1: 0/10      Objective:     Communicated with Rn prior to session.  Patient found supine with telemetry, pulse ox (continuous), PEG Tube upon PT entry to room.     General Precautions: Standard, fall  Orthopedic Precautions:    Braces: N/A  Respiratory Status: Room air      Functional Mobility:  Bed Mobility:     Rolling Left:  minimum assistance  Rolling Right: minimum assistance  Supine to Sit: maximal assistance  EOB sitting: Rosa/ModA  Increased dizziness EOB, pt requesting to return supine   Sit to Supine: maximal assistance and of 2 persons    Pt performed 10 repetitions of seated B LE exercises consisting of: LAQ and AP        AM-PAC 6 CLICK MOBILITY  Turning over in bed (including adjusting bedclothes, sheets and blankets)?: 2  Sitting down on and standing up from a chair with arms (e.g., wheelchair, bedside commode, etc.): 2  Moving from lying on back to sitting on the side of the bed?: 2  Moving to and from a bed to a chair (including a wheelchair)?: 2  Need to walk in hospital room?: 2  Climbing 3-5 steps with a railing?: 1  Basic Mobility Total Score: 11       Treatment & Education:  Therapist provided instruction and educated for safety during bed mobility and EOB sitting. As well as proper body mechanics, energy conservation, and fall prevention strategies during tasks listed above, and the effects of prolonged immobility and the importance of performing EOB/OOB activity and exercises to promote healing and reduce recovery time.       Patient left right sidelying with all lines intact, call button in reach, and Rn notified..    GOALS:   Multidisciplinary Problems       Physical Therapy Goals          Problem: Physical Therapy    Goal Priority Disciplines Outcome Interventions   Physical Therapy Goal     PT, PT/OT Progressing    Description: Goals to be met by: 25     Patient will increase functional independence with mobility by performin. Supine to sit with Modified [place]   2. Sit to supine with Modified [place]  3. Sit to stand transfer with Modified [place] with AD as needed  4. Gait  x 150 feet with Supervision using Rolling Walker.                          DME Justifications:  No DME recommended requiring DME justifications    Time  Tracking:     PT Received On: 03/17/25  PT Start Time: 0858     PT Stop Time: 0915  PT Total Time (min): 17 min     Billable Minutes: Therapeutic Activity 17    Treatment Type: Treatment  PT/PTA: PTA     Number of PTA visits since last PT visit: 1 03/17/2025

## 2025-03-17 NOTE — SUBJECTIVE & OBJECTIVE
Interval History: HD planned for this afternoon      Review of patient's allergies indicates:  No Known Allergies  Current Facility-Administered Medications   Medication Frequency    acetaminophen tablet 650 mg Q8H PRN    aluminum-magnesium hydroxide-simethicone 200-200-20 mg/5 mL suspension 30 mL QID PRN    apixaban tablet 5 mg BID    carvediloL tablet 12.5 mg BID    dextrose 50% injection 12.5 g PRN    dextrose 50% injection 25 g PRN    EScitalopram oxalate tablet 10 mg Daily    folic acid tablet 1,000 mcg Daily    glucagon (human recombinant) injection 1 mg PRN    glucose chewable tablet 16 g PRN    glucose chewable tablet 24 g PRN    heparin (porcine) injection 1,000 Units PRN    hydrALAZINE tablet 75 mg Q8H    melatonin tablet 6 mg Nightly PRN    methocarbamoL tablet 1,000 mg Once    mirtazapine tablet 15 mg QHS    naloxone 0.4 mg/mL injection 0.02 mg PRN    NIFEdipine 24 hr tablet 60 mg Daily    ondansetron injection 4 mg Q8H PRN    oxyCODONE-acetaminophen 5-325 mg per tablet 1 tablet Q8H PRN    pantoprazole EC tablet 40 mg BID AC    polyethylene glycol packet 17 g Daily PRN    potassium bicarbonate disintegrating tablet 50 mEq Once    prochlorperazine injection Soln 5 mg Q6H PRN    senna-docusate 8.6-50 mg per tablet 1 tablet BID PRN    sodium chloride 0.9% flush 10 mL Q6H PRN    thiamine tablet 100 mg Daily    vitamin D 1000 units tablet 1,000 Units Daily    vitamin renal formula (B-complex-vitamin c-folic acid) 1 mg per capsule 1 capsule Daily       Objective:     Vital Signs (Most Recent):  Temp: 98 °F (36.7 °C) (03/17/25 0445)  Pulse: 101 (03/17/25 0538)  Resp: 12 (03/17/25 0045)  BP: (!) 160/101 (03/17/25 0445)  SpO2: 100 % (03/17/25 0045) Vital Signs (24h Range):  Temp:  [97.8 °F (36.6 °C)-98.8 °F (37.1 °C)] 98 °F (36.7 °C)  Pulse:  [] 101  Resp:  [11-20] 12  SpO2:  [98 %-100 %] 100 %  BP: (160-178)/() 160/101     Weight: 73 kg (160 lb 15 oz) (03/13/25 0030)  Body mass index is 26.78  kg/m².  Body surface area is 1.83 meters squared.    No intake/output data recorded.     Physical Exam  Vitals and nursing note reviewed.   Constitutional:       General: She is not in acute distress.     Appearance: She is well-developed. She is ill-appearing. She is not diaphoretic.      Interventions: She is not intubated.  Pulmonary:      Effort: Pulmonary effort is normal. No accessory muscle usage or respiratory distress. She is not intubated.   Musculoskeletal:      Right lower leg: No edema.      Left lower leg: No edema.   Skin:     General: Skin is warm and dry.      Coloration: Skin is not jaundiced or pale.   Neurological:      Motor: No seizure activity.   Psychiatric:         Attention and Perception: She is attentive.         Behavior: Behavior is not aggressive or hyperactive.         Cognition and Memory: Cognition is not impaired. Memory is not impaired.          Significant Labs:  CBC:   Recent Labs   Lab 03/17/25  0446   WBC 10.47   RBC 3.38*   HGB 9.6*   HCT 29.7*      MCV 88   MCH 28.4   MCHC 32.3     CMP:   Recent Labs   Lab 03/17/25  0446   GLU 84   CALCIUM 8.3*   ALBUMIN 2.2*   PROT 5.8*   *   K 4.1   CO2 23   CL 94*   BUN 42*   CREATININE 3.7*   ALKPHOS 217*   ALT 15   AST 67*   BILITOT 1.0     All labs within the past 24 hours have been reviewed.

## 2025-03-17 NOTE — NURSING
Patient arrived in a stretcher to dialysis unit. AAOx4    Report received from FAREED Chi    Hemodialysis initiated using the following:  Dialysis Access: Right Subclavian Catheter     Tolerated well, flows good.

## 2025-03-17 NOTE — NURSING
3 hours HD tx completed. Only 500 mL of fluid removed due to low BP during dialysis. Nephrology aware.     Patient tolerated well.    Blood returned. Lines flushed with NS. Catheter locked with Heparin. Clamped, capped and wrapped with sterile gauze.    Report given to FAREED Chi.

## 2025-03-17 NOTE — PLAN OF CARE
Problem: Chronic Kidney Disease  Goal: Electrolyte Balance  Outcome: Progressing  Goal: Fluid Balance  Outcome: Progressing  Goal: Minimize Renal Failure Effects  Outcome: Progressing

## 2025-03-17 NOTE — ASSESSMENT & PLAN NOTE
Appreciate Colorectal Surgery. Subsided. Monitor for recurrence.  Hemoglobin stable at 8.6   PREOPERATIVE DIAGNOSES:   1. Right  patellofemoral Chondrosis.  2. Working diagnosis of renny Rheumatoid arthritis.  3. Obesity:  BMI 38     POSTOPERATIVE DIAGNOSES:   1. Right  patellofemoral chondrosis   2.  Cartilage wear: Wide area of grade 3 cartilage wear with spotty areas of grade 4 changes at median ridge and lateral patellar facet inferiorly  3.  Cartilage wear: Focal cartilage wear estimate 10 mm in width at most superior lateral aspect of the lateral trochlea  4.  Cartilage wear: Pitting cartilage on lateral tibial plateau  5.  Cartilage wear: Most medial inferior aspect of medial femoral condyle, estimates 15 mm in size  Moderate synovitis primarily in medial and lateral gutters and posterior    OPERATIONS:   1.  Right  knee exam under anesthesia.   2. Diagnostic arthroscopy.   3. Synovial Biopsy  4. Lateral retinacular lengthening (10 mm)    : Jessy Macedo MD ; Enrique Peña PA-C  ANESTHESIA: General endotracheal anesthesia supplemented with Local bupiviacaine (30 ml .25% with epi)     Exam under anesthesia of  Right  knee revealed range of motion 2/-/138    There was no fluid upon entry into the joint.  Arthroscopic findings showed some mild hyperemic in the synovium in the suprapatellar pouch.  However when you progressed to the medial and lateral gutters there was moderate synovitis concentrating around the joint line.  There was also synovitis that 1 could see in and around the ACL as well as peaking through the meniscus inferior and around the popliteal hiatus posterolaterally.    The patient had moderate area of grade 3 cartilage wear centered at the central region of the lateral patella facet beginning at the median ridge.  Right at the median ridge there were spotty areas of grade 4 changes.  Cartilage in the trochlear groove was relatively spared except for the most superior lateral aspect of the groove where the cartilage showed partial-thickness wear and some  delamination of the cartilage in that region.  There was no area that was visualized down to bone  The medial compartment showed an unusual site for some delamination of the cartilage which was at the most medial and inferior aspect of the medial femoral condyle, not in the weightbearing area.  This also has the appearance of some region of delamination but not down to bare bone.  The lateral femoral condyle was spared.  However it is the tibia space appeared to have some pitting of the tibial cartilage.    The menisci were largely intact.    PROCEDURE: Under  General mask, the patient was positioned supine on the operating table. The patient's leg was prepped and draped in the usual sterile fashion. A pause was performed identifying the correct leg and the administration of antibiotics.   A diagnostic arthroscopy was performed using anterolateral portal for inflow and visualization. Findings as above.   We then took multiple biopsies of the synovium concentrating on the medial lateral gutters.  Instruments were then removed from the knee. Tourniquet was elevated to 250 mmHg after Esmarch exsanguination of the leg. A lateral incision was used lateral to the patella approximately the length of the patella. This was carried down to lateral retinaculum. We divided the layer one of the lateral retinaculum close to the patella and layer 2 close to the insertion into the intermuscular septum.We then bent the knee to 60 degrees and sewed the near end of layer 1 to the far end of layer 2 resulting in a lengthening of 10 mm.   Tourniquet was let down at this point in time. Total tourniquet time was 9 minutes.   Subcutaneous tissue was closed with 2-0 Vicryl. The skin was closed with running Monocryl. Dermabond, a sterile dressing and a Tubigrip stockinette was put in place.   The patient tolerated the procedure well and was taken to the recovery room in satisfactory condition.   Patient will be maintained weightbearing as  tolerated in a hinged knee brace.  She can remove the knee immobilizer for gentle range of motion and sitting position as tolerates. He should use this for all weightbearing activities for 2 weeks' time.   Enrique Peña PA-C,  was an essential part of the case to help manage the limb in the OR, help with tissue retraction to ensure maximum exposure and maximum surgical efficiency, both which promotes best practice and best surgical outcomes.  There was no resdient learner available for the case.   ETTA NICOLE MD

## 2025-03-17 NOTE — ASSESSMENT & PLAN NOTE
Outpatient HD Information:  -Dialysis modality: Hemodialysis  -HD TX days: Monday/Wednesday/Friday  -Last HD TX prior to hospital admission: ?  -Dialysis access: dialysis catheter  -Residual urine: Anuric   -EDW: ?    Recommendations  -Plan for HD  this afternoon. Hyponatremia in setting of ESRD. FWF have been discontinued. Na 128 hopefully will improve after HD today   -Renal diet when not NPO

## 2025-03-17 NOTE — PROGRESS NOTES
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 She lives in [facility]. She works for [employer]. Her primary care physician is Dr. Delilah Kelley.               She was hospitalized at [facility] from 10/23/2024 to 11/6/2024.              She was hospitalized at [facility] from 11/15/2024 to 1/18/2024.              She was hospitalized at [facility] from 12/9/2024 to 12/19/2024.              She was hospitalized at [facility] from 12/25/2024 to 1/5/2025.              She was hospitalized at [facility] from 1/5/2025 to 1/31/2025. She was discharged to [facility] inpatient rehab until 2/18/2025.               She presented to [facility] Emergency Department on 3/2/2025 with rectal pain and bleeding. She receives her care in the [facility] system, not at [facility]. History was provided by her daughter Sue Barros. She is followed by Hematology at [facility] and has been on immunomodulator  infusions every 8 weeks (Soliris, now Ultomiris, last dose 2/24/2025). She has severe debility, poor appetite, dysphagia to solids, mostly consumes liquid, but her daughter was concerned her nutritional intake is inadequate and inquired about feeding tube placement for chronic nutrition. She had watery stool on the day of presentation. Her sister noted that she had gingival bleeding. She reported an anal lesion with tenderness and slow bleeding. She has not required frequent transfusions. Her daughter noted that all home antihypertensive medications were discontinued since she was discharged from inpatient rehab.              In the emergency department, she appeared ill, lethargic, unable to fully participate in interview, periodically awakening with pain. Labs showed hemoglobin 14.3 g/dL (from 13.2 on 2/24/2025). Repeat hemoglobin was 10.4. Alkaline phosphatase was 251 U/L, total bilirubin was 2.2 mg/dL, AST was 733 U/L, ALT was 478 U/L. She was given 2.1 liters of IV fluids, vancomycin, piperacillin-tazobactam, 4 mg of IV morphine, 80 mg of IV pantoprazole, topical lidocaine for rectal pain. She was admitted to Hospital Medicine Team S.     Overview/Hospital Course:  GGT was elevated at 447 U/L. LDH was elevated. Colorectal Surgery, Hematology, Nephrology, and Case Management were consulted. She expressed desire to go to [facility] since she gets her care at [facility]. Her mental status has been declining since October 2024. She has memory loss, disorientation, and does not remember her illness. Her mother had dementia. She had chest pain on 3/3/2025 while getting dialysis. EKG showed sinus tachycardia. Troponin was 1102 ng/L. Repeat was 704. Hematology recommended giving 2 units of fresh frozen plasma (FFP) and starting low rate heparin infusion then transitioning to apixaban 2.5 mg twice daily if she had no further bleeding. She was given another 2 units of FFP. Her daughter requested gastrostomy tube  "placement for malnutrition. On 3/4/2025, heparin was held due to recurrent bleeding. She had intermittent somnolence. She was kept NPO. Speech Language Pathology was consulted. She was hypoglycemic so was put on 10% dextrose drip. Speech Language Pathology recommended full liquid diet. LFTs trended down. Heparin infusion was restarted on 3/5/2025. On 3/6/2025, her daughter decided that she did not want transfer to a [facility] hospital, instead she wanted to transfer her medical care to the [facility] system. Hemoglobin and hematocrit remained stable with no evidence of recurrent bleeding on 3/7/2025. Heparin drip was continued. She had epistaxis on 3/7/2025. Hemoglobin decreased to 6.7 g/dL on 3/8/2025. She developed right arm swelling. Repeat hemoglobin was 5.7. Heparin drip was held. She was transfused 1 unit of packed red blood cells (PRBCs). Hemoglobin improved to 8.2. Right upper extremity venous ultrasound showed known right internal jugular thrombus as well as subclavian thrombus, although the subclavian vein was not visualized when the internal jugular thrombus was diagnosed, so it was unknown if it was new. Hemoglobin was stable on 3/10/2025. Heparin drip was resumed. Sodium was found to be 118 mmol/L. She had been on dextrose drip for days to treat hypoglycemia due to poor oral intake. She was given normal saline instead. Gastroenterology placed a gastrostomy tube on 3/11/2025. Around midnight that night, she vomited twice. Nausea resolved. Tolerating PEG and switched to apixaban. Hyponatremia improving.    Interval History: No rectal bleeding and Hemoglobin remains stable, although significant jump in Creatinine.  Patient endorses mild hip pain from "sleeping on it wrong" but endorses resolution of headaches and has no additional complaints.    Review of Systems   Constitutional:  Negative for chills and fever.   Respiratory:  Negative for cough and shortness of breath.    Cardiovascular:  Negative for chest pain " and palpitations.   Gastrointestinal:  Negative for abdominal pain.   Musculoskeletal:  Positive for arthralgias. Negative for back pain.   Neurological:  Negative for light-headedness and headaches.   Psychiatric/Behavioral:  Negative for agitation and behavioral problems.      Objective:     Vital Signs (Most Recent):  Temp: 98 °F (36.7 °C) (03/17/25 0445)  Pulse: (!) 117 (03/17/25 1110)  Resp: 19 (03/17/25 1103)  BP: (!) 160/101 (03/17/25 0445)  SpO2: 96 % (03/17/25 1110) Vital Signs (24h Range):  Temp:  [97.8 °F (36.6 °C)-98.8 °F (37.1 °C)] 98 °F (36.7 °C)  Pulse:  [] 117  Resp:  [11-20] 19  SpO2:  [96 %-100 %] 96 %  BP: (160-178)/() 160/101     Weight: 73 kg (160 lb 15 oz)  Body mass index is 26.78 kg/m².  No intake or output data in the 24 hours ending 03/17/25 1208      Physical Exam  Constitutional:       Appearance: She is not toxic-appearing.   HENT:      Head: Normocephalic and atraumatic.   Eyes:      Extraocular Movements: Extraocular movements intact.   Pulmonary:      Effort: Pulmonary effort is normal. No respiratory distress.   Abdominal:      Comments: PEG in place with feeds running   Neurological:      Mental Status: She is alert and oriented to person, place, and time.   Psychiatric:         Mood and Affect: Mood normal.         Behavior: Behavior normal.               Significant Labs: All pertinent labs within the past 24 hours have been reviewed.  CBC:   Recent Labs   Lab 03/16/25  0641 03/17/25 0446   WBC 9.16 10.47   HGB 10.1* 9.6*   HCT 31.9* 29.7*    221     CMP:   Recent Labs   Lab 03/17/25  0446   *   K 4.1   CL 94*   CO2 23   GLU 84   BUN 42*   CREATININE 3.7*   CALCIUM 8.3*   PROT 5.8*   ALBUMIN 2.2*   BILITOT 1.0   ALKPHOS 217*   AST 67*   ALT 15   ANIONGAP 11       Significant Imaging: I have reviewed all pertinent imaging results/findings within the past 24 hours.      Assessment & Plan  Rectal bleeding  Appreciate Colorectal Surgery. Subsided. Monitor for  recurrence.  Hemoglobin stable at 8.6  Unspecified severe protein-calorie malnutrition  Severe malnutrition patient with recent hx of worsening dysphagia, has had w/u for scleroderma, has hiatal hernia and ?esophageal dysmotility  - dysphagia to solids.   Developed wernicke encephalopathy from nutritional deficiency     Daughter reports concern of inadequate intake has had severe weight loss in recent months with multiple complex health conditions. She requested PEG placement. Nutrition gave recommendations on tube feeds. Giving minced and moist diet, Novasource Renal supplements.  Dysphagia  Appreciate SLP. Advanced diet to minced and moist. Appreciate GI. PEG placed 3/11/2025. Will start enteral water and tube feeds recommended by Nutrition.  FWF stopped per Nephrology request.  Wernicke encephalopathy  Continue on home thiamine supplementation.     GERD without esophagitis  Continue home pantoprazole.    ESRD (end stage renal disease)  Nephrology managing dialysis  - marked bump in creatinine overnight    Atypical HUS (hemolytic uremic syndrome) with mutation in thrombomodulin gene  Chronic. Last Ultomiris dose 2/24/2025. Outpatient hematologist is [facility]u MD at [facility]. Consulted Hematology here. Appreciate assistance with management.  Elevated transaminase level  Hepatic steatosis on CT. Worsening hepatic function may contribute to her worsening coagulopathy elevated PT/PTT. Has significantly improved.  Renal hematoma, left, sequela  In January, due to biopsy in 2024. Most recent imaging showed resolving appearance of remote hematoma.   Anemia of chronic renal failure, stage 5  Anemia is likely due to acute blood loss which was from GI bleed and active hemolysis due to HUS.  . Most recent hemoglobin and hematocrit are listed below.  Recent Labs     03/15/25  0625 03/16/25  0641 03/17/25  0446   HGB 8.7* 10.1* 9.6*   HCT 26.7* 31.9* 29.7*     Plan  - Monitor serial CBC  - Transfuse PRBC if patient becomes  hemodynamically unstable, symptomatic or H/H drops below 7/21.  - Transfused pRBCs 3/8/2025 after having epistaxis.  - Stable until she had some bleeding at new PEG site. Transfuse again 3/12/2025.  - now stable  Hyponatremia  Hyponatremia is likely due to hypotonic fluids and poor oral intake. The patient's most recent sodium results are listed below.  Recent Labs     03/15/25  1100 03/17/25  0446   * 128*     Plan  - Monitor.  improving    Hypertension  Patient's blood pressure range in the last 24 hours was: BP  Min: 160/101  Max: 178/103.The patient's inpatient anti-hypertensive regimen is listed below:  Current Antihypertensives  hydrALAZINE tablet 75 mg, Every 8 hours, Oral  NIFEdipine 24 hr tablet 60 mg, Daily, Oral  carvediloL tablet 12.5 mg, 2 times daily, Oral    Plan  - BP is uncontrolled, will adjust as follows: re-introducing home meds    VTE Risk Mitigation (From admission, onward)           Ordered     apixaban tablet 5 mg  2 times daily         03/12/25 1148     heparin (porcine) injection 1,000 Units  As needed (PRN)         03/03/25 1524     IP VTE HIGH RISK PATIENT  Once         03/03/25 0416     Place sequential compression device  Until discontinued         03/03/25 0416     Reason for No Pharmacological VTE Prophylaxis  Once        Question:  Reasons:  Answer:  Active Bleeding    03/03/25 0416                    Discharge Planning   PATRICIA: 3/21/2025     Code Status: Full Code   Medical Readiness for Discharge Date:   Discharge Plan A: Home with family, Home Health                Douglas Mattson MD  Department of Hospital Medicine   [facility] - Telemetry Stepdown

## 2025-03-17 NOTE — PT/OT/SLP PROGRESS
Occupational Therapy   Co-Treatment    Name: Madelaine Barros  MRN: [id number]  Admitting Diagnosis:  Rectal bleeding  6 Days Post-Op    Recommendations:     Discharge Recommendations: Moderate Intensity Therapy  Discharge Equipment Recommendations:  none  Barriers to discharge:  None    Assessment:     Madelaine Barros is a 59 y.o. female with a medical diagnosis of Rectal bleeding.  She presents with deficits in mobility, endurance and self-care tasks. Pt. Limited by light-headedness when seated EOB on this date. Pt. Able to follow all commands and participated as able. Pt. Continues to require significant assist at this time. Patient would benefit from continued OT services to maximize level of safety and independence with self-care tasks.   Performance deficits affecting function are weakness, impaired endurance, impaired self care skills, impaired functional mobility, impaired balance, decreased upper extremity function, decreased lower extremity function, decreased ROM, impaired cardiopulmonary response to activity. Co-tx to ensure pt. Safety and to accommodate for pt. Activity tolerance.    Rehab Prognosis:  Fair; patient would benefit from acute skilled OT services to address these deficits and reach maximum level of function.       Plan:     Patient to be seen 4 x/week to address the above listed problems via self-care/home management, therapeutic activities, therapeutic exercises, neuromuscular re-education  Plan of Care Expires: 04/10/25  Plan of Care Reviewed with: patient    Subjective     Chief Complaint: Pt. Reported being itchy and wanted back rubbed  Patient/Family Comments/goals: to get better   Pain/Comfort:  Pain Rating 1: 0/10  Pain Rating Post-Intervention 1: 0/10    Objective:     Communicated with: nurse prior to session.  Patient found supine with pulse ox (continuous), telemetry, PEG Tube upon OT entry to room.    General Precautions: Standard, fall, aspiration, NPO    Orthopedic  Precautions:N/A  Braces: N/A  Respiratory Status: Room air     Occupational Performance:     Bed Mobility:    Patient completed Rolling/Turning to Left with  minimum assistance with rail  Patient completed Rolling/Turning to Right with minimum assistance with rail  Patient completed Supine to Sit with maximal assistance  Patient completed Sit to Supine with maximal assistance and 2 persons     Functional Mobility/Transfers:  Drawsheet x 2 to HOB  Functional Mobility: Not tested    Activities of Daily Living:  Not performed      [facility] 6 Click ADL: 12    Treatment & Education:  Pt. Required Min to Mod A to sit EOB on this date. Pt. Required vc's to hold head upright and assist to promote overall midline positioning of trunk.     Patient left right sidelying with all lines intact, call button in reach, and bed alarm on    GOALS:   Multidisciplinary Problems       Occupational Therapy Goals          Problem: Occupational Therapy    Goal Priority Disciplines Outcome Interventions   Occupational Therapy Goal     OT, PT/OT Progressing    Description: Goals to be met by: 4/10/25     Patient will increase functional independence with ADLs by performing:    UE Dressing with Minimal Assistance.  LE Dressing with Moderate Assistance.  Grooming while seated with Supervision.  Toileting from bedside commode with Moderate Assistance for hygiene and clothing management.   Sitting at edge of bed x15 minutes with Stand-by Assistance.  Rolling to Bilateral with Supervision.   Supine to sit with Minimal Assistance.  Stand pivot transfers with Moderate Assistance.                         DME Justifications:  No DME recommended requiring DME justifications    Time Tracking:     OT Date of Treatment: 03/17/25  OT Start Time: 0858  OT Stop Time: 0915  OT Total Time (min): 17 min    Billable Minutes:Neuromuscular Re-education 17    OT/LULI: OT          3/17/2025

## 2025-03-17 NOTE — PROGRESS NOTES
[facility] - Telemetry Stepdown  Nephrology  Progress Note    Patient Name: Madelaine Barros  MRN: [id number]  Admission Date: 3/2/2025  Hospital Length of Stay: 14 days  Attending Provider: Douglas Mattson MD   Primary Care Physician: Delilah Kelley MD  Principal Problem:Rectal bleeding    Subjective:     HPI: 58 y/o AAF w/ Atypical Hemolytic Uremic syndrome now ESRD on HD m/w/f, Severe Malnutrition, GERD/Duodenal ulcer & schatzki ring, Dysphagia, latent syphilis (treated) presents to the ED for concerns of rectal pain and bleeding. She has severe debility, poor appetite dysphagia to solids, mostly consumes liquid. Pt has had c/o of rectal pain, on ED rectal exam, reported anal lesion w/ tenderness and slow bleeding noted. GI saw the patient and noted a non-bleeding fissure in the posterior midline. Nephrology was consulted to help manage her hemodialysis. Patient states that she receives HD MWF and has not missed any of her HD sessions. She is uncertain what her dry hernandez is, denies any dyspnea or chest pain.     Interval History: HD planned for this afternoon      Review of patient's allergies indicates:  No Known Allergies  Current Facility-Administered Medications   Medication Frequency    acetaminophen tablet 650 mg Q8H PRN    aluminum-magnesium hydroxide-simethicone 200-200-20 mg/5 mL suspension 30 mL QID PRN    apixaban tablet 5 mg BID    carvediloL tablet 12.5 mg BID    dextrose 50% injection 12.5 g PRN    dextrose 50% injection 25 g PRN    EScitalopram oxalate tablet 10 mg Daily    folic acid tablet 1,000 mcg Daily    glucagon (human recombinant) injection 1 mg PRN    glucose chewable tablet 16 g PRN    glucose chewable tablet 24 g PRN    heparin (porcine) injection 1,000 Units PRN    hydrALAZINE tablet 75 mg Q8H    melatonin tablet 6 mg Nightly PRN    methocarbamoL tablet 1,000 mg Once    mirtazapine tablet 15 mg QHS    naloxone 0.4 mg/mL injection 0.02 mg PRN    NIFEdipine 24 hr tablet 60 mg Daily     ondansetron injection 4 mg Q8H PRN    oxyCODONE-acetaminophen 5-325 mg per tablet 1 tablet Q8H PRN    pantoprazole EC tablet 40 mg BID AC    polyethylene glycol packet 17 g Daily PRN    potassium bicarbonate disintegrating tablet 50 mEq Once    prochlorperazine injection Soln 5 mg Q6H PRN    senna-docusate 8.6-50 mg per tablet 1 tablet BID PRN    sodium chloride 0.9% flush 10 mL Q6H PRN    thiamine tablet 100 mg Daily    vitamin D 1000 units tablet 1,000 Units Daily    vitamin renal formula (B-complex-vitamin c-folic acid) 1 mg per capsule 1 capsule Daily       Objective:     Vital Signs (Most Recent):  Temp: 98 °F (36.7 °C) (03/17/25 0445)  Pulse: 101 (03/17/25 0538)  Resp: 12 (03/17/25 0045)  BP: (!) 160/101 (03/17/25 0445)  SpO2: 100 % (03/17/25 0045) Vital Signs (24h Range):  Temp:  [97.8 °F (36.6 °C)-98.8 °F (37.1 °C)] 98 °F (36.7 °C)  Pulse:  [] 101  Resp:  [11-20] 12  SpO2:  [98 %-100 %] 100 %  BP: (160-178)/() 160/101     Weight: 73 kg (160 lb 15 oz) (03/13/25 0030)  Body mass index is 26.78 kg/m².  Body surface area is 1.83 meters squared.    No intake/output data recorded.     Physical Exam  Vitals and nursing note reviewed.   Constitutional:       General: She is not in acute distress.     Appearance: She is well-developed. She is ill-appearing. She is not diaphoretic.      Interventions: She is not intubated.  Pulmonary:      Effort: Pulmonary effort is normal. No accessory muscle usage or respiratory distress. She is not intubated.   Musculoskeletal:      Right lower leg: No edema.      Left lower leg: No edema.   Skin:     General: Skin is warm and dry.      Coloration: Skin is not jaundiced or pale.   Neurological:      Motor: No seizure activity.   Psychiatric:         Attention and Perception: She is attentive.         Behavior: Behavior is not aggressive or hyperactive.         Cognition and Memory: Cognition is not impaired. Memory is not impaired.          Significant Labs:  CBC:    Recent Labs   Lab 03/17/25  0446   WBC 10.47   RBC 3.38*   HGB 9.6*   HCT 29.7*      MCV 88   MCH 28.4   MCHC 32.3     CMP:   Recent Labs   Lab 03/17/25  0446   GLU 84   CALCIUM 8.3*   ALBUMIN 2.2*   PROT 5.8*   *   K 4.1   CO2 23   CL 94*   BUN 42*   CREATININE 3.7*   ALKPHOS 217*   ALT 15   AST 67*   BILITOT 1.0     All labs within the past 24 hours have been reviewed.         Assessment/Plan:     Renal/  ESRD (end stage renal disease)  Outpatient HD Information:  -Dialysis modality: Hemodialysis  -HD TX days: Monday/Wednesday/Friday  -Last HD TX prior to hospital admission: ?  -Dialysis access: dialysis catheter  -Residual urine: Anuric   -EDW: ?    Recommendations  -Plan for HD  this afternoon. Hyponatremia in setting of ESRD. FWF have been discontinued. Na 128 hopefully will improve after HD today   -Renal diet when not NPO          Ivan Cesar MD  Nephrology  [facility] - Telemetry Stepdown

## 2025-03-17 NOTE — SUBJECTIVE & OBJECTIVE
"Interval History: No rectal bleeding and Hemoglobin remains stable, although significant jump in Creatinine.  Patient endorses mild hip pain from "sleeping on it wrong" but endorses resolution of headaches and has no additional complaints.    Review of Systems   Constitutional:  Negative for chills and fever.   Respiratory:  Negative for cough and shortness of breath.    Cardiovascular:  Negative for chest pain and palpitations.   Gastrointestinal:  Negative for abdominal pain.   Musculoskeletal:  Positive for arthralgias. Negative for back pain.   Neurological:  Negative for light-headedness and headaches.   Psychiatric/Behavioral:  Negative for agitation and behavioral problems.      Objective:     Vital Signs (Most Recent):  Temp: 98 °F (36.7 °C) (03/17/25 0445)  Pulse: (!) 117 (03/17/25 1110)  Resp: 19 (03/17/25 1103)  BP: (!) 160/101 (03/17/25 0445)  SpO2: 96 % (03/17/25 1110) Vital Signs (24h Range):  Temp:  [97.8 °F (36.6 °C)-98.8 °F (37.1 °C)] 98 °F (36.7 °C)  Pulse:  [] 117  Resp:  [11-20] 19  SpO2:  [96 %-100 %] 96 %  BP: (160-178)/() 160/101     Weight: 73 kg (160 lb 15 oz)  Body mass index is 26.78 kg/m².  No intake or output data in the 24 hours ending 03/17/25 1208      Physical Exam  Constitutional:       Appearance: She is not toxic-appearing.   HENT:      Head: Normocephalic and atraumatic.   Eyes:      Extraocular Movements: Extraocular movements intact.   Pulmonary:      Effort: Pulmonary effort is normal. No respiratory distress.   Abdominal:      Comments: PEG in place with feeds running   Neurological:      Mental Status: She is alert and oriented to person, place, and time.   Psychiatric:         Mood and Affect: Mood normal.         Behavior: Behavior normal.               Significant Labs: All pertinent labs within the past 24 hours have been reviewed.  CBC:   Recent Labs   Lab 03/16/25  0641 03/17/25  0446   WBC 9.16 10.47   HGB 10.1* 9.6*   HCT 31.9* 29.7*    221 "     CMP:   Recent Labs   Lab 03/17/25  0446   *   K 4.1   CL 94*   CO2 23   GLU 84   BUN 42*   CREATININE 3.7*   CALCIUM 8.3*   PROT 5.8*   ALBUMIN 2.2*   BILITOT 1.0   ALKPHOS 217*   AST 67*   ALT 15   ANIONGAP 11       Significant Imaging: I have reviewed all pertinent imaging results/findings within the past 24 hours.

## 2025-03-17 NOTE — ASSESSMENT & PLAN NOTE
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 . Most recent hemoglobin and hematocrit are listed below.  Recent Labs     03/15/25  0625 03/16/25  0641 03/17/25  0446   HGB 8.7* 10.1* 9.6*   HCT 26.7* 31.9* 29.7*     Plan  - Monitor serial CBC  - Transfuse PRBC if patient becomes hemodynamically unstable, symptomatic or H/H drops below 7/21.  - Transfused pRBCs 3/8/2025 after having epistaxis.  - Stable until she had some bleeding at new PEG site. Transfuse again 3/12/2025.  - now stable

## 2025-03-18 LAB
BASOPHILS # BLD AUTO: 0.08 K/UL (ref 0–0.2)
BASOPHILS NFR BLD: 0.7 % (ref 0–1.9)
DIFFERENTIAL METHOD BLD: ABNORMAL
EOSINOPHIL # BLD AUTO: 0 K/UL (ref 0–0.5)
EOSINOPHIL NFR BLD: 0.1 % (ref 0–8)
ERYTHROCYTE [DISTWIDTH] IN BLOOD BY AUTOMATED COUNT: 16.2 % (ref 11.5–14.5)
HCT VFR BLD AUTO: 30.8 % (ref 37–48.5)
HGB BLD-MCNC: 9.9 G/DL (ref 12–16)
IMM GRANULOCYTES # BLD AUTO: 0.04 K/UL (ref 0–0.04)
IMM GRANULOCYTES NFR BLD AUTO: 0.4 % (ref 0–0.5)
LYMPHOCYTES # BLD AUTO: 1.4 K/UL (ref 1–4.8)
LYMPHOCYTES NFR BLD: 12.7 % (ref 18–48)
MCH RBC QN AUTO: 28.9 PG (ref 27–31)
MCHC RBC AUTO-ENTMCNC: 32.1 G/DL (ref 32–36)
MCV RBC AUTO: 90 FL (ref 82–98)
MONOCYTES # BLD AUTO: 0.7 K/UL (ref 0.3–1)
MONOCYTES NFR BLD: 6.3 % (ref 4–15)
NEUTROPHILS # BLD AUTO: 8.5 K/UL (ref 1.8–7.7)
NEUTROPHILS NFR BLD: 79.8 % (ref 38–73)
NRBC BLD-RTO: 0 /100 WBC
PLATELET # BLD AUTO: 254 K/UL (ref 150–450)
PMV BLD AUTO: 10.3 FL (ref 9.2–12.9)
POCT GLUCOSE: 106 MG/DL (ref 70–110)
POCT GLUCOSE: 81 MG/DL (ref 70–110)
POCT GLUCOSE: 98 MG/DL (ref 70–110)
RBC # BLD AUTO: 3.43 M/UL (ref 4–5.4)
WBC # BLD AUTO: 10.68 K/UL (ref 3.9–12.7)

## 2025-03-18 PROCEDURE — 25000003 PHARM REV CODE 250: Performed by: FAMILY MEDICINE

## 2025-03-18 PROCEDURE — 25000003 PHARM REV CODE 250: Performed by: HOSPITALIST

## 2025-03-18 PROCEDURE — 97530 THERAPEUTIC ACTIVITIES: CPT

## 2025-03-18 PROCEDURE — 36415 COLL VENOUS BLD VENIPUNCTURE: CPT | Performed by: HOSPITALIST

## 2025-03-18 PROCEDURE — 85025 COMPLETE CBC W/AUTO DIFF WBC: CPT | Performed by: HOSPITALIST

## 2025-03-18 PROCEDURE — 25000003 PHARM REV CODE 250: Performed by: STUDENT IN AN ORGANIZED HEALTH CARE EDUCATION/TRAINING PROGRAM

## 2025-03-18 PROCEDURE — 20600001 HC STEP DOWN PRIVATE ROOM

## 2025-03-18 PROCEDURE — 38228 CAR-T ADMN AUTOLOGOUS: CPT

## 2025-03-18 RX ORDER — HYDRALAZINE HYDROCHLORIDE 50 MG/1
50 TABLET, FILM COATED ORAL EVERY 8 HOURS
Status: DISCONTINUED | OUTPATIENT
Start: 2025-03-18 | End: 2025-03-19

## 2025-03-18 RX ADMIN — HYDRALAZINE HYDROCHLORIDE 50 MG: 50 TABLET ORAL at 02:03

## 2025-03-18 RX ADMIN — CARVEDILOL 12.5 MG: 12.5 TABLET, FILM COATED ORAL at 08:03

## 2025-03-18 RX ADMIN — MIRTAZAPINE 15 MG: 15 TABLET, FILM COATED ORAL at 08:03

## 2025-03-18 RX ADMIN — NEPHROCAP 1 CAPSULE: 1 CAP ORAL at 08:03

## 2025-03-18 RX ADMIN — Medication 100 MG: at 08:03

## 2025-03-18 RX ADMIN — FOLIC ACID 1000 MCG: 1 TABLET ORAL at 08:03

## 2025-03-18 RX ADMIN — ACETAMINOPHEN 650 MG: 325 TABLET ORAL at 08:03

## 2025-03-18 RX ADMIN — ACETAMINOPHEN 650 MG: 325 TABLET ORAL at 01:03

## 2025-03-18 RX ADMIN — APIXABAN 5 MG: 5 TABLET, FILM COATED ORAL at 08:03

## 2025-03-18 RX ADMIN — PANTOPRAZOLE SODIUM 40 MG: 40 TABLET, DELAYED RELEASE ORAL at 04:03

## 2025-03-18 RX ADMIN — NIFEDIPINE 60 MG: 30 TABLET, FILM COATED, EXTENDED RELEASE ORAL at 08:03

## 2025-03-18 RX ADMIN — ESCITALOPRAM OXALATE 10 MG: 5 TABLET, FILM COATED ORAL at 08:03

## 2025-03-18 RX ADMIN — Medication 1000 UNITS: at 08:03

## 2025-03-18 RX ADMIN — PANTOPRAZOLE SODIUM 40 MG: 40 TABLET, DELAYED RELEASE ORAL at 05:03

## 2025-03-18 NOTE — SUBJECTIVE & OBJECTIVE
Interval History: hemoglobin remains stable and patient without complaint this morning, denying blood per rectum.  No additional issues at this time. Endorses resolution of headache.    Review of Systems   Constitutional:  Negative for chills and fever.   Respiratory:  Negative for cough and shortness of breath.    Cardiovascular:  Negative for chest pain and palpitations.   Neurological:  Negative for light-headedness and headaches.   Psychiatric/Behavioral:  Negative for agitation and behavioral problems.      Objective:     Vital Signs (Most Recent):  Temp: 98.3 °F (36.8 °C) (03/18/25 0723)  Pulse: 85 (03/18/25 1052)  Resp: 19 (03/18/25 0053)  BP: 110/72 (03/18/25 0805)  SpO2: 100 % (03/18/25 1052) Vital Signs (24h Range):  Temp:  [98.2 °F (36.8 °C)-98.4 °F (36.9 °C)] 98.3 °F (36.8 °C)  Pulse:  [85-99] 85  Resp:  [19] 19  SpO2:  [96 %-100 %] 100 %  BP: ()/(52-85) 110/72     Weight: 73 kg (160 lb 15 oz)  Body mass index is 26.78 kg/m².    Intake/Output Summary (Last 24 hours) at 3/18/2025 1111  Last data filed at 3/18/2025 0100  Gross per 24 hour   Intake 935 ml   Output 1100 ml   Net -165 ml         Physical Exam  Constitutional:       General: She is not in acute distress.     Appearance: She is not ill-appearing or toxic-appearing.   HENT:      Head: Normocephalic and atraumatic.   Pulmonary:      Effort: Pulmonary effort is normal. No respiratory distress.   Abdominal:      Comments: PEG in place with feeds running   Skin:     Coloration: Skin is not jaundiced.   Neurological:      Mental Status: She is alert and oriented to person, place, and time. Mental status is at baseline.               Significant Labs: All pertinent labs within the past 24 hours have been reviewed.  CBC:   Recent Labs   Lab 03/17/25  0446 03/18/25  0356   WBC 10.47 10.68   HGB 9.6* 9.9*   HCT 29.7* 30.8*    254     CMP:   Recent Labs   Lab 03/17/25  0446   *   K 4.1   CL 94*   CO2 23   GLU 84   BUN 42*   CREATININE  3.7*   CALCIUM 8.3*   PROT 5.8*   ALBUMIN 2.2*   BILITOT 1.0   ALKPHOS 217*   AST 67*   ALT 15   ANIONGAP 11       Significant Imaging: I have reviewed all pertinent imaging results/findings within the past 24 hours.

## 2025-03-18 NOTE — ASSESSMENT & PLAN NOTE
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 . Most recent hemoglobin and hematocrit are listed below.  Recent Labs     03/16/25  0641 03/17/25  0446 03/18/25  0356   HGB 10.1* 9.6* 9.9*   HCT 31.9* 29.7* 30.8*     Plan  - Monitor serial CBC  - Transfuse PRBC if patient becomes hemodynamically unstable, symptomatic or H/H drops below 7/21.  - Transfused pRBCs 3/8/2025 after having epistaxis.  - Stable until she had some bleeding at new PEG site. Transfuse again 3/12/2025.  - now stable

## 2025-03-18 NOTE — ASSESSMENT & PLAN NOTE
Chronic. Last Ultomiris dose 2/24/2025. Outpatient hematologist is Roma Cantu MD at [facility]. Consulted Hematology here. Appreciate assistance with management.

## 2025-03-18 NOTE — ASSESSMENT & PLAN NOTE
Patient's blood pressure range in the last 24 hours was: BP  Min: 90/52  Max: 110/72.The patient's inpatient anti-hypertensive regimen is listed below:  Current Antihypertensives  hydrALAZINE tablet 75 mg, Every 8 hours, Oral  NIFEdipine 24 hr tablet 60 mg, Daily, Oral  carvediloL tablet 12.5 mg, 2 times daily, Oral    Plan  -was uncontrolled, so introduced home medications; a little hypotensive so will monitor and consider decrease

## 2025-03-18 NOTE — ASSESSMENT & PLAN NOTE
Hyponatremia is likely due to hypotonic fluids and poor oral intake. The patient's most recent sodium results are listed below.  Recent Labs     03/17/25  0446   *     Plan  - Monitor.  improving

## 2025-03-18 NOTE — PT/OT/SLP PROGRESS
Occupational Therapy   Treatment    Name: Madelaine Barros  MRN: [id number]  Admitting Diagnosis:  Rectal bleeding  7 Days Post-Op    Recommendations:     Discharge Recommendations: Moderate Intensity Therapy  Discharge Equipment Recommendations:  none  Barriers to discharge:  None    Assessment:     Madelaine Barros is a 59 y.o. female with a medical diagnosis of Rectal bleeding.  She presents with deficits in mobility and self-care tasks. Pt. Requires significant assist for all mobility. Patient would benefit from continued OT services to maximize level of safety and independence with self-care tasks.   . Performance deficits affecting function are weakness, impaired endurance, impaired self care skills, impaired functional mobility, decreased upper extremity function, decreased lower extremity function, decreased coordination, impaired cardiopulmonary response to activity.     Rehab Prognosis:  Fair; patient would benefit from acute skilled OT services to address these deficits and reach maximum level of function.       Plan:     Patient to be seen 4 x/week to address the above listed problems via self-care/home management, therapeutic activities, therapeutic exercises, neuromuscular re-education  Plan of Care Expires: 04/10/25  Plan of Care Reviewed with: patient    Subjective     Chief Complaint: My skin is so dry.   Patient/Family Comments/goals: to get better  Pain/Comfort:  Pain Rating 1: 0/10  Pain Rating Post-Intervention 1: 0/10    Objective:     Communicated with: nurse prior to session.  Patient found supine with telemetry, PEG Tube, pulse ox (continuous) upon OT entry to room.    General Precautions: Standard, fall, aspiration, NPO    Orthopedic Precautions:N/A  Braces: N/A  Respiratory Status: Room air     Occupational Performance:     Bed Mobility:    Patient completed Supine to Sit with max  assistance x 1 to left side    Functional Mobility/Transfers:  Max A to scoot along EOB to HOB x 3  trials  Functional Mobility: not tested    Activities of Daily Living:  Grooming: stand by assistance for balance to sit EOB and apply lotion to BLE (upper portions)      [facility] 6 Click ADL: 14    Treatment & Education:  Pt. Was able to sit EOB with SBA  Pt. Educated on importance of therapy and OOB to get stronger  Pt. Sat EOB x ~ 8 minutes with SBA and then reported needed to lay down due to not feeling well    Patient left supine with all lines intact, call button in reach, and bed alarm on    GOALS:   Multidisciplinary Problems       Occupational Therapy Goals          Problem: Occupational Therapy    Goal Priority Disciplines Outcome Interventions   Occupational Therapy Goal     OT, PT/OT Progressing    Description: Goals to be met by: 4/10/25     Patient will increase functional independence with ADLs by performing:    UE Dressing with Minimal Assistance.  LE Dressing with Moderate Assistance.  Grooming while seated with Supervision.  Toileting from bedside commode with Moderate Assistance for hygiene and clothing management.   Sitting at edge of bed x15 minutes with Stand-by Assistance.  Rolling to Bilateral with Supervision.   Supine to sit with Minimal Assistance.  Stand pivot transfers with Moderate Assistance.                         DME Justifications:  No DME recommended requiring DME justifications    Time Tracking:     OT Date of Treatment: 03/18/25  OT Start Time: 1502  OT Stop Time: 1513  OT Total Time (min): 11 min    Billable Minutes:Therapeutic Activity 11    OT/LULI: OT          3/18/2025

## 2025-03-18 NOTE — PLAN OF CARE
Problem: Adult Inpatient Plan of Care  Goal: Plan of Care Review  Outcome: Not Progressing  Goal: Patient-Specific Goal (Individualized)  Outcome: Not Progressing  Goal: Absence of Hospital-Acquired Illness or Injury  Outcome: Not Progressing  Goal: Optimal Comfort and Wellbeing  Outcome: Not Progressing  Goal: Readiness for Transition of Care  Outcome: Not Progressing     Problem: Infection  Goal: Absence of Infection Signs and Symptoms  Outcome: Not Progressing     Problem: Hemodialysis  Goal: Safe, Effective Therapy Delivery  Outcome: Not Progressing  Goal: Effective Tissue Perfusion  Outcome: Not Progressing  Goal: Absence of Infection Signs and Symptoms  Outcome: Not Progressing     Problem: Wound  Goal: Optimal Coping  Outcome: Not Progressing  Goal: Optimal Functional Ability  Outcome: Not Progressing  Goal: Absence of Infection Signs and Symptoms  Outcome: Not Progressing  Goal: Improved Oral Intake  Outcome: Not Progressing  Goal: Optimal Pain Control and Function  Outcome: Not Progressing  Goal: Skin Health and Integrity  Outcome: Not Progressing  Goal: Optimal Wound Healing  Outcome: Not Progressing     Problem: Skin Injury Risk Increased  Goal: Skin Health and Integrity  Outcome: Not Progressing     Problem: Fall Injury Risk  Goal: Absence of Fall and Fall-Related Injury  Outcome: Not Progressing     Problem: Chronic Kidney Disease  Goal: Electrolyte Balance  Outcome: Not Progressing  Goal: Fluid Balance  Outcome: Not Progressing     Problem: Chronic Kidney Disease  Goal: Electrolyte Balance  Outcome: Not Progressing  Goal: Fluid Balance  Outcome: Not Progressing  Goal: Minimize Renal Failure Effects  Outcome: Not Progressing

## 2025-03-18 NOTE — PROGRESS NOTES
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 She lives in [facility]. She works for [employer]. Her primary care physician is Dr. Delilah Kelley.               She was hospitalized at [facility] from 10/23/2024 to 11/6/2024.              She was hospitalized at [facility] from 11/15/2024 to 1/18/2024.              She was hospitalized at [facility] from 12/9/2024 to 12/19/2024.              She was hospitalized at [facility] from 12/25/2024 to 1/5/2025.              She was hospitalized at [facility] from 1/5/2025 to 1/31/2025. She was discharged to [facility] inpatient rehab until 2/18/2025.               She presented to [facility] Emergency Department on 3/2/2025 with rectal pain and bleeding. She receives her care in the [facility] system, not at [facility]. History was provided by her daughter Sue Barros. She is followed by Hematology at [facility] and has been on immunomodulator  infusions every 8 weeks (Soliris, now Ultomiris, last dose 2/24/2025). She has severe debility, poor appetite, dysphagia to solids, mostly consumes liquid, but her daughter was concerned her nutritional intake is inadequate and inquired about feeding tube placement for chronic nutrition. She had watery stool on the day of presentation. Her sister noted that she had gingival bleeding. She reported an anal lesion with tenderness and slow bleeding. She has not required frequent transfusions. Her daughter noted that all home antihypertensive medications were discontinued since she was discharged from inpatient rehab.              In the emergency department, she appeared ill, lethargic, unable to fully participate in interview, periodically awakening with pain. Labs showed hemoglobin 14.3 g/dL (from 13.2 on 2/24/2025). Repeat hemoglobin was 10.4. Alkaline phosphatase was 251 U/L, total bilirubin was 2.2 mg/dL, AST was 733 U/L, ALT was 478 U/L. She was given 2.1 liters of IV fluids, vancomycin, piperacillin-tazobactam, 4 mg of IV morphine, 80 mg of IV pantoprazole, topical lidocaine for rectal pain. She was admitted to Hospital Medicine Team S.     Overview/Hospital Course:  GGT was elevated at 447 U/L. LDH was elevated. Colorectal Surgery, Hematology, Nephrology, and Case Management were consulted. She expressed desire to go to [facility] since she gets her care at [facility]. Her mental status has been declining since October 2024. She has memory loss, disorientation, and does not remember her illness. Her mother had dementia. She had chest pain on 3/3/2025 while getting dialysis. EKG showed sinus tachycardia. Troponin was 1102 ng/L. Repeat was 704. Hematology recommended giving 2 units of fresh frozen plasma (FFP) and starting low rate heparin infusion then transitioning to apixaban 2.5 mg twice daily if she had no further bleeding. She was given another 2 units of FFP. Her daughter requested gastrostomy tube  placement for malnutrition. On 3/4/2025, heparin was held due to recurrent bleeding. She had intermittent somnolence. She was kept NPO. Speech Language Pathology was consulted. She was hypoglycemic so was put on 10% dextrose drip. Speech Language Pathology recommended full liquid diet. LFTs trended down. Heparin infusion was restarted on 3/5/2025. On 3/6/2025, her daughter decided that she did not want transfer to a [facility] hospital, instead she wanted to transfer her medical care to the [facility] system. Hemoglobin and hematocrit remained stable with no evidence of recurrent bleeding on 3/7/2025. Heparin drip was continued. She had epistaxis on 3/7/2025. Hemoglobin decreased to 6.7 g/dL on 3/8/2025. She developed right arm swelling. Repeat hemoglobin was 5.7. Heparin drip was held. She was transfused 1 unit of packed red blood cells (PRBCs). Hemoglobin improved to 8.2. Right upper extremity venous ultrasound showed known right internal jugular thrombus as well as subclavian thrombus, although the subclavian vein was not visualized when the internal jugular thrombus was diagnosed, so it was unknown if it was new. Hemoglobin was stable on 3/10/2025. Heparin drip was resumed. Sodium was found to be 118 mmol/L. She had been on dextrose drip for days to treat hypoglycemia due to poor oral intake. She was given normal saline instead. Gastroenterology placed a gastrostomy tube on 3/11/2025. Around midnight that night, she vomited twice. Nausea resolved. Abdomen X-ray showed nothing concerning. She tolerated tube feeds and heparin was transitioned to apixaban. Hyponatremia progressively improved.    Interval History: hemoglobin remains stable and patient without complaint this morning, denying blood per rectum.  No additional issues at this time. Endorses resolution of headache.    Review of Systems   Constitutional:  Negative for chills and fever.   Respiratory:  Negative for cough and shortness of breath.    Cardiovascular:   Negative for chest pain and palpitations.   Neurological:  Negative for light-headedness and headaches.   Psychiatric/Behavioral:  Negative for agitation and behavioral problems.      Objective:     Vital Signs (Most Recent):  Temp: 98.3 °F (36.8 °C) (03/18/25 0723)  Pulse: 85 (03/18/25 1052)  Resp: 19 (03/18/25 0053)  BP: 110/72 (03/18/25 0805)  SpO2: 100 % (03/18/25 1052) Vital Signs (24h Range):  Temp:  [98.2 °F (36.8 °C)-98.4 °F (36.9 °C)] 98.3 °F (36.8 °C)  Pulse:  [85-99] 85  Resp:  [19] 19  SpO2:  [96 %-100 %] 100 %  BP: ()/(52-85) 110/72     Weight: 73 kg (160 lb 15 oz)  Body mass index is 26.78 kg/m².    Intake/Output Summary (Last 24 hours) at 3/18/2025 1111  Last data filed at 3/18/2025 0100  Gross per 24 hour   Intake 935 ml   Output 1100 ml   Net -165 ml         Physical Exam  Constitutional:       General: She is not in acute distress.     Appearance: She is not ill-appearing or toxic-appearing.   HENT:      Head: Normocephalic and atraumatic.   Pulmonary:      Effort: Pulmonary effort is normal. No respiratory distress.   Abdominal:      Comments: PEG in place with feeds running   Skin:     Coloration: Skin is not jaundiced.   Neurological:      Mental Status: She is alert and oriented to person, place, and time. Mental status is at baseline.               Significant Labs: All pertinent labs within the past 24 hours have been reviewed.  CBC:   Recent Labs   Lab 03/17/25  0446 03/18/25  0356   WBC 10.47 10.68   HGB 9.6* 9.9*   HCT 29.7* 30.8*    254     CMP:   Recent Labs   Lab 03/17/25  0446   *   K 4.1   CL 94*   CO2 23   GLU 84   BUN 42*   CREATININE 3.7*   CALCIUM 8.3*   PROT 5.8*   ALBUMIN 2.2*   BILITOT 1.0   ALKPHOS 217*   AST 67*   ALT 15   ANIONGAP 11       Significant Imaging: I have reviewed all pertinent imaging results/findings within the past 24 hours.      Assessment & Plan  Rectal bleeding  Appreciate Colorectal Surgery. Subsided. Monitor for recurrence.  Hemoglobin  stable at 8.6  Unspecified severe protein-calorie malnutrition  Severe malnutrition patient with recent hx of worsening dysphagia, has had w/u for scleroderma, has hiatal hernia and ?esophageal dysmotility  - dysphagia to solids.   Developed wernicke encephalopathy from nutritional deficiency     Daughter reports concern of inadequate intake has had severe weight loss in recent months with multiple complex health conditions. She requested PEG placement. Nutrition gave recommendations on tube feeds. Giving minced and moist diet, Novasource Renal supplements.  Dysphagia  Appreciate SLP. Advanced diet to minced and moist. Appreciate GI. PEG placed 3/11/2025. Will start enteral water and tube feeds recommended by Nutrition.  FWF stopped per Nephrology request.  Wernicke encephalopathy  Continue on home thiamine supplementation.     GERD without esophagitis  Continue home pantoprazole.    ESRD (end stage renal disease)  Nephrology managing dialysis  - marked bump in creatinine overnight    Atypical HUS (hemolytic uremic syndrome) with mutation in thrombomodulin gene  Chronic. Last Ultomiris dose 2/24/2025. Outpatient hematologist is Roma Cantu MD at [facility]. Consulted Hematology here. Appreciate assistance with management.  Elevated transaminase level  Hepatic steatosis on CT. Worsening hepatic function may contribute to her worsening coagulopathy elevated PT/PTT. Has significantly improved.  Renal hematoma, left, sequela  In January, due to biopsy in 2024. Most recent imaging showed resolving appearance of remote hematoma.   Anemia of chronic renal failure, stage 5  Anemia is likely due to acute blood loss which was from GI bleed and active hemolysis due to HUS.  . Most recent hemoglobin and hematocrit are listed below.  Recent Labs     03/16/25  0641 03/17/25  0446 03/18/25  0356   HGB 10.1* 9.6* 9.9*   HCT 31.9* 29.7* 30.8*     Plan  - Monitor serial CBC  - Transfuse PRBC if patient becomes hemodynamically  unstable, symptomatic or H/H drops below 7/21.  - Transfused pRBCs 3/8/2025 after having epistaxis.  - Stable until she had some bleeding at new PEG site. Transfuse again 3/12/2025.  - now stable  Hyponatremia  Hyponatremia is likely due to hypotonic fluids and poor oral intake. The patient's most recent sodium results are listed below.  Recent Labs     03/17/25  0446   *     Plan  - Monitor.  improving    Hypertension  Patient's blood pressure range in the last 24 hours was: BP  Min: 90/52  Max: 110/72.The patient's inpatient anti-hypertensive regimen is listed below:  Current Antihypertensives  hydrALAZINE tablet 75 mg, Every 8 hours, Oral  NIFEdipine 24 hr tablet 60 mg, Daily, Oral  carvediloL tablet 12.5 mg, 2 times daily, Oral    Plan  -was uncontrolled, so introduced home medications; a little hypotensive so will monitor and consider decrease    VTE Risk Mitigation (From admission, onward)           Ordered     apixaban tablet 5 mg  2 times daily         03/12/25 1148     heparin (porcine) injection 1,000 Units  As needed (PRN)         03/03/25 1524     IP VTE HIGH RISK PATIENT  Once         03/03/25 0416     Place sequential compression device  Until discontinued         03/03/25 0416     Reason for No Pharmacological VTE Prophylaxis  Once        Question:  Reasons:  Answer:  Active Bleeding    03/03/25 0416                    Discharge Planning   PATRICIA: 3/21/2025     Code Status: Full Code   Medical Readiness for Discharge Date:   Discharge Plan A: Skilled Nursing Facility   Discharge Delays: (!) Post-Acute Set-up (plan changed from home w/HH to SNF)            Please place Justification for DME        Douglas Mattson MD  Department of Hospital Medicine   [facility] - Telemetry Stepdown

## 2025-03-19 PROBLEM — E83.39 HYPOPHOSPHATEMIA: Status: ACTIVE | Noted: 2025-03-19

## 2025-03-19 LAB
ALBUMIN SERPL BCP-MCNC: 2.2 G/DL (ref 3.5–5.2)
ALP SERPL-CCNC: 190 U/L (ref 40–150)
ALT SERPL W/O P-5'-P-CCNC: 9 U/L (ref 10–44)
ANION GAP SERPL CALC-SCNC: 9 MMOL/L (ref 8–16)
AST SERPL-CCNC: 49 U/L (ref 10–40)
BASOPHILS # BLD AUTO: 0.09 K/UL (ref 0–0.2)
BASOPHILS NFR BLD: 0.8 % (ref 0–1.9)
BILIRUB SERPL-MCNC: 0.9 MG/DL (ref 0.1–1)
BUN SERPL-MCNC: 43 MG/DL (ref 6–20)
CALCIUM SERPL-MCNC: 8.3 MG/DL (ref 8.7–10.5)
CHLORIDE SERPL-SCNC: 99 MMOL/L (ref 95–110)
CO2 SERPL-SCNC: 20 MMOL/L (ref 23–29)
CREAT SERPL-MCNC: 3.3 MG/DL (ref 0.5–1.4)
DIFFERENTIAL METHOD BLD: ABNORMAL
EOSINOPHIL # BLD AUTO: 0 K/UL (ref 0–0.5)
EOSINOPHIL NFR BLD: 0.4 % (ref 0–8)
ERYTHROCYTE [DISTWIDTH] IN BLOOD BY AUTOMATED COUNT: 16.8 % (ref 11.5–14.5)
EST. GFR  (NO RACE VARIABLE): 15.5 ML/MIN/1.73 M^2
GLUCOSE SERPL-MCNC: 61 MG/DL (ref 70–110)
HCT VFR BLD AUTO: 26.8 % (ref 37–48.5)
HGB BLD-MCNC: 8.6 G/DL (ref 12–16)
IMM GRANULOCYTES # BLD AUTO: 0.03 K/UL (ref 0–0.04)
IMM GRANULOCYTES NFR BLD AUTO: 0.3 % (ref 0–0.5)
LYMPHOCYTES # BLD AUTO: 1.7 K/UL (ref 1–4.8)
LYMPHOCYTES NFR BLD: 15.6 % (ref 18–48)
MCH RBC QN AUTO: 28.6 PG (ref 27–31)
MCHC RBC AUTO-ENTMCNC: 32.1 G/DL (ref 32–36)
MCV RBC AUTO: 89 FL (ref 82–98)
MONOCYTES # BLD AUTO: 0.8 K/UL (ref 0.3–1)
MONOCYTES NFR BLD: 7.2 % (ref 4–15)
NEUTROPHILS # BLD AUTO: 8.2 K/UL (ref 1.8–7.7)
NEUTROPHILS NFR BLD: 75.7 % (ref 38–73)
NRBC BLD-RTO: 0 /100 WBC
PHOSPHATE SERPL-MCNC: <1 MG/DL (ref 2.7–4.5)
PLATELET # BLD AUTO: 264 K/UL (ref 150–450)
PMV BLD AUTO: 10.9 FL (ref 9.2–12.9)
POCT GLUCOSE: 85 MG/DL (ref 70–110)
POCT GLUCOSE: 86 MG/DL (ref 70–110)
POCT GLUCOSE: 88 MG/DL (ref 70–110)
POTASSIUM SERPL-SCNC: 3.8 MMOL/L (ref 3.5–5.1)
PROT SERPL-MCNC: 5.7 G/DL (ref 6–8.4)
RBC # BLD AUTO: 3.01 M/UL (ref 4–5.4)
SODIUM SERPL-SCNC: 128 MMOL/L (ref 136–145)
WBC # BLD AUTO: 10.86 K/UL (ref 3.9–12.7)

## 2025-03-19 PROCEDURE — 90935 HEMODIALYSIS ONE EVALUATION: CPT | Mod: ,,, | Performed by: NURSE PRACTITIONER

## 2025-03-19 PROCEDURE — 20600001 HC STEP DOWN PRIVATE ROOM

## 2025-03-19 PROCEDURE — 84100 ASSAY OF PHOSPHORUS: CPT | Performed by: HOSPITALIST

## 2025-03-19 PROCEDURE — 25000003 PHARM REV CODE 250: Performed by: HOSPITALIST

## 2025-03-19 PROCEDURE — 36415 COLL VENOUS BLD VENIPUNCTURE: CPT | Performed by: HOSPITALIST

## 2025-03-19 PROCEDURE — 25000003 PHARM REV CODE 250: Performed by: FAMILY MEDICINE

## 2025-03-19 PROCEDURE — 80100016 HC MAINTENANCE HEMODIALYSIS

## 2025-03-19 PROCEDURE — 25000003 PHARM REV CODE 250: Performed by: STUDENT IN AN ORGANIZED HEALTH CARE EDUCATION/TRAINING PROGRAM

## 2025-03-19 PROCEDURE — 25000003 PHARM REV CODE 250: Performed by: NURSE PRACTITIONER

## 2025-03-19 PROCEDURE — 63600175 PHARM REV CODE 636 W HCPCS: Performed by: STUDENT IN AN ORGANIZED HEALTH CARE EDUCATION/TRAINING PROGRAM

## 2025-03-19 PROCEDURE — 85025 COMPLETE CBC W/AUTO DIFF WBC: CPT | Performed by: HOSPITALIST

## 2025-03-19 PROCEDURE — 80053 COMPREHEN METABOLIC PANEL: CPT | Performed by: HOSPITALIST

## 2025-03-19 RX ORDER — HYDRALAZINE HYDROCHLORIDE 50 MG/1
50 TABLET, FILM COATED ORAL EVERY 8 HOURS
Status: DISCONTINUED | OUTPATIENT
Start: 2025-03-20 | End: 2025-03-21

## 2025-03-19 RX ORDER — CARVEDILOL 12.5 MG/1
12.5 TABLET ORAL 2 TIMES DAILY
Status: DISCONTINUED | OUTPATIENT
Start: 2025-03-20 | End: 2025-03-24

## 2025-03-19 RX ORDER — SODIUM CHLORIDE 9 MG/ML
INJECTION, SOLUTION INTRAVENOUS ONCE
Status: COMPLETED | OUTPATIENT
Start: 2025-03-19 | End: 2025-03-19

## 2025-03-19 RX ORDER — NIFEDIPINE 30 MG/1
30 TABLET, EXTENDED RELEASE ORAL DAILY
Status: DISCONTINUED | OUTPATIENT
Start: 2025-03-20 | End: 2025-03-21

## 2025-03-19 RX ADMIN — HYDRALAZINE HYDROCHLORIDE 50 MG: 50 TABLET ORAL at 03:03

## 2025-03-19 RX ADMIN — APIXABAN 5 MG: 5 TABLET, FILM COATED ORAL at 01:03

## 2025-03-19 RX ADMIN — ESCITALOPRAM OXALATE 10 MG: 5 TABLET, FILM COATED ORAL at 01:03

## 2025-03-19 RX ADMIN — PANTOPRAZOLE SODIUM 40 MG: 40 TABLET, DELAYED RELEASE ORAL at 05:03

## 2025-03-19 RX ADMIN — FOLIC ACID 1000 MCG: 1 TABLET ORAL at 01:03

## 2025-03-19 RX ADMIN — HEPARIN SODIUM 1000 UNITS: 1000 INJECTION, SOLUTION INTRAVENOUS; SUBCUTANEOUS at 12:03

## 2025-03-19 RX ADMIN — PANTOPRAZOLE SODIUM 40 MG: 40 TABLET, DELAYED RELEASE ORAL at 03:03

## 2025-03-19 RX ADMIN — APIXABAN 5 MG: 5 TABLET, FILM COATED ORAL at 08:03

## 2025-03-19 RX ADMIN — SODIUM CHLORIDE: 9 INJECTION, SOLUTION INTRAVENOUS at 08:03

## 2025-03-19 RX ADMIN — MIRTAZAPINE 15 MG: 15 TABLET, FILM COATED ORAL at 08:03

## 2025-03-19 RX ADMIN — SODIUM PHOSPHATE, MONOBASIC, MONOHYDRATE AND SODIUM PHOSPHATE, DIBASIC, ANHYDROUS 30 MMOL: 142; 276 INJECTION, SOLUTION INTRAVENOUS at 01:03

## 2025-03-19 RX ADMIN — ACETAMINOPHEN 650 MG: 325 TABLET ORAL at 08:03

## 2025-03-19 RX ADMIN — Medication 1000 UNITS: at 01:03

## 2025-03-19 NOTE — PROGRESS NOTES
Patient arrived to [facility] via stretcher, Alert, drowsy and oriented X3 , disoriented to place.  On continuous Tube feeding, Nova source Renal formula running at 35cc/hr.    0847 HD treatment started via R subclavian catheter; site looks clean and dry and no complication noted; accessed with good blood flow, VSS and recorded, NADN.

## 2025-03-19 NOTE — PLAN OF CARE
Problem: Adult Inpatient Plan of Care  Goal: Plan of Care Review  Outcome: Progressing  Goal: Patient-Specific Goal (Individualized)  Outcome: Progressing  Goal: Absence of Hospital-Acquired Illness or Injury  Outcome: Progressing  Goal: Optimal Comfort and Wellbeing  Outcome: Progressing  Goal: Readiness for Transition of Care  Outcome: Progressing     Problem: Infection  Goal: Absence of Infection Signs and Symptoms  Outcome: Progressing     Problem: Hemodialysis  Goal: Safe, Effective Therapy Delivery  Outcome: Progressing  Goal: Effective Tissue Perfusion  Outcome: Progressing  Goal: Absence of Infection Signs and Symptoms  Outcome: Progressing     Problem: Wound  Goal: Optimal Coping  Outcome: Progressing  Goal: Optimal Functional Ability  Outcome: Progressing  Goal: Absence of Infection Signs and Symptoms  Outcome: Progressing  Goal: Improved Oral Intake  Outcome: Progressing  Goal: Optimal Pain Control and Function  Outcome: Progressing  Goal: Skin Health and Integrity  Outcome: Progressing  Goal: Optimal Wound Healing  Outcome: Progressing     Problem: Skin Injury Risk Increased  Goal: Skin Health and Integrity  Outcome: Progressing     Problem: Fall Injury Risk  Goal: Absence of Fall and Fall-Related Injury  Outcome: Progressing     Problem: Chronic Kidney Disease  Goal: Electrolyte Balance  Outcome: Progressing  Goal: Fluid Balance  Outcome: Progressing     Problem: Chronic Kidney Disease  Goal: Electrolyte Balance  Outcome: Progressing  Goal: Fluid Balance  Outcome: Progressing  Goal: Minimize Renal Failure Effects  Outcome: Progressing   Pt currently in bed with safety measures in place, call light within reach. Pt is aaox4, vss, and does not appear to be in acute distress. Pt verbalizes no concerns at this time. Care is ongoing.

## 2025-03-19 NOTE — PT/OT/SLP PROGRESS
Physical Therapy      Patient Name:  Madelaine Barros   MRN:  [id number]    Patient not seen today secondary to  (AM pt in dialysis, PM pt refused due to being tired from dialysis.). Will follow-up at a later date.  3/19/2025  .

## 2025-03-19 NOTE — ASSESSMENT & PLAN NOTE
Hyponatremia is likely due to hypotonic fluids and poor oral intake. The patient's most recent sodium results are listed below.  Recent Labs     03/17/25  0446 03/19/25  0431   * 128*     Plan  - Monitor.  stable

## 2025-03-19 NOTE — ASSESSMENT & PLAN NOTE
Patient's most recent phosphorus results are listed below.   Recent Labs     03/19/25  0431   PHOS <1.0*     Plan  - Will treat hypophosphatemia with IV repletion  - repeat lab in AM for assessment

## 2025-03-19 NOTE — CONSULTS
[facility] VASCULAR ACCESS NOTE       Bed:8087/8087 A    22G [facility] 1.75IN [facility].    Indication: PVA  Attempts: 1    [place]ana Augustin RN

## 2025-03-19 NOTE — PROGRESS NOTES
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 She lives in [facility]. She works for [employer]. Her primary care physician is Dr. Delilah Kelley.               She was hospitalized at [facility] from 10/23/2024 to 11/6/2024.              She was hospitalized at [facility] from 11/15/2024 to 1/18/2024.              She was hospitalized at [facility] from 12/9/2024 to 12/19/2024.              She was hospitalized at [facility] from 12/25/2024 to 1/5/2025.              She was hospitalized at [facility] from 1/5/2025 to 1/31/2025. She was discharged to [facility] inpatient rehab until 2/18/2025.               She presented to [facility] Emergency Department on 3/2/2025 with rectal pain and bleeding. She receives her care in the [facility] system, not at [facility]. History was provided by her daughter Sue Barros. She is followed by Hematology at [facility] and has been on immunomodulator  infusions every 8 weeks (Soliris, now Ultomiris, last dose 2/24/2025). She has severe debility, poor appetite, dysphagia to solids, mostly consumes liquid, but her daughter was concerned her nutritional intake is inadequate and inquired about feeding tube placement for chronic nutrition. She had watery stool on the day of presentation. Her sister noted that she had gingival bleeding. She reported an anal lesion with tenderness and slow bleeding. She has not required frequent transfusions. Her daughter noted that all home antihypertensive medications were discontinued since she was discharged from inpatient rehab.              In the emergency department, she appeared ill, lethargic, unable to fully participate in interview, periodically awakening with pain. Labs showed hemoglobin 14.3 g/dL (from 13.2 on 2/24/2025). Repeat hemoglobin was 10.4. Alkaline phosphatase was 251 U/L, total bilirubin was 2.2 mg/dL, AST was 733 U/L, ALT was 478 U/L. She was given 2.1 liters of IV fluids, vancomycin, piperacillin-tazobactam, 4 mg of IV morphine, 80 mg of IV pantoprazole, topical lidocaine for rectal pain. She was admitted to Hospital Medicine Team S.     Overview/Hospital Course:  GGT was elevated at 447 U/L. LDH was elevated. Colorectal Surgery, Hematology, Nephrology, and Case Management were consulted. She expressed desire to go to [facility] since she gets her care at [facility]. Her mental status has been declining since October 2024. She has memory loss, disorientation, and does not remember her illness. Her mother had dementia. She had chest pain on 3/3/2025 while getting dialysis. EKG showed sinus tachycardia. Troponin was 1102 ng/L. Repeat was 704. Hematology recommended giving 2 units of fresh frozen plasma (FFP) and starting low rate heparin infusion then transitioning to apixaban 2.5 mg twice daily if she had no further bleeding. She was given another 2 units of FFP. Her daughter requested gastrostomy tube  placement for malnutrition. On 3/4/2025, heparin was held due to recurrent bleeding. She had intermittent somnolence. She was kept NPO. Speech Language Pathology was consulted. She was hypoglycemic so was put on 10% dextrose drip. Speech Language Pathology recommended full liquid diet. LFTs trended down. Heparin infusion was restarted on 3/5/2025. On 3/6/2025, her daughter decided that she did not want transfer to a [facility], instead she wanted to transfer her medical care to the [facility] system. Hemoglobin and hematocrit remained stable with no evidence of recurrent bleeding on 3/7/2025. Heparin drip was continued. She had epistaxis on 3/7/2025. Hemoglobin decreased to 6.7 g/dL on 3/8/2025. She developed right arm swelling. Repeat hemoglobin was 5.7. Heparin drip was held. She was transfused 1 unit of packed red blood cells (PRBCs). Hemoglobin improved to 8.2. Right upper extremity venous ultrasound showed known right internal jugular thrombus as well as subclavian thrombus, although the subclavian vein was not visualized when the internal jugular thrombus was diagnosed, so it was unknown if it was new. Hemoglobin was stable on 3/10/2025. Heparin drip was resumed. Sodium was found to be 118 mmol/L. She had been on dextrose drip for days to treat hypoglycemia due to poor oral intake. She was given normal saline instead. Gastroenterology placed a gastrostomy tube on 3/11/2025. Around midnight that night, she vomited twice. Nausea resolved. Abdomen X-ray showed nothing concerning. She tolerated tube feeds and heparin was transitioned to apixaban. Hyponatremia progressively improved. Hypophosphatemia on 3/19 so repleting. Awaiting placement.    Interval History: Patient without complaint but hypotensive today so blood pressure meds reduced in dosage and BID held for tonight's dose.  Additionally with low phosphorous, so repleting.  No additional complaints or issues at this time.    Review of Systems    Constitutional:  Negative for chills and fever.   Eyes:  Negative for visual disturbance.   Respiratory:  Negative for cough and shortness of breath.    Cardiovascular:  Negative for chest pain and palpitations.   Musculoskeletal:  Negative for arthralgias.   Neurological:  Negative for light-headedness and headaches.   Psychiatric/Behavioral:  Negative for agitation and behavioral problems.      Objective:     Vital Signs (Most Recent):  Temp: 98.2 °F (36.8 °C) (03/19/25 1220)  Pulse: 91 (03/19/25 1225)  Resp: 16 (03/19/25 1220)  BP: (!) 99/58 (03/19/25 1225)  SpO2: 98 % (03/19/25 0545) Vital Signs (24h Range):  Temp:  [96.2 °F (35.7 °C)-98.5 °F (36.9 °C)] 98.2 °F (36.8 °C)  Pulse:  [86-92] 91  Resp:  [15-18] 16  SpO2:  [86 %-100 %] 98 %  BP: ()/(53-74) 99/58     Weight: 73 kg (160 lb 15 oz)  Body mass index is 26.78 kg/m².    Intake/Output Summary (Last 24 hours) at 3/19/2025 1520  Last data filed at 3/19/2025 1220  Gross per 24 hour   Intake 430 ml   Output 812 ml   Net -382 ml         Physical Exam  Constitutional:       General: She is not in acute distress.     Appearance: She is not toxic-appearing.   HENT:      Head: Normocephalic and atraumatic.   Eyes:      General: No scleral icterus.     Extraocular Movements: Extraocular movements intact.   Cardiovascular:      Rate and Rhythm: Normal rate and regular rhythm.   Pulmonary:      Effort: Pulmonary effort is normal. No respiratory distress.   Abdominal:      Comments: PEG in place   Musculoskeletal:      Right lower leg: No edema.      Left lower leg: No edema.   Neurological:      Mental Status: She is alert and oriented to person, place, and time.   Psychiatric:         Mood and Affect: Mood normal.         Behavior: Behavior normal.               Significant Labs: All pertinent labs within the past 24 hours have been reviewed.  CBC:   Recent Labs   Lab 03/18/25  0356 03/19/25  0431   WBC 10.68 10.86   HGB 9.9* 8.6*   HCT 30.8* 26.8*    230      CMP:   Recent Labs   Lab 03/19/25  0431   *   K 3.8   CL 99   CO2 20*   GLU 61*   BUN 43*   CREATININE 3.3*   CALCIUM 8.3*   PROT 5.7*   ALBUMIN 2.2*   BILITOT 0.9   ALKPHOS 190*   AST 49*   ALT 9*   ANIONGAP 9     Phos <1    Significant Imaging: I have reviewed all pertinent imaging results/findings within the past 24 hours.      Assessment & Plan  Rectal bleeding  Appreciate Colorectal Surgery. Subsided. Monitor for recurrence.  Hemoglobin stable at 8.6  Unspecified severe protein-calorie malnutrition  Severe malnutrition patient with recent hx of worsening dysphagia, has had w/u for scleroderma, has hiatal hernia and ?esophageal dysmotility  - dysphagia to solids.   Developed wernicke encephalopathy from nutritional deficiency     Daughter reports concern of inadequate intake has had severe weight loss in recent months with multiple complex health conditions. She requested PEG placement. Nutrition gave recommendations on tube feeds. Giving minced and moist diet, Novasource Renal supplements.  Dysphagia  Appreciate SLP. Advanced diet to minced and moist. Appreciate GI. PEG placed 3/11/2025. Will start enteral water and tube feeds recommended by Nutrition.  FWF stopped per Nephrology request.  Wernicke encephalopathy  Continue on home thiamine supplementation.     GERD without esophagitis  Continue home pantoprazole.    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 Last Ultomiris dose 2/24/2025. Outpatient hematologist is Roma Cantu MD at [facility]. Consulted Hematology here. Appreciate assistance with management.  Elevated transaminase level  Hepatic steatosis on CT. Worsening hepatic function may contribute to her worsening coagulopathy elevated PT/PTT. Has significantly improved.  Renal hematoma, left, sequela  In January, due to biopsy  in 2024. Most recent imaging showed resolving appearance of remote hematoma.   Anemia of chronic renal failure, stage 5  Anemia is likely due to acute blood loss which was from GI bleed and active hemolysis due to HUS.  . Most recent hemoglobin and hematocrit are listed below.  Recent Labs     03/17/25  0446 03/18/25  0356 03/19/25  0431   HGB 9.6* 9.9* 8.6*   HCT 29.7* 30.8* 26.8*     Plan  - Monitor serial CBC  - Transfuse PRBC if patient becomes hemodynamically unstable, symptomatic or H/H drops below 7/21.  - Transfused pRBCs 3/8/2025 after having epistaxis.  - Stable until she had some bleeding at new PEG site. Transfuse again 3/12/2025.  - now stable  Hyponatremia  Hyponatremia is likely due to hypotonic fluids and poor oral intake. The patient's most recent sodium results are listed below.  Recent Labs     03/17/25  0446 03/19/25  0431   * 128*     Plan  - Monitor.  stable    Hypertension  Patient's blood pressure range in the last 24 hours was: BP  Min: 88/54  Max: 115/74.The patient's inpatient anti-hypertensive regimen is listed below:  Current Antihypertensives  hydrALAZINE tablet 50 mg, Every 8 hours, Oral  NIFEdipine 24 hr tablet 30 mg, Daily, Oral  carvediloL tablet 12.5 mg, 2 times daily, Oral    Plan  -was uncontrolled, so introduced home medications; hypotension so decreased dose of Nifeidpine and will hold tonight's dose of Coreg and Hydralazine    Hypophosphatemia  Patient's most recent phosphorus results are listed below.   Recent Labs     03/19/25  0431   PHOS <1.0*     Plan  - Will treat hypophosphatemia with IV repletion  - repeat lab in AM for assessment  VTE Risk Mitigation (From admission, onward)           Ordered     apixaban tablet 5 mg  2 times daily         03/12/25 1148     heparin (porcine) injection 1,000 Units  As needed (PRN)         03/03/25 1524     IP VTE HIGH RISK PATIENT  Once         03/03/25 0416     Place sequential compression device  Until discontinued          03/03/25 0416     Reason for No Pharmacological VTE Prophylaxis  Once        Question:  Reasons:  Answer:  Active Bleeding    03/03/25 0416                    Discharge Planning   PATRICIA: 3/21/2025     Code Status: Full Code   Medical Readiness for Discharge Date:   Discharge Plan A: Skilled Nursing Facility   Discharge Delays: (!) Post-Acute Set-up (plan changed from home w/HH to SNF)            Please place Justification for DME        Douglas Mattson MD  Department of Hospital Medicine   [facility]

## 2025-03-19 NOTE — PLAN OF CARE
JORGE left message for Keya in admissions at [facility] to follow up on referral.      Tete Goetz RN     [phone]

## 2025-03-19 NOTE — ASSESSMENT & PLAN NOTE
Patient's blood pressure range in the last 24 hours was: BP  Min: 88/54  Max: 115/74.The patient's inpatient anti-hypertensive regimen is listed below:  Current Antihypertensives  hydrALAZINE tablet 50 mg, Every 8 hours, Oral  NIFEdipine 24 hr tablet 30 mg, Daily, Oral  carvediloL tablet 12.5 mg, 2 times daily, Oral    Plan  -was uncontrolled, so introduced home medications; hypotension so decreased dose of Nifeidpine and will hold tonight's dose of Coreg and Hydralazine

## 2025-03-19 NOTE — ASSESSMENT & PLAN NOTE
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 . Most recent hemoglobin and hematocrit are listed below.  Recent Labs     03/17/25  0446 03/18/25  0356 03/19/25  0431   HGB 9.6* 9.9* 8.6*   HCT 29.7* 30.8* 26.8*     Plan  - Monitor serial CBC  - Transfuse PRBC if patient becomes hemodynamically unstable, symptomatic or H/H drops below 7/21.  - Transfused pRBCs 3/8/2025 after having epistaxis.  - Stable until she had some bleeding at new PEG site. Transfuse again 3/12/2025.  - now stable

## 2025-03-19 NOTE — PROGRESS NOTES
[facility] STAFF HEMODIALYSIS NOTE     Patient currently on hemodialysis for removal of uremic toxins and volume.     Patient seen and evaluated on hemodialysis, tolerating treatment, see HD flowsheet for vitals and assessments.    Labs have been reviewed and the dialysate bath has been adjusted.       Assessment/Plan:    ESRD  -Patient seen on HD, tolerating treatment well, w/o complaints   -Hyponatremia in setting of ESRD. Recommend 1L fluid restrictions. Na bath adjusted   -UF goal of 0.5L  -Renal diet, if not NPO   -Strict I/O's and daily weights  -Daily renal function panels  -Keep MAP >65 while on HD   -Hgb goal 10-11  -add on phos level (ordered)  -Will continue to follow while inpatient     Gladys Feliciano DNP-FNP, C  Nephrology  Pager: [phone]

## 2025-03-19 NOTE — SUBJECTIVE & OBJECTIVE
Interval History: Patient without complaint but hypotensive today so blood pressure meds reduced in dosage and BID held for tonight's dose.  Additionally with low phosphorous, so repleting.  No additional complaints or issues at this time.    Review of Systems   Constitutional:  Negative for chills and fever.   Eyes:  Negative for visual disturbance.   Respiratory:  Negative for cough and shortness of breath.    Cardiovascular:  Negative for chest pain and palpitations.   Musculoskeletal:  Negative for arthralgias.   Neurological:  Negative for light-headedness and headaches.   Psychiatric/Behavioral:  Negative for agitation and behavioral problems.      Objective:     Vital Signs (Most Recent):  Temp: 98.2 °F (36.8 °C) (03/19/25 1220)  Pulse: 91 (03/19/25 1225)  Resp: 16 (03/19/25 1220)  BP: (!) 99/58 (03/19/25 1225)  SpO2: 98 % (03/19/25 0545) Vital Signs (24h Range):  Temp:  [96.2 °F (35.7 °C)-98.5 °F (36.9 °C)] 98.2 °F (36.8 °C)  Pulse:  [86-92] 91  Resp:  [15-18] 16  SpO2:  [86 %-100 %] 98 %  BP: ()/(53-74) 99/58     Weight: 73 kg (160 lb 15 oz)  Body mass index is 26.78 kg/m².    Intake/Output Summary (Last 24 hours) at 3/19/2025 1520  Last data filed at 3/19/2025 1220  Gross per 24 hour   Intake 430 ml   Output 812 ml   Net -382 ml         Physical Exam  Constitutional:       General: She is not in acute distress.     Appearance: She is not toxic-appearing.   HENT:      Head: Normocephalic and atraumatic.   Eyes:      General: No scleral icterus.     Extraocular Movements: Extraocular movements intact.   Cardiovascular:      Rate and Rhythm: Normal rate and regular rhythm.   Pulmonary:      Effort: Pulmonary effort is normal. No respiratory distress.   Abdominal:      Comments: PEG in place   Musculoskeletal:      Right lower leg: No edema.      Left lower leg: No edema.   Neurological:      Mental Status: She is alert and oriented to person, place, and time.   Psychiatric:         Mood and Affect: Mood  normal.         Behavior: Behavior normal.               Significant Labs: All pertinent labs within the past 24 hours have been reviewed.  CBC:   Recent Labs   Lab 03/18/25  0356 03/19/25  0431   WBC 10.68 10.86   HGB 9.9* 8.6*   HCT 30.8* 26.8*    264     CMP:   Recent Labs   Lab 03/19/25  0431   *   K 3.8   CL 99   CO2 20*   GLU 61*   BUN 43*   CREATININE 3.3*   CALCIUM 8.3*   PROT 5.7*   ALBUMIN 2.2*   BILITOT 0.9   ALKPHOS 190*   AST 49*   ALT 9*   ANIONGAP 9     Phos <1    Significant Imaging: I have reviewed all pertinent imaging results/findings within the past 24 hours.

## 2025-03-19 NOTE — CARE UPDATE
Unit [facility] Care Support Interaction      I have reviewed the chart of Madelaine Barros who is hospitalized for Rectal bleeding. The patient is currently located in the following unit: mtsd           I have  provided the following support:     Skin - Integrity assessment and wound care reconsulted       Valeri Oconnell NP  Unit Based [facility]

## 2025-03-19 NOTE — PROGRESS NOTES
03/19/25 1220   Post-Hemodialysis Assessment   Rinseback Volume (mL) 250 mL   Blood Volume Processed (Liters) 69.8 L   Dialyzer Clearance Lightly streaked   Duration of Treatment 210 minutes   Additional Fluid Intake (mL) 100 mL   Total UF (mL) 812 mL   Net Fluid Removal 200   Patient Response to Treatment tolerated   Post-Treatment Weight 71.2 kg (156 lb 15.5 oz)  (bed scale with waffle mattres on)   Treatment Weight Change -0.6   Post-Hemodialysis Comments HD tx completed     3.5 hrs HD tx completed with only  200cc of fluid removed d/t decrease BP.    Patient denies any complaints.     Blood returned; lines flushed with NS; catheter locked with heparin, clamped ; capped and wrapped with sterile gauze.    Report called to FAREED Villeda.    2854 Patient departed IMANI via stretcher by patient transport.

## 2025-03-20 LAB
ANION GAP SERPL CALC-SCNC: 10 MMOL/L (ref 8–16)
BASOPHILS # BLD AUTO: 0.08 K/UL (ref 0–0.2)
BASOPHILS NFR BLD: 0.6 % (ref 0–1.9)
BUN SERPL-MCNC: 25 MG/DL (ref 6–20)
CALCIUM SERPL-MCNC: 8 MG/DL (ref 8.7–10.5)
CHLORIDE SERPL-SCNC: 99 MMOL/L (ref 95–110)
CO2 SERPL-SCNC: 22 MMOL/L (ref 23–29)
CREAT SERPL-MCNC: 2.1 MG/DL (ref 0.5–1.4)
DIFFERENTIAL METHOD BLD: ABNORMAL
EOSINOPHIL # BLD AUTO: 0 K/UL (ref 0–0.5)
EOSINOPHIL NFR BLD: 0.1 % (ref 0–8)
ERYTHROCYTE [DISTWIDTH] IN BLOOD BY AUTOMATED COUNT: 16.9 % (ref 11.5–14.5)
ERYTHROCYTE [DISTWIDTH] IN BLOOD BY AUTOMATED COUNT: 17.2 % (ref 11.5–14.5)
EST. GFR  (NO RACE VARIABLE): 26.6 ML/MIN/1.73 M^2
GLUCOSE SERPL-MCNC: 97 MG/DL (ref 70–110)
HCT VFR BLD AUTO: 26.2 % (ref 37–48.5)
HCT VFR BLD AUTO: 26.7 % (ref 37–48.5)
HGB BLD-MCNC: 8.4 G/DL (ref 12–16)
HGB BLD-MCNC: 8.6 G/DL (ref 12–16)
IMM GRANULOCYTES # BLD AUTO: 0.05 K/UL (ref 0–0.04)
IMM GRANULOCYTES NFR BLD AUTO: 0.4 % (ref 0–0.5)
LYMPHOCYTES # BLD AUTO: 1.6 K/UL (ref 1–4.8)
LYMPHOCYTES NFR BLD: 12.4 % (ref 18–48)
MAGNESIUM SERPL-MCNC: 1.4 MG/DL (ref 1.6–2.6)
MCH RBC QN AUTO: 28 PG (ref 27–31)
MCH RBC QN AUTO: 28.5 PG (ref 27–31)
MCHC RBC AUTO-ENTMCNC: 32.1 G/DL (ref 32–36)
MCHC RBC AUTO-ENTMCNC: 32.2 G/DL (ref 32–36)
MCV RBC AUTO: 87 FL (ref 82–98)
MCV RBC AUTO: 88 FL (ref 82–98)
MONOCYTES # BLD AUTO: 0.8 K/UL (ref 0.3–1)
MONOCYTES NFR BLD: 6.2 % (ref 4–15)
NEUTROPHILS # BLD AUTO: 10.2 K/UL (ref 1.8–7.7)
NEUTROPHILS NFR BLD: 80.3 % (ref 38–73)
NRBC BLD-RTO: 0 /100 WBC
PHOSPHATE SERPL-MCNC: 2 MG/DL (ref 2.7–4.5)
PLATELET # BLD AUTO: 304 K/UL (ref 150–450)
PLATELET # BLD AUTO: 311 K/UL (ref 150–450)
PMV BLD AUTO: 10.5 FL (ref 9.2–12.9)
PMV BLD AUTO: 10.9 FL (ref 9.2–12.9)
POCT GLUCOSE: 109 MG/DL (ref 70–110)
POCT GLUCOSE: 76 MG/DL (ref 70–110)
POCT GLUCOSE: 77 MG/DL (ref 70–110)
POCT GLUCOSE: 90 MG/DL (ref 70–110)
POCT GLUCOSE: 97 MG/DL (ref 70–110)
POTASSIUM SERPL-SCNC: 3.1 MMOL/L (ref 3.5–5.1)
RBC # BLD AUTO: 3 M/UL (ref 4–5.4)
RBC # BLD AUTO: 3.02 M/UL (ref 4–5.4)
SODIUM SERPL-SCNC: 131 MMOL/L (ref 136–145)
WBC # BLD AUTO: 11.87 K/UL (ref 3.9–12.7)
WBC # BLD AUTO: 12.67 K/UL (ref 3.9–12.7)

## 2025-03-20 PROCEDURE — 25000003 PHARM REV CODE 250: Performed by: HOSPITALIST

## 2025-03-20 PROCEDURE — 36415 COLL VENOUS BLD VENIPUNCTURE: CPT | Performed by: FAMILY MEDICINE

## 2025-03-20 PROCEDURE — 25000003 PHARM REV CODE 250: Performed by: STUDENT IN AN ORGANIZED HEALTH CARE EDUCATION/TRAINING PROGRAM

## 2025-03-20 PROCEDURE — 84100 ASSAY OF PHOSPHORUS: CPT | Performed by: STUDENT IN AN ORGANIZED HEALTH CARE EDUCATION/TRAINING PROGRAM

## 2025-03-20 PROCEDURE — 83735 ASSAY OF MAGNESIUM: CPT | Performed by: STUDENT IN AN ORGANIZED HEALTH CARE EDUCATION/TRAINING PROGRAM

## 2025-03-20 PROCEDURE — 80048 BASIC METABOLIC PNL TOTAL CA: CPT | Performed by: HOSPITALIST

## 2025-03-20 PROCEDURE — 97530 THERAPEUTIC ACTIVITIES: CPT

## 2025-03-20 PROCEDURE — 97530 THERAPEUTIC ACTIVITIES: CPT | Mod: CQ

## 2025-03-20 PROCEDURE — 36415 COLL VENOUS BLD VENIPUNCTURE: CPT | Performed by: STUDENT IN AN ORGANIZED HEALTH CARE EDUCATION/TRAINING PROGRAM

## 2025-03-20 PROCEDURE — 25000003 PHARM REV CODE 250: Performed by: FAMILY MEDICINE

## 2025-03-20 PROCEDURE — 20600001 HC STEP DOWN PRIVATE ROOM

## 2025-03-20 PROCEDURE — 85025 COMPLETE CBC W/AUTO DIFF WBC: CPT | Performed by: HOSPITALIST

## 2025-03-20 PROCEDURE — 85027 COMPLETE CBC AUTOMATED: CPT | Performed by: FAMILY MEDICINE

## 2025-03-20 RX ORDER — NIFEDIPINE 30 MG/1
30 TABLET, EXTENDED RELEASE ORAL DAILY
Qty: 30 TABLET | Refills: 11 | Status: SHIPPED | OUTPATIENT
Start: 2025-03-21 | End: 2025-03-25 | Stop reason: HOSPADM

## 2025-03-20 RX ORDER — SODIUM,POTASSIUM PHOSPHATES 280-250MG
2 POWDER IN PACKET (EA) ORAL
Status: COMPLETED | OUTPATIENT
Start: 2025-03-20 | End: 2025-03-20

## 2025-03-20 RX ORDER — HYDRALAZINE HYDROCHLORIDE 50 MG/1
50 TABLET, FILM COATED ORAL EVERY 8 HOURS
Qty: 90 TABLET | Refills: 11 | Status: SHIPPED | OUTPATIENT
Start: 2025-03-20 | End: 2025-03-25 | Stop reason: HOSPADM

## 2025-03-20 RX ORDER — LANOLIN ALCOHOL/MO/W.PET/CERES
400 CREAM (GRAM) TOPICAL ONCE
Status: COMPLETED | OUTPATIENT
Start: 2025-03-20 | End: 2025-03-20

## 2025-03-20 RX ORDER — CARVEDILOL 12.5 MG/1
12.5 TABLET ORAL 2 TIMES DAILY
Qty: 180 TABLET | Refills: 3 | Status: SHIPPED | OUTPATIENT
Start: 2025-03-20 | End: 2025-03-25 | Stop reason: HOSPADM

## 2025-03-20 RX ADMIN — POTASSIUM & SODIUM PHOSPHATES POWDER PACK 280-160-250 MG 2 PACKET: 280-160-250 PACK at 08:03

## 2025-03-20 RX ADMIN — OXYCODONE HYDROCHLORIDE AND ACETAMINOPHEN 1 TABLET: 5; 325 TABLET ORAL at 05:03

## 2025-03-20 RX ADMIN — POTASSIUM & SODIUM PHOSPHATES POWDER PACK 280-160-250 MG 2 PACKET: 280-160-250 PACK at 09:03

## 2025-03-20 RX ADMIN — Medication 400 MG: at 10:03

## 2025-03-20 RX ADMIN — APIXABAN 5 MG: 5 TABLET, FILM COATED ORAL at 09:03

## 2025-03-20 RX ADMIN — CARVEDILOL 12.5 MG: 12.5 TABLET, FILM COATED ORAL at 08:03

## 2025-03-20 RX ADMIN — PANTOPRAZOLE SODIUM 40 MG: 40 TABLET, DELAYED RELEASE ORAL at 04:03

## 2025-03-20 RX ADMIN — Medication 100 MG: at 08:03

## 2025-03-20 RX ADMIN — MIRTAZAPINE 15 MG: 15 TABLET, FILM COATED ORAL at 09:03

## 2025-03-20 RX ADMIN — NEPHROCAP 1 CAPSULE: 1 CAP ORAL at 08:03

## 2025-03-20 RX ADMIN — HYDRALAZINE HYDROCHLORIDE 50 MG: 50 TABLET ORAL at 09:03

## 2025-03-20 RX ADMIN — POTASSIUM & SODIUM PHOSPHATES POWDER PACK 280-160-250 MG 2 PACKET: 280-160-250 PACK at 04:03

## 2025-03-20 RX ADMIN — POTASSIUM BICARBONATE 40 MEQ: 391 TABLET, EFFERVESCENT ORAL at 11:03

## 2025-03-20 RX ADMIN — NIFEDIPINE 30 MG: 30 TABLET, FILM COATED, EXTENDED RELEASE ORAL at 08:03

## 2025-03-20 RX ADMIN — CARVEDILOL 12.5 MG: 12.5 TABLET, FILM COATED ORAL at 09:03

## 2025-03-20 RX ADMIN — OXYCODONE HYDROCHLORIDE AND ACETAMINOPHEN 1 TABLET: 5; 325 TABLET ORAL at 08:03

## 2025-03-20 RX ADMIN — Medication 1000 UNITS: at 08:03

## 2025-03-20 RX ADMIN — PANTOPRAZOLE SODIUM 40 MG: 40 TABLET, DELAYED RELEASE ORAL at 06:03

## 2025-03-20 RX ADMIN — HYDRALAZINE HYDROCHLORIDE 50 MG: 50 TABLET ORAL at 06:03

## 2025-03-20 RX ADMIN — APIXABAN 5 MG: 5 TABLET, FILM COATED ORAL at 08:03

## 2025-03-20 RX ADMIN — ESCITALOPRAM OXALATE 10 MG: 5 TABLET, FILM COATED ORAL at 08:03

## 2025-03-20 RX ADMIN — POTASSIUM & SODIUM PHOSPHATES POWDER PACK 280-160-250 MG 2 PACKET: 280-160-250 PACK at 11:03

## 2025-03-20 RX ADMIN — HYDRALAZINE HYDROCHLORIDE 50 MG: 50 TABLET ORAL at 01:03

## 2025-03-20 RX ADMIN — FOLIC ACID 1000 MCG: 1 TABLET ORAL at 08:03

## 2025-03-20 NOTE — ASSESSMENT & PLAN NOTE
Case Management Discharge Note    Final Note: Patient was DC'd home 9/15 with [facility] providing O2 and with [facility] following.    Destination      No service has been selected for the patient.      Durable Medical Equipment      Service Provider Request Status Selected Services Address Phone Number Fax Number    [facility] Selected Durable Medical Equipment [address], [place] [facility] [id number] [phone]       Stacy Fortune RN 9/15/2019 1446    Per their  Jared--he will deliver pt's O2 tank to the room within the hour. Return call from Wanda [facility] and she was able to confirm that [employer] provides pt's home O2. Wanda also states pt has an order for O2 with exertion, dating back to Aug 2018. She requested family contact [facility] [place] Monday to make sure they have all the supplies they need at home for O2 with exertion and HS. Updated staff RN....Stacy Fortune RN                    Home Medical Care - Selection Complete      Service Provider Request Status Selected Services Address Phone Number Fax Number    [facility] Selected Home Health Services [address] [place] [address] [address] [phone]       Stacy Fortune RN 9/15/2019 1309    Left a [facility] for [facility] to follow.                       Transportation Services  Private: Car    Final Discharge Disposition Code: 06 - home with home health care   Chronic. Last Ultomiris dose 2/24/2025. Outpatient hematologist is Roma Cantu MD at [facility]. Consulted Hematology here. Appreciate assistance with management.

## 2025-03-20 NOTE — PT/OT/SLP PROGRESS
Occupational Therapy   Treatment    Name: Madelaine Barros  MRN: [id number]  Admitting Diagnosis:  Rectal bleeding  9 Days Post-Op    Recommendations:     Discharge Recommendations: Moderate Intensity Therapy  Discharge Equipment Recommendations:  none  Barriers to discharge:   (increased (A) required)    Assessment:     Madelaine Barros is a 59 y.o. female with a medical diagnosis of Rectal bleeding.  She presents with fair participation and motivation.  Pt limited by lethargy on this date. Performance deficits affecting function are weakness, impaired endurance, impaired self care skills, impaired functional mobility, impaired balance, decreased safety awareness, decreased lower extremity function, decreased upper extremity function, impaired cardiopulmonary response to activity.     Rehab Prognosis:  Fair; patient would benefit from acute skilled OT services to address these deficits and reach maximum level of function.       Plan:     Patient to be seen 4 x/week to address the above listed problems via self-care/home management, therapeutic activities, therapeutic exercises, neuromuscular re-education  Plan of Care Expires: 04/10/25  Plan of Care Reviewed with: patient    Subjective     Chief Complaint: lethargy  Patient/Family Comments/goals: Pt reported that she doesn't know why she is so sleepy.  Pain/Comfort:  Pain Rating 1: 0/10  Pain Rating Post-Intervention 1: 0/10    Objective:     Communicated with: RN prior to session.  Patient found supine with telemetry, PEG Tube, pulse ox (continuous) (no family present) upon OT entry to room.    General Precautions: Standard, fall, NPO, aspiration    Orthopedic Precautions:N/A  Braces: N/A     Occupational Performance:     Bed Mobility:    Patient completed Rolling/Turning to Left with  SBA-Min (A) x 2 trials  Patient completed Rolling/Turning to Right with minimum assistance  Patient completed Scooting/Bridging with maximal assistance scooting forward & to  the left along EOB; dependent drawsheet transfer up [facility] while supine  Patient completed Supine to Sit with maximal assistance  Patient completed Sit to Supine with maximal assistance     Activities of Daily Living:  Upper Body Dressing: maximal assistance donning gown   Toileting: maximal assistance while supine due to incontinent episode      AMPAC 6 Click ADL: 14    Treatment & Education:  Pt was oriented x 4.  Pt required CGA-Min (A) for postural control while seated EOB.  Provided verbal & physical cues to facilitate postural control. Pt with difficulty maintaining eyes open during session. Pt had no further questions & when asked whether there were any concerns pt reported none.        Patient left supine with all lines intact, call button in reach, bed alarm on, and RN notified    GOALS:   Multidisciplinary Problems       Occupational Therapy Goals          Problem: Occupational Therapy    Goal Priority Disciplines Outcome Interventions   Occupational Therapy Goal     OT, PT/OT Progressing    Description: Goals to be met by: 4/10/25     Patient will increase functional independence with ADLs by performing:    UE Dressing with Minimal Assistance.  LE Dressing with Moderate Assistance.  Grooming while seated with Supervision.  Toileting from bedside commode with Moderate Assistance for hygiene and clothing management.   Sitting at edge of bed x15 minutes with Stand-by Assistance.  Rolling to Bilateral with Supervision.   Supine to sit with Minimal Assistance.  Stand pivot transfers with Moderate Assistance.                           Time Tracking:     OT Date of Treatment: 03/20/25  OT Start Time: 1104  OT Stop Time: 1128  OT Total Time (min): 24 min    Billable Minutes:Therapeutic Activity 24    OT/LULI: OT          3/20/2025

## 2025-03-20 NOTE — NURSING
Notified Dr. Staton of the following lab results from patient's BMP: potassium of 3.1, creatinine 2.1, BUN 25, calcium 8, sodium 131(trending up). Provider voiced understanding and placed orders, see MAR.

## 2025-03-20 NOTE — SUBJECTIVE & OBJECTIVE
Interval History: No complaints. Medically ready for discharge. Will give some more phosphates, but phosphorus has improved since yesterday.    Review of Systems   Respiratory:  Negative for cough and shortness of breath.    Cardiovascular:  Negative for chest pain and palpitations.   Gastrointestinal:  Negative for abdominal pain and nausea.     Objective:     Vital Signs (Most Recent):  Temp: 98.3 °F (36.8 °C) (03/20/25 0720)  Pulse: 97 (03/20/25 0844)  Resp: 18 (03/20/25 0849)  BP: 135/81 (03/20/25 0844)  SpO2: 100 % (03/20/25 0720) Vital Signs (24h Range):  Temp:  [97.7 °F (36.5 °C)-98.3 °F (36.8 °C)] 98.3 °F (36.8 °C)  Pulse:  [86-99] 97  Resp:  [14-20] 18  SpO2:  [98 %-100 %] 100 %  BP: ()/(53-81) 135/81     Weight: 73.6 kg (162 lb 4.1 oz)  Body mass index is 27 kg/m².    Intake/Output Summary (Last 24 hours) at 3/20/2025 0902  Last data filed at 3/20/2025 0759  Gross per 24 hour   Intake 1952.52 ml   Output 813 ml   Net 1139.52 ml         Physical Exam  Vitals and nursing note reviewed.   Constitutional:       General: She is not in acute distress.     Appearance: She is well-developed. She is not diaphoretic.      Interventions: She is not intubated.  Pulmonary:      Effort: Pulmonary effort is normal. No accessory muscle usage or respiratory distress. She is not intubated.   Abdominal:      General: There is no distension.      Palpations: Abdomen is soft.      Tenderness: There is no abdominal tenderness. There is no guarding.   Skin:     General: Skin is warm and dry.      Coloration: Skin is not jaundiced or pale.   Neurological:      Mental Status: She is alert.      Motor: No tremor or seizure activity.   Psychiatric:         Attention and Perception: Attention normal.         Mood and Affect: Mood and affect normal.         Behavior: Behavior is not aggressive, hyperactive or combative.               Significant Labs: All pertinent labs within the past 24 hours have been reviewed.  CBC:   Recent  Labs   Lab 03/19/25  0431 03/20/25  0757   WBC 10.86 12.67   HGB 8.6* 8.4*   HCT 26.8* 26.2*    311       Significant Imaging: I have reviewed all pertinent imaging results/findings within the past 24 hours.  X-Ray Abdomen Portable 3/12/25: FINDINGS:   Interval placement of a PEG tube in which the tip projects about the paravertebral left mid abdomen at the level of the L1-L2 disc space.  If clinically important to confirm satisfactory position of PEG tube within the stomach, consider injection of water-soluble contrast through the PEG tube under fluoroscopy or acquiring a film of the abdomen immediately following injection of water-soluble contrast through the PEG tube at bedside.  Nonobstructive bowel gas pattern.  No free air noted on these images.  Scattered spinal degenerative changes.  Mild right hip joint space and moderate to severe left hip joint space osteoarthritic changes.

## 2025-03-20 NOTE — ASSESSMENT & PLAN NOTE
Patient's most recent phosphorus results are listed below.   Recent Labs     03/19/25  0431 03/20/25  0757   PHOS <1.0* 2.0*     Plan  - Will treat hypophosphatemia with oral repletion  - repeat lab in AM for assessment

## 2025-03-20 NOTE — NURSING
Notified Dr. Staton of patient and situation, ISBAR provided, informed provider that patient's magnesium this morning is 1.4 without replacements currently ordered. Informed provider that patient is a dialysis patient and does not have a full BMP ordered for this morning. MD voiced understanding and placed orders.

## 2025-03-20 NOTE — PROGRESS NOTES
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  Chart reviewed for this encounter.  See flowsheets for findings.    Pt found lying in bed, agreeable to care at this time. Pt turned into lateral position and cleansed w/ no rinse bath wipes d/t stool incontinence. Sacral region largely unchanged from last WC assessment, continued moist epidermal sloughing, pink scar tissue. Continue Triad for moisture barrier protection.     03/20/25 1029   WOCN Assessment   WOCN Total Time (mins) 20   Visit Date 03/20/25   Visit Time 1029   Consult Type New;Follow Up   WOCN Speciality Wound   Intervention assessed;changed;applied;chart review;coordination of care   Teaching on-going        Wound 03/02/25 1500 Moisture associated dermatitis medial Perineum   Date First Assessed/Time First Assessed: 03/02/25 1500   Present on Original Admission: Yes  Primary Wound Type: (c) Moisture associated dermatitis  Orientation: medial  Location: Perineum   Wound Image     Dressing Appearance Open to air   Drainage Amount None   Drainage Characteristics/Odor No odor   Appearance Pink;Moist   Periwound Area Intact;Moist   Care Cleansed with:;Soap and water;Applied:;Skin Barrier            [1]   Social History  Socioeconomic History    Marital status: Single   Tobacco Use    Smoking status: Never   Substance and Sexual Activity    Alcohol use: Not Currently     Comment: occasionally    Drug use: No    Sexual activity: Not Currently     Social Drivers of Health     Financial Resource Strain: Medium Risk (3/5/2025)    Overall Financial Resource Strain (CARDIA)     Difficulty of Paying Living Expenses: Somewhat hard   Food Insecurity: Food Insecurity Present (3/5/2025)    Hunger Vital Sign     Worried About Running Out of Food in the Last Year: Sometimes true     Ran Out of Food in the Last Year: Sometimes true   Transportation Needs: No Transportation Needs (1/31/2025)    [place] from [facility] [place] [facility]    PRAPARE - Transportation     Lack of Transportation (Medical): No     Lack of Transportation (Non-Medical): No   Physical Activity: Inactive (3/5/2025)    Exercise Vital Sign     Days of Exercise per Week: 0 days     Minutes of Exercise per Session: 0 min   Stress: Patient Declined (3/5/2025)    [employer] - Occupational Stress Questionnaire     Feeling of Stress : Patient declined   Recent Concern: Stress - Stress Concern Present (1/7/2025)    Received from [facility] [place] [facility]    [place] [employer] - Occupational Stress Questionnaire     Feeling of Stress : To some extent   Housing Stability: Low Risk  (3/5/2025)    Housing Stability Vital Sign     Unable to Pay for Housing in the Last Year: No     Homeless in the Last Year: No   Recent Concern: Housing Stability - High Risk (1/31/2025)    Received from [facility] [place] [facility]    Housing Stability Vital Sign     Unable to Pay for Housing in the Last Year: Yes     Number of Times Moved in the Last Year: 1      Homeless in the Last Year: No

## 2025-03-20 NOTE — ASSESSMENT & PLAN NOTE
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 . Most recent hemoglobin and hematocrit are listed below.  Recent Labs     03/18/25  0356 03/19/25  0431 03/20/25  0757   HGB 9.9* 8.6* 8.4*   HCT 30.8* 26.8* 26.2*     Plan  - Monitor serial CBC  - Transfuse PRBC if patient becomes hemodynamically unstable, symptomatic or H/H drops below 7/21.  - Transfused pRBCs 3/8/2025 after having epistaxis.  - Stable until she had some bleeding at new PEG site. Transfuse again 3/12/2025.  - now stable

## 2025-03-20 NOTE — DISCHARGE SUMMARY
[facility] Medicine  Discharge Summary      Patient Name: Madelaine Barros  MRN: [id number]  [facility]: [id number]  Patient Class: IP- Inpatient  Admission Date: 3/2/2025  Hospital Length of Stay: 22 days  Discharge Date and Time: 3/25/2025  4:07 PM  Attending Physician: Edi Staton MD   Discharging Provider: Edi Staton MD  Primary Care Provider: Delilah Kelley MD  Hospital Medicine Team: [facility] Edi Staton MD  Primary Care Team: [facility] MED S    HPI:   Madelaine Barros is a 59 year old Black woman with hypertension, 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 She lives in [facility]. She works for [employer]. Her primary care physician is Dr. Delilah Kelley. Her hematologist is Dr. Roma Cantu.              She was hospitalized at [facility] from 10/23/2024 to 11/6/2024.              She was hospitalized at [facility] from 11/15/2024 to 1/18/2024.              She was hospitalized at [facility] from 12/9/2024 to 12/19/2024.              She was hospitalized at [facility] from 12/25/2024 to 1/5/2025.              She was hospitalized at [facility] from 1/5/2025 to 1/31/2025. She was discharged to [facility] inpatient rehab until 2/18/2025.               She presented to [facility] on 3/2/2025 with rectal pain and bleeding.  She receives her care in the [facility] system, not at [facility]. History was provided by her daughter Sue Barros. She is followed by Hematology at [facility] and has been on immunomodulator infusions every 8 weeks (Soliris, now Ultomiris, last dose 2/24/2025). She has severe debility, poor appetite, dysphagia to solids, mostly consumes liquid, but her daughter was concerned her nutritional intake is inadequate and inquired about feeding tube placement for chronic nutrition. She had watery stool on the day of presentation. Her sister noted that she had gingival bleeding. She reported an anal lesion with tenderness and slow bleeding. She has not required frequent transfusions. Her daughter noted that all home antihypertensive medications were discontinued since she was discharged from inpatient rehab.              In the emergency department, she appeared ill, lethargic, unable to fully participate in interview, periodically awakening with pain. Labs showed hemoglobin 14.3 g/dL (from 13.2 on 2/24/2025). Repeat hemoglobin was 10.4. Alkaline phosphatase was 251 U/L, total bilirubin was 2.2 mg/dL, AST was 733 U/L, ALT was 478 U/L. She was given 2.1 liters of IV fluids, vancomycin, piperacillin-tazobactam, 4 mg of IV morphine, 80 mg of IV pantoprazole, topical lidocaine for rectal pain. She was admitted to Hospital Medicine Team S.     Procedure(s) (LRB):  EGD (ESOPHAGOGASTRODUODENOSCOPY) (N/A)      Hospital Course:   GGT was elevated at 447 U/L. LDH was elevated. Colorectal Surgery, Hematology, Nephrology, and Case Management were consulted. Colorectal Surgery noted a posterior midline anal fissure. She expressed desire to go to [facility] since she gets her care at [facility]. Her mental status has been declining since October 2024. She has memory loss, disorientation, and does not remember her illness. Her mother had dementia. She had chest pain on 3/3/2025 while getting dialysis. EKG showed sinus tachycardia.  Troponin was 1102 ng/L. Repeat was 704. Hematology recommended giving 2 units of fresh frozen plasma (FFP) and starting low rate heparin infusion then transitioning to apixaban 2.5 mg twice daily if she had no further bleeding. She was given another 2 units of FFP. Her daughter requested gastrostomy tube placement for malnutrition. On 3/4/2025, heparin was held due to recurrent bleeding. She had intermittent somnolence. She was kept NPO. Speech Language Pathology was consulted. She was hypoglycemic so was put on 10% dextrose drip. Speech Language Pathology recommended full liquid diet. LFTs trended down. Heparin infusion was restarted on 3/5/2025. On 3/6/2025, her daughter decided that she did not want transfer to a [facility] hospital, instead she wanted to transfer her medical care to the [facility] system. Hemoglobin and hematocrit remained stable with no evidence of recurrent bleeding on 3/7/2025. Heparin drip was continued. She had epistaxis on 3/7/2025. Hemoglobin decreased to 6.7 g/dL on 3/8/2025. She developed right arm swelling. Repeat hemoglobin was 5.7. Heparin drip was held. She was transfused 1 unit of packed red blood cells (PRBCs). Hemoglobin improved to 8.2. Right upper extremity venous ultrasound showed known right internal jugular thrombus as well as subclavian thrombus, although the subclavian vein was not visualized when the internal jugular thrombus was diagnosed, so it was unknown if it was new. Hemoglobin was stable on 3/10/2025. Heparin drip was resumed. Sodium was found to be 118 mmol/L. She had been on dextrose drip for days to treat hypoglycemia due to poor oral intake. She was given normal saline instead. Gastroenterology placed a gastrostomy tube on 3/11/2025. Around midnight that night, she vomited twice. Nausea resolved. Abdomen X-ray showed nothing concerning. She tolerated tube feeds and heparin was transitioned to apixaban. Hyponatremia progressively improved. She had hypophosphatemia that  was treated. Case Management worked on skilled nursing facility placement. On 3/20/2025, family opted to take her home with home health.  On 3/21/2025, she had recurrent rectal bleeding with demond red blood, with rectal pain when passing gas and defecating. Apixaban was held. Ultrasound showed persistent occlusive thrombus in the right internal jugular vein. Bleeding subsided. Her blood pressures were too well controlled on the new carvedilol, nifedipine, and hydralazine, so they were stopped. Apixaban was resumed on 3/23/2025. She was monitored for 48 hours on apixaban and had no recurrence of rectal bleeding or rectal pain. She was discharged home with home health on 3/25/2025.      Goals of Care Treatment Preferences:  Code Status: Full Code      SDOH Screening:  The patient was screened for food insecurity, housing instability, transportation needs, utility difficulties, and interpersonal safety. The social determinant(s) of health identified as a concern this admission are:  Food insecurity    Will not discuss with case management and/or community health workers.    Social Drivers of Health with Concerns     Food Insecurity: Food Insecurity Present (3/24/2025)   Housing Stability: Low Risk  (3/24/2025)   Recent Concern: Housing Stability - High Risk (1/31/2025)    [facility]        Consults:   Consults (From admission, onward)          Status Ordering Provider     Inpatient consult to Midline team  Once        Provider:  (Not yet assigned)    Completed PAMELA CORDOBA     Inpatient consult to Registered Dietitian/Nutritionist  Once        Provider:  (Not yet assigned)    Completed PAMELA CORDOBA     Inpatient consult to Midline team  Once        Provider:  (Not yet assigned)    Completed CHARITY VILLAGOMEZ     Inpatient consult to Gastroenterology  Once        Provider:  Shayy Garcia MD    Completed CHARITY VILLAGOMEZ     Inpatient consult to Midline team  Once        Provider:  (Not yet  assigned)    Completed WOO GERBER     Inpatient consult to Social Work  Once        Provider:  (Not yet assigned)    Acknowledged WOO GERBER     Inpatient consult to Registered Dietitian/Nutritionist  Once        Provider:  (Not yet assigned)    Completed KETAN CHAPARRO     Inpatient consult to Hematology  Once        Provider:  Elan Recinos DO    Completed KETAN CHAPARRO     Inpatient consult to Colorectal Surgery  Once        Provider:  Sridhar Mathis MD    Completed KETAN CHAPARRO     Inpatient consult to Nephrology  Once        Provider:  Milo Davis MD    Completed KETAN CHAPARRO            Final Active Diagnoses:    Diagnosis Date Noted POA    PRINCIPAL PROBLEM:  Rectal bleeding [K62.5] 03/02/2025 Yes    Hypertension [I10] 03/16/2025 Yes     Chronic    Hyponatremia [E87.1] 03/10/2025 No    Renal hematoma, left, sequela [S37.012S] 03/03/2025 Not Applicable    Anemia of chronic renal failure, stage 5 [N18.5, D63.1] 03/03/2025 Yes     Chronic    Elevated transaminase level [R74.01] 03/02/2025 Yes    Wernicke encephalopathy [E51.2] 02/13/2025 Yes     Chronic    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] 01/05/2025 Yes     Chronic    ESRD (end stage renal disease) [N18.6] 01/05/2025 Yes     Chronic    Unspecified severe protein-calorie malnutrition [E43] 01/02/2025 Yes    Dysphagia [R13.10] 12/09/2024 Yes     Chronic    GERD without esophagitis [K21.9] 08/15/2024 Yes     Chronic      Problems Resolved During this Admission:    Diagnosis Date Noted Date Resolved POA    Hypophosphatemia [E83.39] 03/19/2025 03/25/2025 Unknown    Hypokalemia [E87.6] 03/14/2025 03/15/2025 Unknown    Diarrhea [R19.7] 03/03/2025 03/07/2025 Yes    Metabolic encephalopathy [G93.41] 03/03/2025 03/15/2025 Yes    Acute thrombosis of right internal jugular vein [I82.C11] 03/02/2025 03/15/2025 Yes    History of Helicobacter pylori infection [Z86.19] 03/02/2025  03/15/2025 Yes       Discharged Condition: good    Disposition: Home-Health Care Svc    Follow Up:   Follow-up Information       Delilah Kelley MD Follow up.    Specialty: Internal Medicine  Why: Pt navigator Karen sent a message to the clinic nurse to contact pt to schedule her hospital f/u visit.  Contact information:  [address]  [address]  [facility] [address]  [phone]               Roma Cantu MD Follow up.    Specialty: Internal Medicine  Why: This is the doctor who manages your atypical hemolytic uremic syndrome treatment.  Contact information:  [address]  [facility] [address]  [phone]                           Patient Instructions:      Diet full liquid     Notify your health care provider if you experience any of the following:  persistent nausea and vomiting or diarrhea     Change dressing (specify)   Order Comments: BID/PRN - perineum - cleanse gently w/ no rinse bath wipes, pat dry and apply Triad to affected area.     Tube Feedings/Formulas   Order Comments: Give 1 can 4 times a day. Flush tube afterward with free water 200 mL after each feeding (4 times a day).  POST PEG INSTRUCTION  -   change dressing once per day, dry dressing only x 24h. Use as little tape as possible. Then NO dressing needed unless drainage seen.  -   may use PEG for feedings; always flush with 60ml water after each bolus feeding.  -   Keep PEG site clean with soap and water at least once daily.  -   There should be 1cm of clearance between bottom of external bumper and skin surface at all times     Order Specific Question Answer Comments   Select Adult Formula: Isosource 1.5 marjorie    Route: Gastrostomy      Activity as tolerated       Significant Diagnostic Studies:   X-Ray Chest AP Portable 3/2/25: FINDINGS:   Right central venous catheter tip projects over the distal SVC.  The cardiomediastinal silhouette is not enlarged noting patient is rotated..  There is no pleural effusion.  The trachea is  midline.  The lungs are symmetrically expanded bilaterally with mildly coarse interstitial attenuation, accentuated by overlying habitus given patient rotation..  No large focal consolidation seen.  There is no pneumothorax.  The osseous structures are remarkable for degenerative change..   Impression:  1. Interstitial findings are accentuated by positioning, superimposed edema is a consideration although correlation is needed.   CT Abdomen Pelvis With IV Contrast NO Oral Contrast 3/2/25: FINDINGS:   SOFT TISSUES: Diffuse body wall edema.   LUNG BASES/VISUALIZED  MEDIASTINUM: Unremarkable.   HEPATOBILIARY: Liver is normal size with diffuse parenchymal hypoattenuation suggestive of steatosis.  Heterogeneous enhancement of the liver which could be related to hepatitis/inflammation in this patient with elevated LFTs.  No focal hepatic lesions.  No biliary duct dilatation.  Small volume pericholecystic fluid interposed between the gallbladder in the liver.  No calcified gallstones, wall thickening, or pericholecystic stranding.   PANCREAS: No focal masses or ductal dilatation.   SPLEEN: Normal size.   ADRENALS: No adrenal nodules.   KIDNEYS/URETERS: Kidneys enhance symmetrically. Crescentic hypoattenuating collection along the medial inferior pole of left kidney, possibly related to remote subcapsular hemorrhage.  Multifocal left renal cortical scarring.  No obvious enhancing renal lesion though there is possible cortical scarring.  No nephrolithiasis or hydronephrosis. No solid mass lesions. Ureters are unremarkable.   BLADDER/PELVIC ORGANS: Mild circumferential bladder wall thickening.  Hysterectomy.  No adnexal masses.   PERITONEUM / RETROPERITONEUM: No free air or fluid.   LYMPH NODES: No lymphadenopathy.   VESSELS: No aortic aneurysm.  Major aortic branch vessels are patent.  Portal venous system is patent noting narrowing of the main portal vein as it passes under the duodenum.  Decompressed IVC which may be seen  the setting of volume depletion.   GI TRACT: Mucosal hyperenhancement, submucosal edema, and wall thickening of the gastric pylorus and duodenal bulb.  No evidence of bowel obstruction.  Minimal small bowel wall thickening in the left hemiabdomen.  Few colonic diverticula.  Appendix is normal.  Mild diffuse colonic wall thickening which could be exaggerated by decompressed state.   BONES: Transitional lumbosacral anatomy.  Advanced degenerative change of the hips.  Degenerative changes spine.  No acute fracture or aggressive appearing osseous lesion.  No acute fractures or suspicious osseous lesions.  Transitional lumbosacral vertebral body at L5 with pseudoarticulation of the left transverse process with the sacrum.   Impression:  1. Gastroduodenitis involving the gastric pylorus and duodenal bulb.  Minimal small bowel wall thickening in the left hemiabdomen and mild diffuse colonic wall thickening which could be exaggerated by decompressed state.  Suggest correlation for any clinical signs of enteritis or colitis.   2. Small volume pericholecystic fluid is likely reactive in the setting of adjacent gastroduodenitis or liver dysfunction.  No cholelithiasis or other evidence of cholecystitis.   3. Circumferential bladder wall thickening which may be seen in setting of cystitis.  Recommend correlation with urinalysis.   4. Small volume subcapsular fluid along the posterior lower pole left kidney of uncertain etiology.   5. Anasarca.   6. Probable hepatic steatosis and nonspecific subtle heterogeneous enhancement of the liver.  Suggest correlation with laboratory values and risk factors for hepatitis.   7. Additional findings as above  US Upper Extremity Veins Right 3/9/25: FINDINGS:   Central veins: Occlusive thrombosis in the internal jugular vein and lateral subclavian vein.  The central subclavian and axillary veins patent and free of thrombus.   Arm veins: The brachial and basilic veins are patent and  compressible.  The cephalic vein is not visualized.   Contralateral subclavian/internal jugular veins: The left subclavian and internal jugular veins are patent and free of thrombus.   Other findings: None.   Impression:  Occlusive thrombus in the right internal jugular and subclavian veins.   X-Ray Abdomen Portable 3/12/25: FINDINGS:   Interval placement of a PEG tube in which the tip projects about the paravertebral left mid abdomen at the level of the L1-L2 disc space.  If clinically important to confirm satisfactory position of PEG tube within the stomach, consider injection of water-soluble contrast through the PEG tube under fluoroscopy or acquiring a film of the abdomen immediately following injection of water-soluble contrast through the PEG tube at bedside.  Nonobstructive bowel gas pattern.  No free air noted on these images.  Scattered spinal degenerative changes.  Mild right hip joint space and moderate to severe left hip joint space osteoarthritic changes.   US Upper Extremity Veins Right 3/21/25: FINDINGS:   Persistent occlusive thrombus in the right internal jugular vein.   The left internal jugular vein is patent.     Medications:  Reconciled Home Medications:      Medication List        CONTINUE taking these medications      azelastine 137 mcg (0.1 %) nasal spray  Commonly known as: ASTELIN  1 spray (137 mcg total) by Nasal route 2 (two) times daily.     ELIQUIS 5 mg Tab  Generic drug: apixaban  Take 5 mg by mouth 2 (two) times daily.     epoetin pily-epbx 10,000 unit/mL imjection  Commonly known as: RETACRIT  Inject 10,000 Units into the skin every Mon, Wed, Fri.     folic acid 1 MG tablet  Commonly known as: FOLVITE  Take 1,000 mcg by mouth once daily.     mirtazapine 15 MG tablet  Commonly known as: REMERON  Take 15 mg by mouth every evening.     pantoprazole 40 MG tablet  Commonly known as: PROTONIX  Take 40 mg by mouth once daily.     VITAMIN B-1 100 MG tablet  Generic drug: thiamine  Take 100  mg by mouth once daily.            STOP taking these medications      aspirin 81 MG Chew     atorvastatin 80 MG tablet  Commonly known as: LIPITOR            ASK your doctor about these medications      EScitalopram oxalate 10 MG tablet  Commonly known as: LEXAPRO  Take 1 tablet by mouth once daily.  Ask about: Should I take this medication?     vitamin D 1000 units Tab  Commonly known as: VITAMIN D3  Take 1 tablet by mouth once daily.  Ask about: Should I take this medication?     vitamin renal formula (B-complex-vitamin c-folic acid) 1 mg Cap  Commonly known as: NEPHROCAP  Take 1 capsule by mouth once daily.  Ask about: Should I take this medication?              Indwelling Lines/Drains at time of discharge:   Lines/Drains/Airways       Central Venous Catheter Line  Duration                  Hemodialysis Catheter right subclavian -- days              Drain  Duration                  Gastrostomy/Enterostomy 03/11/25 1710 Gastrostomy tube w/o balloon LUQ 8 days                    Time spent on the discharge of patient: 40 minutes         Edi Staton MD  Department of Hospital Medicine  [facility] - Telemetry Stepdown

## 2025-03-20 NOTE — PLAN OF CARE
[facility] - Telemetry Stepdown      HOME HEALTH ORDERS  FACE TO FACE ENCOUNTER    Patient Name: Madelaine Barros  YOB: 1965    PCP: Delilah Kelley MD   PCP Address: [address] [place] [address] / [place] [address]  PCP Phone Number: [phone]  PCP Fax: [phone]    Encounter Date: 3/2/25    Admit to Home Health    Diagnoses:  Active Hospital Problems    Diagnosis  POA    *Rectal bleeding [K62.5]  Yes    Hypophosphatemia [E83.39]  Unknown    Hypertension [I10]  Yes     Chronic    Hyponatremia [E87.1]  No    Renal hematoma, left, sequela [S37.012S]  Not Applicable    Anemia of chronic renal failure, stage 5 [N18.5, D63.1]  Yes     Chronic    Elevated transaminase level [R74.01]  Yes    Wernicke encephalopathy [E51.2]  Yes     Chronic    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]  Yes     Chronic    ESRD (end stage renal disease) [N18.6]  Yes     Chronic    Unspecified severe protein-calorie malnutrition [E43]  Yes    Dysphagia [R13.10]  Yes     Chronic    GERD without esophagitis [K21.9]  Yes     Chronic      Resolved Hospital Problems    Diagnosis Date Resolved POA    Hypokalemia [E87.6] 03/15/2025 Unknown    Diarrhea [R19.7] 03/07/2025 Yes    Metabolic encephalopathy [G93.41] 03/15/2025 Yes    Acute thrombosis of right internal jugular vein [I82.C11] 03/15/2025 Yes    History of Helicobacter pylori infection [Z86.19] 03/15/2025 Yes       Follow Up Appointments:  Future Appointments   Date Time Provider Department Center   3/26/2025  7:00 AM D[facility]YSIS, [facility]       Allergies:Review of patient's allergies indicates:  No Known Allergies    Medications: Review discharge medications with patient and family and provide education.    Current Medications[1]     Medication List        CONTINUE taking these medications      azelastine 137 mcg (0.1 %) nasal spray  Commonly known as: ASTELIN  1 spray (137 mcg total) by Nasal  route 2 (two) times daily.     ELIQUIS 5 mg Tab  Generic drug: apixaban  Take 5 mg by mouth 2 (two) times daily.     epoetin pily-epbx 10,000 unit/mL imjection  Commonly known as: RETACRIT  Inject 10,000 Units into the skin every Mon, Wed, Fri.     folic acid 1 MG tablet  Commonly known as: FOLVITE  Take 1,000 mcg by mouth once daily.     mirtazapine 15 MG tablet  Commonly known as: REMERON  Take 15 mg by mouth every evening.     pantoprazole 40 MG tablet  Commonly known as: PROTONIX  Take 40 mg by mouth once daily.     VITAMIN B-1 100 MG tablet  Generic drug: thiamine  Take 100 mg by mouth once daily.            STOP taking these medications      aspirin 81 MG Chew     atorvastatin 80 MG tablet  Commonly known as: LIPITOR            ASK your doctor about these medications      EScitalopram oxalate 10 MG tablet  Commonly known as: LEXAPRO  Take 1 tablet by mouth once daily.  Ask about: Should I take this medication?     vitamin D 1000 units Tab  Commonly known as: VITAMIN D3  Take 1 tablet by mouth once daily.  Ask about: Should I take this medication?     vitamin renal formula (B-complex-vitamin c-folic acid) 1 mg Cap  Commonly known as: NEPHROCAP  Take 1 capsule by mouth once daily.  Ask about: Should I take this medication?                I have seen and examined this patient within the last 30 days. My clinical findings that support the need for the home health skilled services and home bound status are the following:no   Medical restrictions requiring assistance of another human to leave home due to  Wound care needs and Newly placed G-tube/ostomy.     Discharge Procedure Orders   Diet full liquid       Change dressing (specify)   Order Comments: BID/PRN - perineum - cleanse gently w/ no rinse bath wipes, pat dry and apply Triad to affected area.     Tube Feedings/Formulas   Order Comments: Give 1 can as bolus via syringe 4 times a day. Flush tube afterward with free water 200 mL after each feeding (4 times a  day).  POST PEG INSTRUCTION  -   change dressing once per day, dry dressing only x 24h. Use as little tape as possible. Then NO dressing needed unless drainage seen.  -   may use PEG for feedings; always flush with 60ml water after each bolus feeding.  -   Keep PEG site clean with soap and water at least once daily.  -   There should be 1cm of clearance between bottom of external bumper and skin surface at all times     Order Specific Question Answer Comments   Select Adult Formula: Isosource 1.5 marjorie    Route: Gastrostomy      Activity as tolerated       Labs:  none    Referrals/ Consults  Physical Therapy to evaluate and treat. Evaluate for home safety and equipment needs; Establish/upgrade home exercise program. Perform / instruct on therapeutic exercises, gait training, transfer training, and Range of Motion.  Occupational Therapy to evaluate and treat. Evaluate home environment for safety and equipment needs. Perform/Instruct on transfers, ADL training, ROM, and therapeutic exercises.  Speech Therapy  to evaluate and treat for  Swallowing.  Aide to provide assistance with personal care, ADLs, and vital signs.    Activities:   activity as tolerated    Nursing:   Agency to admit patient within 24 hours of hospital discharge unless specified on physician order or at patient request    SN to complete comprehensive assessment including routine vital signs. Instruct on disease process and s/s of complications to report to MD. Review/verify medication list sent home with the patient at time of discharge  and instruct patient/caregiver as needed. Frequency may be adjusted depending on start of care date.     Skilled nurse to perform up to 3 visits PRN for symptoms related to diagnosis    Notify MD if SBP > 160 or < 90; DBP > 90 or < 50; HR > 120 or < 50; Temp > 101; O2 < 88%; Other:   problems with PEG    Ok to schedule additional visits based on staff availability and patient request on consecutive days within the home  health episode.    When multiple disciplines ordered:    Start of Care occurs on Sunday - Wednesday schedule remaining discipline evaluations as ordered on separate consecutive days following the start of care.    Thursday SOC -schedule subsequent evaluations Friday and Monday the following week.     Friday - Saturday SOC - schedule subsequent discipline evaluations on consecutive days starting Monday of the following week.    For all post-discharge communication and subsequent orders please contact patient's primary care physician. If unable to reach primary care physician or do not receive response within 30 minutes, please contact Delilah Kelley MD for clinical staff order clarification    Miscellaneous   PEG Care:  Instruct patient/caregiver to clean site.  Monitor skin integrity.    Home Health Aide:  Nursing Three times weekly, Physical Therapy Three times weekly, Occupational Therapy Three times weekly, Speech Language Pathology Three times weekly, and Home Health Aide Three times weekly    Wound Care Orders  BID/PRN - perineum - cleanse gently w/ no rinse bath wipes, pat dry and apply Triad to affected area.    I certify that this patient is confined to her home and needs intermittent skilled nursing care, physical therapy, speech therapy, and occupational therapy.        Edi Staton MD  [facility]         [1]   Current Facility-Administered Medications   Medication Dose Route Frequency Provider Last Rate Last Admin    acetaminophen tablet 650 mg  650 mg Oral Q8H PRN Edi Staton MD        aluminum-magnesium hydroxide-simethicone 200-200-20 mg/5 mL suspension 30 mL  30 mL Oral QID PRN Atif Melo MD   30 mL at 03/22/25 2052    apixaban tablet 5 mg  5 mg Oral BID Edi Staton MD   5 mg at 03/25/25 0913    dextrose 50% injection 12.5 g  12.5 g Intravenous PRN Atif Melo MD   12.5 g at 03/12/25 1532    dextrose 50% injection 25 g  25 g Intravenous  PRAtif Khan MD   25 g at 03/12/25 1309    EScitalopram oxalate tablet 10 mg  10 mg Oral Daily Atif Melo MD   10 mg at 03/25/25 0909    folic acid tablet 1,000 mcg  1,000 mcg Oral Daily Atif Melo MD   1,000 mcg at 03/25/25 0908    glucagon (human recombinant) injection 1 mg  1 mg Intramuscular PRN Atif Melo MD   1 mg at 03/24/25 1301    glucose chewable tablet 16 g  16 g Oral PRN Atif Melo MD   16 g at 03/12/25 1827    glucose chewable tablet 24 g  24 g Oral PRN Atif Melo MD        heparin (porcine) injection 1,000 Units  1,000 Units Intra-Catheter PRN Milo Davis MD   1,000 Units at 03/24/25 1137    hydrocortisone 2.5 % rectal cream   Rectal BID Edi Staton MD   Given at 03/25/25 1039    melatonin tablet 6 mg  6 mg Oral Nightly PRAtif Khan MD   6 mg at 03/17/25 2144    mirtazapine tablet 15 mg  15 mg Oral QHS Edi Staton MD   15 mg at 03/24/25 2021    naloxone 0.4 mg/mL injection 0.02 mg  0.02 mg Intravenous PRAtif Khan MD        ondansetron injection 4 mg  4 mg Intravenous Q8H PRN Atif Melo MD   4 mg at 03/12/25 1316    oxyCODONE-acetaminophen 5-325 mg per tablet 1 tablet  1 tablet Oral Q6H PRN Edi Staton MD        pantoprazole EC tablet 40 mg  40 mg Oral BID AC Zoya Cadena MD   40 mg at 03/25/25 0513    polyethylene glycol packet 17 g  17 g Oral Daily PRAtif Khan MD        prochlorperazine injection Soln 5 mg  5 mg Intravenous Q6H PRAtif Khan MD   5 mg at 03/08/25 0915    senna-docusate 8.6-50 mg per tablet 1 tablet  1 tablet Oral BID PRN Atif Melo MD        simethicone chewable tablet 80 mg  1 tablet Oral TID PRN Damián Pruitt III, MD   80 mg at 03/22/25 2134    sodium chloride 0.9% flush 10 mL  10 mL Intravenous Q6H PRN Atif Melo MD        thiamine tablet 100 mg  100 mg Oral Daily Atif Melo MD   100 mg at 03/25/25 0911    vitamin D 1000  units tablet 1,000 Units  1,000 Units Oral Daily Atif Melo MD   1,000 Units at 03/25/25 0908    vitamin renal formula (B-complex-vitamin c-folic acid) 1 mg per capsule 1 capsule  1 capsule Oral Daily Atif Melo MD   1 capsule at 03/25/25 0912

## 2025-03-20 NOTE — ASSESSMENT & PLAN NOTE
Patient's blood pressure range in the last 24 hours was: BP  Min: 88/54  Max: 135/81.The patient's inpatient anti-hypertensive regimen is listed below:  Current Antihypertensives  NIFEdipine 24 hr tablet 30 mg, Daily, Oral  carvediloL tablet 12.5 mg, 2 times daily, Oral  hydrALAZINE tablet 50 mg, Every 8 hours, Oral    Plan  -was uncontrolled, so introduced home medications; hypotension so decreased dose of Nifeidpine and will hold tonight's dose of Coreg and Hydralazine     Spontaneous, unlabored and symmetrical

## 2025-03-20 NOTE — PLAN OF CARE
Problem: Adult Inpatient Plan of Care  Goal: Plan of Care Review  Outcome: Progressing  Goal: Absence of Hospital-Acquired Illness or Injury  Outcome: Progressing  Goal: Optimal Comfort and Wellbeing  Outcome: Progressing     Problem: Infection  Goal: Absence of Infection Signs and Symptoms  Outcome: Progressing     Problem: Hemodialysis  Goal: Safe, Effective Therapy Delivery  Outcome: Progressing  Goal: Effective Tissue Perfusion  Outcome: Progressing  Goal: Absence of Infection Signs and Symptoms  Outcome: Progressing     Problem: Wound  Goal: Skin Health and Integrity  Outcome: Progressing  Goal: Optimal Wound Healing  Outcome: Progressing     Problem: Skin Injury Risk Increased  Goal: Skin Health and Integrity  Outcome: Progressing     Problem: Fall Injury Risk  Goal: Absence of Fall and Fall-Related Injury  Outcome: Progressing     Problem: Chronic Kidney Disease  Goal: Electrolyte Balance  Outcome: Progressing     Problem: Chronic Kidney Disease  Goal: Minimize Renal Failure Effects  Outcome: Progressing

## 2025-03-20 NOTE — PROGRESS NOTES
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 She lives in [facility]. She works for [employer]. Her primary care physician is Dr. Delilah Kelley.               She was hospitalized at [facility] from 10/23/2024 to 11/6/2024.              She was hospitalized at [facility] from 11/15/2024 to 1/18/2024.              She was hospitalized at [facility] from 12/9/2024 to 12/19/2024.              She was hospitalized at [facility] from 12/25/2024 to 1/5/2025.              She was hospitalized at [facility] from 1/5/2025 to 1/31/2025. She was discharged to [facility] inpatient rehab until 2/18/2025.               She presented to [facility] Emergency Department on 3/2/2025 with rectal pain and bleeding. She receives her care in the [facility] system, not at [facility]. History was provided by her daughter Sue Barros. She is followed by Hematology at [facility] and has been on immunomodulator  infusions every 8 weeks (Soliris, now Ultomiris, last dose 2/24/2025). She has severe debility, poor appetite, dysphagia to solids, mostly consumes liquid, but her daughter was concerned her nutritional intake is inadequate and inquired about feeding tube placement for chronic nutrition. She had watery stool on the day of presentation. Her sister noted that she had gingival bleeding. She reported an anal lesion with tenderness and slow bleeding. She has not required frequent transfusions. Her daughter noted that all home antihypertensive medications were discontinued since she was discharged from inpatient rehab.              In the emergency department, she appeared ill, lethargic, unable to fully participate in interview, periodically awakening with pain. Labs showed hemoglobin 14.3 g/dL (from 13.2 on 2/24/2025). Repeat hemoglobin was 10.4. Alkaline phosphatase was 251 U/L, total bilirubin was 2.2 mg/dL, AST was 733 U/L, ALT was 478 U/L. She was given 2.1 liters of IV fluids, vancomycin, piperacillin-tazobactam, 4 mg of IV morphine, 80 mg of IV pantoprazole, topical lidocaine for rectal pain. She was admitted to Hospital Medicine Team S.     Overview/Hospital Course:  GGT was elevated at 447 U/L. LDH was elevated. Colorectal Surgery, Hematology, Nephrology, and Case Management were consulted. She expressed desire to go to [facility] since she gets her care at [facility]. Her mental status has been declining since October 2024. She has memory loss, disorientation, and does not remember her illness. Her mother had dementia. She had chest pain on 3/3/2025 while getting dialysis. EKG showed sinus tachycardia. Troponin was 1102 ng/L. Repeat was 704. Hematology recommended giving 2 units of fresh frozen plasma (FFP) and starting low rate heparin infusion then transitioning to apixaban 2.5 mg twice daily if she had no further bleeding. She was given another 2 units of FFP. Her daughter requested gastrostomy tube  placement for malnutrition. On 3/4/2025, heparin was held due to recurrent bleeding. She had intermittent somnolence. She was kept NPO. Speech Language Pathology was consulted. She was hypoglycemic so was put on 10% dextrose drip. Speech Language Pathology recommended full liquid diet. LFTs trended down. Heparin infusion was restarted on 3/5/2025. On 3/6/2025, her daughter decided that she did not want transfer to a [facility] hospital, instead she wanted to transfer her medical care to the [facility] system. Hemoglobin and hematocrit remained stable with no evidence of recurrent bleeding on 3/7/2025. Heparin drip was continued. She had epistaxis on 3/7/2025. Hemoglobin decreased to 6.7 g/dL on 3/8/2025. She developed right arm swelling. Repeat hemoglobin was 5.7. Heparin drip was held. She was transfused 1 unit of packed red blood cells (PRBCs). Hemoglobin improved to 8.2. Right upper extremity venous ultrasound showed known right internal jugular thrombus as well as subclavian thrombus, although the subclavian vein was not visualized when the internal jugular thrombus was diagnosed, so it was unknown if it was new. Hemoglobin was stable on 3/10/2025. Heparin drip was resumed. Sodium was found to be 118 mmol/L. She had been on dextrose drip for days to treat hypoglycemia due to poor oral intake. She was given normal saline instead. Gastroenterology placed a gastrostomy tube on 3/11/2025. Around midnight that night, she vomited twice. Nausea resolved. Abdomen X-ray showed nothing concerning. She tolerated tube feeds and heparin was transitioned to apixaban. Hyponatremia progressively improved. She had hypophosphatemia that was treated. Case Management worked on skilled nursing facility placement.     Interval History: No complaints. Medically ready for discharge. Will give some more phosphates, but phosphorus has improved since yesterday.    Review of Systems   Respiratory:  Negative for cough and shortness of breath.     Cardiovascular:  Negative for chest pain and palpitations.   Gastrointestinal:  Negative for abdominal pain and nausea.     Objective:     Vital Signs (Most Recent):  Temp: 98.3 °F (36.8 °C) (03/20/25 0720)  Pulse: 97 (03/20/25 0844)  Resp: 18 (03/20/25 0849)  BP: 135/81 (03/20/25 0844)  SpO2: 100 % (03/20/25 0720) Vital Signs (24h Range):  Temp:  [97.7 °F (36.5 °C)-98.3 °F (36.8 °C)] 98.3 °F (36.8 °C)  Pulse:  [86-99] 97  Resp:  [14-20] 18  SpO2:  [98 %-100 %] 100 %  BP: ()/(53-81) 135/81     Weight: 73.6 kg (162 lb 4.1 oz)  Body mass index is 27 kg/m².    Intake/Output Summary (Last 24 hours) at 3/20/2025 0902  Last data filed at 3/20/2025 0759  Gross per 24 hour   Intake 1952.52 ml   Output 813 ml   Net 1139.52 ml         Physical Exam  Vitals and nursing note reviewed.   Constitutional:       General: She is not in acute distress.     Appearance: She is well-developed. She is not diaphoretic.      Interventions: She is not intubated.  Pulmonary:      Effort: Pulmonary effort is normal. No accessory muscle usage or respiratory distress. She is not intubated.   Abdominal:      General: There is no distension.      Palpations: Abdomen is soft.      Tenderness: There is no abdominal tenderness. There is no guarding.   Skin:     General: Skin is warm and dry.      Coloration: Skin is not jaundiced or pale.   Neurological:      Mental Status: She is alert.      Motor: No tremor or seizure activity.   Psychiatric:         Attention and Perception: Attention normal.         Mood and Affect: Mood and affect normal.         Behavior: Behavior is not aggressive, hyperactive or combative.               Significant Labs: All pertinent labs within the past 24 hours have been reviewed.  CBC:   Recent Labs   Lab 03/19/25  0431 03/20/25  0757   WBC 10.86 12.67   HGB 8.6* 8.4*   HCT 26.8* 26.2*    311       Significant Imaging: I have reviewed all pertinent imaging results/findings within the past 24 hours.  X-Ray  Abdomen Portable 3/12/25: FINDINGS:   Interval placement of a PEG tube in which the tip projects about the paravertebral left mid abdomen at the level of the L1-L2 disc space.  If clinically important to confirm satisfactory position of PEG tube within the stomach, consider injection of water-soluble contrast through the PEG tube under fluoroscopy or acquiring a film of the abdomen immediately following injection of water-soluble contrast through the PEG tube at bedside.  Nonobstructive bowel gas pattern.  No free air noted on these images.  Scattered spinal degenerative changes.  Mild right hip joint space and moderate to severe left hip joint space osteoarthritic changes.       Assessment & Plan  Rectal bleeding  Appreciate Colorectal Surgery. Subsided. Monitor for recurrence.  Hemoglobin stable at 8.6  Unspecified severe protein-calorie malnutrition  Severe malnutrition patient with recent hx of worsening dysphagia, has had w/u for scleroderma, has hiatal hernia and ?esophageal dysmotility  - dysphagia to solids.   Developed wernicke encephalopathy from nutritional deficiency     Daughter reports concern of inadequate intake has had severe weight loss in recent months with multiple complex health conditions. She requested PEG placement. Nutrition gave recommendations on tube feeds. Giving minced and moist diet, Novasource Renal supplements.  Dysphagia  Appreciate SLP. Advanced diet to minced and moist. Appreciate GI. PEG placed 3/11/2025. Will start enteral water and tube feeds recommended by Nutrition.  FWF stopped per Nephrology request.  Wernicke encephalopathy  Continue on home thiamine supplementation.     GERD without esophagitis  Continue home pantoprazole.    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 Last Ultomiris dose 2/24/2025.  Outpatient hematologist is Roma Cantu MD at [facility]. Consulted Hematology here. Appreciate assistance with management.  Elevated transaminase level  Hepatic steatosis on CT. Worsening hepatic function may contribute to her worsening coagulopathy elevated PT/PTT. Has significantly improved.  Renal hematoma, left, sequela  In January, due to biopsy in 2024. Most recent imaging showed resolving appearance of remote hematoma.   Anemia of chronic renal failure, stage 5  Anemia is likely due to acute blood loss which was from GI bleed and active hemolysis due to HUS.  . Most recent hemoglobin and hematocrit are listed below.  Recent Labs     03/18/25  0356 03/19/25 0431 03/20/25  0757   HGB 9.9* 8.6* 8.4*   HCT 30.8* 26.8* 26.2*     Plan  - Monitor serial CBC  - Transfuse PRBC if patient becomes hemodynamically unstable, symptomatic or H/H drops below 7/21.  - Transfused pRBCs 3/8/2025 after having epistaxis.  - Stable until she had some bleeding at new PEG site. Transfuse again 3/12/2025.  - now stable  Hyponatremia  Hyponatremia is likely due to hypotonic fluids and poor oral intake. The patient's most recent sodium results are listed below.  Recent Labs     03/19/25 0431   *     Plan  - Monitor.  stable    Hypertension  Patient's blood pressure range in the last 24 hours was: BP  Min: 88/54  Max: 135/81.The patient's inpatient anti-hypertensive regimen is listed below:  Current Antihypertensives  NIFEdipine 24 hr tablet 30 mg, Daily, Oral  carvediloL tablet 12.5 mg, 2 times daily, Oral  hydrALAZINE tablet 50 mg, Every 8 hours, Oral    Plan  -was uncontrolled, so introduced home medications; hypotension so decreased dose of Nifeidpine and will hold tonight's dose of Coreg and Hydralazine    Hypophosphatemia  Patient's most recent phosphorus results are listed below.   Recent Labs     03/19/25 0431 03/20/25  0757   PHOS <1.0* 2.0*     Plan  - Will treat hypophosphatemia with oral repletion  - repeat lab in AM  for assessment  VTE Risk Mitigation (From admission, onward)           Ordered     apixaban tablet 5 mg  2 times daily         03/12/25 1148     heparin (porcine) injection 1,000 Units  As needed (PRN)         03/03/25 1524     IP VTE HIGH RISK PATIENT  Once         03/03/25 0416     Place sequential compression device  Until discontinued         03/03/25 0416     Reason for No Pharmacological VTE Prophylaxis  Once        Question:  Reasons:  Answer:  Active Bleeding    03/03/25 0416                    Discharge Planning   PATRICIA: 3/21/2025     Code Status: Full Code   Medical Readiness for Discharge Date: 3/20/2025  Discharge Plan A: Skilled Nursing Facility   Discharge Delays: (!) Post-Acute Set-up (plan changed from home w/HH to SNF)            Please place Justification for DME        Edi Staton MD  Department of Hospital Medicine   [facility] - Telemetry Stepdown

## 2025-03-20 NOTE — ASSESSMENT & PLAN NOTE
Hyponatremia is likely due to hypotonic fluids and poor oral intake. The patient's most recent sodium results are listed below.  Recent Labs     03/19/25  0431   *     Plan  - Monitor.  stable

## 2025-03-20 NOTE — PLAN OF CARE
Problem: Adult Inpatient Plan of Care  Goal: Plan of Care Review  Outcome: Progressing  Goal: Patient-Specific Goal (Individualized)  Outcome: Progressing  Goal: Absence of Hospital-Acquired Illness or Injury  Outcome: Progressing  Goal: Optimal Comfort and Wellbeing  Outcome: Progressing  Goal: Readiness for Transition of Care  Outcome: Progressing     Problem: Infection  Goal: Absence of Infection Signs and Symptoms  Outcome: Progressing     Problem: Hemodialysis  Goal: Safe, Effective Therapy Delivery  Outcome: Progressing  Goal: Effective Tissue Perfusion  Outcome: Progressing  Goal: Absence of Infection Signs and Symptoms  Outcome: Progressing     Problem: Wound  Goal: Optimal Coping  Outcome: Progressing  Goal: Optimal Functional Ability  Outcome: Progressing  Goal: Absence of Infection Signs and Symptoms  Outcome: Progressing  Goal: Improved Oral Intake  Outcome: Progressing  Goal: Optimal Pain Control and Function  Outcome: Progressing  Goal: Skin Health and Integrity  Outcome: Progressing  Goal: Optimal Wound Healing  Outcome: Progressing     Problem: Skin Injury Risk Increased  Goal: Skin Health and Integrity  Outcome: Progressing     Problem: Fall Injury Risk  Goal: Absence of Fall and Fall-Related Injury  Outcome: Progressing     Problem: Chronic Kidney Disease  Goal: Electrolyte Balance  Outcome: Progressing  Goal: Fluid Balance  Outcome: Progressing     Problem: Chronic Kidney Disease  Goal: Electrolyte Balance  Outcome: Progressing  Goal: Fluid Balance  Outcome: Progressing  Goal: Minimize Renal Failure Effects  Outcome: Progressing     Problem: Malnutrition  Goal: Improved Nutritional Intake  Outcome: Progressing     Problem: Mobility Impairment  Goal: Optimal Mobility  Outcome: Progressing     Problem: Pain Acute  Goal: Optimal Pain Control and Function  Outcome: Progressing

## 2025-03-20 NOTE — PT/OT/SLP PROGRESS
"Physical Therapy Treatment    Patient Name:  Madelaine Barros   MRN:  [id number]    Recommendations:     Discharge Recommendations: Moderate Intensity Therapy  Discharge Equipment Recommendations: none  Barriers to discharge:  current level of assistance required     Assessment:     Madelaine Barros is a 59 y.o. female admitted with a medical diagnosis of Rectal bleeding.  She presents with the following impairments/functional limitations: weakness, impaired endurance, impaired self care skills, impaired functional mobility, impaired balance, pain, decreased safety awareness, decreased lower extremity function, decreased upper extremity function, impaired skin, impaired cardiopulmonary response to activity Pt tolerated treatment session fairly today. Pt reporting increased fatigue and pain today, ultimately limiting today's session. Patient remains appropriate for continued skilled services within the acute environment and goals remain appropriate.   .    Rehab Prognosis: Fair; patient would benefit from acute skilled PT services to address these deficits and reach maximum level of function.    Recent Surgery: Procedure(s) (LRB):  EGD (ESOPHAGOGASTRODUODENOSCOPY) (N/A) 9 Days Post-Op    Plan:     During this hospitalization, patient to be seen 4 x/week to address the identified rehab impairments via gait training, therapeutic activities and progress toward the following goals:    Plan of Care Expires:  04/09/25    Subjective     Chief Complaint: bottom pain   Patient/Family Comments/goals: "it's burning."  Pain/Comfort:  Pain Rating 1:  (not rated)  Location - Orientation 1: generalized  Location 1: perirectal  Pain Addressed 1: Reposition, Distraction  Pain Rating Post-Intervention 1:  (not rated)      Objective:     Communicated with Rn prior to session.  Patient found supine with telemetry, pulse ox (continuous), PEG Tube upon PT entry to room.     General Precautions: Standard, fall  Orthopedic Precautions: "    Braces: N/A  Respiratory Status: Room air     Functional Mobility:  Bed Mobility:     Rolling Left:  contact guard assistance and minimum assistance  Rolling Right: contact guard assistance and minimum assistance  Supine to Sit: maximal assistance  Pt only able to tolerate EOB sitting for ~ 1-2 minutes before having to be returned supine due to increased dizziness  Sit to Supine: maximal assistance    Pt performed 10 repetitions of supine B LE exercises consisting of: Hip flexion, resisted leg kicks, Hip ABD/ADD, QS and AP.         AM-PAC 6 CLICK MOBILITY  Turning over in bed (including adjusting bedclothes, sheets and blankets)?: 2  Sitting down on and standing up from a chair with arms (e.g., wheelchair, bedside commode, etc.): 2  Moving from lying on back to sitting on the side of the bed?: 2  Moving to and from a bed to a chair (including a wheelchair)?: 2  Need to walk in hospital room?: 2  Climbing 3-5 steps with a railing?: 1  Basic Mobility Total Score: 11       Treatment & Education:  Therapist provided instruction and educated for safety during transfers and gait training. As well as proper body mechanics, energy conservation, and fall prevention strategies during tasks listed above, and the effects of prolonged immobility and the importance of performing EOB/OOB activity and exercises to promote healing and reduce recovery time.       Patient left supine with all lines intact, call button in reach, and Rn notified..    GOALS:   Multidisciplinary Problems       Physical Therapy Goals          Problem: Physical Therapy    Goal Priority Disciplines Outcome Interventions   Physical Therapy Goal     PT, PT/OT Progressing    Description: Goals to be met by: 25     Patient will increase functional independence with mobility by performin. Supine to sit with Modified [place]   2. Sit to supine with Modified [place]  3. Sit to stand transfer with Modified [place] with AD as needed  4.  Gait  x 150 feet with Supervision using Rolling Walker.                          DME Justifications:  No DME recommended requiring DME justifications    Time Tracking:     PT Received On: 03/20/25  PT Start Time: 0853     PT Stop Time: 0926  PT Total Time (min): 33 min     Billable Minutes: Therapeutic Activity 33    Treatment Type: Treatment  PT/PTA: PTA     Number of PTA visits since last PT visit: 2     03/20/2025

## 2025-03-20 NOTE — PLAN OF CARE
JORGE faxed  packet with orders to [facility]  [phone] fx, [phone] brittany.      Tete Goetz RN     [phone]

## 2025-03-21 LAB
ALBUMIN SERPL BCP-MCNC: 2.2 G/DL (ref 3.5–5.2)
ALP SERPL-CCNC: 190 U/L (ref 40–150)
ALT SERPL W/O P-5'-P-CCNC: 12 U/L (ref 10–44)
ANION GAP SERPL CALC-SCNC: 10 MMOL/L (ref 8–16)
AST SERPL-CCNC: 51 U/L (ref 10–40)
BASOPHILS # BLD AUTO: 0.09 K/UL (ref 0–0.2)
BASOPHILS NFR BLD: 0.7 % (ref 0–1.9)
BILIRUB SERPL-MCNC: 0.8 MG/DL (ref 0.1–1)
BUN SERPL-MCNC: 40 MG/DL (ref 6–20)
CALCIUM SERPL-MCNC: 8.4 MG/DL (ref 8.7–10.5)
CHLORIDE SERPL-SCNC: 95 MMOL/L (ref 95–110)
CO2 SERPL-SCNC: 24 MMOL/L (ref 23–29)
CREAT SERPL-MCNC: 2.7 MG/DL (ref 0.5–1.4)
DIFFERENTIAL METHOD BLD: ABNORMAL
EOSINOPHIL # BLD AUTO: 0.1 K/UL (ref 0–0.5)
EOSINOPHIL NFR BLD: 0.4 % (ref 0–8)
ERYTHROCYTE [DISTWIDTH] IN BLOOD BY AUTOMATED COUNT: 17.3 % (ref 11.5–14.5)
EST. GFR  (NO RACE VARIABLE): 19.7 ML/MIN/1.73 M^2
GLUCOSE SERPL-MCNC: 92 MG/DL (ref 70–110)
HCT VFR BLD AUTO: 29.1 % (ref 37–48.5)
HGB BLD-MCNC: 9.3 G/DL (ref 12–16)
IMM GRANULOCYTES # BLD AUTO: 0.06 K/UL (ref 0–0.04)
IMM GRANULOCYTES NFR BLD AUTO: 0.4 % (ref 0–0.5)
INR PPP: 1.2 (ref 0.8–1.2)
LYMPHOCYTES # BLD AUTO: 1.2 K/UL (ref 1–4.8)
LYMPHOCYTES NFR BLD: 8.4 % (ref 18–48)
MCH RBC QN AUTO: 28.5 PG (ref 27–31)
MCHC RBC AUTO-ENTMCNC: 32 G/DL (ref 32–36)
MCV RBC AUTO: 89 FL (ref 82–98)
MONOCYTES # BLD AUTO: 0.6 K/UL (ref 0.3–1)
MONOCYTES NFR BLD: 4.5 % (ref 4–15)
NEUTROPHILS # BLD AUTO: 11.7 K/UL (ref 1.8–7.7)
NEUTROPHILS NFR BLD: 85.6 % (ref 38–73)
NRBC BLD-RTO: 0 /100 WBC
PHOSPHATE SERPL-MCNC: 3.3 MG/DL (ref 2.7–4.5)
PLATELET # BLD AUTO: 331 K/UL (ref 150–450)
PMV BLD AUTO: 10.5 FL (ref 9.2–12.9)
POCT GLUCOSE: 106 MG/DL (ref 70–110)
POCT GLUCOSE: 58 MG/DL (ref 70–110)
POCT GLUCOSE: 75 MG/DL (ref 70–110)
POCT GLUCOSE: 79 MG/DL (ref 70–110)
POCT GLUCOSE: 94 MG/DL (ref 70–110)
POTASSIUM SERPL-SCNC: 3.7 MMOL/L (ref 3.5–5.1)
PROT SERPL-MCNC: 5.8 G/DL (ref 6–8.4)
PROTHROMBIN TIME: 12.7 SEC (ref 9–12.5)
RBC # BLD AUTO: 3.26 M/UL (ref 4–5.4)
SODIUM SERPL-SCNC: 129 MMOL/L (ref 136–145)
WBC # BLD AUTO: 13.63 K/UL (ref 3.9–12.7)

## 2025-03-21 PROCEDURE — 25000003 PHARM REV CODE 250: Performed by: HOSPITALIST

## 2025-03-21 PROCEDURE — 63600175 PHARM REV CODE 636 W HCPCS: Performed by: STUDENT IN AN ORGANIZED HEALTH CARE EDUCATION/TRAINING PROGRAM

## 2025-03-21 PROCEDURE — 20600001 HC STEP DOWN PRIVATE ROOM

## 2025-03-21 PROCEDURE — 80053 COMPREHEN METABOLIC PANEL: CPT | Performed by: HOSPITALIST

## 2025-03-21 PROCEDURE — 85025 COMPLETE CBC W/AUTO DIFF WBC: CPT | Performed by: HOSPITALIST

## 2025-03-21 PROCEDURE — 85610 PROTHROMBIN TIME: CPT | Performed by: HOSPITALIST

## 2025-03-21 PROCEDURE — 25000003 PHARM REV CODE 250: Performed by: FAMILY MEDICINE

## 2025-03-21 PROCEDURE — 84100 ASSAY OF PHOSPHORUS: CPT | Performed by: HOSPITALIST

## 2025-03-21 PROCEDURE — 25000003 PHARM REV CODE 250: Performed by: STUDENT IN AN ORGANIZED HEALTH CARE EDUCATION/TRAINING PROGRAM

## 2025-03-21 PROCEDURE — 80100016 HC MAINTENANCE HEMODIALYSIS

## 2025-03-21 PROCEDURE — 90935 HEMODIALYSIS ONE EVALUATION: CPT | Mod: ,,, | Performed by: NURSE PRACTITIONER

## 2025-03-21 PROCEDURE — 36415 COLL VENOUS BLD VENIPUNCTURE: CPT | Performed by: HOSPITALIST

## 2025-03-21 RX ORDER — HYDROCORTISONE 25 MG/G
CREAM TOPICAL 2 TIMES DAILY
Status: DISCONTINUED | OUTPATIENT
Start: 2025-03-21 | End: 2025-03-25 | Stop reason: HOSPADM

## 2025-03-21 RX ORDER — OXYCODONE AND ACETAMINOPHEN 5; 325 MG/1; MG/1
1 TABLET ORAL EVERY 6 HOURS PRN
Refills: 0 | Status: DISCONTINUED | OUTPATIENT
Start: 2025-03-21 | End: 2025-03-24

## 2025-03-21 RX ORDER — SODIUM CHLORIDE 9 MG/ML
INJECTION, SOLUTION INTRAVENOUS ONCE
Status: CANCELLED | OUTPATIENT
Start: 2025-03-21 | End: 2025-03-21

## 2025-03-21 RX ADMIN — PANTOPRAZOLE SODIUM 40 MG: 40 TABLET, DELAYED RELEASE ORAL at 05:03

## 2025-03-21 RX ADMIN — MIRTAZAPINE 15 MG: 15 TABLET, FILM COATED ORAL at 09:03

## 2025-03-21 RX ADMIN — HYDROCORTISONE: 25 CREAM TOPICAL at 09:03

## 2025-03-21 RX ADMIN — CARVEDILOL 12.5 MG: 12.5 TABLET, FILM COATED ORAL at 09:03

## 2025-03-21 RX ADMIN — CARVEDILOL 12.5 MG: 12.5 TABLET, FILM COATED ORAL at 08:03

## 2025-03-21 RX ADMIN — HYDRALAZINE HYDROCHLORIDE 50 MG: 50 TABLET ORAL at 05:03

## 2025-03-21 RX ADMIN — Medication 100 MG: at 08:03

## 2025-03-21 RX ADMIN — OXYCODONE HYDROCHLORIDE AND ACETAMINOPHEN 1 TABLET: 5; 325 TABLET ORAL at 05:03

## 2025-03-21 RX ADMIN — Medication 1000 UNITS: at 08:03

## 2025-03-21 RX ADMIN — APIXABAN 5 MG: 5 TABLET, FILM COATED ORAL at 08:03

## 2025-03-21 RX ADMIN — HEPARIN SODIUM 1000 UNITS: 1000 INJECTION, SOLUTION INTRAVENOUS; SUBCUTANEOUS at 12:03

## 2025-03-21 RX ADMIN — FOLIC ACID 1000 MCG: 1 TABLET ORAL at 08:03

## 2025-03-21 RX ADMIN — NIFEDIPINE 30 MG: 30 TABLET, FILM COATED, EXTENDED RELEASE ORAL at 08:03

## 2025-03-21 RX ADMIN — NEPHROCAP 1 CAPSULE: 1 CAP ORAL at 08:03

## 2025-03-21 RX ADMIN — HYDROCORTISONE: 25 CREAM TOPICAL at 01:03

## 2025-03-21 RX ADMIN — ESCITALOPRAM OXALATE 10 MG: 5 TABLET, FILM COATED ORAL at 08:03

## 2025-03-21 RX ADMIN — OXYCODONE HYDROCHLORIDE AND ACETAMINOPHEN 1 TABLET: 5; 325 TABLET ORAL at 11:03

## 2025-03-21 NOTE — ASSESSMENT & PLAN NOTE
Patient's blood pressure range in the last 24 hours was: BP  Min: 96/61  Max: 127/74.The patient's inpatient anti-hypertensive regimen is listed below:  Current Antihypertensives  NIFEdipine 24 hr tablet 30 mg, Daily, Oral  carvediloL tablet 12.5 mg, 2 times daily, Oral  hydrALAZINE tablet 50 mg, Every 8 hours, Oral  carvedilol (COREG) tablet, 2 times daily, Oral  NIFEdipine (PROCARDIA-XL) 24 hr tablet, Daily, Oral  hydrALAZINE (APRESOLINE) tablet, Every 8 hours, Oral    Plan  -was uncontrolled, so introduced home medications; hypotension so decreased dose of Nifeidpine and will hold tonight's dose of Coreg and Hydralazine

## 2025-03-21 NOTE — PLAN OF CARE
Problem: Skin Injury Risk Increased  Goal: Skin Health and Integrity  Outcome: Progressing  Intervention: Optimize Skin Protection  Flowsheets (Taken 3/21/2025 2947)  Pressure Reduction Techniques: frequent weight shift encouraged  Pressure Reduction Devices: positioning supports utilized  Skin Protection:   incontinence pads utilized   pectin skin barriers applied  Activity Management:   Rolling - L1   Heel slide - L1   Arm raise - L1  Head of Bed (HOB) Positioning: HOB elevated   Plan of care review with pt. AAOx3. PRN meds given for pain; Rectal bleeding noticed, MD notified, rectal cream applied. Wound care performed at bedside. I&O documented. Safety measures performed. Call light within reach.

## 2025-03-21 NOTE — ASSESSMENT & PLAN NOTE
Appreciate Colorectal Surgery. Subsided. Recurred on 3/21/2025. With rectal pain, could be hemorrhoids, as diverticular bleeding is typically painless. Maybe intolerant to anticoagulation. Evaluate if recent DVT is persistent before resuming. Try hydrocortisone rectal cream.

## 2025-03-21 NOTE — PHYSICIAN QUERY
Please clarify the infectious disease diagnosis.    Sepsis ruled out  Sepsis was not documented as a diagnosis on the problem list during this encounter.

## 2025-03-21 NOTE — PROGRESS NOTES
0850 Arrived to [facility] via stretcher, pt cleaned up and demond blood noted; md notified    0903 Maintenance HD started via R PC without issue

## 2025-03-21 NOTE — PROGRESS NOTES
[facility] - Telemetry Stepdown  Adult Nutrition  Progress Note    SUMMARY     Recommendations  -- Current TF does not meet protein/calorie needs: Recommend TF via PEG of Novasource @10ml/hr, advancing as tolerated to a goal rate of 40ml/hr to provide 1920 kcals, 87g of proteins, 691ml. Free water flushes of 210ml Q4H to provide an additional 1260ml. Total free water= 1951 ml.     --For bolus feed, recommend Novasource Renal @ 4 cans per day (1 breakfast, 1 lunch, 2 dinner) to provide 1900 kcal, 948 mL volume, 172 g CHO, 88 g protein, 672 mL free water  -Free water flush per provider      -If necessary for insurance purposes, continue TF of Isosource 1.5 @40ml/hr, advancing as tolerated to a goal rate of 50ml/hr to provide 1800 kcals, 82g of protein, 917ml of fluid. Free water flushes of 155ml Q4H to provide an additional 930ml. Total free water=1847ml.     --Bolus feeds of Isosource 1.5: 5 cans/day to provide 1875 kcals, 85g of protein, and 955ml of fluid. Free water flushes of 150ml Q4H to provide an additional 900ml. Total free water= 1855ml.     --RD to monitor intake, tolerance    Goals:   1.  75% nutritional needs met with diet/EN/PN    2. Maintain dry weight during admission    Nutrition Goal Status: progressing towards goal  Communication of RD Recs:  (POC)    Nutrition Discharge Planning  Nutrition Discharge Planning: Enteral nutrition (comments)  Therapeutic diet (comments): NPO  Oral supplement regimen (comments): 0  Enteral nutrition (comments): Novasource Renal    Assessment and Plan  Nutrition Problem  Inadequate oral intake    Related to (etiology):   Decreased ability to consume sufficient needs     Signs and Symptoms (as evidenced by):   NPO   Severe debility, poor appetite, dysphagia to solid, mostly consuming liquids     Interventions:  Collaboration by nutrition professional with other providers   Enteral Nutrition-Novasource Renal     Nutrition Diagnosis Status:   Continues    Malnutrition  "Assessment  Unable to assess at this time, will attempt at follow up      Reason for Assessment  Reason For Assessment: RD follow-up  Diagnosis:  (Rectal bleeding)  General Information Comments: Pt is currently on a full liquid diet with Isosource 1.5 @40ml/hr via PEG. Novasource TID. PMH- HTN, DM, hiatal hernia, duodenal ulcer, GERD, wernicke encephalopathy. PEG placed 3/11/25. Mental status declining since 2024. Multipl hospitalizations at [facility] from 2024-2025. Anil-17/perineum. ESRD on HD. Medically ready for discharge. Phosphorus has improved since yesterday. Weight stable for the last 2 months.    Nutrition/Diet History  Nutrition Intake History: [facility]  Food Preferences: [facility]  Spiritual, Cultural Beliefs, Hinduism Practices, Values that Affect Care: no  Factors Affecting Nutritional Intake: NPO, difficulty/impaired swallowing, chewing difficulties/inability to chew food    Food Insecurity: Food Insecurity Present (3/5/2025)    Hunger Vital Sign     Worried About Running Out of Food in the Last Year: Sometimes true     Ran Out of Food in the Last Year: Sometimes true     Anthropometrics  Height: 5' 5" (165.1 cm)  Height (inches): 65 in  Weight: 73.7 kg (162 lb 7.7 oz)  Weight (lb): 162.48 lb  Weight Method: Bed Scale  Ideal Body Weight (IBW), Female: 125 lb  % Ideal Body Weight, Female (lb): 128.75 %  BMI (Calculated): 27  BMI Grade: 25 - 29.9 - overweight  Usual Body Weight (UBW), k.1 kg (1/3/25)  % Usual Body Weight: 100.21  % Weight Change From Usual Weight: 0 %     Lab/Procedures/Meds  Pertinent Labs Reviewed: reviewed  Pertinent Labs Comments: Na 129, BUN 40, Creatinine, Ca 8.4, GFR 19.7, Magnesium 1.4, , Cholesterol 237,   Pertinent Medications Reviewed: reviewed  Pertinent Medications Comments: carvedilol, folic acid, mirtazapine    Estimated/Assessed Needs  Weight Used For Calorie Calculations: 73.7 kg (162 lb 7.7 oz)  Energy Calorie Requirements (kcal): 5264-0477 kcals " (25-30 kcals/kg)  Energy Need Method: Kcal/kg  Protein Requirements:  g (1.2-1.5 g/kg)  Weight Used For Protein Calculations: 73.7 kg (162 lb 7.7 oz)     Estimated Fluid Requirement Method: RDA Method  RDA Method (mL): 1842  CHO Requirement: 230g    Nutrition Prescription Ordered  Current Diet Order: NPO  Current Nutrition Support Formula Ordered: Isosource 1.5  Current Nutrition Support Rate Ordered: 40 (ml)  Current Nutrition Support Frequency Ordered: Continuous  Oral Nutrition Supplement: 0    Evaluation of Received Nutrient/Fluid Intake  Enteral Calories (kcal): 1440  Enteral Protein (gm): 65  Enteral (Free Water) Fluid (mL): 733  % Kcal Needs: 75%  % Protein Needs: 72%  I/O: 1470/0  Energy Calories Required: not meeting needs  Protein Required: not meeting needs  Fluid Required: not meeting needs  Comments: LBM-3/21/25  Tolerance: tolerating  % Intake of Estimated Energy Needs: 75 - 100 %  % Meal Intake: 75 - 100 %    Nutrition Risk  Level of Risk/Frequency of Follow-up: low     Monitor and Evaluation  Monitor and Evaluation: Energy intake, Food and beverage intake, Protein intake, Carbohydrate intake, Diet order, Enteral and parenteral nutrition administration, Food and nutrition knowledge, Weight, Electrolyte and renal panel     Nutrition Follow-Up  RD Follow-up?: Yes

## 2025-03-21 NOTE — PT/OT/SLP PROGRESS
Occupational Therapy      Patient Name:  Madelaine Barros   MRN:  [id number]    Patient not seen today secondary to  (pt away at HD at time of attempt. OT unable to return for 2nd attempt.). Will follow-up as appropriate.    3/21/2025

## 2025-03-21 NOTE — SUBJECTIVE & OBJECTIVE
Interval History: Rectal bleed recurred. Had normal bowel movements yesterday.    Review of Systems   Respiratory:  Negative for cough and shortness of breath.    Cardiovascular:  Negative for chest pain and palpitations.   Gastrointestinal:  Positive for blood in stool and vomiting.     Objective:     Vital Signs (Most Recent):  Temp: 98.4 °F (36.9 °C) (03/21/25 0411)  Pulse: 89 (03/21/25 1115)  Resp: 18 (03/21/25 1111)  BP: 103/64 (03/21/25 1115)  SpO2: 100 % (03/21/25 0815) Vital Signs (24h Range):  Temp:  [98 °F (36.7 °C)-99 °F (37.2 °C)] 98.4 °F (36.9 °C)  Pulse:  [85-95] 89  Resp:  [13-19] 18  SpO2:  [97 %-100 %] 100 %  BP: ()/(61-80) 103/64     Weight: 73.7 kg (162 lb 7.7 oz)  Body mass index is 27.04 kg/m².    Intake/Output Summary (Last 24 hours) at 3/21/2025 1127  Last data filed at 3/21/2025 0358  Gross per 24 hour   Intake 1410 ml   Output --   Net 1410 ml         Physical Exam  Vitals and nursing note reviewed.   Constitutional:       General: She is not in acute distress.     Appearance: She is well-developed. She is not diaphoretic.      Interventions: She is not intubated.  Pulmonary:      Effort: Pulmonary effort is normal. No accessory muscle usage or respiratory distress. She is not intubated.   Abdominal:      General: There is no distension.      Palpations: Abdomen is soft.      Tenderness: There is no abdominal tenderness. There is no guarding.   Skin:     General: Skin is warm and dry.      Coloration: Skin is not jaundiced or pale.   Neurological:      Mental Status: She is alert.      Motor: No tremor or seizure activity.   Psychiatric:         Attention and Perception: Attention normal.         Mood and Affect: Mood and affect normal.         Behavior: Behavior is not aggressive, hyperactive or combative.               Significant Labs: All pertinent labs within the past 24 hours have been reviewed.  CBC:   Recent Labs   Lab 03/20/25  0757 03/20/25  2303 03/21/25  0713   WBC 12.67  11.87 13.63*   HGB 8.4* 8.6* 9.3*   HCT 26.2* 26.7* 29.1*    304 331       Significant Imaging: I have reviewed all pertinent imaging results/findings within the past 24 hours.

## 2025-03-21 NOTE — PROGRESS NOTES
[facility] STAFF HEMODIALYSIS NOTE     Patient currently on hemodialysis for removal of uremic toxins and volume.     Patient seen and evaluated on hemodialysis, tolerating treatment, see HD flowsheet for vitals and assessments.    Labs have been reviewed and the dialysate bath has been adjusted.       Assessment/Plan:    ESRD  -Patient seen on HD, tolerating treatment well, w/o complaints   -UF goal of keep net even   -Hyponatremia in setting of ESRD, d/c FWF. Adjust Na bath on HD   -Renal diet, if not NPO   -Strict I/O's and daily weights  -Daily renal function panels  -Keep MAP >65 while on HD   -Hgb goal 10-11  -replete phos as needed   -Will continue to follow while inpatient     Gladys Feliciano DNP-FNP, C  Nephrology  Pager: [phone]

## 2025-03-21 NOTE — ASSESSMENT & PLAN NOTE
Patient's most recent phosphorus results are listed below.   Recent Labs     03/19/25  0431 03/20/25  0757 03/21/25  0713   PHOS <1.0* 2.0* 3.3     Plan  - Will treat hypophosphatemia with oral repletion  - repeat lab in AM for assessment

## 2025-03-21 NOTE — PROGRESS NOTES
03/21/25 1245   Post-Hemodialysis Assessment   Blood Volume Processed (Liters) 70.3 L   Duration of Treatment 210 minutes   Net Fluid Removal 112     HD tx completed without issue, blood returned and PC heparin locked. Pt complaint of pain often to rectal area, primary team aware. Report called to primary RN.    Pt back to unit via stretcher with dialysis tech.

## 2025-03-21 NOTE — PLAN OF CARE
Recommendations  -- Current TF does not meet protein/calorie needs: Recommend TF via PEG of Novasource @10ml/hr, advancing as tolerated to a goal rate of 40ml/hr to provide 1920 kcals, 87g of proteins, 691ml. Free water flushes of 210ml Q4H to provide an additional 1260ml. Total free water= 1951 ml.     --For bolus feed, recommend Novasource Renal @ 4 cans per day (1 breakfast, 1 lunch, 2 dinner) to provide 1900 kcal, 948 mL volume, 172 g CHO, 88 g protein, 672 mL free water  -Free water flush per provider      -If necessary for insurance purposes, continue TF of Isosource 1.5 @40ml/hr, advancing as tolerated to a goal rate of 50ml/hr to provide 1800 kcals, 82g of protein, 917ml of fluid. Free water flushes of 155ml Q4H to provide an additional 930ml. Total free water=1847ml.     --Bolus feeds of Isosource 1.5: 5 cans/day to provide 1875 kcals, 85g of protein, and 955ml of fluid. Free water flushes of 150ml Q4H to provide an additional 900ml. Total free water= 1855ml.     --RD to monitor intake, tolerance    Goals:   1.  75% nutritional needs met with diet/EN/PN    2. Maintain dry weight during admission

## 2025-03-21 NOTE — PT/OT/SLP PROGRESS
Physical Therapy      Patient Name:  Madelaine Barros   MRN:  [id number]    Patient not seen today secondary to at 9:03 PM pt was off the floor (Dialysis). Will follow-up at next PT POC date.

## 2025-03-21 NOTE — PROGRESS NOTES
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 She lives in [facility]. She works for [employer]. Her primary care physician is Dr. Delilah Kelley.               She was hospitalized at [facility] from 10/23/2024 to 11/6/2024.              She was hospitalized at [facility] from 11/15/2024 to 1/18/2024.              She was hospitalized at [facility] from 12/9/2024 to 12/19/2024.              She was hospitalized at [facility] from 12/25/2024 to 1/5/2025.              She was hospitalized at [facility] from 1/5/2025 to 1/31/2025. She was discharged to [facility] until 2/18/2025.               She presented to [facility] Emergency [facility] on 3/2/2025 with rectal pain and bleeding. She receives her care in the [facility], not at [facility]. History was provided by her daughter Sue Barros. She is followed by Hematology at [facility] and has been on immunomodulator  infusions every 8 weeks (Soliris, now Ultomiris, last dose 2/24/2025). She has severe debility, poor appetite, dysphagia to solids, mostly consumes liquid, but her daughter was concerned her nutritional intake is inadequate and inquired about feeding tube placement for chronic nutrition. She had watery stool on the day of presentation. Her sister noted that she had gingival bleeding. She reported an anal lesion with tenderness and slow bleeding. She has not required frequent transfusions. Her daughter noted that all home antihypertensive medications were discontinued since she was discharged from inpatient rehab.              In the emergency department, she appeared ill, lethargic, unable to fully participate in interview, periodically awakening with pain. Labs showed hemoglobin 14.3 g/dL (from 13.2 on 2/24/2025). Repeat hemoglobin was 10.4. Alkaline phosphatase was 251 U/L, total bilirubin was 2.2 mg/dL, AST was 733 U/L, ALT was 478 U/L. She was given 2.1 liters of IV fluids, vancomycin, piperacillin-tazobactam, 4 mg of IV morphine, 80 mg of IV pantoprazole, topical lidocaine for rectal pain. She was admitted to Hospital Medicine Team S.     Overview/Hospital Course:  GGT was elevated at 447 U/L. LDH was elevated. Colorectal Surgery, Hematology, Nephrology, and Case Management were consulted. She expressed desire to go to [facility] since she gets her care at [facility]. Her mental status has been declining since October 2024. She has memory loss, disorientation, and does not remember her illness. Her mother had dementia. She had chest pain on 3/3/2025 while getting dialysis. EKG showed sinus tachycardia. Troponin was 1102 ng/L. Repeat was 704. Hematology recommended giving 2 units of fresh frozen plasma (FFP) and starting low rate heparin infusion then transitioning to apixaban 2.5 mg twice daily if she had no further bleeding. She was given another 2 units of FFP. Her daughter requested gastrostomy tube  placement for malnutrition. On 3/4/2025, heparin was held due to recurrent bleeding. She had intermittent somnolence. She was kept NPO. Speech Language Pathology was consulted. She was hypoglycemic so was put on 10% dextrose drip. Speech Language Pathology recommended full liquid diet. LFTs trended down. Heparin infusion was restarted on 3/5/2025. On 3/6/2025, her daughter decided that she did not want transfer to a [facility] hospital, instead she wanted to transfer her medical care to the [facility] system. Hemoglobin and hematocrit remained stable with no evidence of recurrent bleeding on 3/7/2025. Heparin drip was continued. She had epistaxis on 3/7/2025. Hemoglobin decreased to 6.7 g/dL on 3/8/2025. She developed right arm swelling. Repeat hemoglobin was 5.7. Heparin drip was held. She was transfused 1 unit of packed red blood cells (PRBCs). Hemoglobin improved to 8.2. Right upper extremity venous ultrasound showed known right internal jugular thrombus as well as subclavian thrombus, although the subclavian vein was not visualized when the internal jugular thrombus was diagnosed, so it was unknown if it was new. Hemoglobin was stable on 3/10/2025. Heparin drip was resumed. Sodium was found to be 118 mmol/L. She had been on dextrose drip for days to treat hypoglycemia due to poor oral intake. She was given normal saline instead. Gastroenterology placed a gastrostomy tube on 3/11/2025. Around midnight that night, she vomited twice. Nausea resolved. Abdomen X-ray showed nothing concerning. She tolerated tube feeds and heparin was transitioned to apixaban. Hyponatremia progressively improved. She had hypophosphatemia that was treated. Case Management worked on skilled nursing facility placement. On 3/20/2025, family opted to take her home with home health.  On 3/21/2025, she had recurrent rectal bleeding with demond red blood, with rectal pain when passing gas and defecating.     Interval History: Rectal bleed recurred. Had  normal bowel movements yesterday.    Review of Systems   Respiratory:  Negative for cough and shortness of breath.    Cardiovascular:  Negative for chest pain and palpitations.   Gastrointestinal:  Positive for blood in stool and vomiting.     Objective:     Vital Signs (Most Recent):  Temp: 98.4 °F (36.9 °C) (03/21/25 0411)  Pulse: 89 (03/21/25 1115)  Resp: 18 (03/21/25 1111)  BP: 103/64 (03/21/25 1115)  SpO2: 100 % (03/21/25 0815) Vital Signs (24h Range):  Temp:  [98 °F (36.7 °C)-99 °F (37.2 °C)] 98.4 °F (36.9 °C)  Pulse:  [85-95] 89  Resp:  [13-19] 18  SpO2:  [97 %-100 %] 100 %  BP: ()/(61-80) 103/64     Weight: 73.7 kg (162 lb 7.7 oz)  Body mass index is 27.04 kg/m².    Intake/Output Summary (Last 24 hours) at 3/21/2025 1127  Last data filed at 3/21/2025 0358  Gross per 24 hour   Intake 1410 ml   Output --   Net 1410 ml         Physical Exam  Vitals and nursing note reviewed.   Constitutional:       General: She is not in acute distress.     Appearance: She is well-developed. She is not diaphoretic.      Interventions: She is not intubated.  Pulmonary:      Effort: Pulmonary effort is normal. No accessory muscle usage or respiratory distress. She is not intubated.   Abdominal:      General: There is no distension.      Palpations: Abdomen is soft.      Tenderness: There is no abdominal tenderness. There is no guarding.   Skin:     General: Skin is warm and dry.      Coloration: Skin is not jaundiced or pale.   Neurological:      Mental Status: She is alert.      Motor: No tremor or seizure activity.   Psychiatric:         Attention and Perception: Attention normal.         Mood and Affect: Mood and affect normal.         Behavior: Behavior is not aggressive, hyperactive or combative.               Significant Labs: All pertinent labs within the past 24 hours have been reviewed.  CBC:   Recent Labs   Lab 03/20/25  0757 03/20/25  2303 03/21/25  0713   WBC 12.67 11.87 13.63*   HGB 8.4* 8.6* 9.3*   HCT 26.2*  26.7* 29.1*    304 331       Significant Imaging: I have reviewed all pertinent imaging results/findings within the past 24 hours.      Assessment & Plan  Rectal bleeding  Appreciate Colorectal Surgery. Subsided. Recurred on 3/21/2025. With rectal pain, could be hemorrhoids, as diverticular bleeding is typically painless. Maybe intolerant to anticoagulation. Evaluate if recent DVT is persistent before resuming. Try hydrocortisone rectal cream.   Unspecified severe protein-calorie malnutrition  Severe malnutrition patient with recent hx of worsening dysphagia, has had w/u for scleroderma, has hiatal hernia and ?esophageal dysmotility  - dysphagia to solids.   Developed wernicke encephalopathy from nutritional deficiency     Daughter reports concern of inadequate intake has had severe weight loss in recent months with multiple complex health conditions. She requested PEG placement. Nutrition gave recommendations on tube feeds. Giving minced and moist diet, Novasource Renal supplements.  Dysphagia  Appreciate SLP. Advanced diet to minced and moist. Appreciate GI. PEG placed 3/11/2025. Will start enteral water and tube feeds recommended by Nutrition.  FWF stopped per Nephrology request.  Wernicke encephalopathy  Continue on home thiamine supplementation.     GERD without esophagitis  Continue home pantoprazole.    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 Last Ultomiris dose 2/24/2025. Outpatient hematologist is Roma Cantu MD at [facility]. Consulted Hematology here. Appreciate assistance with management.  Elevated transaminase level  Hepatic steatosis on CT. Worsening hepatic function may contribute to her worsening coagulopathy elevated PT/PTT. Has significantly improved.  Renal hematoma, left, sequela  In January, due to biopsy in 2024. Most  recent imaging showed resolving appearance of remote hematoma.   Anemia of chronic renal failure, stage 5  Anemia is likely due to acute blood loss which was from GI bleed and active hemolysis due to HUS.  . Most recent hemoglobin and hematocrit are listed below.  Recent Labs     03/20/25 0757 03/20/25  2303 03/21/25  0713   HGB 8.4* 8.6* 9.3*   HCT 26.2* 26.7* 29.1*     Plan  - Monitor serial CBC  - Transfuse PRBC if patient becomes hemodynamically unstable, symptomatic or H/H drops below 7/21.  - Transfused pRBCs 3/8/2025 after having epistaxis.  - Stable until she had some bleeding at new PEG site. Transfuse again 3/12/2025.  - now stable  Hyponatremia  Hyponatremia is likely due to hypotonic fluids and poor oral intake. The patient's most recent sodium results are listed below.  Recent Labs     03/19/25  0431 03/20/25 0757 03/21/25  0713   * 131* 129*     Plan  - Monitor.  stable    Hypertension  Patient's blood pressure range in the last 24 hours was: BP  Min: 96/61  Max: 127/74.The patient's inpatient anti-hypertensive regimen is listed below:  Current Antihypertensives  NIFEdipine 24 hr tablet 30 mg, Daily, Oral  carvediloL tablet 12.5 mg, 2 times daily, Oral  hydrALAZINE tablet 50 mg, Every 8 hours, Oral  carvedilol (COREG) tablet, 2 times daily, Oral  NIFEdipine (PROCARDIA-XL) 24 hr tablet, Daily, Oral  hydrALAZINE (APRESOLINE) tablet, Every 8 hours, Oral    Plan  -was uncontrolled, so introduced home medications; hypotension so decreased dose of Nifeidpine and will hold tonight's dose of Coreg and Hydralazine    Hypophosphatemia  Patient's most recent phosphorus results are listed below.   Recent Labs     03/19/25  0431 03/20/25 0757 03/21/25  0713   PHOS <1.0* 2.0* 3.3     Plan  - Will treat hypophosphatemia with oral repletion  - repeat lab in AM for assessment  VTE Risk Mitigation (From admission, onward)           Ordered     heparin (porcine) injection 1,000 Units  As needed (PRN)          03/03/25 1524     IP VTE HIGH RISK PATIENT  Once         03/03/25 0416     Place sequential compression device  Until discontinued         03/03/25 0416     Reason for No Pharmacological VTE Prophylaxis  Once        Question:  Reasons:  Answer:  Active Bleeding    03/03/25 0416                    Discharge Planning   PATRICIA: 3/24/2025     Code Status: Full Code   Medical Readiness for Discharge Date:   Discharge Plan A: Skilled Nursing Facility   Discharge Delays: (!) Post-Acute Set-up (plan changed from home w/HH to SNF)            Please place Justification for DME        Edi Staton MD  Department of Hospital Medicine   [facility] - Telemetry Stepdown

## 2025-03-21 NOTE — ASSESSMENT & PLAN NOTE
Chronic. Last Ultomiris dose 2/24/2025. Outpatient hematologist is Roma Cantu MD at [facility]. Consulted Hematology here. Appreciate assistance with management.

## 2025-03-21 NOTE — ASSESSMENT & PLAN NOTE
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 . Most recent hemoglobin and hematocrit are listed below.  Recent Labs     03/20/25  0757 03/20/25  2303 03/21/25  0713   HGB 8.4* 8.6* 9.3*   HCT 26.2* 26.7* 29.1*     Plan  - Monitor serial CBC  - Transfuse PRBC if patient becomes hemodynamically unstable, symptomatic or H/H drops below 7/21.  - Transfused pRBCs 3/8/2025 after having epistaxis.  - Stable until she had some bleeding at new PEG site. Transfuse again 3/12/2025.  - now stable

## 2025-03-21 NOTE — ASSESSMENT & PLAN NOTE
Hyponatremia is likely due to hypotonic fluids and poor oral intake. The patient's most recent sodium results are listed below.  Recent Labs     03/19/25  0431 03/20/25  0757 03/21/25  0713   * 131* 129*     Plan  - Monitor.  stable

## 2025-03-22 LAB
ABSOLUTE EOSINOPHIL (OHS): 0.03 K/UL
ABSOLUTE MONOCYTE (OHS): 0.89 K/UL (ref 0.3–1)
ABSOLUTE NEUTROPHIL COUNT (OHS): 10.71 K/UL (ref 1.8–7.7)
BASOPHILS # BLD AUTO: 0.07 K/UL
BASOPHILS NFR BLD AUTO: 0.5 %
ERYTHROCYTE [DISTWIDTH] IN BLOOD BY AUTOMATED COUNT: 17.8 % (ref 11.5–14.5)
HCT VFR BLD AUTO: 25.5 % (ref 37–48.5)
HGB BLD-MCNC: 8.2 GM/DL (ref 12–16)
IMM GRANULOCYTES # BLD AUTO: 0.05 K/UL (ref 0–0.04)
IMM GRANULOCYTES NFR BLD AUTO: 0.4 % (ref 0–0.5)
LYMPHOCYTES # BLD AUTO: 1.41 K/UL (ref 1–4.8)
MAGNESIUM SERPL-MCNC: 1.5 MG/DL (ref 1.6–2.6)
MCH RBC QN AUTO: 28.9 PG (ref 27–50)
MCHC RBC AUTO-ENTMCNC: 32.2 G/DL (ref 32–36)
MCV RBC AUTO: 90 FL (ref 82–98)
NUCLEATED RBC (/100WBC) (OHS): 0 /100 WBC
PHOSPHATE SERPL-MCNC: 2.6 MG/DL (ref 2.7–4.5)
PLATELET # BLD AUTO: 349 K/UL (ref 150–450)
PMV BLD AUTO: 10.7 FL (ref 9.2–12.9)
POCT GLUCOSE: 101 MG/DL (ref 70–110)
POCT GLUCOSE: 72 MG/DL (ref 70–110)
POCT GLUCOSE: 94 MG/DL (ref 70–110)
RBC # BLD AUTO: 2.84 M/UL (ref 4–5.4)
RELATIVE EOSINOPHIL (OHS): 0.2 %
RELATIVE LYMPHOCYTE (OHS): 10.7 % (ref 18–48)
RELATIVE MONOCYTE (OHS): 6.8 % (ref 4–15)
RELATIVE NEUTROPHIL (OHS): 81.4 % (ref 38–73)
WBC # BLD AUTO: 13.16 K/UL (ref 3.9–12.7)

## 2025-03-22 PROCEDURE — 25000003 PHARM REV CODE 250: Performed by: STUDENT IN AN ORGANIZED HEALTH CARE EDUCATION/TRAINING PROGRAM

## 2025-03-22 PROCEDURE — 25000003 PHARM REV CODE 250: Performed by: HOSPITALIST

## 2025-03-22 PROCEDURE — 20600001 HC STEP DOWN PRIVATE ROOM

## 2025-03-22 PROCEDURE — 36415 COLL VENOUS BLD VENIPUNCTURE: CPT | Performed by: STUDENT IN AN ORGANIZED HEALTH CARE EDUCATION/TRAINING PROGRAM

## 2025-03-22 PROCEDURE — 63600175 PHARM REV CODE 636 W HCPCS: Performed by: HOSPITALIST

## 2025-03-22 PROCEDURE — 85025 COMPLETE CBC W/AUTO DIFF WBC: CPT | Performed by: HOSPITALIST

## 2025-03-22 PROCEDURE — 25000003 PHARM REV CODE 250: Performed by: FAMILY MEDICINE

## 2025-03-22 PROCEDURE — 83735 ASSAY OF MAGNESIUM: CPT | Performed by: STUDENT IN AN ORGANIZED HEALTH CARE EDUCATION/TRAINING PROGRAM

## 2025-03-22 PROCEDURE — 84100 ASSAY OF PHOSPHORUS: CPT | Performed by: STUDENT IN AN ORGANIZED HEALTH CARE EDUCATION/TRAINING PROGRAM

## 2025-03-22 RX ORDER — SODIUM,POTASSIUM PHOSPHATES 280-250MG
2 POWDER IN PACKET (EA) ORAL
Status: COMPLETED | OUTPATIENT
Start: 2025-03-22 | End: 2025-03-22

## 2025-03-22 RX ORDER — MAGNESIUM SULFATE HEPTAHYDRATE 40 MG/ML
2 INJECTION, SOLUTION INTRAVENOUS ONCE
Status: COMPLETED | OUTPATIENT
Start: 2025-03-22 | End: 2025-03-22

## 2025-03-22 RX ORDER — SODIUM CHLORIDE 9 MG/ML
INJECTION, SOLUTION INTRAVENOUS ONCE
Status: CANCELLED | OUTPATIENT
Start: 2025-03-24

## 2025-03-22 RX ORDER — SIMETHICONE 80 MG
1 TABLET,CHEWABLE ORAL 3 TIMES DAILY PRN
Status: DISCONTINUED | OUTPATIENT
Start: 2025-03-22 | End: 2025-03-25 | Stop reason: HOSPADM

## 2025-03-22 RX ADMIN — SIMETHICONE 80 MG: 80 TABLET, CHEWABLE ORAL at 09:03

## 2025-03-22 RX ADMIN — MIRTAZAPINE 15 MG: 15 TABLET, FILM COATED ORAL at 08:03

## 2025-03-22 RX ADMIN — MAGNESIUM SULFATE HEPTAHYDRATE 2 G: 40 INJECTION, SOLUTION INTRAVENOUS at 12:03

## 2025-03-22 RX ADMIN — CARVEDILOL 12.5 MG: 12.5 TABLET, FILM COATED ORAL at 08:03

## 2025-03-22 RX ADMIN — ALUMINUM HYDROXIDE, MAGNESIUM HYDROXIDE, AND SIMETHICONE 30 ML: 200; 200; 20 SUSPENSION ORAL at 08:03

## 2025-03-22 RX ADMIN — ESCITALOPRAM OXALATE 10 MG: 5 TABLET, FILM COATED ORAL at 08:03

## 2025-03-22 RX ADMIN — FOLIC ACID 1000 MCG: 1 TABLET ORAL at 08:03

## 2025-03-22 RX ADMIN — PANTOPRAZOLE SODIUM 40 MG: 40 TABLET, DELAYED RELEASE ORAL at 04:03

## 2025-03-22 RX ADMIN — Medication 1000 UNITS: at 08:03

## 2025-03-22 RX ADMIN — HYDROCORTISONE: 25 CREAM TOPICAL at 08:03

## 2025-03-22 RX ADMIN — POTASSIUM & SODIUM PHOSPHATES POWDER PACK 280-160-250 MG 2 PACKET: 280-160-250 PACK at 04:03

## 2025-03-22 RX ADMIN — Medication 100 MG: at 08:03

## 2025-03-22 RX ADMIN — POTASSIUM & SODIUM PHOSPHATES POWDER PACK 280-160-250 MG 2 PACKET: 280-160-250 PACK at 12:03

## 2025-03-22 RX ADMIN — NEPHROCAP 1 CAPSULE: 1 CAP ORAL at 08:03

## 2025-03-22 RX ADMIN — PANTOPRAZOLE SODIUM 40 MG: 40 TABLET, DELAYED RELEASE ORAL at 05:03

## 2025-03-22 NOTE — PROGRESS NOTES
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 She lives in [facility]. She works for [employer]. Her primary care physician is Dr. Delilah Kelley.               [place] was [place] at [place] from 10/23/2024 to 11/6/2024.              She was hospitalized at [place] from 11/15/2024 to 1/18/2024.              She was hospitalized at [place] from 12/9/2024 to 12/19/2024.              She was hospitalized at [place] from 12/25/2024 to 1/5/2025.              She was hospitalized at [facility] from 1/5/2025 to 1/31/2025. She was discharged [place] [facility] [place] [facility] until 2/18/2025.               She presented to [facility] on 3/2/2025 with rectal pain and bleeding. She receives her care in the [facility], not at [facility]. History was provided by her daughter Sue Barros. She is followed by Hematology at [place] and has been on immunomodulator  infusions every 8 weeks (Soliris, now Ultomiris, last dose 2/24/2025). She has severe debility, poor appetite, dysphagia to solids, mostly consumes liquid, but her daughter was concerned her nutritional intake is inadequate and inquired about feeding tube placement for chronic nutrition. She had watery stool on the day of presentation. Her sister noted that she had gingival bleeding. She reported an anal lesion with tenderness and slow bleeding. She has not required frequent transfusions. Her daughter noted that all home antihypertensive medications were discontinued since she was discharged from inpatient rehab.              In the emergency department, she appeared ill, lethargic, unable to fully participate in interview, periodically awakening with pain. Labs showed hemoglobin 14.3 g/dL (from 13.2 on 2/24/2025). Repeat hemoglobin was 10.4. Alkaline phosphatase was 251 U/L, total bilirubin was 2.2 mg/dL, AST was 733 U/L, ALT was 478 U/L. She was given 2.1 liters of IV fluids, vancomycin, piperacillin-tazobactam, 4 mg of IV morphine, 80 mg of IV pantoprazole, topical lidocaine for rectal pain. She was admitted to Hospital Medicine Team S.     Overview/Hospital Course:  GGT was elevated at 447 U/L. LDH was elevated. Colorectal Surgery, Hematology, Nephrology, and Case Management were consulted. She expressed desire to go to [facility] since she gets her care at [facility]. Her mental status has been declining since October 2024. She has memory loss, disorientation, and does not remember her illness. Her mother had dementia. She had chest pain on 3/3/2025 while getting dialysis. EKG showed sinus tachycardia. Troponin was 1102 ng/L. Repeat was 704. Hematology recommended giving 2 units of fresh frozen plasma (FFP) and starting low rate heparin infusion then transitioning to apixaban 2.5 mg twice daily if she had no further bleeding. She was given another 2 units of FFP. Her daughter requested gastrostomy tube  placement for malnutrition. On 3/4/2025, heparin was held due to recurrent bleeding. She had intermittent somnolence. She was kept NPO. Speech Language Pathology was consulted. She was hypoglycemic so was put on 10% dextrose drip. Speech Language Pathology recommended full liquid diet. LFTs trended down. Heparin infusion was restarted on 3/5/2025. On 3/6/2025, her daughter decided that she did not want transfer to a [facility] hospital, instead she wanted to transfer her medical care to the [facility] system. Hemoglobin and hematocrit remained stable with no evidence of recurrent bleeding on 3/7/2025. Heparin drip was continued. She had epistaxis on 3/7/2025. Hemoglobin decreased to 6.7 g/dL on 3/8/2025. She developed right arm swelling. Repeat hemoglobin was 5.7. Heparin drip was held. She was transfused 1 unit of packed red blood cells (PRBCs). Hemoglobin improved to 8.2. Right upper extremity venous ultrasound showed known right internal jugular thrombus as well as subclavian thrombus, although the subclavian vein was not visualized when the internal jugular thrombus was diagnosed, so it was unknown if it was new. Hemoglobin was stable on 3/10/2025. Heparin drip was resumed. Sodium was found to be 118 mmol/L. She had been on dextrose drip for days to treat hypoglycemia due to poor oral intake. She was given normal saline instead. Gastroenterology placed a gastrostomy tube on 3/11/2025. Around midnight that night, she vomited twice. Nausea resolved. Abdomen X-ray showed nothing concerning. She tolerated tube feeds and heparin was transitioned to apixaban. Hyponatremia progressively improved. She had hypophosphatemia that was treated. Case Management worked on skilled nursing facility placement. On 3/20/2025, family opted to take her home with home health.  On 3/21/2025, she had recurrent rectal bleeding with demond red blood, with rectal pain when passing gas and defecating. Apixaban was held. Ultrasound showed persistent  occlusive thrombus in the right internal jugular vein.     Interval History: Ultrasound showed persistent thrombus. Holding anticoagulation since rectal bleed recurrence yesterday. Will resume when no bleeding for 24 to 48 hours.     Review of Systems   Respiratory:  Negative for cough and shortness of breath.    Cardiovascular:  Negative for chest pain and palpitations.     Objective:     Vital Signs (Most Recent):  Temp: 98.7 °F (37.1 °C) (03/22/25 0710)  Pulse: 98 (03/22/25 0710)  Resp: 15 (03/22/25 0710)  BP: 122/74 (03/22/25 0824)  SpO2: (!) 94 % (03/22/25 0710) Vital Signs (24h Range):  Temp:  [98.1 °F (36.7 °C)-99.4 °F (37.4 °C)] 98.7 °F (37.1 °C)  Pulse:  [] 98  Resp:  [11-28] 15  SpO2:  [94 %-99 %] 94 %  BP: ()/(49-74) 122/74     Weight: 73.6 kg (162 lb 4.1 oz)  Body mass index is 27 kg/m².    Intake/Output Summary (Last 24 hours) at 3/22/2025 1132  Last data filed at 3/22/2025 0327  Gross per 24 hour   Intake 2520 ml   Output 388 ml   Net 2132 ml         Physical Exam  Vitals and nursing note reviewed.   Constitutional:       General: She is not in acute distress.     Appearance: She is well-developed. She is not diaphoretic.      Interventions: She is not intubated.  Pulmonary:      Effort: Pulmonary effort is normal. No accessory muscle usage or respiratory distress. She is not intubated.   Skin:     General: Skin is warm and dry.      Coloration: Skin is not jaundiced or pale.   Neurological:      Mental Status: She is alert.      Motor: No tremor or seizure activity.   Psychiatric:         Attention and Perception: Attention normal.         Mood and Affect: Mood and affect normal.         Behavior: Behavior is not aggressive, hyperactive or combative.               Significant Labs: All pertinent labs within the past 24 hours have been reviewed.  CBC:   Recent Labs   Lab 03/20/25  2303 03/21/25  0713 03/22/25  0507   WBC 11.87 13.63* 13.16*   HGB 8.6* 9.3* 8.2*   HCT 26.7* 29.1* 25.5*   PLT  304 331 349       Significant Imaging: I have reviewed all pertinent imaging results/findings within the past 24 hours.  US Upper Extremity Veins Right 3/21/25: FINDINGS:   Persistent occlusive thrombus in the right internal jugular vein.   The left internal jugular vein is patent.       Assessment & Plan  Rectal bleeding  Appreciate Colorectal Surgery. Subsided. Recurred on 3/21/2025. With rectal pain, could be hemorrhoids, as diverticular bleeding is typically painless. GI recommended evaluating continued need for anticoagulation. Ultrasound showed persistent thrombus so still needs anticoagulation. Giving hydrocortisone rectal cream.   Unspecified severe protein-calorie malnutrition  Severe malnutrition patient with recent hx of worsening dysphagia, has had w/u for scleroderma, has hiatal hernia and ?esophageal dysmotility  - dysphagia to solids.   Developed wernicke encephalopathy from nutritional deficiency     Daughter reports concern of inadequate intake has had severe weight loss in recent months with multiple complex health conditions. She requested PEG placement. Nutrition gave recommendations on tube feeds. Giving minced and moist diet, Novasource Renal supplements.  Dysphagia  Appreciate SLP. Advanced diet to minced and moist. Appreciate GI. PEG placed 3/11/2025. Will start enteral water and tube feeds recommended by Nutrition.  FWF stopped per Nephrology request.  Wernicke encephalopathy  Continue on home thiamine supplementation.     GERD without esophagitis  Continue home pantoprazole.    ESRD (end stage renal disease)  Nephrology managing dialysis  Atypical HUS (hemolytic uremic syndrome) with mutation in thrombomodulin gene  Chronic. Last Ultomiris dose 2/24/2025. Outpatient hematologist is Roma Cantu MD at [facility]. Consulted Hematology here. Appreciate assistance with management.  Elevated transaminase level  Hepatic steatosis on CT. Worsening hepatic function may contribute to her worsening  coagulopathy elevated PT/PTT. Has significantly improved.  Renal hematoma, left, sequela  In January, due to biopsy in 2024. Most recent imaging showed resolving appearance of remote hematoma.   Anemia of chronic renal failure, stage 5  Anemia is likely due to acute blood loss which was from GI bleed and active hemolysis due to HUS.  . Most recent hemoglobin and hematocrit are listed below.  Recent Labs     03/20/25  2303 03/21/25  0713 03/22/25  0507   HGB 8.6* 9.3* 8.2*   HCT 26.7* 29.1* 25.5*     Plan  - Monitor serial CBC  - Transfuse PRBC if patient becomes hemodynamically unstable, symptomatic or H/H drops below 7/21.  - Transfused pRBCs 3/8/2025 after having epistaxis.  - Stable until she had some bleeding at new PEG site. Transfuse again 3/12/2025.  - now stable  Hyponatremia  Hyponatremia is likely due to hypotonic fluids and poor oral intake. The patient's most recent sodium results are listed below.  Recent Labs     03/20/25  0757 03/21/25  0713   * 129*     Plan  - Monitor.  stable    Hypertension  Patient's blood pressure range in the last 24 hours was: BP  Min: 76/49  Max: 122/74.The patient's inpatient anti-hypertensive regimen is listed below:  Current Antihypertensives  carvediloL tablet 12.5 mg, 2 times daily, Oral  carvedilol (COREG) tablet, 2 times daily, Oral  NIFEdipine (PROCARDIA-XL) 24 hr tablet, Daily, Oral  hydrALAZINE (APRESOLINE) tablet, Every 8 hours, Oral    Plan  Was started on nifedipine and hydralazine. Blood pressures too well controlled so holding nifedipine and hydralazine.    Hypophosphatemia  Patient's most recent phosphorus results are listed below.   Recent Labs     03/20/25  0757 03/21/25  0713 03/22/25  0507   PHOS 2.0* 3.3 2.6*     Plan  - Replete  - also treat hypomagnesemia  VTE Risk Mitigation (From admission, onward)           Ordered     heparin (porcine) injection 1,000 Units  As needed (PRN)         03/03/25 1524     IP VTE HIGH RISK PATIENT  Once          03/03/25 0416     Place sequential compression device  Until discontinued         03/03/25 0416     Reason for No Pharmacological VTE Prophylaxis  Once        Question:  Reasons:  Answer:  Active Bleeding    03/03/25 0416                    Discharge Planning   PATRICIA: 3/25/2025     Code Status: Full Code   Medical Readiness for Discharge Date:   Discharge Plan A: Skilled Nursing Facility   Discharge Delays: (!) Post-Acute Set-up (plan changed from home w/HH to SNF)            Please place Justification for DME        Edi Staton MD  Department of Hospital Medicine   [facility] - Telemetry Stepdown

## 2025-03-22 NOTE — ASSESSMENT & PLAN NOTE
Patient's most recent phosphorus results are listed below.   Recent Labs     03/20/25  0757 03/21/25  0713 03/22/25  0507   PHOS 2.0* 3.3 2.6*     Plan  - Replete  - also treat hypomagnesemia

## 2025-03-22 NOTE — ASSESSMENT & PLAN NOTE
Patient's blood pressure range in the last 24 hours was: BP  Min: 76/49  Max: 122/74.The patient's inpatient anti-hypertensive regimen is listed below:  Current Antihypertensives  carvediloL tablet 12.5 mg, 2 times daily, Oral  carvedilol (COREG) tablet, 2 times daily, Oral  NIFEdipine (PROCARDIA-XL) 24 hr tablet, Daily, Oral  hydrALAZINE (APRESOLINE) tablet, Every 8 hours, Oral    Plan  Was started on nifedipine and hydralazine. Blood pressures too well controlled so holding nifedipine and hydralazine.

## 2025-03-22 NOTE — ASSESSMENT & PLAN NOTE
Appreciate Colorectal Surgery. Subsided. Recurred on 3/21/2025. With rectal pain, could be hemorrhoids, as diverticular bleeding is typically painless. GI recommended evaluating continued need for anticoagulation. Ultrasound showed persistent thrombus so still needs anticoagulation. Giving hydrocortisone rectal cream.

## 2025-03-22 NOTE — SUBJECTIVE & OBJECTIVE
Interval History: Ultrasound showed persistent thrombus. Holding anticoagulation since rectal bleed recurrence yesterday. Will resume when no bleeding for 24 to 48 hours.     Review of Systems   Respiratory:  Negative for cough and shortness of breath.    Cardiovascular:  Negative for chest pain and palpitations.     Objective:     Vital Signs (Most Recent):  Temp: 98.7 °F (37.1 °C) (03/22/25 0710)  Pulse: 98 (03/22/25 0710)  Resp: 15 (03/22/25 0710)  BP: 122/74 (03/22/25 0824)  SpO2: (!) 94 % (03/22/25 0710) Vital Signs (24h Range):  Temp:  [98.1 °F (36.7 °C)-99.4 °F (37.4 °C)] 98.7 °F (37.1 °C)  Pulse:  [] 98  Resp:  [11-28] 15  SpO2:  [94 %-99 %] 94 %  BP: ()/(49-74) 122/74     Weight: 73.6 kg (162 lb 4.1 oz)  Body mass index is 27 kg/m².    Intake/Output Summary (Last 24 hours) at 3/22/2025 1132  Last data filed at 3/22/2025 0327  Gross per 24 hour   Intake 2520 ml   Output 388 ml   Net 2132 ml         Physical Exam  Vitals and nursing note reviewed.   Constitutional:       General: She is not in acute distress.     Appearance: She is well-developed. She is not diaphoretic.      Interventions: She is not intubated.  Pulmonary:      Effort: Pulmonary effort is normal. No accessory muscle usage or respiratory distress. She is not intubated.   Skin:     General: Skin is warm and dry.      Coloration: Skin is not jaundiced or pale.   Neurological:      Mental Status: She is alert.      Motor: No tremor or seizure activity.   Psychiatric:         Attention and Perception: Attention normal.         Mood and Affect: Mood and affect normal.         Behavior: Behavior is not aggressive, hyperactive or combative.               Significant Labs: All pertinent labs within the past 24 hours have been reviewed.  CBC:   Recent Labs   Lab 03/20/25  2303 03/21/25  0713 03/22/25  0507   WBC 11.87 13.63* 13.16*   HGB 8.6* 9.3* 8.2*   HCT 26.7* 29.1* 25.5*    331 349       Significant Imaging: I have reviewed all  pertinent imaging results/findings within the past 24 hours.  US Upper Extremity Veins Right 3/21/25: FINDINGS:   Persistent occlusive thrombus in the right internal jugular vein.   The left internal jugular vein is patent.

## 2025-03-22 NOTE — ASSESSMENT & PLAN NOTE
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 . Most recent hemoglobin and hematocrit are listed below.  Recent Labs     03/20/25  2303 03/21/25  0713 03/22/25  0507   HGB 8.6* 9.3* 8.2*   HCT 26.7* 29.1* 25.5*     Plan  - Monitor serial CBC  - Transfuse PRBC if patient becomes hemodynamically unstable, symptomatic or H/H drops below 7/21.  - Transfused pRBCs 3/8/2025 after having epistaxis.  - Stable until she had some bleeding at new PEG site. Transfuse again 3/12/2025.  - now stable

## 2025-03-22 NOTE — ASSESSMENT & PLAN NOTE
Hyponatremia is likely due to hypotonic fluids and poor oral intake. The patient's most recent sodium results are listed below.  Recent Labs     03/20/25  0757 03/21/25  0713   * 129*     Plan  - Monitor.  stable

## 2025-03-23 LAB
ABSOLUTE EOSINOPHIL (OHS): 0.11 K/UL
ABSOLUTE MONOCYTE (OHS): 0.94 K/UL (ref 0.3–1)
ABSOLUTE NEUTROPHIL COUNT (OHS): 7.82 K/UL (ref 1.8–7.7)
BASOPHILS # BLD AUTO: 0.09 K/UL
BASOPHILS NFR BLD AUTO: 0.8 %
ERYTHROCYTE [DISTWIDTH] IN BLOOD BY AUTOMATED COUNT: 18.1 % (ref 11.5–14.5)
HCT VFR BLD AUTO: 24.2 % (ref 37–48.5)
HGB BLD-MCNC: 7.6 GM/DL (ref 12–16)
IMM GRANULOCYTES # BLD AUTO: 0.03 K/UL (ref 0–0.04)
IMM GRANULOCYTES NFR BLD AUTO: 0.3 % (ref 0–0.5)
LYMPHOCYTES # BLD AUTO: 2.12 K/UL (ref 1–4.8)
MCH RBC QN AUTO: 28.4 PG (ref 27–50)
MCHC RBC AUTO-ENTMCNC: 31.4 G/DL (ref 32–36)
MCV RBC AUTO: 90 FL (ref 82–98)
NUCLEATED RBC (/100WBC) (OHS): 0 /100 WBC
PLATELET # BLD AUTO: 389 K/UL (ref 150–450)
PMV BLD AUTO: 11.1 FL (ref 9.2–12.9)
POCT GLUCOSE: 103 MG/DL (ref 70–110)
POCT GLUCOSE: 117 MG/DL (ref 70–110)
POCT GLUCOSE: 71 MG/DL (ref 70–110)
POCT GLUCOSE: 75 MG/DL (ref 70–110)
POCT GLUCOSE: 89 MG/DL (ref 70–110)
RBC # BLD AUTO: 2.68 M/UL (ref 4–5.4)
RELATIVE EOSINOPHIL (OHS): 1 %
RELATIVE LYMPHOCYTE (OHS): 19.1 % (ref 18–48)
RELATIVE MONOCYTE (OHS): 8.5 % (ref 4–15)
RELATIVE NEUTROPHIL (OHS): 70.3 % (ref 38–73)
WBC # BLD AUTO: 11.11 K/UL (ref 3.9–12.7)

## 2025-03-23 PROCEDURE — 25000003 PHARM REV CODE 250: Performed by: STUDENT IN AN ORGANIZED HEALTH CARE EDUCATION/TRAINING PROGRAM

## 2025-03-23 PROCEDURE — 20600001 HC STEP DOWN PRIVATE ROOM

## 2025-03-23 PROCEDURE — 25000003 PHARM REV CODE 250: Performed by: HOSPITALIST

## 2025-03-23 PROCEDURE — 25000003 PHARM REV CODE 250: Performed by: FAMILY MEDICINE

## 2025-03-23 PROCEDURE — 85025 COMPLETE CBC W/AUTO DIFF WBC: CPT | Performed by: HOSPITALIST

## 2025-03-23 PROCEDURE — 36415 COLL VENOUS BLD VENIPUNCTURE: CPT | Performed by: HOSPITALIST

## 2025-03-23 PROCEDURE — 97530 THERAPEUTIC ACTIVITIES: CPT | Mod: CQ

## 2025-03-23 RX ADMIN — MIRTAZAPINE 15 MG: 15 TABLET, FILM COATED ORAL at 08:03

## 2025-03-23 RX ADMIN — NEPHROCAP 1 CAPSULE: 1 CAP ORAL at 08:03

## 2025-03-23 RX ADMIN — APIXABAN 5 MG: 5 TABLET, FILM COATED ORAL at 08:03

## 2025-03-23 RX ADMIN — ESCITALOPRAM OXALATE 10 MG: 5 TABLET, FILM COATED ORAL at 08:03

## 2025-03-23 RX ADMIN — CARVEDILOL 12.5 MG: 12.5 TABLET, FILM COATED ORAL at 08:03

## 2025-03-23 RX ADMIN — APIXABAN 5 MG: 5 TABLET, FILM COATED ORAL at 11:03

## 2025-03-23 RX ADMIN — PANTOPRAZOLE SODIUM 40 MG: 40 TABLET, DELAYED RELEASE ORAL at 04:03

## 2025-03-23 RX ADMIN — OXYCODONE HYDROCHLORIDE AND ACETAMINOPHEN 1 TABLET: 5; 325 TABLET ORAL at 10:03

## 2025-03-23 RX ADMIN — OXYCODONE HYDROCHLORIDE AND ACETAMINOPHEN 1 TABLET: 5; 325 TABLET ORAL at 09:03

## 2025-03-23 RX ADMIN — HYDROCORTISONE: 25 CREAM TOPICAL at 08:03

## 2025-03-23 RX ADMIN — FOLIC ACID 1000 MCG: 1 TABLET ORAL at 08:03

## 2025-03-23 RX ADMIN — HYDROCORTISONE: 25 CREAM TOPICAL at 09:03

## 2025-03-23 RX ADMIN — PANTOPRAZOLE SODIUM 40 MG: 40 TABLET, DELAYED RELEASE ORAL at 06:03

## 2025-03-23 RX ADMIN — Medication 100 MG: at 08:03

## 2025-03-23 RX ADMIN — Medication 1000 UNITS: at 08:03

## 2025-03-23 NOTE — ASSESSMENT & PLAN NOTE
Hyponatremia is likely due to hypotonic fluids and poor oral intake. The patient's most recent sodium results are listed below.  Recent Labs     03/21/25  0713   *     Plan  - Monitor.  stable

## 2025-03-23 NOTE — ASSESSMENT & PLAN NOTE
Patient's most recent phosphorus results are listed below.   Recent Labs     03/21/25  0713 03/22/25  0507   PHOS 3.3 2.6*     Plan  - Replete  - also treat hypomagnesemia

## 2025-03-23 NOTE — PT/OT/SLP PROGRESS
"Physical Therapy Treatment    Patient Name:  Madelaine Barros   MRN:  [id number]    Recommendations:     Discharge Recommendations: Moderate Intensity Therapy  Discharge Equipment Recommendations: none  Barriers to discharge: Inaccessible home Decreased functional mobility increasing fall risk    Assessment:     Madelaine Barros is a 59 y.o. female admitted with a medical diagnosis of Rectal bleeding.  She presents with the following impairments/functional limitations: weakness, impaired endurance, impaired self care skills, impaired functional mobility, gait instability, impaired balance, decreased coordination, decreased upper extremity function, decreased lower extremity function, decreased safety awareness, pain, impaired skin requiring max assistance and verbal cues for bed mob, scooting to EOB, sit < > stand transitions to prevent falls due to weakness, pain.   In light of pt's current functional level and deficits, it is anticipated that pt will need to participate in a moderate intensity rehab program consisting of PT and OT in order to achieve full rehab potential to return to previous level of function and roles.  Pt will cont to benefit from skilled PT intervention to address deficits and improve functional mobility.   .    Rehab Prognosis: Fair; patient would benefit from acute skilled PT services to address these deficits and reach maximum level of function.    Recent Surgery: Procedure(s) (LRB):  EGD (ESOPHAGOGASTRODUODENOSCOPY) (N/A) 12 Days Post-Op    Plan:     During this hospitalization, patient to be seen 4 x/week to address the identified rehab impairments via gait training, therapeutic activities and progress toward the following goals:    Plan of Care Expires:  04/09/25    Subjective     Chief Complaint: "I can't sit up, I have to pass gas" "Bringing my legs out of the bed gives me pain in between my legs, it's raw down there"  Pain/Comfort:  Pain Rating 1:  (no rating provided)  Location " - Side 1: Bilateral  Location - Orientation 1: generalized  Location 1:  (genital area and LE's during attempt for sup > sit transition)  Pain Addressed 1: Reposition, Distraction, Cessation of Activity  Pain Rating Post-Intervention 1:  (no rating provided)      Objective:     Communicated with nurse (Shannon) prior to session.  Patient found HOB elevated with pressure relief boots, telemetry (waffle pad.  No family present) upon PT entry to room.     General Precautions: Standard, aspiration, fall, NPO  Orthopedic Precautions: N/A  Braces: N/A  Respiratory Status: Room air     Functional Mobility:  Bed Mobility:     Rolling Left:  min A with use of rail  Scooting: dependent of 1 via drawsheet  Bridging: moderate assistance  Supine to Sit: attempted to assist pt to transition from sup to sitting at the EOB via logroll x2, however, pt presents resisting PTA and PT tech's efforts (see subjective)      AM-PAC 6 CLICK MOBILITY  Turning over in bed (including adjusting bedclothes, sheets and blankets)?: 3  Sitting down on and standing up from a chair with arms (e.g., wheelchair, bedside commode, etc.): 2  Moving from lying on back to sitting on the side of the bed?: 2  Moving to and from a bed to a chair (including a wheelchair)?: 2  Need to walk in hospital room?: 2  Climbing 3-5 steps with a railing?: 1  Basic Mobility Total Score: 12       Treatment & Education:  Patient provided with daily orientation and goals of this PT session. They were educated to call for assistance and to transfer with hospital staff only.  Also, pt was educated on the effects of prolonged immobility and the importance of performing OOB activity and exercises to promote healing and reduce recovery time    Patient left HOB elevated with all lines intact, call button in reach, and nurse notified..    GOALS:   Multidisciplinary Problems       Physical Therapy Goals          Problem: Physical Therapy    Goal Priority Disciplines Outcome  Interventions   Physical Therapy Goal     PT, PT/OT Progressing    Description: Goals to be met by: 25     Patient will increase functional independence with mobility by performin. Supine to sit with Modified [place]   2. Sit to supine with Modified [place]  3. Sit to stand transfer with Modified [place] with AD as needed  4. Gait  x 150 feet with Supervision using Rolling Walker.                          DME Justifications:  No DME recommended requiring DME justifications    Time Tracking:     PT Received On: 25  PT Start Time: 1056     PT Stop Time: 1110  PT Total Time (min): 14 min     Billable Minutes: Therapeutic Activity 14    Treatment Type: Treatment  PT/PTA: PTA     Number of PTA visits since last PT visit: 3     2025

## 2025-03-23 NOTE — ASSESSMENT & PLAN NOTE
Appreciate SLP. Advanced diet to minced and moist. Appreciate GI. PEG placed 3/11/2025. Tolerating tube feeds.

## 2025-03-23 NOTE — PROGRESS NOTES
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 She lives in [facility]. She works for [employer]. Her primary care physician is Dr. Delilah Kelley. Her hematologist is Dr. Roma Cantu.              She was hospitalized at [facility] from 10/23/2024 to 11/6/2024.              She was hospitalized at [facility] from 11/15/2024 to 1/18/2024.              She was hospitalized at [facility] from 12/9/2024 to 12/19/2024.              She was hospitalized at [facility] from 12/25/2024 to 1/5/2025.              She was hospitalized at [facility] from 1/5/2025 to 1/31/2025. She was discharged to [facility] inpatient rehab until 2/18/2025.               She presented to [facility] on 3/2/2025 with rectal pain and bleeding. She receives her care in the [facility] system, not at [facility]. History was provided by her daughter Sue Barrso. She is followed by Hematology at [facility] and has been on immunomodulator infusions every 8 weeks (Soliris, now Ultomiris, last dose 2/24/2025). She has severe debility, poor appetite, dysphagia to solids, mostly consumes liquid, but her daughter was concerned her nutritional intake is inadequate and inquired about feeding tube placement for chronic nutrition. She had watery stool on the day of presentation. Her sister noted that she had gingival bleeding. She reported an anal lesion with tenderness and slow bleeding. She has not required frequent transfusions. Her daughter noted that all home antihypertensive medications were discontinued since she was discharged from inpatient rehab.              In the emergency department, she appeared ill, lethargic, unable to fully participate in interview, periodically awakening with pain. Labs showed hemoglobin 14.3 g/dL (from 13.2 on 2/24/2025). Repeat hemoglobin was 10.4. Alkaline phosphatase was 251 U/L, total bilirubin was 2.2 mg/dL, AST was 733 U/L, ALT was 478 U/L. She was given 2.1 liters of IV fluids, vancomycin, piperacillin-tazobactam, 4 mg of IV morphine, 80 mg of IV pantoprazole, topical lidocaine for rectal pain. She was admitted to Hospital Medicine Team S.     Overview/Hospital Course:  GGT was elevated at 447 U/L. LDH was elevated. Colorectal Surgery, Hematology, Nephrology, and Case Management were consulted. She expressed desire to go to [facility] since she gets her care at [facility]. Her mental status has been declining since October 2024. She has memory loss, disorientation, and does not remember her illness. Her mother had dementia. She had chest pain on 3/3/2025 while getting dialysis. EKG showed sinus tachycardia. Troponin was 1102 ng/L. Repeat was 704. Hematology recommended giving 2 units of fresh frozen plasma (FFP) and starting low rate heparin infusion then transitioning to apixaban 2.5 mg twice daily if she had no further bleeding. She was given another 2 units of FFP. Her  daughter requested gastrostomy tube placement for malnutrition. On 3/4/2025, heparin was held due to recurrent bleeding. She had intermittent somnolence. She was kept NPO. Speech Language Pathology was consulted. She was hypoglycemic so was put on 10% dextrose drip. Speech Language Pathology recommended full liquid diet. LFTs trended down. Heparin infusion was restarted on 3/5/2025. On 3/6/2025, her daughter decided that she did not want transfer to a [facility] hospital, instead she wanted to transfer her medical care to the [facility] system. Hemoglobin and hematocrit remained stable with no evidence of recurrent bleeding on 3/7/2025. Heparin drip was continued. She had epistaxis on 3/7/2025. Hemoglobin decreased to 6.7 g/dL on 3/8/2025. She developed right arm swelling. Repeat hemoglobin was 5.7. Heparin drip was held. She was transfused 1 unit of packed red blood cells (PRBCs). Hemoglobin improved to 8.2. Right upper extremity venous ultrasound showed known right internal jugular thrombus as well as subclavian thrombus, although the subclavian vein was not visualized when the internal jugular thrombus was diagnosed, so it was unknown if it was new. Hemoglobin was stable on 3/10/2025. Heparin drip was resumed. Sodium was found to be 118 mmol/L. She had been on dextrose drip for days to treat hypoglycemia due to poor oral intake. She was given normal saline instead. Gastroenterology placed a gastrostomy tube on 3/11/2025. Around midnight that night, she vomited twice. Nausea resolved. Abdomen X-ray showed nothing concerning. She tolerated tube feeds and heparin was transitioned to apixaban. Hyponatremia progressively improved. She had hypophosphatemia that was treated. Case Management worked on skilled nursing facility placement. On 3/20/2025, family opted to take her home with home health.  On 3/21/2025, she had recurrent rectal bleeding with demond red blood, with rectal pain when passing gas and defecating. Apixaban was  held. Ultrasound showed persistent occlusive thrombus in the right internal jugular vein. Bleeding subsided. Her blood pressures were too well controlled on the new nifedipine and hydralazine, so they were stopped.     Interval History: Discussed restarting apixaban and monitoring for bleeding recurrence. Her niece is in the room and asked about her atypical HUS and wanted more explanation. I told her that since it is a previously diagnosed disease that she is being followed for outpatient already, it would be better for her specialists to explain it.    Review of Systems   Respiratory:  Negative for cough and shortness of breath.    Cardiovascular:  Negative for chest pain and palpitations.     Objective:     Vital Signs (Most Recent):  Temp: 98.4 °F (36.9 °C) (03/23/25 1145)  Pulse: 85 (03/23/25 1145)  Resp: 20 (03/23/25 1145)  BP: (!) 88/55 (03/23/25 1145)  SpO2: 97 % (03/23/25 1145) Vital Signs (24h Range):  Temp:  [97.9 °F (36.6 °C)-98.8 °F (37.1 °C)] 98.4 °F (36.9 °C)  Pulse:  [] 85  Resp:  [12-20] 20  SpO2:  [97 %-100 %] 97 %  BP: ()/(55-87) 88/55     Weight: 73.6 kg (162 lb 4.1 oz)  Body mass index is 27 kg/m².    Intake/Output Summary (Last 24 hours) at 3/23/2025 1306  Last data filed at 3/23/2025 0800  Gross per 24 hour   Intake 250 ml   Output --   Net 250 ml         Physical Exam  Vitals and nursing note reviewed.   Constitutional:       General: She is not in acute distress.     Appearance: She is well-developed. She is not diaphoretic.      Interventions: She is not intubated.  Pulmonary:      Effort: Pulmonary effort is normal. No accessory muscle usage or respiratory distress. She is not intubated.   Skin:     General: Skin is warm and dry.      Coloration: Skin is not jaundiced or pale.   Neurological:      Mental Status: She is alert.      Motor: No tremor or seizure activity.   Psychiatric:         Attention and Perception: Attention normal.         Mood and Affect: Mood and affect  normal.         Behavior: Behavior is not aggressive, hyperactive or combative.               Significant Labs: All pertinent labs within the past 24 hours have been reviewed.  CBC:   Recent Labs   Lab 03/22/25  0507 03/23/25  0353   WBC 13.16* 11.11   HGB 8.2* 7.6*   HCT 25.5* 24.2*    389       Significant Imaging: I have reviewed all pertinent imaging results/findings within the past 24 hours.      Assessment & Plan  Rectal bleeding  Appreciate Colorectal Surgery. Subsided. Recurred on 3/21/2025. With rectal pain, could be hemorrhoids, as diverticular bleeding is typically painless. GI recommended evaluating continued need for anticoagulation. Ultrasound showed persistent thrombus so still needs anticoagulation. Giving hydrocortisone rectal cream. Bleeding subsided. Resume apixaban.  Unspecified severe protein-calorie malnutrition  Severe malnutrition patient with recent hx of worsening dysphagia, has had w/u for scleroderma, has hiatal hernia and ?esophageal dysmotility  - dysphagia to solids.   Developed wernicke encephalopathy from nutritional deficiency     Daughter reports concern of inadequate intake has had severe weight loss in recent months with multiple complex health conditions. She requested PEG placement. Nutrition gave recommendations on tube feeds. Giving minced and moist diet, Novasource Renal supplements.  Dysphagia  Appreciate SLP. Advanced diet to minced and moist. Appreciate GI. PEG placed 3/11/2025. Tolerating tube feeds.  Wernicke encephalopathy  Continue on home thiamine supplementation.     GERD without esophagitis  Continue home pantoprazole.    ESRD (end stage renal disease)  Nephrology managing dialysis  Atypical HUS (hemolytic uremic syndrome) with mutation in thrombomodulin gene  Chronic. Last Ultomiris dose 2/24/2025. Outpatient hematologist is Roma Cantu MD at [facility]. Seen by Hematology here. Appreciate assistance with management.  Elevated transaminase level  Hepatic  steatosis on CT. Worsening hepatic function may contribute to her worsening coagulopathy elevated PT/PTT. Has significantly improved.  Renal hematoma, left, sequela  In January, due to biopsy in 2024. Most recent imaging showed resolving appearance of remote hematoma.   Anemia of chronic renal failure, stage 5  Anemia is likely due to acute blood loss which was from GI bleed and active hemolysis due to HUS.  . Most recent hemoglobin and hematocrit are listed below.  Recent Labs     03/21/25  0713 03/22/25  0507 03/23/25  0353   HGB 9.3* 8.2* 7.6*   HCT 29.1* 25.5* 24.2*     Plan  - Monitor serial CBC  - Transfuse PRBC if patient becomes hemodynamically unstable, symptomatic or H/H drops below 7/21.  - Transfused pRBCs 3/8/2025 after having epistaxis.  - Stable until she had some bleeding at new PEG site. Transfuse again 3/12/2025.  - now stable  Hyponatremia  Hyponatremia is likely due to hypotonic fluids and poor oral intake. The patient's most recent sodium results are listed below.  Recent Labs     03/21/25  0713   *     Plan  - Monitor.  stable    Hypertension  Patient's blood pressure range in the last 24 hours was: BP  Min: 88/55  Max: 142/87.The patient's inpatient anti-hypertensive regimen is listed below:  Current Antihypertensives  carvediloL tablet 12.5 mg, 2 times daily, Oral  carvedilol (COREG) tablet, 2 times daily, Oral  NIFEdipine (PROCARDIA-XL) 24 hr tablet, Daily, Oral  hydrALAZINE (APRESOLINE) tablet, Every 8 hours, Oral    Plan  Was started on nifedipine and hydralazine. Blood pressures too well controlled so holding nifedipine and hydralazine.    Hypophosphatemia  Patient's most recent phosphorus results are listed below.   Recent Labs     03/21/25  0713 03/22/25  0507   PHOS 3.3 2.6*     Plan  - Replete  - also treat hypomagnesemia  VTE Risk Mitigation (From admission, onward)           Ordered     apixaban tablet 5 mg  2 times daily         03/23/25 0957     heparin (porcine) injection  1,000 Units  As needed (PRN)         03/03/25 1524     IP VTE HIGH RISK PATIENT  Once         03/03/25 0416     Place sequential compression device  Until discontinued         03/03/25 0416     Reason for No Pharmacological VTE Prophylaxis  Once        Question:  Reasons:  Answer:  Active Bleeding    03/03/25 0416                    Discharge Planning   PATRICIA: 3/25/2025     Code Status: Full Code   Medical Readiness for Discharge Date:   Discharge Plan A: Skilled Nursing Facility   Discharge Delays: (!) Post-Acute Set-up (plan changed from home w/HH to SNF)            Please place Justification for DME        Edi Staton MD  Department of Hospital Medicine   [facility] - Telemetry Stepdown

## 2025-03-23 NOTE — ASSESSMENT & PLAN NOTE
Patient's blood pressure range in the last 24 hours was: BP  Min: 88/55  Max: 142/87.The patient's inpatient anti-hypertensive regimen is listed below:  Current Antihypertensives  carvediloL tablet 12.5 mg, 2 times daily, Oral  carvedilol (COREG) tablet, 2 times daily, Oral  NIFEdipine (PROCARDIA-XL) 24 hr tablet, Daily, Oral  hydrALAZINE (APRESOLINE) tablet, Every 8 hours, Oral    Plan  Was started on nifedipine and hydralazine. Blood pressures too well controlled so holding nifedipine and hydralazine.

## 2025-03-23 NOTE — ASSESSMENT & PLAN NOTE
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 . Most recent hemoglobin and hematocrit are listed below.  Recent Labs     03/21/25  0713 03/22/25  0507 03/23/25  0353   HGB 9.3* 8.2* 7.6*   HCT 29.1* 25.5* 24.2*     Plan  - Monitor serial CBC  - Transfuse PRBC if patient becomes hemodynamically unstable, symptomatic or H/H drops below 7/21.  - Transfused pRBCs 3/8/2025 after having epistaxis.  - Stable until she had some bleeding at new PEG site. Transfuse again 3/12/2025.  - now stable

## 2025-03-23 NOTE — SUBJECTIVE & OBJECTIVE
Interval History: Discussed restarting apixaban and monitoring for bleeding recurrence. Her niece is in the room and asked about her atypical HUS and wanted more explanation. I told her that since it is a previously diagnosed disease that she is being followed for outpatient already, it would be better for her specialists to explain it.    Review of Systems   Respiratory:  Negative for cough and shortness of breath.    Cardiovascular:  Negative for chest pain and palpitations.     Objective:     Vital Signs (Most Recent):  Temp: 98.4 °F (36.9 °C) (03/23/25 1145)  Pulse: 85 (03/23/25 1145)  Resp: 20 (03/23/25 1145)  BP: (!) 88/55 (03/23/25 1145)  SpO2: 97 % (03/23/25 1145) Vital Signs (24h Range):  Temp:  [97.9 °F (36.6 °C)-98.8 °F (37.1 °C)] 98.4 °F (36.9 °C)  Pulse:  [] 85  Resp:  [12-20] 20  SpO2:  [97 %-100 %] 97 %  BP: ()/(55-87) 88/55     Weight: 73.6 kg (162 lb 4.1 oz)  Body mass index is 27 kg/m².    Intake/Output Summary (Last 24 hours) at 3/23/2025 1306  Last data filed at 3/23/2025 0800  Gross per 24 hour   Intake 250 ml   Output --   Net 250 ml         Physical Exam  Vitals and nursing note reviewed.   Constitutional:       General: She is not in acute distress.     Appearance: She is well-developed. She is not diaphoretic.      Interventions: She is not intubated.  Pulmonary:      Effort: Pulmonary effort is normal. No accessory muscle usage or respiratory distress. She is not intubated.   Skin:     General: Skin is warm and dry.      Coloration: Skin is not jaundiced or pale.   Neurological:      Mental Status: She is alert.      Motor: No tremor or seizure activity.   Psychiatric:         Attention and Perception: Attention normal.         Mood and Affect: Mood and affect normal.         Behavior: Behavior is not aggressive, hyperactive or combative.               Significant Labs: All pertinent labs within the past 24 hours have been reviewed.  CBC:   Recent Labs   Lab 03/22/25  0507  03/23/25  0353   WBC 13.16* 11.11   HGB 8.2* 7.6*   HCT 25.5* 24.2*    389       Significant Imaging: I have reviewed all pertinent imaging results/findings within the past 24 hours.

## 2025-03-23 NOTE — ASSESSMENT & PLAN NOTE
Chronic. Last Ultomiris dose 2/24/2025. Outpatient hematologist is Roma Cantu MD at [facility]. Seen by Hematology here. Appreciate assistance with management.

## 2025-03-23 NOTE — ASSESSMENT & PLAN NOTE
Appreciate Colorectal Surgery. Subsided. Recurred on 3/21/2025. With rectal pain, could be hemorrhoids, as diverticular bleeding is typically painless. GI recommended evaluating continued need for anticoagulation. Ultrasound showed persistent thrombus so still needs anticoagulation. Giving hydrocortisone rectal cream. Bleeding subsided. Resume apixaban.

## 2025-03-24 PROBLEM — I10 HYPERTENSION: Chronic | Status: ACTIVE | Noted: 2025-03-16

## 2025-03-24 LAB
ABSOLUTE EOSINOPHIL (OHS): 0.28 K/UL
ABSOLUTE MONOCYTE (OHS): 0.85 K/UL (ref 0.3–1)
ABSOLUTE NEUTROPHIL COUNT (OHS): 6.04 K/UL (ref 1.8–7.7)
ALBUMIN SERPL BCP-MCNC: 1.8 G/DL (ref 3.5–5.2)
ALP SERPL-CCNC: 117 UNIT/L (ref 40–150)
ALT SERPL W/O P-5'-P-CCNC: 8 UNIT/L (ref 10–44)
ANION GAP (OHS): 8 MMOL/L (ref 8–16)
AST SERPL-CCNC: 40 UNIT/L (ref 11–45)
BASOPHILS # BLD AUTO: 0.11 K/UL
BASOPHILS NFR BLD AUTO: 1.2 %
BILIRUB SERPL-MCNC: 0.8 MG/DL (ref 0.1–1)
BUN SERPL-MCNC: 13 MG/DL (ref 6–20)
BUN SERPL-MCNC: 39 MG/DL (ref 6–20)
CALCIUM SERPL-MCNC: 7.9 MG/DL (ref 8.7–10.5)
CHLORIDE SERPL-SCNC: 100 MMOL/L (ref 95–110)
CO2 SERPL-SCNC: 24 MMOL/L (ref 23–29)
CREAT SERPL-MCNC: 3.3 MG/DL (ref 0.5–1.4)
ERYTHROCYTE [DISTWIDTH] IN BLOOD BY AUTOMATED COUNT: 18.3 % (ref 11.5–14.5)
GFR SERPLBLD CREATININE-BSD FMLA CKD-EPI: 16 ML/MIN/1.73/M2
GLUCOSE SERPL-MCNC: 58 MG/DL (ref 70–110)
HCT VFR BLD AUTO: 23.3 % (ref 37–48.5)
HGB BLD-MCNC: 7.4 GM/DL (ref 12–16)
IMM GRANULOCYTES # BLD AUTO: 0.01 K/UL (ref 0–0.04)
IMM GRANULOCYTES NFR BLD AUTO: 0.1 % (ref 0–0.5)
LYMPHOCYTES # BLD AUTO: 2.05 K/UL (ref 1–4.8)
MAGNESIUM SERPL-MCNC: 1.4 MG/DL (ref 1.6–2.6)
MCH RBC QN AUTO: 28.6 PG (ref 27–50)
MCHC RBC AUTO-ENTMCNC: 31.8 G/DL (ref 32–36)
MCV RBC AUTO: 90 FL (ref 82–98)
NUCLEATED RBC (/100WBC) (OHS): 0 /100 WBC
PHOSPHATE SERPL-MCNC: 4.9 MG/DL (ref 2.7–4.5)
PLATELET # BLD AUTO: 387 K/UL (ref 150–450)
PMV BLD AUTO: 10.3 FL (ref 9.2–12.9)
POCT GLUCOSE: 128 MG/DL (ref 70–110)
POCT GLUCOSE: 55 MG/DL (ref 70–110)
POCT GLUCOSE: 88 MG/DL (ref 70–110)
POCT GLUCOSE: 90 MG/DL (ref 70–110)
POTASSIUM SERPL-SCNC: 4.9 MMOL/L (ref 3.5–5.1)
PROT SERPL-MCNC: 4.9 GM/DL (ref 6–8.4)
RBC # BLD AUTO: 2.59 M/UL (ref 4–5.4)
RELATIVE EOSINOPHIL (OHS): 3 %
RELATIVE LYMPHOCYTE (OHS): 21.9 % (ref 18–48)
RELATIVE MONOCYTE (OHS): 9.1 % (ref 4–15)
RELATIVE NEUTROPHIL (OHS): 64.7 % (ref 38–73)
SODIUM SERPL-SCNC: 132 MMOL/L (ref 136–145)
WBC # BLD AUTO: 9.34 K/UL (ref 3.9–12.7)

## 2025-03-24 PROCEDURE — 84100 ASSAY OF PHOSPHORUS: CPT | Performed by: STUDENT IN AN ORGANIZED HEALTH CARE EDUCATION/TRAINING PROGRAM

## 2025-03-24 PROCEDURE — 80053 COMPREHEN METABOLIC PANEL: CPT | Performed by: NURSE PRACTITIONER

## 2025-03-24 PROCEDURE — 83735 ASSAY OF MAGNESIUM: CPT | Performed by: STUDENT IN AN ORGANIZED HEALTH CARE EDUCATION/TRAINING PROGRAM

## 2025-03-24 PROCEDURE — 20600001 HC STEP DOWN PRIVATE ROOM

## 2025-03-24 PROCEDURE — 25000003 PHARM REV CODE 250: Performed by: HOSPITALIST

## 2025-03-24 PROCEDURE — 63600175 PHARM REV CODE 636 W HCPCS: Performed by: STUDENT IN AN ORGANIZED HEALTH CARE EDUCATION/TRAINING PROGRAM

## 2025-03-24 PROCEDURE — 80100016 HC MAINTENANCE HEMODIALYSIS

## 2025-03-24 PROCEDURE — 36415 COLL VENOUS BLD VENIPUNCTURE: CPT | Performed by: STUDENT IN AN ORGANIZED HEALTH CARE EDUCATION/TRAINING PROGRAM

## 2025-03-24 PROCEDURE — 90935 HEMODIALYSIS ONE EVALUATION: CPT | Mod: ,,,

## 2025-03-24 PROCEDURE — 63600175 PHARM REV CODE 636 W HCPCS: Mod: JZ,TB | Performed by: HOSPITALIST

## 2025-03-24 PROCEDURE — 84520 ASSAY OF UREA NITROGEN: CPT

## 2025-03-24 PROCEDURE — 85025 COMPLETE CBC W/AUTO DIFF WBC: CPT | Performed by: HOSPITALIST

## 2025-03-24 RX ORDER — ACETAMINOPHEN 325 MG/1
650 TABLET ORAL EVERY 8 HOURS PRN
Status: DISCONTINUED | OUTPATIENT
Start: 2025-03-24 | End: 2025-03-25 | Stop reason: HOSPADM

## 2025-03-24 RX ORDER — OXYCODONE AND ACETAMINOPHEN 5; 325 MG/1; MG/1
1 TABLET ORAL EVERY 6 HOURS PRN
Refills: 0 | Status: DISCONTINUED | OUTPATIENT
Start: 2025-03-24 | End: 2025-03-25 | Stop reason: HOSPADM

## 2025-03-24 RX ORDER — MIRTAZAPINE 15 MG/1
15 TABLET, FILM COATED ORAL NIGHTLY
Status: DISCONTINUED | OUTPATIENT
Start: 2025-03-24 | End: 2025-03-25 | Stop reason: HOSPADM

## 2025-03-24 RX ADMIN — GLUCAGON 1 MG: 1 INJECTION, POWDER, LYOPHILIZED, FOR SOLUTION INTRAMUSCULAR; INTRAVENOUS at 01:03

## 2025-03-24 RX ADMIN — APIXABAN 5 MG: 5 TABLET, FILM COATED ORAL at 09:03

## 2025-03-24 RX ADMIN — HYDROCORTISONE: 25 CREAM TOPICAL at 08:03

## 2025-03-24 RX ADMIN — NEPHROCAP 1 CAPSULE: 1 CAP ORAL at 02:03

## 2025-03-24 RX ADMIN — PANTOPRAZOLE SODIUM 40 MG: 40 TABLET, DELAYED RELEASE ORAL at 04:03

## 2025-03-24 RX ADMIN — PANTOPRAZOLE SODIUM 40 MG: 40 TABLET, DELAYED RELEASE ORAL at 05:03

## 2025-03-24 RX ADMIN — HEPARIN SODIUM 1000 UNITS: 1000 INJECTION, SOLUTION INTRAVENOUS; SUBCUTANEOUS at 11:03

## 2025-03-24 RX ADMIN — PHENYLEPHRINE HYDROCHLORIDE AND FAT, HARD 1 SUPPOSITORY: 5; 1.77 SUPPOSITORY RECTAL at 09:03

## 2025-03-24 RX ADMIN — OXYCODONE HYDROCHLORIDE AND ACETAMINOPHEN 1 TABLET: 5; 325 TABLET ORAL at 05:03

## 2025-03-24 RX ADMIN — FOLIC ACID 1000 MCG: 1 TABLET ORAL at 09:03

## 2025-03-24 RX ADMIN — ESCITALOPRAM OXALATE 10 MG: 5 TABLET, FILM COATED ORAL at 02:03

## 2025-03-24 RX ADMIN — MIRTAZAPINE 15 MG: 15 TABLET, FILM COATED ORAL at 08:03

## 2025-03-24 RX ADMIN — APIXABAN 5 MG: 5 TABLET, FILM COATED ORAL at 08:03

## 2025-03-24 RX ADMIN — Medication 100 MG: at 02:03

## 2025-03-24 RX ADMIN — Medication 1000 UNITS: at 02:03

## 2025-03-24 RX ADMIN — HYDROCORTISONE: 25 CREAM TOPICAL at 09:03

## 2025-03-24 NOTE — PT/OT/SLP PROGRESS
Occupational Therapy      Patient Name:  Madelaine Barros   MRN:  [id number]    Patient not seen today secondary to pt HERNANDO with Dialysis in AM. Unable to attempt session in PM. Will follow-up as appropriate.    3/24/2025

## 2025-03-24 NOTE — SUBJECTIVE & OBJECTIVE
Interval History: Stool is liquid, no constipation. Hemoglobin is 7.4 from 7.6 yesterday. Discussed plan with her sister and niece. Will monitor another day on apixaban and try a suppository medication to help local prevention of bleeding.     Review of Systems   Respiratory:  Negative for cough and shortness of breath.    Cardiovascular:  Negative for chest pain and palpitations.     Objective:     Vital Signs (Most Recent):  Temp: 98.5 °F (36.9 °C) (03/23/25 1552)  Pulse: 83 (03/24/25 0915)  Resp: 19 (03/24/25 0507)  BP: (!) 88/55 (03/24/25 0915)  SpO2: 97 % (03/23/25 1552) Vital Signs (24h Range):  Temp:  [98.4 °F (36.9 °C)-98.5 °F (36.9 °C)] 98.5 °F (36.9 °C)  Pulse:  [83-94] 83  Resp:  [14-20] 19  SpO2:  [97 %] 97 %  BP: ()/(50-80) 88/55     Weight: 73.6 kg (162 lb 4.1 oz)  Body mass index is 27 kg/m².  No intake or output data in the 24 hours ending 03/24/25 0922        Physical Exam  Vitals and nursing note reviewed.   Constitutional:       General: She is not in acute distress.     Appearance: She is well-developed. She is not diaphoretic.      Interventions: She is not intubated.  Pulmonary:      Effort: Pulmonary effort is normal. No accessory muscle usage or respiratory distress. She is not intubated.   Skin:     General: Skin is warm and dry.      Coloration: Skin is not jaundiced or pale.   Neurological:      Mental Status: She is alert.      Motor: No tremor or seizure activity.   Psychiatric:         Attention and Perception: Attention normal.         Mood and Affect: Mood and affect normal.         Behavior: Behavior is not aggressive, hyperactive or combative.               Significant Labs: All pertinent labs within the past 24 hours have been reviewed.  CBC:   Recent Labs   Lab 03/23/25  0353 03/24/25  0500   WBC 11.11 9.34   HGB 7.6* 7.4*   HCT 24.2* 23.3*    387       Significant Imaging: I have reviewed all pertinent imaging results/findings within the past 24 hours.

## 2025-03-24 NOTE — PT/OT/SLP PROGRESS
Asked to arrange a CASEY Physical Therapy      Patient Name:  Madelaine Barros   MRN:  [id number]    Patient not seen today secondary to at 8:36 AM pt was off the floor (dialysis). Writing therapist unable to make second attempt in PM. Will follow-up at next PT POC date.

## 2025-03-24 NOTE — PROGRESS NOTES
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 She lives in [place]. She works for [employer]. Her primary care physician is Dr. Delilah Kelley. Her hematologist is Dr. Roma Cantu.              She was hospitalized at [facility] from 10/23/2024 to 11/6/2024.              She was hospitalized at [facility] from 11/15/2024 to 1/18/2024.              She was hospitalized at [facility] from 12/9/2024 to 12/19/2024.              She was hospitalized at [facility] from 12/25/2024 to 1/5/2025.              She was hospitalized at [facility] from 1/5/2025 to 1/31/2025. She was [facility] until 2/18/2025.               She presented to [facility] Emergency Department on 3/2/2025 with rectal pain and bleeding. She receives her care in the [facility], not at [facility]. History was provided by her daughter Sue Barros. She is followed by Hematology at [facility] and has been on immunomodulator infusions every 8 weeks (Soliris, now Ultomiris, last dose 2/24/2025). She has severe debility, poor appetite, dysphagia to solids, mostly consumes liquid, but her daughter was concerned her nutritional intake is inadequate and inquired about feeding tube placement for chronic nutrition. She had watery stool on the day of presentation. Her sister noted that she had gingival bleeding. She reported an anal lesion with tenderness and slow bleeding. She has not required frequent transfusions. Her daughter noted that all home antihypertensive medications were discontinued since she was discharged from inpatient rehab.              In the emergency department, she appeared ill, lethargic, unable to fully participate in interview, periodically awakening with pain. Labs showed hemoglobin 14.3 g/dL (from 13.2 on 2/24/2025). Repeat hemoglobin was 10.4. Alkaline phosphatase was 251 U/L, total bilirubin was 2.2 mg/dL, AST was 733 U/L, ALT was 478 U/L. She was given 2.1 liters of IV fluids, vancomycin, piperacillin-tazobactam, 4 mg of IV morphine, 80 mg of IV pantoprazole, topical lidocaine for rectal pain. She was admitted to Hospital Medicine Team S.     Overview/Hospital Course:  GGT was elevated at 447 U/L. LDH was elevated. Colorectal Surgery, Hematology, Nephrology, and Case Management were consulted. Colorectal Surgery noted a posterior midline anal fissure. She expressed desire to go to [facility] since she gets her care at [facility]. Her mental status has been declining since October 2024. She has memory loss, disorientation, and does not remember her illness. Her mother had dementia. She had chest pain on 3/3/2025 while getting dialysis. EKG showed sinus tachycardia. Troponin was 1102 ng/L. Repeat was 704. Hematology recommended giving 2 units of fresh frozen plasma (FFP) and starting low rate heparin infusion then transitioning to apixaban 2.5 mg twice daily if she had no  further bleeding. She was given another 2 units of FFP. Her daughter requested gastrostomy tube placement for malnutrition. On 3/4/2025, heparin was held due to recurrent bleeding. She had intermittent somnolence. She was kept NPO. Speech Language Pathology was consulted. She was hypoglycemic so was put on 10% dextrose drip. Speech Language Pathology recommended full liquid diet. LFTs trended down. Heparin infusion was restarted on 3/5/2025. On 3/6/2025, her daughter decided that she did not want transfer to a [facility] hospital, instead she wanted to transfer her medical care to the [facility] system. Hemoglobin and hematocrit remained stable with no evidence of recurrent bleeding on 3/7/2025. Heparin drip was continued. She had epistaxis on 3/7/2025. Hemoglobin decreased to 6.7 g/dL on 3/8/2025. She developed right arm swelling. Repeat hemoglobin was 5.7. Heparin drip was held. She was transfused 1 unit of packed red blood cells (PRBCs). Hemoglobin improved to 8.2. Right upper extremity venous ultrasound showed known right internal jugular thrombus as well as subclavian thrombus, although the subclavian vein was not visualized when the internal jugular thrombus was diagnosed, so it was unknown if it was new. Hemoglobin was stable on 3/10/2025. Heparin drip was resumed. Sodium was found to be 118 mmol/L. She had been on dextrose drip for days to treat hypoglycemia due to poor oral intake. She was given normal saline instead. Gastroenterology placed a gastrostomy tube on 3/11/2025. Around midnight that night, she vomited twice. Nausea resolved. Abdomen X-ray showed nothing concerning. She tolerated tube feeds and heparin was transitioned to apixaban. Hyponatremia progressively improved. She had hypophosphatemia that was treated. Case Management worked on skilled nursing facility placement. On 3/20/2025, family opted to take her home with home health.  On 3/21/2025, she had recurrent rectal bleeding with demond red blood,  with rectal pain when passing gas and defecating. Apixaban was held. Ultrasound showed persistent occlusive thrombus in the right internal jugular vein. Bleeding subsided. Her blood pressures were too well controlled on the new nifedipine and hydralazine, so they were stopped. Apixaban was resumed on 3/23/2025.     Interval History: Stool is liquid, no constipation. Hemoglobin is 7.4 from 7.6 yesterday. Discussed plan with her sister and niece. Will monitor another day on apixaban and try a suppository medication to help local prevention of bleeding.     Review of Systems   Respiratory:  Negative for cough and shortness of breath.    Cardiovascular:  Negative for chest pain and palpitations.     Objective:     Vital Signs (Most Recent):  Temp: 98.5 °F (36.9 °C) (03/23/25 1552)  Pulse: 83 (03/24/25 0915)  Resp: 19 (03/24/25 0507)  BP: (!) 88/55 (03/24/25 0915)  SpO2: 97 % (03/23/25 1552) Vital Signs (24h Range):  Temp:  [98.4 °F (36.9 °C)-98.5 °F (36.9 °C)] 98.5 °F (36.9 °C)  Pulse:  [83-94] 83  Resp:  [14-20] 19  SpO2:  [97 %] 97 %  BP: ()/(50-80) 88/55     Weight: 73.6 kg (162 lb 4.1 oz)  Body mass index is 27 kg/m².  No intake or output data in the 24 hours ending 03/24/25 0922        Physical Exam  Vitals and nursing note reviewed.   Constitutional:       General: She is not in acute distress.     Appearance: She is well-developed. She is not diaphoretic.      Interventions: She is not intubated.  Pulmonary:      Effort: Pulmonary effort is normal. No accessory muscle usage or respiratory distress. She is not intubated.   Skin:     General: Skin is warm and dry.      Coloration: Skin is not jaundiced or pale.   Neurological:      Mental Status: She is alert.      Motor: No tremor or seizure activity.   Psychiatric:         Attention and Perception: Attention normal.         Mood and Affect: Mood and affect normal.         Behavior: Behavior is not aggressive, hyperactive or combative.                Significant Labs: All pertinent labs within the past 24 hours have been reviewed.  CBC:   Recent Labs   Lab 03/23/25  0353 03/24/25  0500   WBC 11.11 9.34   HGB 7.6* 7.4*   HCT 24.2* 23.3*    387       Significant Imaging: I have reviewed all pertinent imaging results/findings within the past 24 hours.      Assessment & Plan  Rectal bleeding  Appreciate Colorectal Surgery. Found to have an anal fissure on 3/3/2025. Bleeding subsided. Recurred on 3/21/2025. Giving hydrocortisone rectal cream. Bleeding subsided. Resumed apixaban. Try phenylephrine suppository.  Unspecified severe protein-calorie malnutrition  Severe malnutrition patient with recent hx of worsening dysphagia, has had w/u for scleroderma, has hiatal hernia and ?esophageal dysmotility  - dysphagia to solids.   Developed wernicke encephalopathy from nutritional deficiency     Daughter reports concern of inadequate intake has had severe weight loss in recent months with multiple complex health conditions. She requested PEG placement. Nutrition gave recommendations on tube feeds. Giving minced and moist diet, Novasource Renal supplements.  Dysphagia  Appreciate SLP. Advanced diet to minced and moist. Appreciate GI. PEG placed 3/11/2025. Tolerating tube feeds.  Wernicke encephalopathy  Continue on home thiamine supplementation.     GERD without esophagitis  Continue home pantoprazole.    ESRD (end stage renal disease)  Nephrology managing dialysis  Atypical HUS (hemolytic uremic syndrome) with mutation in thrombomodulin gene  Chronic. Last Ultomiris dose 2/24/2025. Outpatient hematologist is Roma Cantu MD at [facility]. Seen by Hematology here. Appreciate assistance with management.  Elevated transaminase level  Hepatic steatosis on CT. Worsening hepatic function may contribute to her worsening coagulopathy elevated PT/PTT. Has significantly improved.  Renal hematoma, left, sequela  In January, due to biopsy in 2024. Most recent imaging showed  resolving appearance of remote hematoma.   Anemia of chronic renal failure, stage 5  Anemia is likely due to acute blood loss which was from GI bleed and active hemolysis due to HUS.  . Most recent hemoglobin and hematocrit are listed below.  Recent Labs     03/22/25  0507 03/23/25  0353 03/24/25  0500   HGB 8.2* 7.6* 7.4*   HCT 25.5* 24.2* 23.3*     Plan  - Monitor serial CBC  - Transfuse PRBC if patient becomes hemodynamically unstable, symptomatic or H/H drops below 7/21.  - Transfused pRBCs 3/8/2025 after having epistaxis.  - Stable until she had some bleeding at new PEG site. Transfuse again 3/12/2025.  - now stable  Hyponatremia  Hyponatremia is likely due to hypotonic fluids and poor oral intake. The patient's most recent sodium results are listed below.  Recent Labs     03/24/25  0500   *     Plan  - Monitor.  stable    Hypertension  Patient's blood pressure range in the last 24 hours was: BP  Min: 83/50  Max: 124/77.The patient's inpatient anti-hypertensive regimen is listed below:  Current Antihypertensives  carvediloL tablet 12.5 mg, 2 times daily, Oral  carvedilol (COREG) tablet, 2 times daily, Oral  NIFEdipine (PROCARDIA-XL) 24 hr tablet, Daily, Oral  hydrALAZINE (APRESOLINE) tablet, Every 8 hours, Oral    Plan  Was started on nifedipine and hydralazine. Blood pressures too well controlled so holding nifedipine and hydralazine.    Hypophosphatemia  Patient's most recent phosphorus results are listed below.   Recent Labs     03/22/25  0507 03/24/25  0500   PHOS 2.6* 4.9*     Plan  - Replete  - also treat hypomagnesemia  VTE Risk Mitigation (From admission, onward)           Ordered     apixaban tablet 5 mg  2 times daily         03/23/25 0957     heparin (porcine) injection 1,000 Units  As needed (PRN)         03/03/25 1524     IP VTE HIGH RISK PATIENT  Once         03/03/25 0416     Place sequential compression device  Until discontinued         03/03/25 0416     Reason for No Pharmacological VTE  Prophylaxis  Once        Question:  Reasons:  Answer:  Active Bleeding    03/03/25 0416                    Discharge Planning   PATRICIA: 3/25/2025     Code Status: Full Code   Medical Readiness for Discharge Date:   Discharge Plan A: Skilled Nursing Facility   Discharge Delays: (!) Post-Acute Set-up (plan changed from home w/HH to SNF)            Please place Justification for DME        Edi Staton MD  Department of Hospital Medicine   [facility] - Telemetry Stepdown

## 2025-03-24 NOTE — ASSESSMENT & PLAN NOTE
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 . Most recent hemoglobin and hematocrit are listed below.  Recent Labs     03/22/25  0507 03/23/25  0353 03/24/25  0500   HGB 8.2* 7.6* 7.4*   HCT 25.5* 24.2* 23.3*     Plan  - Monitor serial CBC  - Transfuse PRBC if patient becomes hemodynamically unstable, symptomatic or H/H drops below 7/21.  - Transfused pRBCs 3/8/2025 after having epistaxis.  - Stable until she had some bleeding at new PEG site. Transfuse again 3/12/2025.  - now stable

## 2025-03-24 NOTE — ASSESSMENT & PLAN NOTE
Patient's most recent phosphorus results are listed below.   Recent Labs     03/22/25  0507 03/24/25  0500   PHOS 2.6* 4.9*     Plan  - Replete  - also treat hypomagnesemia

## 2025-03-24 NOTE — NURSING
Patient going off unit per stretcher in no apparent distress with perineum creams for incontinence with chart

## 2025-03-24 NOTE — PROGRESS NOTES
[facility]     Patient currently on hemodialysis for removal of uremic toxins and volume.     Patient seen and evaluated on hemodialysis, tolerating treatment, see HD flowsheet for vitals and assessments.     Labs have been reviewed and the dialysate bath has been adjusted.        Assessment/Plan:     -Patient ESRD, seen on HD, tolerating treatment well, w/o complaints   -UF goal of 500 cc initially, however became hypotensive with SBP 80s, asymptomatic. UF paused, given 100 cc bolus, BP improved to 90s systolic, UF goal adjusted to 0  -Renal diet, if not NPO   -Strict I/O's and daily weights  -Daily renal function panels  -Keep MAP >65 while on HD   -Hgb goal 10-11, hgb 7.4, slowly downtrending in the setting of intermittent rectal bleeding, transfuse for hgb < 7.0  -Phos 4.9, controlled, not currently on binders  -Will continue to follow while inpatient      Olivia Carrasquillo PA-C  Nephrology

## 2025-03-24 NOTE — PROGRESS NOTES
03/24/25 1148   Post-Hemodialysis Assessment   Blood Volume Processed (Liters) 62.3 L   Duration of Treatment 210 minutes   Net Fluid Removal 0     HD tx completed, blood pressure decreased during tx bolus of 100ml given and UF adjusted per nephrology. Pt in NAD/VSS, report called to primary RN. Back to unit via stretcher.

## 2025-03-24 NOTE — ASSESSMENT & PLAN NOTE
Hyponatremia is likely due to hypotonic fluids and poor oral intake. The patient's most recent sodium results are listed below.  Recent Labs     03/24/25  0500   *     Plan  - Monitor.  stable

## 2025-03-24 NOTE — ASSESSMENT & PLAN NOTE
Chronic. Last Ultomiris dose 2/24/2025. Outpatient hematologist is Roma Cantu MD at [facility]. Seen by Hematology here. Appreciate assistance with management.

## 2025-03-24 NOTE — PROGRESS NOTES
0750 Arrived to [facility] via stretcher with dialysis tech    0802 Maintenance HD started via R PC without issue

## 2025-03-24 NOTE — ASSESSMENT & PLAN NOTE
Patient's blood pressure range in the last 24 hours was: BP  Min: 83/50  Max: 124/77.The patient's inpatient anti-hypertensive regimen is listed below:  Current Antihypertensives  carvediloL tablet 12.5 mg, 2 times daily, Oral  carvedilol (COREG) tablet, 2 times daily, Oral  NIFEdipine (PROCARDIA-XL) 24 hr tablet, Daily, Oral  hydrALAZINE (APRESOLINE) tablet, Every 8 hours, Oral    Plan  Was started on nifedipine and hydralazine. Blood pressures too well controlled so holding nifedipine and hydralazine.

## 2025-03-24 NOTE — ASSESSMENT & PLAN NOTE
Appreciate Colorectal Surgery. Found to have an anal fissure on 3/3/2025. Bleeding subsided. Recurred on 3/21/2025. Giving hydrocortisone rectal cream. Bleeding subsided. Resumed apixaban. Try phenylephrine suppository.

## 2025-03-24 NOTE — PLAN OF CARE
JORGE spoke with daughter Sue this am. Plan is still home with [facility] per daughter. JORGE spoke with Sue with [facility] to update on when patient may discharge. [facility] is following for discharge.      Tete Goetz RN     [phone]

## 2025-03-25 VITALS
HEART RATE: 96 BPM | HEIGHT: 65 IN | SYSTOLIC BLOOD PRESSURE: 101 MMHG | OXYGEN SATURATION: 99 % | BODY MASS INDEX: 27.03 KG/M2 | DIASTOLIC BLOOD PRESSURE: 60 MMHG | WEIGHT: 162.25 LBS | TEMPERATURE: 99 F | RESPIRATION RATE: 13 BRPM

## 2025-03-25 PROBLEM — E83.39 HYPOPHOSPHATEMIA: Status: RESOLVED | Noted: 2025-03-19 | Resolved: 2025-03-25

## 2025-03-25 LAB
POCT GLUCOSE: 105 MG/DL (ref 70–110)
POCT GLUCOSE: 115 MG/DL (ref 70–110)
POCT GLUCOSE: 118 MG/DL (ref 70–110)
POCT GLUCOSE: 75 MG/DL (ref 70–110)

## 2025-03-25 PROCEDURE — 97535 SELF CARE MNGMENT TRAINING: CPT

## 2025-03-25 PROCEDURE — 97112 NEUROMUSCULAR REEDUCATION: CPT | Mod: CQ

## 2025-03-25 PROCEDURE — 63600175 PHARM REV CODE 636 W HCPCS: Performed by: HOSPITALIST

## 2025-03-25 PROCEDURE — 25000003 PHARM REV CODE 250: Performed by: HOSPITALIST

## 2025-03-25 PROCEDURE — 97110 THERAPEUTIC EXERCISES: CPT | Mod: CQ

## 2025-03-25 PROCEDURE — 99232 SBSQ HOSP IP/OBS MODERATE 35: CPT | Mod: ,,, | Performed by: NURSE PRACTITIONER

## 2025-03-25 PROCEDURE — 97530 THERAPEUTIC ACTIVITIES: CPT

## 2025-03-25 RX ORDER — SODIUM CHLORIDE 9 MG/ML
INJECTION, SOLUTION INTRAVENOUS ONCE
Status: CANCELLED | OUTPATIENT
Start: 2025-03-26

## 2025-03-25 RX ADMIN — Medication 100 MG: at 09:03

## 2025-03-25 RX ADMIN — ESCITALOPRAM OXALATE 10 MG: 5 TABLET, FILM COATED ORAL at 09:03

## 2025-03-25 RX ADMIN — APIXABAN 5 MG: 5 TABLET, FILM COATED ORAL at 09:03

## 2025-03-25 RX ADMIN — ACETAMINOPHEN 650 MG: 325 TABLET ORAL at 01:03

## 2025-03-25 RX ADMIN — FOLIC ACID 1000 MCG: 1 TABLET ORAL at 09:03

## 2025-03-25 RX ADMIN — PANTOPRAZOLE SODIUM 40 MG: 40 TABLET, DELAYED RELEASE ORAL at 05:03

## 2025-03-25 RX ADMIN — Medication 1000 UNITS: at 09:03

## 2025-03-25 RX ADMIN — ONDANSETRON 4 MG: 2 INJECTION INTRAMUSCULAR; INTRAVENOUS at 11:03

## 2025-03-25 RX ADMIN — HYDROCORTISONE: 25 CREAM TOPICAL at 10:03

## 2025-03-25 RX ADMIN — NEPHROCAP 1 CAPSULE: 1 CAP ORAL at 09:03

## 2025-03-25 NOTE — ASSESSMENT & PLAN NOTE
Chronic. Last Ultomiris dose 2/24/2025. Outpatient hematologist is Roma Cantu MD at [facility]. Seen by Hematology here. Appreciate assistance with management.

## 2025-03-25 NOTE — PT/OT/SLP PROGRESS
Occupational Therapy  Co-Treatment with PTA    Name: Madelaine Barros  MRN: [id number]  Admitting Diagnosis: Rectal bleeding  Recent Surgery: Procedure(s) (LRB):  EGD (ESOPHAGOGASTRODUODENOSCOPY) (N/A) 14 Days Post-Op    Recommendations:     Discharge Recommendations: Moderate Intensity Therapy  Discharge Equipment Recommendations: bedside commode  Barriers to discharge: Other (Comment) (requires increased assistance)    Assessment:     Madelaine Barros is a 59 y.o. female with a medical diagnosis of Rectal bleeding. She presents with performance deficits affecting function including: weakness, impaired endurance, impaired self care skills, impaired functional mobility, gait instability, impaired balance, decreased coordination, decreased upper extremity function, decreased lower extremity function, decreased safety awareness, pain, impaired cardiopulmonary response to activity. Patient agreeable to participate in therapy session this AM. Patient making progress with established OT goals. At this time, patient remains limited in ADLs, transfers, and functional mobility and is currently not performing tasks at their reported PLOF. Patient would benefit from continued skilled acute OT services in order to maximize IND with ADLs and functional mobility to ensure safe return to PLOF in the least restrictive environment. OT continuing to recommend Moderate Intensity Therapy once patient is medically appropriate for d/c.    Rehab Prognosis: Good; patient would benefit from acute OT services to address these deficits and reach maximum level of function.    Plan:     Patient to be seen 4 x/week to address the above listed problems via self-care/home management, therapeutic activities, therapeutic exercises, neuromuscular re-education  Plan of Care Expires: 04/10/25  Plan of Care Reviewed with: patient    Subjective     Chief Complaint: Patient with c/o headache.  Patient Comments/Goals: Patient with goal to increase  functional independence.  Pain/Comfort:  Pain Rating 1: 3/10  Location - Orientation 1: generalized  Location 1: head (headache)  Pain Addressed 1: Reposition, Distraction  Pain Rating Post-Intervention 1: other (see comments) (unrated)    Objective:     Communicated with RN prior to session. Patient cleared to participate in therapy at this time. Patient found HOB elevated with pulse ox (continuous), telemetry, pressure relief boots upon OT entry to room. No visitors present in room upon OT entry.    General Precautions: Standard, fall   Orthopedic Precautions: N/A   Braces: N/A  Respiratory Status: Room air    Occupational Performance    Bed Mobility:  Patient completed Supine to Sit with moderate assistance  Patient completed anterior Scooting towards the EOB with minimum assistance  Patient completed Sit to Supine with maximal assistance and 2 persons  Patient completed Scooting/Bridging towards the HOB via drawsheet with total assistance and 2 persons    Functional Mobility/Transfers:  Not performed at this time due to patient with increased fatigue sitting EOB, requesting to return to supine at this time.    Activities of Daily Living:  Grooming: minimum assistance to wash face with washcloth seated EOB  Lower Body Dressing: maximal assistance to janet B socks at bed-level; total assistance to doff and re-janet B pressure relief boots at bed-level    Balance:  Static sitting balance: max A, progressing to min A; noted, patient with posterior lean, requiring verbal/tactile cues as well as physical A to maintain upright posture  Dynamic sitting balance: max A, progressing to min A; noted, patient with posterior lean, requiring verbal/tactile cues as well as physical A to maintain upright posture    AMPAC 6 Click ADL:  AMPAC Total Score: 14    Treatment & Education:  Patient educated on:   Role of OT, OT POC, and discharge planning.  Safe transfer techniques and proper body mechanics for fall prevention and  improved independence with functional transfers.  Importance of OOB activities to increase endurance and tolerance for increased participation in daily ADLs.  Various therapeutic exercises patient can perform outside of therapy sessions to increase functional endurance and strength for overall improved independence in occupations of choice.  Time provided for therapeutic counseling and discussion of health disposition.  Utilizing the call bell to request for assistance with all functional mobility to ensure safety during hospital stay.  Whiteboard updated.    Patient verbalized understanding and all questions were addressed within the scope of OT.    Patient left HOB elevated with all lines intact, call button in reach, RN notified, and no visitors present.    GOALS:   Multidisciplinary Problems       Occupational Therapy Goals          Problem: Occupational Therapy    Goal Priority Disciplines Outcome Interventions   Occupational Therapy Goal     OT, PT/OT Progressing    Description: Goals to be met by: 04/10/2025     Patient will increase functional independence with ADLs by performing:    UE Dressing with Minimal Assistance.  LE Dressing with Moderate Assistance.  Grooming while seated with Supervision.  Toileting from bedside commode with Moderate Assistance for hygiene and clothing management.   Sitting at edge of bed x15 minutes with Stand-by Assistance.  Rolling to Bilateral with Supervision.   Supine to sit with Minimal Assistance.  Stand pivot transfers with Moderate Assistance.                       DME Justifications:  Bedside Commode - Patient requires a commode for home use because she is confined to a single room.    Time Tracking:     OT Date of Treatment: 03/25/25  OT Start Time: 0843  OT Stop Time: 0906  OT Total Time (min): 23 min    Billable Minutes: Self Care/Home Management 8  Therapeutic Activity 15    Co-treatment performed with PTA due to patient's multiple deficits requiring two skilled  therapists to appropriately and safely assess patient's strength, endurance, functional mobility, and ADL performance while facilitating functional tasks in addition to accommodating for patient's activity tolerance and medical acuity.     3/25/2025

## 2025-03-25 NOTE — PLAN OF CARE
Problem: Occupational Therapy  Goal: Occupational Therapy Goal  Description: Goals to be met by: 04/10/2025     Patient will increase functional independence with ADLs by performing:    UE Dressing with Minimal Assistance.  LE Dressing with Moderate Assistance.  Grooming while seated with Supervision.  Toileting from bedside commode with Moderate Assistance for hygiene and clothing management.   Sitting at edge of bed x15 minutes with Stand-by Assistance.  Rolling to Bilateral with Supervision.   Supine to sit with Minimal Assistance.  Stand pivot transfers with Moderate Assistance.    Outcome: Progressing

## 2025-03-25 NOTE — ASSESSMENT & PLAN NOTE
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 . Most recent hemoglobin and hematocrit are listed below.  Recent Labs     03/23/25  0353 03/24/25  0500   HGB 7.6* 7.4*   HCT 24.2* 23.3*     Plan  - Monitor serial CBC  - Transfuse PRBC if patient becomes hemodynamically unstable, symptomatic or H/H drops below 7/21.  - Transfused pRBCs 3/8/2025 after having epistaxis.  - Stable until she had some bleeding at new PEG site. Transfuse again 3/12/2025.  - now stable

## 2025-03-25 NOTE — NURSING
Pt discharged by family via wheelchair. Discharge instruction reviewed at bedside, medication and all belongings sent w pt.

## 2025-03-25 NOTE — ASSESSMENT & PLAN NOTE
Patient's blood pressure range in the last 24 hours was: BP  Min: 95/59  Max: 129/84.The patient's inpatient anti-hypertensive regimen is listed below:  Current Antihypertensives       Plan  Was started on nifedipine and hydralazine. Blood pressures too well controlled so holding nifedipine and hydralazine.

## 2025-03-25 NOTE — SUBJECTIVE & OBJECTIVE
Interval History: No further bleeding. Has been on apixaban for 48 hours. Can go home now.    Review of Systems   Respiratory:  Negative for cough and shortness of breath.    Cardiovascular:  Negative for chest pain and palpitations.     Objective:     Vital Signs (Most Recent):  Temp: 98.8 °F (37.1 °C) (03/25/25 0753)  Pulse: 92 (03/25/25 0930)  Resp: 13 (03/25/25 0930)  BP: 106/67 (03/25/25 0753)  SpO2: 100 % (03/25/25 0930) Vital Signs (24h Range):  Temp:  [97.7 °F (36.5 °C)-99.3 °F (37.4 °C)] 98.8 °F (37.1 °C)  Pulse:  [76-98] 92  Resp:  [13-18] 13  SpO2:  [88 %-100 %] 100 %  BP: ()/(58-84) 106/67     Weight: 73.6 kg (162 lb 4.1 oz)  Body mass index is 27 kg/m².    Intake/Output Summary (Last 24 hours) at 3/25/2025 1043  Last data filed at 3/24/2025 1800  Gross per 24 hour   Intake 1000 ml   Output 400 ml   Net 600 ml           Physical Exam  Vitals and nursing note reviewed.   Constitutional:       General: She is not in acute distress.     Appearance: She is well-developed. She is not diaphoretic.      Interventions: She is not intubated.  Pulmonary:      Effort: Pulmonary effort is normal. No accessory muscle usage or respiratory distress. She is not intubated.   Skin:     General: Skin is warm and dry.      Coloration: Skin is not jaundiced or pale.   Neurological:      Mental Status: She is alert.      Motor: No tremor or seizure activity.   Psychiatric:         Attention and Perception: Attention normal.         Mood and Affect: Mood and affect normal.         Behavior: Behavior is not aggressive, hyperactive or combative.               Significant Labs: All pertinent labs within the past 24 hours have been reviewed.  CBC:   Recent Labs   Lab 03/24/25  0500   WBC 9.34   HGB 7.4*   HCT 23.3*          Significant Imaging: I have reviewed all pertinent imaging results/findings within the past 24 hours.

## 2025-03-25 NOTE — SUBJECTIVE & OBJECTIVE
Interval History: HD yesterday, 0 UF. No further bleeding.     Review of patient's allergies indicates:  No Known Allergies  Current Facility-Administered Medications   Medication Frequency    acetaminophen tablet 650 mg Q8H PRN    aluminum-magnesium hydroxide-simethicone 200-200-20 mg/5 mL suspension 30 mL QID PRN    apixaban tablet 5 mg BID    dextrose 50% injection 12.5 g PRN    dextrose 50% injection 25 g PRN    EScitalopram oxalate tablet 10 mg Daily    folic acid tablet 1,000 mcg Daily    glucagon (human recombinant) injection 1 mg PRN    glucose chewable tablet 16 g PRN    glucose chewable tablet 24 g PRN    heparin (porcine) injection 1,000 Units PRN    hydrocortisone 2.5 % rectal cream BID    melatonin tablet 6 mg Nightly PRN    mirtazapine tablet 15 mg QHS    naloxone 0.4 mg/mL injection 0.02 mg PRN    ondansetron injection 4 mg Q8H PRN    oxyCODONE-acetaminophen 5-325 mg per tablet 1 tablet Q6H PRN    pantoprazole EC tablet 40 mg BID AC    polyethylene glycol packet 17 g Daily PRN    prochlorperazine injection Soln 5 mg Q6H PRN    senna-docusate 8.6-50 mg per tablet 1 tablet BID PRN    simethicone chewable tablet 80 mg TID PRN    sodium chloride 0.9% flush 10 mL Q6H PRN    thiamine tablet 100 mg Daily    vitamin D 1000 units tablet 1,000 Units Daily    vitamin renal formula (B-complex-vitamin c-folic acid) 1 mg per capsule 1 capsule Daily       Objective:     Vital Signs (Most Recent):  Temp: 98.7 °F (37.1 °C) (03/25/25 1147)  Pulse: 96 (03/25/25 1147)  Resp: 13 (03/25/25 1147)  BP: 101/60 (03/25/25 1149)  SpO2: 99 % (03/25/25 1147) Vital Signs (24h Range):  Temp:  [97.7 °F (36.5 °C)-99.3 °F (37.4 °C)] 98.7 °F (37.1 °C)  Pulse:  [89-98] 96  Resp:  [12-18] 13  SpO2:  [99 %-100 %] 99 %  BP: ()/(58-68) 101/60     Weight: 73.6 kg (162 lb 4.1 oz) (03/22/25 0400)  Body mass index is 27 kg/m².  Body surface area is 1.84 meters squared.    I/O last 3 completed shifts:  In: 1000 [P.O.:100; Other:400;  NG/GT:500]  Out: 400 [Other:400]     Physical Exam  Vitals and nursing note reviewed.   Constitutional:       General: She is not in acute distress.     Appearance: She is well-developed. She is not diaphoretic.      Interventions: She is not intubated.  Pulmonary:      Effort: Pulmonary effort is normal. No accessory muscle usage or respiratory distress. She is not intubated.   Skin:     General: Skin is warm and dry.      Coloration: Skin is not jaundiced or pale.   Neurological:      Mental Status: She is alert.      Motor: No tremor or seizure activity.   Psychiatric:         Attention and Perception: Attention normal.         Mood and Affect: Mood and affect normal.         Behavior: Behavior is not aggressive, hyperactive or combative.          Significant Labs:  CBC:   Recent Labs   Lab 03/24/25  0500   WBC 9.34   RBC 2.59*   HGB 7.4*   HCT 23.3*      MCV 90   MCH 28.6   MCHC 31.8*     CMP:   Recent Labs   Lab 03/21/25  0713 03/24/25  0500 03/24/25  0500 03/24/25  1137   GLU 92  --   --   --    CALCIUM 8.4* 7.9*  --   --    ALBUMIN 2.2* 1.8*  --   --    PROT 5.8*  --   --   --    * 132*  --   --    K 3.7 4.9  --   --    CO2 24 24  --   --    CL 95 100  --   --    BUN 40* 39*   < > 13   CREATININE 2.7* 3.3*  --   --    ALKPHOS 190* 117  --   --    ALT 12 8*  --   --    AST 51* 40  --   --    BILITOT 0.8 0.8  --   --     < > = values in this interval not displayed.     All labs within the past 24 hours have been reviewed.

## 2025-03-25 NOTE — PROGRESS NOTES
[facility] - Telemetry Stepdown  Nephrology  Progress Note    Patient Name: Madelaine Barros  MRN: [id number]  Admission Date: 3/2/2025  Hospital Length of Stay: 22 days  Attending Provider: Edi Staton MD   Primary Care Physician: Delilah Kelley MD  Principal Problem:Rectal bleeding    Subjective:     Interval History: HD yesterday, 0 UF. No further bleeding.     Review of patient's allergies indicates:  No Known Allergies  Current Facility-Administered Medications   Medication Frequency    acetaminophen tablet 650 mg Q8H PRN    aluminum-magnesium hydroxide-simethicone 200-200-20 mg/5 mL suspension 30 mL QID PRN    apixaban tablet 5 mg BID    dextrose 50% injection 12.5 g PRN    dextrose 50% injection 25 g PRN    EScitalopram oxalate tablet 10 mg Daily    folic acid tablet 1,000 mcg Daily    glucagon (human recombinant) injection 1 mg PRN    glucose chewable tablet 16 g PRN    glucose chewable tablet 24 g PRN    heparin (porcine) injection 1,000 Units PRN    hydrocortisone 2.5 % rectal cream BID    melatonin tablet 6 mg Nightly PRN    mirtazapine tablet 15 mg QHS    naloxone 0.4 mg/mL injection 0.02 mg PRN    ondansetron injection 4 mg Q8H PRN    oxyCODONE-acetaminophen 5-325 mg per tablet 1 tablet Q6H PRN    pantoprazole EC tablet 40 mg BID AC    polyethylene glycol packet 17 g Daily PRN    prochlorperazine injection Soln 5 mg Q6H PRN    senna-docusate 8.6-50 mg per tablet 1 tablet BID PRN    simethicone chewable tablet 80 mg TID PRN    sodium chloride 0.9% flush 10 mL Q6H PRN    thiamine tablet 100 mg Daily    vitamin D 1000 units tablet 1,000 Units Daily    vitamin renal formula (B-complex-vitamin c-folic acid) 1 mg per capsule 1 capsule Daily       Objective:     Vital Signs (Most Recent):  Temp: 98.7 °F (37.1 °C) (03/25/25 1147)  Pulse: 96 (03/25/25 1147)  Resp: 13 (03/25/25 1147)  BP: 101/60 (03/25/25 1149)  SpO2: 99 % (03/25/25 1147) Vital Signs (24h Range):  Temp:  [97.7 °F (36.5 °C)-99.3 °F  (37.4 °C)] 98.7 °F (37.1 °C)  Pulse:  [89-98] 96  Resp:  [12-18] 13  SpO2:  [99 %-100 %] 99 %  BP: ()/(58-68) 101/60     Weight: 73.6 kg (162 lb 4.1 oz) (03/22/25 0400)  Body mass index is 27 kg/m².  Body surface area is 1.84 meters squared.    I/O last 3 completed shifts:  In: 1000 [P.O.:100; Other:400; NG/GT:500]  Out: 400 [Other:400]     Physical Exam  Vitals and nursing note reviewed.   Constitutional:       General: She is not in acute distress.     Appearance: She is well-developed. She is not diaphoretic.      Interventions: She is not intubated.  Pulmonary:      Effort: Pulmonary effort is normal. No accessory muscle usage or respiratory distress. She is not intubated.   Skin:     General: Skin is warm and dry.      Coloration: Skin is not jaundiced or pale.   Neurological:      Mental Status: She is alert.      Motor: No tremor or seizure activity.   Psychiatric:         Attention and Perception: Attention normal.         Mood and Affect: Mood and affect normal.         Behavior: Behavior is not aggressive, hyperactive or combative.          Significant Labs:  CBC:   Recent Labs   Lab 03/24/25  0500   WBC 9.34   RBC 2.59*   HGB 7.4*   HCT 23.3*      MCV 90   MCH 28.6   MCHC 31.8*     CMP:   Recent Labs   Lab 03/21/25  0713 03/24/25  0500 03/24/25  0500 03/24/25  1137   GLU 92  --   --   --    CALCIUM 8.4* 7.9*  --   --    ALBUMIN 2.2* 1.8*  --   --    PROT 5.8*  --   --   --    * 132*  --   --    K 3.7 4.9  --   --    CO2 24 24  --   --    CL 95 100  --   --    BUN 40* 39*   < > 13   CREATININE 2.7* 3.3*  --   --    ALKPHOS 190* 117  --   --    ALT 12 8*  --   --    AST 51* 40  --   --    BILITOT 0.8 0.8  --   --     < > = values in this interval not displayed.     All labs within the past 24 hours have been reviewed.         Assessment/Plan:     Renal/  ESRD (end stage renal disease)  Outpatient HD Information:  -Dialysis modality: Hemodialysis  -HD TX days:  Monday/Wednesday/Friday  -Last HD TX prior to hospital admission: ?  -Dialysis access: dialysis catheter  -Residual urine: Anuric   -EDW: ?    Recommendations    -Hyponatremia in setting of ESRD. Discontinue FWF   -Continue MWF iHD schedule   -Renal diet when not NPO    GI  * Rectal bleeding  - per primary         Thank you for your consult. I will follow-up with patient. Please contact us if you have any additional questions.    Gldays Feliciano DNP  Nephrology  [facility] - Telemetry Stepdown

## 2025-03-25 NOTE — PROGRESS NOTES
[facility]    [facility] Home (Bolus) Tube Feeding Education/Discharge Planning Note:     Bedside home Bolus Feeding education completed with the patient and sister on 3/25/25. Home tube feeding regimen (Bolus 4 containers of Isosource 1.5 daily + 50 ml syringe water flush before and 150 ml syringe water flush after each feed) reviewed and provided. Teach back by the patient and sister demonstrated. Also provided review/education on flushing requirements, formula safety, HOB elevation requirements, giving medications through feeding tube, and feeding tube site care. Additional education materials also provided. Time was allotted for questions; questions addressed appropriately. The patient and her sister family are agreeable with the enteral discharge plan and [facility] consent to treatment form reviewed and acknowledged by the patient and her sister. The patient's home tube feeding supplies and formula will be delivered to the home. Provided patient with [facility] support number [phone]. The patient is ready discharge home from [facility] perspective. Patient's discharge planning team and bedside nurse updated with the information above.      Please do not hesitate reach out for any additional needs.    [facility]  Nery Bustillos RN, Clinical Educator  Cell [phone]  Office [phone]  Fax [phone]

## 2025-03-25 NOTE — ASSESSMENT & PLAN NOTE
Outpatient HD Information:  -Dialysis modality: Hemodialysis  -HD TX days: Monday/Wednesday/Friday  -Last HD TX prior to hospital admission: ?  -Dialysis access: dialysis catheter  -Residual urine: Anuric   -EDW: ?    Recommendations  -Continue MWF iHD schedule   -Renal diet when not NPO

## 2025-03-25 NOTE — ASSESSMENT & PLAN NOTE
Appreciate Colorectal Surgery. Found to have an anal fissure on 3/3/2025. Bleeding subsided. Recurred on 3/21/2025. Giving hydrocortisone rectal cream. Bleeding subsided. Resumed apixaban. Resolved.

## 2025-03-25 NOTE — PLAN OF CARE
Problem: Adult Inpatient Plan of Care  Goal: Plan of Care Review  Outcome: Not Progressing  Goal: Patient-Specific Goal (Individualized)  Outcome: Not Progressing  Goal: Absence of Hospital-Acquired Illness or Injury  Outcome: Not Progressing  Goal: Optimal Comfort and Wellbeing  Outcome: Not Progressing  Goal: Readiness for Transition of Care  Outcome: Not Progressing     Problem: Infection  Goal: Absence of Infection Signs and Symptoms  Outcome: Not Progressing      no concerns

## 2025-03-25 NOTE — PROGRESS NOTES
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 She lives in [facility]. She works for [employer]. Her primary care physician is Dr. Delilah Kelley. Her hematologist is Dr. Roma Cantu.              She was hospitalized at [facility] from 10/23/2024 to 11/6/2024.              She was hospitalized at [facility] from 11/15/2024 to 1/18/2024.              She was hospitalized at [facility] from 12/9/2024 to 12/19/2024.              She was hospitalized at [facility] from 12/25/2024 to 1/5/2025.              She was hospitalized at [facility] from 1/5/2025 to 1/31/2025. She was discharged to [facility] inpatient rehab until 2/18/2025.               She presented to [facility] Emergency Department on 3/2/2025 with rectal pain and bleeding. She receives her care in the [facility] system, not at [facility]. History was provided by her daughter Sue Barros. She is followed by Hematology at [facility] and has been on immunomodulator infusions every 8 weeks (Soliris, now Ultomiris, last dose 2/24/2025). She has severe debility, poor appetite, dysphagia to solids, mostly consumes liquid, but her daughter was concerned her nutritional intake is inadequate and inquired about feeding tube placement for chronic nutrition. She had watery stool on the day of presentation. Her sister noted that she had gingival bleeding. She reported an anal lesion with tenderness and slow bleeding. She has not required frequent transfusions. Her daughter noted that all home antihypertensive medications were discontinued since she was discharged from inpatient rehab.              In the emergency department, she appeared ill, lethargic, unable to fully participate in interview, periodically awakening with pain. Labs showed hemoglobin 14.3 g/dL (from 13.2 on 2/24/2025). Repeat hemoglobin was 10.4. Alkaline phosphatase was 251 U/L, total bilirubin was 2.2 mg/dL, AST was 733 U/L, ALT was 478 U/L. She was given 2.1 liters of IV fluids, vancomycin, piperacillin-tazobactam, 4 mg of IV morphine, 80 mg of IV pantoprazole, topical lidocaine for rectal pain. She was admitted to Hospital Medicine Team S.     Overview/Hospital Course:  GGT was elevated at 447 U/L. LDH was elevated. Colorectal Surgery, Hematology, Nephrology, and Case Management were consulted. Colorectal Surgery noted a posterior midline anal fissure. She expressed desire to go to [facility] since she gets her care at [facility]. Her mental status has been declining since October 2024. She has memory loss, disorientation, and does not remember her illness. Her mother had dementia. She had chest pain on 3/3/2025 while getting dialysis. EKG showed sinus tachycardia. Troponin was 1102 ng/L. Repeat was 704. Hematology recommended giving 2 units of fresh frozen plasma (FFP) and starting low rate heparin infusion then transitioning to apixaban 2.5 mg twice daily if she had no  further bleeding. She was given another 2 units of FFP. Her daughter requested gastrostomy tube placement for malnutrition. On 3/4/2025, heparin was held due to recurrent bleeding. She had intermittent somnolence. She was kept NPO. Speech Language Pathology was consulted. She was hypoglycemic so was put on 10% dextrose drip. Speech Language Pathology recommended full liquid diet. LFTs trended down. Heparin infusion was restarted on 3/5/2025. On 3/6/2025, her daughter decided that she did not want transfer to a [facility] hospital, instead she wanted to transfer her medical care to the [facility] system. Hemoglobin and hematocrit remained stable with no evidence of recurrent bleeding on 3/7/2025. Heparin drip was continued. She had epistaxis on 3/7/2025. Hemoglobin decreased to 6.7 g/dL on 3/8/2025. She developed right arm swelling. Repeat hemoglobin was 5.7. Heparin drip was held. She was transfused 1 unit of packed red blood cells (PRBCs). Hemoglobin improved to 8.2. Right upper extremity venous ultrasound showed known right internal jugular thrombus as well as subclavian thrombus, although the subclavian vein was not visualized when the internal jugular thrombus was diagnosed, so it was unknown if it was new. Hemoglobin was stable on 3/10/2025. Heparin drip was resumed. Sodium was found to be 118 mmol/L. She had been on dextrose drip for days to treat hypoglycemia due to poor oral intake. She was given normal saline instead. Gastroenterology placed a gastrostomy tube on 3/11/2025. Around midnight that night, she vomited twice. Nausea resolved. Abdomen X-ray showed nothing concerning. She tolerated tube feeds and heparin was transitioned to apixaban. Hyponatremia progressively improved. She had hypophosphatemia that was treated. Case Management worked on skilled nursing facility placement. On 3/20/2025, family opted to take her home with home health.  On 3/21/2025, she had recurrent rectal bleeding with demond red blood,  with rectal pain when passing gas and defecating. Apixaban was held. Ultrasound showed persistent occlusive thrombus in the right internal jugular vein. Bleeding subsided. Her blood pressures were too well controlled on the new carvedilol, nifedipine, and hydralazine, so they were stopped. Apixaban was resumed on 3/23/2025. She was monitored for 48 hours on apixaban and had no recurrence of rectal bleeding or rectal pain. She was discharged home with home health on 3/25/2025.     Interval History: No further bleeding. Has been on apixaban for 48 hours. Can go home now.    Review of Systems   Respiratory:  Negative for cough and shortness of breath.    Cardiovascular:  Negative for chest pain and palpitations.     Objective:     Vital Signs (Most Recent):  Temp: 98.8 °F (37.1 °C) (03/25/25 0753)  Pulse: 92 (03/25/25 0930)  Resp: 13 (03/25/25 0930)  BP: 106/67 (03/25/25 0753)  SpO2: 100 % (03/25/25 0930) Vital Signs (24h Range):  Temp:  [97.7 °F (36.5 °C)-99.3 °F (37.4 °C)] 98.8 °F (37.1 °C)  Pulse:  [76-98] 92  Resp:  [13-18] 13  SpO2:  [88 %-100 %] 100 %  BP: ()/(58-84) 106/67     Weight: 73.6 kg (162 lb 4.1 oz)  Body mass index is 27 kg/m².    Intake/Output Summary (Last 24 hours) at 3/25/2025 1043  Last data filed at 3/24/2025 1800  Gross per 24 hour   Intake 1000 ml   Output 400 ml   Net 600 ml           Physical Exam  Vitals and nursing note reviewed.   Constitutional:       General: She is not in acute distress.     Appearance: She is well-developed. She is not diaphoretic.      Interventions: She is not intubated.  Pulmonary:      Effort: Pulmonary effort is normal. No accessory muscle usage or respiratory distress. She is not intubated.   Skin:     General: Skin is warm and dry.      Coloration: Skin is not jaundiced or pale.   Neurological:      Mental Status: She is alert.      Motor: No tremor or seizure activity.   Psychiatric:         Attention and Perception: Attention normal.         Mood and  Affect: Mood and affect normal.         Behavior: Behavior is not aggressive, hyperactive or combative.               Significant Labs: All pertinent labs within the past 24 hours have been reviewed.  CBC:   Recent Labs   Lab 03/24/25  0500   WBC 9.34   HGB 7.4*   HCT 23.3*          Significant Imaging: I have reviewed all pertinent imaging results/findings within the past 24 hours.      Assessment & Plan  Rectal bleeding  Appreciate Colorectal Surgery. Found to have an anal fissure on 3/3/2025. Bleeding subsided. Recurred on 3/21/2025. Giving hydrocortisone rectal cream. Bleeding subsided. Resumed apixaban. Resolved.   Unspecified severe protein-calorie malnutrition  Severe malnutrition patient with recent hx of worsening dysphagia, has had w/u for scleroderma, has hiatal hernia and ?esophageal dysmotility  - dysphagia to solids.   Developed wernicke encephalopathy from nutritional deficiency     Daughter reports concern of inadequate intake has had severe weight loss in recent months with multiple complex health conditions. She requested PEG placement. Nutrition gave recommendations on tube feeds. Giving minced and moist diet, Novasource Renal supplements.  Dysphagia  Appreciate SLP. Advanced diet to minced and moist. Appreciate GI. PEG placed 3/11/2025. Tolerating tube feeds.  Wernicke encephalopathy  Continue on home thiamine supplementation.     GERD without esophagitis  Continue home pantoprazole.    ESRD (end stage renal disease)  Nephrology managing dialysis  Atypical HUS (hemolytic uremic syndrome) with mutation in thrombomodulin gene  Chronic. Last Ultomiris dose 2/24/2025. Outpatient hematologist is Roma Cantu MD at [facility]. Seen by Hematology here. Appreciate assistance with management.  Elevated transaminase level  Hepatic steatosis on CT. Worsening hepatic function may contribute to her worsening coagulopathy elevated PT/PTT. Has significantly improved.  Renal hematoma, left, sequela  In  January, due to biopsy in 2024. Most recent imaging showed resolving appearance of remote hematoma.   Anemia of chronic renal failure, stage 5  Anemia is likely due to acute blood loss which was from GI bleed and active hemolysis due to HUS.  . Most recent hemoglobin and hematocrit are listed below.  Recent Labs     03/23/25  0353 03/24/25  0500   HGB 7.6* 7.4*   HCT 24.2* 23.3*     Plan  - Monitor serial CBC  - Transfuse PRBC if patient becomes hemodynamically unstable, symptomatic or H/H drops below 7/21.  - Transfused pRBCs 3/8/2025 after having epistaxis.  - Stable until she had some bleeding at new PEG site. Transfuse again 3/12/2025.  - now stable  Hyponatremia  Hyponatremia is likely due to hypotonic fluids and poor oral intake. The patient's most recent sodium results are listed below.  Recent Labs     03/24/25  0500   *     Plan  - Monitor.  stable    Hypertension  Patient's blood pressure range in the last 24 hours was: BP  Min: 95/59  Max: 129/84.The patient's inpatient anti-hypertensive regimen is listed below:  Current Antihypertensives       Plan  Was started on nifedipine and hydralazine. Blood pressures too well controlled so holding nifedipine and hydralazine.    VTE Risk Mitigation (From admission, onward)           Ordered     apixaban tablet 5 mg  2 times daily         03/23/25 0957     heparin (porcine) injection 1,000 Units  As needed (PRN)         03/03/25 1524     IP VTE HIGH RISK PATIENT  Once         03/03/25 0416     Place sequential compression device  Until discontinued         03/03/25 0416     Reason for No Pharmacological VTE Prophylaxis  Once        Question:  Reasons:  Answer:  Active Bleeding    03/03/25 0416                    Discharge Planning   PATRICIA: 3/25/2025     Code Status: Full Code   Medical Readiness for Discharge Date: 3/25/2025  Discharge Plan A: Skilled Nursing Facility   Discharge Delays: (!) Post-Acute Set-up (plan changed from home w/HH to  SNF)            Please place Justification for DME        Edi Staton MD  Department of Hospital Medicine   [facility] - Telemetry Stepdown

## 2025-03-25 NOTE — PT/OT/SLP PROGRESS
Physical Therapy Treatment  Co-treatment with OT due to acuity of condition, level of skilled assist needed for assessment of safety with mobility and potential of not tolerating a second treatment session.     Patient Name:  Madelaine Barros   MRN:  [id number]    Recommendations:     Discharge Recommendations: Moderate Intensity Therapy  Discharge Equipment Recommendations: none  Barriers to discharge:  current level of assistance required     Assessment:     Madelaine Barros is a 59 y.o. female admitted with a medical diagnosis of Rectal bleeding.  She presents with the following impairments/functional limitations: weakness, impaired endurance, impaired self care skills, impaired functional mobility, impaired balance, pain, decreased safety awareness, decreased lower extremity function, decreased upper extremity function, decreased ROM, impaired skin, impaired cardiopulmonary response to activity Pt tolerated treatment session well today. Pt is still requiring assistance for bed mobility, and EOB sitting, and still expierncing dizziness with EOB sitting. Pt was able to tolerate prolonged EOB sitting on this date. Pt with B LE musculature (quads). Writing PTA educating pt on stretching exercises to help increase ROM. Patient remains appropriate for continued skilled services within the acute environment and goals remain appropriate.   .    Rehab Prognosis: Good; patient would benefit from acute skilled PT services to address these deficits and reach maximum level of function.    Recent Surgery: Procedure(s) (LRB):  EGD (ESOPHAGOGASTRODUODENOSCOPY) (N/A) 14 Days Post-Op    Plan:     During this hospitalization, patient to be seen 4 x/week to address the identified rehab impairments via gait training, therapeutic activities, therapeutic exercises, neuromuscular re-education and progress toward the following goals:    Plan of Care Expires:  04/09/25    Subjective     Chief Complaint: B leg tightness/pain    Patient/Family Comments/goals: Pt agreeable to PT   Pain/Comfort:  Pain Rating 1: 3/10  Location - Side 1: Bilateral  Location - Orientation 1: generalized  Location 1:  (headache)  Pain Addressed 1: Reposition, Distraction  Pain Rating Post-Intervention 1:  (not rated)      Objective:     Communicated with Rn prior to session.  Patient found supine with telemetry, pulse ox (continuous), pressure relief boots upon PT entry to room.     General Precautions: Standard, aspiration, fall, NPO  Orthopedic Precautions: N/A  Braces: N/A  Respiratory Status: Room air     Functional Mobility:  Bed Mobility:     Scooting: minimum assistance  Supine to Sit: moderate assistance  EOB sitting: ~ 10 minutes initially requiring MaxA due to posterior lean, yet progressing to Rosa   Pt required verbal and tactile cueing for upright sitting posture and awareness to midline   Sit to Supine: maximal assistance and of 2 persons    Pt performed 10 repetitions of supine B LE exercises consisting of:  AP, QS, Hip ADD/ABD, and heel slides       AM-PAC 6 CLICK MOBILITY  Turning over in bed (including adjusting bedclothes, sheets and blankets)?: 3  Sitting down on and standing up from a chair with arms (e.g., wheelchair, bedside commode, etc.): 2  Moving from lying on back to sitting on the side of the bed?: 2  Moving to and from a bed to a chair (including a wheelchair)?: 2  Need to walk in hospital room?: 1  Climbing 3-5 steps with a railing?: 1  Basic Mobility Total Score: 11       Treatment & Education:  Therapist provided instruction and educated for safety during bed mobility and transfers. As well as proper body mechanics, energy conservation, and fall prevention strategies during tasks listed above, and the effects of prolonged immobility and the importance of performing EOB/OOB activity and exercises to promote healing and reduce recovery time.       Patient left supine with all lines intact, call button in reach, and Rn  notified..    GOALS:   Multidisciplinary Problems       Physical Therapy Goals          Problem: Physical Therapy    Goal Priority Disciplines Outcome Interventions   Physical Therapy Goal     PT, PT/OT Progressing    Description: Goals to be met by: 25     Patient will increase functional independence with mobility by performin. Supine to sit with Modified [place]   2. Sit to supine with Modified [place]  3. Sit to stand transfer with Modified [place] with AD as needed  4. Gait  x 150 feet with Supervision using Rolling Walker.   5. Pt sit on EOB x 10 min with SBA and perform functional activities.  6. Pt perform AAROM there exer BLE 10 x 2 reps to increased tolerance to activities.                          DME Justifications:  No DME recommended requiring DME justifications    Time Tracking:     PT Received On: 25  PT Start Time: 843     PT Stop Time: 906  PT Total Time (min): 23 min     Billable Minutes: Therapeutic Exercise 13 and Neuromuscular Re-education 10    Treatment Type: Treatment  PT/PTA: PTA     Number of PTA visits since last PT visit: 4     2025

## 2025-03-25 NOTE — PLAN OF CARE
Problem: Physical Therapy  Goal: Physical Therapy Goal  Description: Goals to be met by: 25     Patient will increase functional independence with mobility by performin. Supine to sit with Modified [place]   2. Sit to supine with Modified [place]  3. Sit to stand transfer with Modified [place] with AD as needed  4. Gait  x 150 feet with Supervision using Rolling Walker.   5. Pt sit on EOB x 10 min with SBA and perform functional activities.  6. Pt perform AAROM there exer BLE 10 x 2 reps to increased tolerance to activities.     Outcome: Progressing   Goals updated for content and are appropriate. 3/25/2025

## 2025-03-26 NOTE — PLAN OF CARE
[facility] - Telemetry Stepdown  Discharge Final Note    Primary Care Provider: Delilah Kelley MD    Expected Discharge Date: 3/25/2025    Patient discharged 3/25/2025 home with [place]  set up. Family provided transportation home.      Delilah Kelley MD   Specialty: Internal Medicine   Relationship: PCP - General    [address] [place], [place] [id number]   Phone: [phone]      Next Steps: Follow up   Instructions: Pt navigator Karen sent a message to the clinic nurse to contact pt to schedule her hospital f/u visit.   Roma Cantu MD   Specialty: Internal Medicine    [address] [place] [address]   Phone: [phone]      Next Steps: Follow up   Instructions: This is the doctor who manages your atypical hemolytic uremic syndrome treatment.         Final Discharge Note (most recent)       Final Note - 03/26/25 1013          Final Note    Assessment Type Final Discharge Note     Anticipated Discharge Disposition [place] [facility]     Hospital Resources/Appts/Education Provided Appointments scheduled and added to AVS        Post-Acute Status    Post-Acute Authorization Home Health     Home Health Status Set-up Complete/Auth obtained     Coverage [facility] [employer] [place] PPO     Discharge Delays None known at this time                     Important Message from Medicare             Contact Info       Delilah Kelley MD   Specialty: Internal Medicine   Relationship: PCP - General    [address] [place] [address]   Phone: [phone]       Next Steps: Follow up    Instructions: Pt navigator Karen sent a message to the clinic nurse to contact pt to schedule her hospital f/u visit.    Roma Cantu MD   Specialty: Internal Medicine    [address] [place] [address]   Phone: [phone]       Next Steps: Follow up    Instructions: This is the doctor who manages your atypical hemolytic uremic syndrome treatment.             Tete Goetz RN     [phone]

## 2025-05-22 ENCOUNTER — EXTERNAL HOME HEALTH (OUTPATIENT)
Dept: HOME HEALTH SERVICES | Facility: HOSPITAL | Age: 60
End: 2025-05-22
Payer: COMMERCIAL

## 2025-05-29 ENCOUNTER — DOCUMENT SCAN (OUTPATIENT)
Dept: HOME HEALTH SERVICES | Facility: HOSPITAL | Age: 60
End: 2025-05-29
Payer: COMMERCIAL

## 2025-08-18 ENCOUNTER — OUTPATIENT CASE MANAGEMENT (OUTPATIENT)
Dept: ADMINISTRATIVE | Facility: OTHER | Age: 60
End: 2025-08-18
Payer: COMMERCIAL